# Patient Record
Sex: MALE | Race: WHITE | NOT HISPANIC OR LATINO | Employment: OTHER | URBAN - METROPOLITAN AREA
[De-identification: names, ages, dates, MRNs, and addresses within clinical notes are randomized per-mention and may not be internally consistent; named-entity substitution may affect disease eponyms.]

---

## 2017-03-06 ENCOUNTER — TRANSCRIBE ORDERS (OUTPATIENT)
Dept: LAB | Facility: CLINIC | Age: 67
End: 2017-03-06

## 2017-03-06 ENCOUNTER — APPOINTMENT (OUTPATIENT)
Dept: LAB | Facility: CLINIC | Age: 67
End: 2017-03-06
Payer: COMMERCIAL

## 2017-03-06 DIAGNOSIS — E78.5 HYPERLIPIDEMIA: ICD-10-CM

## 2017-03-06 DIAGNOSIS — M10.9 GOUT: ICD-10-CM

## 2017-03-06 DIAGNOSIS — R73.01 IMPAIRED FASTING GLUCOSE: ICD-10-CM

## 2017-03-06 DIAGNOSIS — I10 ESSENTIAL (PRIMARY) HYPERTENSION: ICD-10-CM

## 2017-03-06 LAB
ALBUMIN SERPL BCP-MCNC: 4 G/DL (ref 3.5–5)
ALP SERPL-CCNC: 90 U/L (ref 46–116)
ALT SERPL W P-5'-P-CCNC: 95 U/L (ref 12–78)
ANION GAP SERPL CALCULATED.3IONS-SCNC: 7 MMOL/L (ref 4–13)
AST SERPL W P-5'-P-CCNC: 49 U/L (ref 5–45)
BASOPHILS # BLD AUTO: 0.06 THOUSANDS/ΜL (ref 0–0.1)
BASOPHILS NFR BLD AUTO: 1 % (ref 0–1)
BILIRUB DIRECT SERPL-MCNC: 0.14 MG/DL (ref 0–0.2)
BILIRUB SERPL-MCNC: 0.56 MG/DL (ref 0.2–1)
BUN SERPL-MCNC: 19 MG/DL (ref 5–25)
CALCIUM SERPL-MCNC: 9.3 MG/DL (ref 8.3–10.1)
CHLORIDE SERPL-SCNC: 103 MMOL/L (ref 100–108)
CHOLEST SERPL-MCNC: 171 MG/DL (ref 50–200)
CO2 SERPL-SCNC: 31 MMOL/L (ref 21–32)
CREAT SERPL-MCNC: 1.11 MG/DL (ref 0.6–1.3)
EOSINOPHIL # BLD AUTO: 0.2 THOUSAND/ΜL (ref 0–0.61)
EOSINOPHIL NFR BLD AUTO: 3 % (ref 0–6)
ERYTHROCYTE [DISTWIDTH] IN BLOOD BY AUTOMATED COUNT: 13 % (ref 11.6–15.1)
EST. AVERAGE GLUCOSE BLD GHB EST-MCNC: 120 MG/DL
GFR SERPL CREATININE-BSD FRML MDRD: >60 ML/MIN/1.73SQ M
GLUCOSE SERPL-MCNC: 89 MG/DL (ref 65–140)
HBA1C MFR BLD: 5.8 % (ref 4.2–6.3)
HCT VFR BLD AUTO: 41 % (ref 36.5–49.3)
HDLC SERPL-MCNC: 40 MG/DL (ref 40–60)
HGB BLD-MCNC: 14.2 G/DL (ref 12–17)
LDLC SERPL CALC-MCNC: 60 MG/DL (ref 0–100)
LYMPHOCYTES # BLD AUTO: 2.29 THOUSANDS/ΜL (ref 0.6–4.47)
LYMPHOCYTES NFR BLD AUTO: 36 % (ref 14–44)
MCH RBC QN AUTO: 32.6 PG (ref 26.8–34.3)
MCHC RBC AUTO-ENTMCNC: 34.6 G/DL (ref 31.4–37.4)
MCV RBC AUTO: 94 FL (ref 82–98)
MONOCYTES # BLD AUTO: 0.49 THOUSAND/ΜL (ref 0.17–1.22)
MONOCYTES NFR BLD AUTO: 8 % (ref 4–12)
NEUTROPHILS # BLD AUTO: 3.27 THOUSANDS/ΜL (ref 1.85–7.62)
NEUTS SEG NFR BLD AUTO: 52 % (ref 43–75)
NRBC BLD AUTO-RTO: 0 /100 WBCS
PLATELET # BLD AUTO: 188 THOUSANDS/UL (ref 149–390)
PMV BLD AUTO: 11.2 FL (ref 8.9–12.7)
POTASSIUM SERPL-SCNC: 4 MMOL/L (ref 3.5–5.3)
PROT SERPL-MCNC: 7.2 G/DL (ref 6.4–8.2)
RBC # BLD AUTO: 4.35 MILLION/UL (ref 3.88–5.62)
SODIUM SERPL-SCNC: 141 MMOL/L (ref 136–145)
TRIGL SERPL-MCNC: 357 MG/DL
URATE SERPL-MCNC: 6.7 MG/DL (ref 4.2–8)
WBC # BLD AUTO: 6.33 THOUSAND/UL (ref 4.31–10.16)

## 2017-03-06 PROCEDURE — 83036 HEMOGLOBIN GLYCOSYLATED A1C: CPT

## 2017-03-06 PROCEDURE — 80061 LIPID PANEL: CPT

## 2017-03-06 PROCEDURE — 80076 HEPATIC FUNCTION PANEL: CPT

## 2017-03-06 PROCEDURE — 36415 COLL VENOUS BLD VENIPUNCTURE: CPT

## 2017-03-06 PROCEDURE — 84550 ASSAY OF BLOOD/URIC ACID: CPT

## 2017-03-06 PROCEDURE — 85025 COMPLETE CBC W/AUTO DIFF WBC: CPT

## 2017-03-06 PROCEDURE — 80048 BASIC METABOLIC PNL TOTAL CA: CPT

## 2017-03-17 ENCOUNTER — ALLSCRIPTS OFFICE VISIT (OUTPATIENT)
Dept: OTHER | Facility: OTHER | Age: 67
End: 2017-03-17

## 2017-09-18 DIAGNOSIS — M25.561 PAIN IN RIGHT KNEE: ICD-10-CM

## 2017-09-18 DIAGNOSIS — M10.9 GOUT: ICD-10-CM

## 2017-09-18 DIAGNOSIS — R73.01 IMPAIRED FASTING GLUCOSE: ICD-10-CM

## 2017-09-18 DIAGNOSIS — Z12.5 ENCOUNTER FOR SCREENING FOR MALIGNANT NEOPLASM OF PROSTATE: ICD-10-CM

## 2017-09-18 DIAGNOSIS — M25.562 PAIN IN LEFT KNEE: ICD-10-CM

## 2017-09-18 DIAGNOSIS — R74.8 ABNORMAL LEVELS OF OTHER SERUM ENZYMES: ICD-10-CM

## 2017-09-18 DIAGNOSIS — E78.5 HYPERLIPIDEMIA: ICD-10-CM

## 2017-09-23 ENCOUNTER — APPOINTMENT (OUTPATIENT)
Dept: LAB | Facility: CLINIC | Age: 67
End: 2017-09-23
Payer: COMMERCIAL

## 2017-09-23 DIAGNOSIS — E78.5 HYPERLIPIDEMIA: ICD-10-CM

## 2017-09-23 DIAGNOSIS — R73.01 IMPAIRED FASTING GLUCOSE: ICD-10-CM

## 2017-09-23 DIAGNOSIS — M10.9 GOUT: ICD-10-CM

## 2017-09-23 DIAGNOSIS — Z12.5 ENCOUNTER FOR SCREENING FOR MALIGNANT NEOPLASM OF PROSTATE: ICD-10-CM

## 2017-09-23 LAB
ALBUMIN SERPL BCP-MCNC: 3.9 G/DL (ref 3.5–5)
ALP SERPL-CCNC: 100 U/L (ref 46–116)
ALT SERPL W P-5'-P-CCNC: 125 U/L (ref 12–78)
ANION GAP SERPL CALCULATED.3IONS-SCNC: 7 MMOL/L (ref 4–13)
AST SERPL W P-5'-P-CCNC: 58 U/L (ref 5–45)
BILIRUB SERPL-MCNC: 0.51 MG/DL (ref 0.2–1)
BUN SERPL-MCNC: 18 MG/DL (ref 5–25)
CALCIUM SERPL-MCNC: 8.9 MG/DL (ref 8.3–10.1)
CHLORIDE SERPL-SCNC: 105 MMOL/L (ref 100–108)
CHOLEST SERPL-MCNC: 170 MG/DL (ref 50–200)
CO2 SERPL-SCNC: 26 MMOL/L (ref 21–32)
CREAT SERPL-MCNC: 1.11 MG/DL (ref 0.6–1.3)
ERYTHROCYTE [DISTWIDTH] IN BLOOD BY AUTOMATED COUNT: 13.1 % (ref 11.6–15.1)
EST. AVERAGE GLUCOSE BLD GHB EST-MCNC: 128 MG/DL
GFR SERPL CREATININE-BSD FRML MDRD: 69 ML/MIN/1.73SQ M
GLUCOSE P FAST SERPL-MCNC: 115 MG/DL (ref 65–99)
HBA1C MFR BLD: 6.1 % (ref 4.2–6.3)
HCT VFR BLD AUTO: 42.6 % (ref 36.5–49.3)
HDLC SERPL-MCNC: 37 MG/DL (ref 40–60)
HGB BLD-MCNC: 14.5 G/DL (ref 12–17)
LDLC SERPL CALC-MCNC: 82 MG/DL (ref 0–100)
MCH RBC QN AUTO: 32 PG (ref 26.8–34.3)
MCHC RBC AUTO-ENTMCNC: 34 G/DL (ref 31.4–37.4)
MCV RBC AUTO: 94 FL (ref 82–98)
PLATELET # BLD AUTO: 194 THOUSANDS/UL (ref 149–390)
PMV BLD AUTO: 11.1 FL (ref 8.9–12.7)
POTASSIUM SERPL-SCNC: 3.9 MMOL/L (ref 3.5–5.3)
PROT SERPL-MCNC: 7.4 G/DL (ref 6.4–8.2)
PSA SERPL-MCNC: 1.2 NG/ML (ref 0–4)
RBC # BLD AUTO: 4.53 MILLION/UL (ref 3.88–5.62)
SODIUM SERPL-SCNC: 138 MMOL/L (ref 136–145)
TRIGL SERPL-MCNC: 257 MG/DL
TSH SERPL DL<=0.05 MIU/L-ACNC: 3.1 UIU/ML (ref 0.36–3.74)
URATE SERPL-MCNC: 7 MG/DL (ref 4.2–8)
WBC # BLD AUTO: 4.61 THOUSAND/UL (ref 4.31–10.16)

## 2017-09-23 PROCEDURE — 85027 COMPLETE CBC AUTOMATED: CPT

## 2017-09-23 PROCEDURE — 80053 COMPREHEN METABOLIC PANEL: CPT

## 2017-09-23 PROCEDURE — 80061 LIPID PANEL: CPT

## 2017-09-23 PROCEDURE — 84443 ASSAY THYROID STIM HORMONE: CPT

## 2017-09-23 PROCEDURE — 83036 HEMOGLOBIN GLYCOSYLATED A1C: CPT

## 2017-09-23 PROCEDURE — 36415 COLL VENOUS BLD VENIPUNCTURE: CPT

## 2017-09-23 PROCEDURE — 84550 ASSAY OF BLOOD/URIC ACID: CPT

## 2017-09-23 PROCEDURE — G0103 PSA SCREENING: HCPCS

## 2017-09-27 ENCOUNTER — GENERIC CONVERSION - ENCOUNTER (OUTPATIENT)
Dept: OTHER | Facility: OTHER | Age: 67
End: 2017-09-27

## 2017-09-29 ENCOUNTER — APPOINTMENT (OUTPATIENT)
Dept: LAB | Facility: CLINIC | Age: 67
End: 2017-09-29
Payer: COMMERCIAL

## 2017-09-29 ENCOUNTER — ALLSCRIPTS OFFICE VISIT (OUTPATIENT)
Dept: OTHER | Facility: OTHER | Age: 67
End: 2017-09-29

## 2017-09-29 ENCOUNTER — APPOINTMENT (OUTPATIENT)
Dept: RADIOLOGY | Facility: CLINIC | Age: 67
End: 2017-09-29
Payer: COMMERCIAL

## 2017-09-29 DIAGNOSIS — R74.8 ABNORMAL LEVELS OF OTHER SERUM ENZYMES: ICD-10-CM

## 2017-09-29 DIAGNOSIS — M25.561 PAIN IN RIGHT KNEE: ICD-10-CM

## 2017-09-29 DIAGNOSIS — M25.562 PAIN IN LEFT KNEE: ICD-10-CM

## 2017-09-29 LAB
FERRITIN SERPL-MCNC: 164 NG/ML (ref 8–388)
GGT SERPL-CCNC: 121 U/L (ref 5–85)

## 2017-09-29 PROCEDURE — 82728 ASSAY OF FERRITIN: CPT

## 2017-09-29 PROCEDURE — 86803 HEPATITIS C AB TEST: CPT

## 2017-09-29 PROCEDURE — 86235 NUCLEAR ANTIGEN ANTIBODY: CPT

## 2017-09-29 PROCEDURE — 86255 FLUORESCENT ANTIBODY SCREEN: CPT

## 2017-09-29 PROCEDURE — 82977 ASSAY OF GGT: CPT

## 2017-09-29 PROCEDURE — 86704 HEP B CORE ANTIBODY TOTAL: CPT

## 2017-09-29 PROCEDURE — 87340 HEPATITIS B SURFACE AG IA: CPT

## 2017-09-29 PROCEDURE — 83516 IMMUNOASSAY NONANTIBODY: CPT

## 2017-09-29 PROCEDURE — 86256 FLUORESCENT ANTIBODY TITER: CPT

## 2017-09-29 PROCEDURE — 36415 COLL VENOUS BLD VENIPUNCTURE: CPT

## 2017-09-29 PROCEDURE — 82784 ASSAY IGA/IGD/IGG/IGM EACH: CPT

## 2017-09-29 PROCEDURE — 73562 X-RAY EXAM OF KNEE 3: CPT

## 2017-09-29 PROCEDURE — 86705 HEP B CORE ANTIBODY IGM: CPT

## 2017-09-30 LAB
ACTIN IGG SERPL-ACNC: 3 UNITS (ref 0–19)
MITOCHONDRIA M2 IGG SER-ACNC: 8.3 UNITS (ref 0–20)

## 2017-10-01 LAB
HBV CORE AB SER QL: NORMAL
HBV CORE IGM SER QL: NORMAL
HBV SURFACE AG SER QL: NORMAL
HCV AB SER QL: NORMAL

## 2017-10-02 LAB
ENDOMYSIUM IGA SER QL: NEGATIVE
GLIADIN PEPTIDE IGA SER-ACNC: 9 UNITS (ref 0–19)
GLIADIN PEPTIDE IGG SER-ACNC: 3 UNITS (ref 0–19)
IGA SERPL-MCNC: 215 MG/DL (ref 61–437)
TTG IGA SER-ACNC: <2 U/ML (ref 0–3)
TTG IGG SER-ACNC: <2 U/ML (ref 0–5)

## 2017-10-03 ENCOUNTER — HOSPITAL ENCOUNTER (OUTPATIENT)
Dept: ULTRASOUND IMAGING | Facility: CLINIC | Age: 67
Discharge: HOME | End: 2017-10-03
Payer: COMMERCIAL

## 2017-10-03 DIAGNOSIS — R74.8 ABNORMAL LEVELS OF OTHER SERUM ENZYMES: ICD-10-CM

## 2017-10-03 PROCEDURE — 76705 ECHO EXAM OF ABDOMEN: CPT

## 2017-10-05 ENCOUNTER — GENERIC CONVERSION - ENCOUNTER (OUTPATIENT)
Dept: OTHER | Facility: OTHER | Age: 67
End: 2017-10-05

## 2017-11-29 ENCOUNTER — GENERIC CONVERSION - ENCOUNTER (OUTPATIENT)
Dept: OTHER | Facility: OTHER | Age: 67
End: 2017-11-29

## 2018-01-12 VITALS
HEART RATE: 80 BPM | WEIGHT: 227.5 LBS | HEIGHT: 68 IN | SYSTOLIC BLOOD PRESSURE: 132 MMHG | BODY MASS INDEX: 34.48 KG/M2 | DIASTOLIC BLOOD PRESSURE: 82 MMHG

## 2018-01-14 VITALS
RESPIRATION RATE: 12 BRPM | HEIGHT: 68 IN | HEART RATE: 80 BPM | DIASTOLIC BLOOD PRESSURE: 100 MMHG | SYSTOLIC BLOOD PRESSURE: 150 MMHG | BODY MASS INDEX: 34.58 KG/M2 | WEIGHT: 228.13 LBS

## 2018-02-02 DIAGNOSIS — I10 ESSENTIAL HYPERTENSION: Primary | ICD-10-CM

## 2018-02-02 RX ORDER — AMLODIPINE BESYLATE AND ATORVASTATIN CALCIUM 5; 10 MG/1; MG/1
TABLET, FILM COATED ORAL
Qty: 30 TABLET | Refills: 2 | Status: SHIPPED | OUTPATIENT
Start: 2018-02-02 | End: 2018-04-28 | Stop reason: SDUPTHER

## 2018-03-01 DIAGNOSIS — M10.9 GOUT: ICD-10-CM

## 2018-03-01 DIAGNOSIS — I10 ESSENTIAL (PRIMARY) HYPERTENSION: ICD-10-CM

## 2018-03-01 DIAGNOSIS — R73.01 IMPAIRED FASTING GLUCOSE: ICD-10-CM

## 2018-03-01 DIAGNOSIS — E78.5 HYPERLIPIDEMIA: ICD-10-CM

## 2018-03-04 DIAGNOSIS — M10.9 GOUT: Primary | ICD-10-CM

## 2018-03-05 RX ORDER — COLCHICINE 0.6 MG/1
TABLET ORAL
Qty: 30 TABLET | Refills: 1 | Status: SHIPPED | OUTPATIENT
Start: 2018-03-05 | End: 2018-05-08 | Stop reason: SDUPTHER

## 2018-03-06 ENCOUNTER — APPOINTMENT (OUTPATIENT)
Dept: LAB | Facility: CLINIC | Age: 68
End: 2018-03-06
Payer: COMMERCIAL

## 2018-03-06 DIAGNOSIS — R73.01 IMPAIRED FASTING GLUCOSE: ICD-10-CM

## 2018-03-06 DIAGNOSIS — M10.9 GOUT: ICD-10-CM

## 2018-03-06 DIAGNOSIS — E78.5 HYPERLIPIDEMIA: ICD-10-CM

## 2018-03-06 DIAGNOSIS — I10 ESSENTIAL (PRIMARY) HYPERTENSION: ICD-10-CM

## 2018-03-06 LAB
ALBUMIN SERPL BCP-MCNC: 4.1 G/DL (ref 3.5–5)
ALP SERPL-CCNC: 78 U/L (ref 46–116)
ALT SERPL W P-5'-P-CCNC: 85 U/L (ref 12–78)
ANION GAP SERPL CALCULATED.3IONS-SCNC: 8 MMOL/L (ref 4–13)
AST SERPL W P-5'-P-CCNC: 41 U/L (ref 5–45)
BASOPHILS # BLD AUTO: 0.04 THOUSANDS/ΜL (ref 0–0.1)
BASOPHILS NFR BLD AUTO: 1 % (ref 0–1)
BILIRUB DIRECT SERPL-MCNC: 0.15 MG/DL (ref 0–0.2)
BILIRUB SERPL-MCNC: 0.69 MG/DL (ref 0.2–1)
BUN SERPL-MCNC: 18 MG/DL (ref 5–25)
CALCIUM SERPL-MCNC: 9.2 MG/DL (ref 8.3–10.1)
CHLORIDE SERPL-SCNC: 107 MMOL/L (ref 100–108)
CHOLEST SERPL-MCNC: 173 MG/DL (ref 50–200)
CO2 SERPL-SCNC: 27 MMOL/L (ref 21–32)
CREAT SERPL-MCNC: 1.19 MG/DL (ref 0.6–1.3)
EOSINOPHIL # BLD AUTO: 0.24 THOUSAND/ΜL (ref 0–0.61)
EOSINOPHIL NFR BLD AUTO: 4 % (ref 0–6)
ERYTHROCYTE [DISTWIDTH] IN BLOOD BY AUTOMATED COUNT: 13.4 % (ref 11.6–15.1)
EST. AVERAGE GLUCOSE BLD GHB EST-MCNC: 120 MG/DL
GFR SERPL CREATININE-BSD FRML MDRD: 64 ML/MIN/1.73SQ M
GLUCOSE P FAST SERPL-MCNC: 111 MG/DL (ref 65–99)
HBA1C MFR BLD: 5.8 % (ref 4.2–6.3)
HCT VFR BLD AUTO: 41.5 % (ref 36.5–49.3)
HDLC SERPL-MCNC: 43 MG/DL (ref 40–60)
HGB BLD-MCNC: 14.4 G/DL (ref 12–17)
LDLC SERPL CALC-MCNC: 89 MG/DL (ref 0–100)
LYMPHOCYTES # BLD AUTO: 1.79 THOUSANDS/ΜL (ref 0.6–4.47)
LYMPHOCYTES NFR BLD AUTO: 33 % (ref 14–44)
MCH RBC QN AUTO: 32.4 PG (ref 26.8–34.3)
MCHC RBC AUTO-ENTMCNC: 34.7 G/DL (ref 31.4–37.4)
MCV RBC AUTO: 94 FL (ref 82–98)
MONOCYTES # BLD AUTO: 0.32 THOUSAND/ΜL (ref 0.17–1.22)
MONOCYTES NFR BLD AUTO: 6 % (ref 4–12)
NEUTROPHILS # BLD AUTO: 3.05 THOUSANDS/ΜL (ref 1.85–7.62)
NEUTS SEG NFR BLD AUTO: 56 % (ref 43–75)
NRBC BLD AUTO-RTO: 0 /100 WBCS
PLATELET # BLD AUTO: 190 THOUSANDS/UL (ref 149–390)
PMV BLD AUTO: 11.8 FL (ref 8.9–12.7)
POTASSIUM SERPL-SCNC: 3.8 MMOL/L (ref 3.5–5.3)
PROT SERPL-MCNC: 7.4 G/DL (ref 6.4–8.2)
RBC # BLD AUTO: 4.44 MILLION/UL (ref 3.88–5.62)
SODIUM SERPL-SCNC: 142 MMOL/L (ref 136–145)
TRIGL SERPL-MCNC: 207 MG/DL
URATE SERPL-MCNC: 7.6 MG/DL (ref 4.2–8)
WBC # BLD AUTO: 5.45 THOUSAND/UL (ref 4.31–10.16)

## 2018-03-06 PROCEDURE — 80076 HEPATIC FUNCTION PANEL: CPT

## 2018-03-06 PROCEDURE — 85025 COMPLETE CBC W/AUTO DIFF WBC: CPT

## 2018-03-06 PROCEDURE — 84550 ASSAY OF BLOOD/URIC ACID: CPT

## 2018-03-06 PROCEDURE — 36415 COLL VENOUS BLD VENIPUNCTURE: CPT

## 2018-03-06 PROCEDURE — 80048 BASIC METABOLIC PNL TOTAL CA: CPT

## 2018-03-06 PROCEDURE — 83036 HEMOGLOBIN GLYCOSYLATED A1C: CPT

## 2018-03-06 PROCEDURE — 80061 LIPID PANEL: CPT

## 2018-03-07 NOTE — PROGRESS NOTES
History of Present Illness    Revaccination   Vaccine Information: Vaccine(s) Given (names): Forbes Severin [id number]  Unable to reach by phone  Spoke with patient regarding vaccine out of temperature range  Action(s): Pt will be revaccinated  Pt called (attempt 1): [phone] sd  Other Information: patient returned call and scheduled appt tomorrow  [phone] sd  Revaccination Completed: [id number]  Active Problems    1  Asthma (493 90) (J45 909)   2  Benign essential hypertension (401 1) (I10)   3  BPH without urinary obstruction (600 00) (N40 0)   4  Depression (311) (F32 9)   5  Fever (780 60) (R50 9)   6  Gout (274 9) (M10 9)   7  Hyperlipidemia (272 4) (E78 5)   8  Impaired fasting glucose (790 21) (R73 01)   9  Need for influenza vaccination (V04 81) (Z23)   10  Need for prophylactic vaccination and inoculation against influenza (V04 81) (Z23)   11  Need for revaccination (V05 9) (Z23)   12  Need for Tdap vaccination (V06 1) (Z23)   13  Need for vaccination (V05 9) (Z23)   14  Need for zoster vaccine (V04 89) (Z23)   15  Prostate cancer screening (V76 44) (Z12 5)   16  Tubular adenoma of colon (211 3) (D12 6)    Immunizations  Influenza --- Verl Nones: [id number] Lynette: 30-Oct-2015   PPSV --- Verl Nones: Never   Tdap --- Verl Nones: 02-Nov-2015; [facility]: Unknown   Zoster --- Series1: Never     Current Meds   1  Amlodipine-Atorvastatin 5-10 MG Oral Tablet; Take 1 tablet daily   2  Aspir-81 81 MG Oral Tablet Delayed Release; Take 1 tablet daily   3  BusPIRone HCl - 15 MG Oral Tablet; Take 1 tablet twice daily   4  ClonazePAM 1 MG Oral Tablet; take 1 tablet daily as needed   5  CloNIDine HCl - 0 1 MG Oral Tablet; TAKE 1 TABLET AT BEDTIME   6  Colchicine 0 6 MG Oral Tablet; TAKE 1 TABLET BY MOUTH 1-3 TIMES DAILY AS NEEDED   7  Lisinopril 20 MG Oral Tablet; take 1 tablet by mouth every day   8  Montelukast Sodium 10 MG Oral Tablet; TAKE 1 TABLET DAILY AS DIRECTED   9  SEROquel 100 MG Oral Tablet;  Take 1qam 2 q hs    Allergies    1  FLU    Future Appointments    Date/Time Provider Specialty Site   03/10/2017 10:15 AM NISSA Sims   Internal Medicine [facility]     Signatures   Electronically signed by : NISSA Reese ; Dec 21 2016 10:57AM EST

## 2018-03-16 DIAGNOSIS — I10 ESSENTIAL HYPERTENSION: Primary | ICD-10-CM

## 2018-03-16 RX ORDER — LISINOPRIL 20 MG/1
TABLET ORAL
Qty: 30 TABLET | Refills: 2 | Status: SHIPPED | OUTPATIENT
Start: 2018-03-16 | End: 2018-06-15 | Stop reason: SDUPTHER

## 2018-03-21 RX ORDER — CLONIDINE HYDROCHLORIDE 0.1 MG/1
1 TABLET ORAL
COMMUNITY
End: 2018-11-27

## 2018-03-21 RX ORDER — BUSPIRONE HYDROCHLORIDE 15 MG/1
1 TABLET ORAL DAILY
COMMUNITY
End: 2020-03-24

## 2018-03-21 RX ORDER — QUETIAPINE FUMARATE 100 MG/1
100 TABLET, FILM COATED ORAL DAILY
COMMUNITY
End: 2020-03-24

## 2018-03-21 RX ORDER — ASPIRIN 81 MG/1
1 TABLET ORAL DAILY
COMMUNITY

## 2018-03-21 RX ORDER — MONTELUKAST SODIUM 10 MG/1
1 TABLET ORAL DAILY
COMMUNITY
Start: 2014-10-09 | End: 2022-05-18

## 2018-03-22 ENCOUNTER — OFFICE VISIT (OUTPATIENT)
Dept: INTERNAL MEDICINE CLINIC | Facility: CLINIC | Age: 68
End: 2018-03-22
Payer: COMMERCIAL

## 2018-03-22 VITALS
DIASTOLIC BLOOD PRESSURE: 96 MMHG | HEART RATE: 104 BPM | BODY MASS INDEX: 34.4 KG/M2 | RESPIRATION RATE: 14 BRPM | SYSTOLIC BLOOD PRESSURE: 144 MMHG | WEIGHT: 227 LBS | HEIGHT: 68 IN

## 2018-03-22 DIAGNOSIS — R73.01 IMPAIRED FASTING GLUCOSE: ICD-10-CM

## 2018-03-22 DIAGNOSIS — M17.0 PRIMARY OSTEOARTHRITIS OF BOTH KNEES: ICD-10-CM

## 2018-03-22 DIAGNOSIS — I10 BENIGN ESSENTIAL HYPERTENSION: ICD-10-CM

## 2018-03-22 DIAGNOSIS — Z12.5 SCREENING FOR PROSTATE CANCER: ICD-10-CM

## 2018-03-22 DIAGNOSIS — R74.8 ELEVATED LIVER ENZYMES: ICD-10-CM

## 2018-03-22 DIAGNOSIS — M1A.9XX0 CHRONIC GOUT INVOLVING TOE OF RIGHT FOOT WITHOUT TOPHUS, UNSPECIFIED CAUSE: ICD-10-CM

## 2018-03-22 DIAGNOSIS — E78.2 MIXED HYPERLIPIDEMIA: ICD-10-CM

## 2018-03-22 DIAGNOSIS — J06.9 VIRAL URI WITH COUGH: Primary | ICD-10-CM

## 2018-03-22 DIAGNOSIS — N40.0 BPH WITHOUT URINARY OBSTRUCTION: ICD-10-CM

## 2018-03-22 PROCEDURE — 99215 OFFICE O/P EST HI 40 MIN: CPT | Performed by: INTERNAL MEDICINE

## 2018-03-22 PROCEDURE — 1101F PT FALLS ASSESS-DOCD LE1/YR: CPT | Performed by: INTERNAL MEDICINE

## 2018-03-22 RX ORDER — HYDROCHLOROTHIAZIDE 12.5 MG/1
12.5 TABLET ORAL DAILY
Qty: 30 TABLET | Refills: 5 | Status: SHIPPED | OUTPATIENT
Start: 2018-03-22 | End: 2018-09-12 | Stop reason: SDUPTHER

## 2018-03-22 NOTE — LETTER
April 4, 2018     Patient: Valerie Rush   YOB: 1950   Date of Visit: 3/22/2018       To Whom it May Concern:    Markel Cantu is under my professional care  He was seen in my office on 3/22/2018  He has upper respiratory illness  He may return to work on 3/26 without restriction  If you have any questions or concerns, please don't hesitate to call           Sincerely,          Ellis Tomas MD        CC: No Recipients

## 2018-03-22 NOTE — PROGRESS NOTES
Assessment/Plan:    No problem-specific Assessment & Plan notes found for this encounter  Diagnoses and all orders for this visit:    Viral URI with cough    Impaired fasting glucose  -     Hemoglobin A1c; Future    Benign essential hypertension  -     CBC; Future  -     Comprehensive metabolic panel; Future  -     hydrochlorothiazide (HYDRODIURIL) 12 5 mg tablet; Take 1 tablet (12 5 mg total) by mouth daily    BPH without urinary obstruction    Elevated liver enzymes    Mixed hyperlipidemia  -     Lipid panel; Future  -     TSH, 3rd generation with T4 reflex; Future    Chronic gout involving toe of right 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 tophus, unspecified cause  -     Uric acid; Future    Primary osteoarthritis of both knees  -     Ambulatory referral to Orthopedic Surgery; Future    Screening for prostate cancer  -     PSA; Future    Other orders  -     aspirin (ASPIR-81) 81 mg EC tablet; Take 1 tablet by mouth daily  -     busPIRone (BUSPAR) 15 mg tablet; Take 1 tablet by mouth 2 (two) times a day    -     cloNIDine (CATAPRES) 0 1 mg tablet; Take 1 tablet by mouth  -     montelukast (SINGULAIR) 10 mg tablet; Take 1 tablet by mouth daily  -     QUEtiapine (SEROQUEL) 100 mg tablet; Take 100 mg by mouth 2 (two) times a day    -     Hm Colonoscopy        Patient Instructions    Lab data reviewed in detail and compared prior     Cough and pharyngitis likely related to viral URI, cannot rule out influenza however outside of the treatment window therefore continue with supportive measures  Can try Mucinex DM over the counter, patient declines stronger prescription cough syrup at this time  Hypertension-suboptimally controlled- start back on hydrochlorothiazide 12 5 mg, continue monitor uric acid levels, keep well-hydrated, exercise as able, monitor home blood pressures, contact me for any recurrent gout flare     Gout has been stable with normal uric acid levels without recent flare    Monitor as above    Hyperlipidemia with increased triglycerides-slight improvement, no indication for medicationChange, continue on atorvastatin get back to exercise when able,     bilateral knee pain -x-rays  Reviewed indicating tricompartmental osteoarthritis on the right and medial compartment on the left -referral to orthopedic surgery  Routine follow-up after labs in 6 months, sooner as needed, PSA ordered, do not do before September 23rd to avoid charge  Health maintenance- due for pneumonia vaccination however will avoid today due to upper respiratory infection  Colonoscopy reviewed from May 5, 2015 with 1 solitary 7 mm polyp removed, recommendation was 5 year follow-up which would be May of 2020  Subjective:      Patient ID: Erinn Melendez is a 74 y o  male  Notes acute onset cough, chills, st, myalgias w/o fever 4-5 d ago  Getting better, using tylenol  HTN-home bp's 140-160/  No regular exercise d/t knee pain, occasional walks  B/l knee pain limits activity, hasn't seen orhto, no help w/ PT  Gout-no flares, he continues w/ colchicine daily  The following portions of the patient's history were reviewed and updated as appropriate: allergies, current medications, past family history, past medical history, past social history, past surgical history and problem list     Review of Systems   Constitutional: Negative for appetite change, chills, diaphoresis, fatigue, fever and unexpected weight change  HENT: Positive for sore throat  Negative for congestion, hearing loss and rhinorrhea  Eyes: Negative for visual disturbance  Respiratory: Positive for cough  Negative for chest tightness, shortness of breath and wheezing  Cardiovascular: Negative for chest pain, palpitations and leg swelling  Gastrointestinal: Negative for abdominal pain and blood in stool  Endocrine: Negative for cold intolerance, heat intolerance, polydipsia and polyuria     Genitourinary: Negative for difficulty urinating, dysuria, frequency and urgency  Musculoskeletal: Positive for arthralgias  Negative for myalgias  Skin: Negative for rash  Neurological: Negative for dizziness, weakness, light-headedness and headaches  Hematological: Does not bruise/bleed easily  Psychiatric/Behavioral: Negative for dysphoric mood and sleep disturbance  Objective:      /90 (BP Location: Left arm, Patient Position: Sitting)   Pulse 104   Resp 14   Ht 5' 8" (1 727 m)   Wt 103 kg (227 lb)   BMI 34 52 kg/m²          Physical Exam   Constitutional: He is oriented to person, place, and time  He appears well-developed and well-nourished  HENT:   Head: Normocephalic and atraumatic  Nose: Nose normal    Mouth/Throat: Oropharynx is clear and moist  No oropharyngeal exudate  Soft palate and uvula are erythematous without exudate   Eyes: Conjunctivae and EOM are normal  Pupils are equal, round, and reactive to light  No scleral icterus  Neck: Normal range of motion  Neck supple  No JVD present  No tracheal deviation present  No thyromegaly present  Cardiovascular: Normal rate, regular rhythm and intact distal pulses  Exam reveals no gallop and no friction rub  No murmur heard  Pulmonary/Chest: Effort normal and breath sounds normal  No respiratory distress  He has no wheezes  He has no rales  Abdominal: Soft  Bowel sounds are normal    Musculoskeletal: He exhibits no edema or tenderness  Lymphadenopathy:     He has no cervical adenopathy  Neurological: He is alert and oriented to person, place, and time  No cranial nerve deficit  Skin: Skin is warm and dry  No rash noted  No erythema  Psychiatric: He has a normal mood and affect   His behavior is normal  Judgment and thought content normal

## 2018-03-22 NOTE — PATIENT INSTRUCTIONS
Lab data reviewed in detail and compared prior     Cough and pharyngitis likely related to viral URI, cannot rule out influenza however outside of the treatment window therefore continue with supportive measures  Can try Mucinex DM over the counter, patient declines stronger prescription cough syrup at this time  Hypertension-suboptimally controlled- start back on hydrochlorothiazide 12 5 mg, continue monitor uric acid levels, keep well-hydrated, exercise as able, monitor home blood pressures, contact me for any recurrent gout flare     Gout has been stable with normal uric acid levels without recent flare  Monitor as above    Hyperlipidemia with increased triglycerides-slight improvement, no indication for medicationChange, continue on atorvastatin get back to exercise when able,     bilateral knee pain -x-rays  Reviewed indicating tricompartmental osteoarthritis on the right and medial compartment on the left -referral to orthopedic surgery  Routine follow-up after labs in 6 months, sooner as needed, PSA ordered, do not do before September 23rd to avoid charge  Health maintenance- due for pneumonia vaccination however will avoid today due to upper respiratory infection  Colonoscopy reviewed from May 5, 2015 with 1 solitary 7 mm polyp removed, recommendation was 5 year follow-up which would be May of 2020

## 2018-03-27 ENCOUNTER — TELEPHONE (OUTPATIENT)
Dept: INTERNAL MEDICINE CLINIC | Facility: CLINIC | Age: 68
End: 2018-03-27

## 2018-03-27 NOTE — TELEPHONE ENCOUNTER
Pt cld asking if he could get a work note from his visit on 3/22? Please call pt and let him know and then mail letter to him  [phone]

## 2018-03-27 NOTE — LETTER
[facility] [address] [place] [address] [address]  [facility] [address]  Dept: [phone]    March 27, 2018     Patient: Afshin Bright   YOB: 1950   Date of Visit: 3/27/2018       To Whom it May Concern:    Markel Campo is under my professional care  He was seen in the office on 03/22/2018  If you have any questions or concerns, please don't hesitate to call           Sincerely,          Poonam Galindo MD

## 2018-04-04 ENCOUNTER — TELEPHONE (OUTPATIENT)
Dept: INTERNAL MEDICINE CLINIC | Facility: CLINIC | Age: 68
End: 2018-04-04

## 2018-04-04 NOTE — TELEPHONE ENCOUNTER
Pt saw Dr & had to be out of work, so now needs Dr's note stating the condition he had   which was upper respiratory infection   Also needs to state expected duration of illness    Which was a wk , & expected return to work date, which was 3/26/18 & because he was out for more then 4 days, it needs to state that he is released, by , to return to work, & needs to be signed by the Dr Marilyn Ugarte fax to PT @ [phone] & also mail copy to his home  Mirna Watson

## 2018-04-05 NOTE — TELEPHONE ENCOUNTER
Called pt and was notified have been trying to fax letter and fax machine is busy and notified has been mailed to him , stated they have awful fax machine as connected to printer- will continue to try to fax

## 2018-04-06 NOTE — TELEPHONE ENCOUNTER
Pt cld asking if we could please fax letter to [phone]  This is the correct fax#, please resend

## 2018-04-20 VITALS
DIASTOLIC BLOOD PRESSURE: 81 MMHG | SYSTOLIC BLOOD PRESSURE: 123 MMHG | WEIGHT: 215 LBS | HEART RATE: 87 BPM | HEIGHT: 70 IN | BODY MASS INDEX: 30.78 KG/M2

## 2018-04-20 DIAGNOSIS — M25.562 CHRONIC PAIN OF LEFT KNEE: ICD-10-CM

## 2018-04-20 DIAGNOSIS — M25.561 CHRONIC PAIN OF RIGHT KNEE: ICD-10-CM

## 2018-04-20 DIAGNOSIS — G89.29 CHRONIC PAIN OF RIGHT KNEE: ICD-10-CM

## 2018-04-20 DIAGNOSIS — G89.29 CHRONIC PAIN OF LEFT KNEE: ICD-10-CM

## 2018-04-20 DIAGNOSIS — M17.0 PRIMARY OSTEOARTHRITIS OF BOTH KNEES: Primary | ICD-10-CM

## 2018-04-20 PROCEDURE — 20610 DRAIN/INJ JOINT/BURSA W/O US: CPT

## 2018-04-20 PROCEDURE — 99203 OFFICE O/P NEW LOW 30 MIN: CPT | Performed by: ORTHOPAEDIC SURGERY

## 2018-04-20 RX ORDER — BUPIVACAINE HYDROCHLORIDE 2.5 MG/ML
2 INJECTION, SOLUTION INFILTRATION; PERINEURAL
Status: COMPLETED | OUTPATIENT
Start: 2018-04-20 | End: 2018-04-20

## 2018-04-20 RX ORDER — BETAMETHASONE SODIUM PHOSPHATE AND BETAMETHASONE ACETATE 3; 3 MG/ML; MG/ML
12 INJECTION, SUSPENSION INTRA-ARTICULAR; INTRALESIONAL; INTRAMUSCULAR; SOFT TISSUE
Status: COMPLETED | OUTPATIENT
Start: 2018-04-20 | End: 2018-04-20

## 2018-04-20 RX ORDER — LIDOCAINE HYDROCHLORIDE 10 MG/ML
2 INJECTION, SOLUTION INFILTRATION; PERINEURAL
Status: COMPLETED | OUTPATIENT
Start: 2018-04-20 | End: 2018-04-20

## 2018-04-20 RX ADMIN — LIDOCAINE HYDROCHLORIDE 2 ML: 10 INJECTION, SOLUTION INFILTRATION; PERINEURAL at 10:35

## 2018-04-20 RX ADMIN — BETAMETHASONE SODIUM PHOSPHATE AND BETAMETHASONE ACETATE 12 MG: 3; 3 INJECTION, SUSPENSION INTRA-ARTICULAR; INTRALESIONAL; INTRAMUSCULAR; SOFT TISSUE at 10:35

## 2018-04-20 RX ADMIN — BUPIVACAINE HYDROCHLORIDE 2 ML: 2.5 INJECTION, SOLUTION INFILTRATION; PERINEURAL at 10:35

## 2018-04-20 NOTE — PROGRESS NOTES
74 y o male presents for initial evaluation of his B/L knees due to pain left more than right  He started with discomfort some 10 yrs ago but increased last year  He had x-rays in Sept '17 and was referred to PT without relief  He has not taken any oral medications or had any injections  He likes to be active and cannot do so due to his knee pain  Review of Systems  Review of systems negative unless otherwise specified in HPI    Past Medical History  Past Medical History:   Diagnosis Date    Chronic kidney disease     Depression     Dyshidrosis     Hypertension     Osteoarthritis        Past Surgical History  Past Surgical History:   Procedure Laterality Date    COLONOSCOPY  02/25/2010    HEMORRHOID SURGERY      TONSILLECTOMY         Current Medications  Current Outpatient Prescriptions on File Prior to Visit   Medication Sig Dispense Refill    amLODIPine-atorvastatin (CADUET) 5-10 MG per tablet TAKE 1 TABLET DAILY 30 tablet 2    aspirin (ASPIR-81) 81 mg EC tablet Take 1 tablet by mouth daily      busPIRone (BUSPAR) 15 mg tablet Take 1 tablet by mouth 2 (two) times a day        cloNIDine (CATAPRES) 0 1 mg tablet Take 1 tablet by mouth      colchicine (COLCRYS) 0 6 mg tablet TAKE 1 TABLET BY MOUTH 1-3 TIMES DAILY AS NEEDED (Patient taking differently: daily) 30 tablet 1    hydrochlorothiazide (HYDRODIURIL) 12 5 mg tablet Take 1 tablet (12 5 mg total) by mouth daily 30 tablet 5    lisinopril (ZESTRIL) 20 mg tablet TAKE 1 TABLET EVERY DAY 30 tablet 2    montelukast (SINGULAIR) 10 mg tablet Take 1 tablet by mouth daily      QUEtiapine (SEROQUEL) 100 mg tablet Take 100 mg by mouth 2 (two) times a day         No current facility-administered medications on file prior to visit          Recent Labs [facility])    0  Lab Value Date/Time   HCT 41 5 03/06/2018 0754   HCT 40 5 09/25/2015 0820   HGB 14 4 03/06/2018 0754   HGB 14 2 09/25/2015 0820   WBC 5 45 03/06/2018 0754   WBC 5 06 09/25/2015 0820   ESR 6 09/09/2016 1010   GLUCOSE 89 03/06/2017 1613   GLUCOSE 103 09/25/2015 0820   HGBA1C 5 8 03/06/2018 0754   HGBA1C 5 5 09/25/2015 0820         Physical exam  · General: Awake, Alert, Oriented  · Eyes: Pupils equal, round and reactive to light  · Heart: regular rate and rhythm  · Lungs: No audible wheezing  · Abdomen: soft  bilateral Knee exam mild varus alignment , good STLT NVID B/L  · Left Knee: no swelling or effusion, no warmth, bony enlargement medial proximal tibia, moderate crepitus with ROM, medial joint line tenderness    · No ligamentous laxity, good quad tone and strength  ·   · Right Knee: no swelling or effusion, no warmth, bony enlargement medial proximal tibia, moderate crepitus with ROM, medial joint line tenderness  No ligamentous laxity, good quad tone and strength  ·     Procedure  Large joint arthrocentesis  Date/Time: 4/20/2018 10:35 AM  Consent given by: patient  Site marked: site marked  Supporting Documentation  Indications: pain   Procedure Details  Location: knee - R knee  Preparation: Patient was prepped and draped in the usual sterile fashion  Needle size: 22 G  Ultrasound guidance: no  Approach: anteromedial  Medications administered: 2 mL bupivacaine 0 25 %; 2 mL lidocaine 1 %; 12 mg betamethasone acetate-betamethasone sodium phosphate 6 (3-3) mg/mL    Patient tolerance: patient tolerated the procedure well with no immediate complications  Dressing:  Sterile dressing applied  Large joint arthrocentesis  Date/Time: 4/20/2018 10:35 AM  Consent given by: patient  Site marked: site marked  Supporting Documentation  Indications: pain   Procedure Details  Location: knee - L knee  Preparation: Patient was prepped and draped in the usual sterile fashion  Needle size: 22 G  Ultrasound guidance: no  Approach: anteromedial  Medications administered: 2 mL bupivacaine 0 25 %; 2 mL lidocaine 1 %; 12 mg betamethasone acetate-betamethasone sodium phosphate 6 (3-3) mg/mL    Patient tolerance: patient tolerated the procedure well with no immediate complications  Dressing:  Sterile dressing applied          Imaging  X-rays of both knees from 9/29/17 reviewed with the patient today in the exam room :   Left Knee: moderate medial joint space narrowing with spurring medially, moderate patellofemoral DJD, mild lateral joint space narrowing  Right Knee: moderate medial joint space narrowing with spurring medially, moderate patellofemoral DJD, mild lateral joint space narrowing    ASSESSMENT  74yo male with B/L knee moderate Oa, offered cortisone injections  PLAN  Both knees injected today  ICE over injection sites    WBAT  Activities as tolerated  Re-check in 6 weeks

## 2018-04-28 DIAGNOSIS — I10 ESSENTIAL HYPERTENSION: ICD-10-CM

## 2018-04-30 RX ORDER — AMLODIPINE BESYLATE AND ATORVASTATIN CALCIUM 5; 10 MG/1; MG/1
TABLET, FILM COATED ORAL
Qty: 30 TABLET | Refills: 2 | Status: SHIPPED | OUTPATIENT
Start: 2018-04-30 | End: 2018-07-26 | Stop reason: SDUPTHER

## 2018-05-08 DIAGNOSIS — M10.9 GOUT: ICD-10-CM

## 2018-05-08 RX ORDER — COLCHICINE 0.6 MG/1
TABLET ORAL
Qty: 30 TABLET | Refills: 1 | Status: SHIPPED | OUTPATIENT
Start: 2018-05-08 | End: 2018-06-15 | Stop reason: SDUPTHER

## 2018-06-08 ENCOUNTER — OFFICE VISIT (OUTPATIENT)
Dept: OBGYN CLINIC | Facility: MEDICAL CENTER | Age: 68
End: 2018-06-08
Payer: COMMERCIAL

## 2018-06-08 VITALS
WEIGHT: 218 LBS | DIASTOLIC BLOOD PRESSURE: 84 MMHG | BODY MASS INDEX: 31.21 KG/M2 | HEART RATE: 88 BPM | HEIGHT: 70 IN | SYSTOLIC BLOOD PRESSURE: 126 MMHG

## 2018-06-08 DIAGNOSIS — G89.29 CHRONIC PAIN OF RIGHT KNEE: ICD-10-CM

## 2018-06-08 DIAGNOSIS — M25.562 CHRONIC PAIN OF LEFT KNEE: ICD-10-CM

## 2018-06-08 DIAGNOSIS — M25.561 CHRONIC PAIN OF RIGHT KNEE: ICD-10-CM

## 2018-06-08 DIAGNOSIS — G89.29 CHRONIC PAIN OF LEFT KNEE: ICD-10-CM

## 2018-06-08 DIAGNOSIS — M17.0 PRIMARY OSTEOARTHRITIS OF BOTH KNEES: Primary | ICD-10-CM

## 2018-06-08 PROCEDURE — 99213 OFFICE O/P EST LOW 20 MIN: CPT | Performed by: ORTHOPAEDIC SURGERY

## 2018-06-08 NOTE — PROGRESS NOTES
79 y o male returns today for evaluation if his B/L knees, known OA and chronic pain  He had both knees injected with cortisone 6 weeks ago with minimal short term relief  He hiked 4 miles last weekend and his knees were painful for a few days after his hike  He is seeking out a 2nd opinion with Dr Bolivar Das next week  Review of Systems  Review of systems negative unless otherwise specified in HPI    Past Medical History  Past Medical History:   Diagnosis Date    Chronic kidney disease     Depression     Dyshidrosis     Hypertension     Osteoarthritis        Past Surgical History  Past Surgical History:   Procedure Laterality Date    COLONOSCOPY  02/25/2010    HEMORRHOID SURGERY      TONSILLECTOMY         Current Medications  Current Outpatient Prescriptions on File Prior to Visit   Medication Sig Dispense Refill    amLODIPine-atorvastatin (CADUET) 5-10 MG per tablet TAKE 1 TABLET DAILY 30 tablet 2    aspirin (ASPIR-81) 81 mg EC tablet Take 1 tablet by mouth daily      busPIRone (BUSPAR) 15 mg tablet Take 1 tablet by mouth 2 (two) times a day        cloNIDine (CATAPRES) 0 1 mg tablet Take 1 tablet by mouth      colchicine (COLCRYS) 0 6 mg tablet TAKE 1 TABLET BY MOUTH 1-3 TIMES DAILY AS NEEDED 30 tablet 1    hydrochlorothiazide (HYDRODIURIL) 12 5 mg tablet Take 1 tablet (12 5 mg total) by mouth daily 30 tablet 5    lisinopril (ZESTRIL) 20 mg tablet TAKE 1 TABLET EVERY DAY 30 tablet 2    montelukast (SINGULAIR) 10 mg tablet Take 1 tablet by mouth daily      QUEtiapine (SEROQUEL) 100 mg tablet Take 100 mg by mouth 2 (two) times a day         No current facility-administered medications on file prior to visit          Recent Labs [facility])    0  Lab Value Date/Time   HCT 41 5 03/06/2018 0754   HCT 40 5 09/25/2015 0820   HGB 14 4 03/06/2018 0754   HGB 14 2 09/25/2015 0820   WBC 5 45 03/06/2018 0754   WBC 5 06 09/25/2015 0820   ESR 6 09/09/2016 1010   GLUCOSE 89 03/06/2017 1613 GLUCOSE 103 09/25/2015 0820   HGBA1C 5 8 03/06/2018 0754   HGBA1C 5 5 09/25/2015 0820         Physical exam  · General: Awake, Alert, Oriented  · Eyes: Pupils equal, round and reactive to light  · Heart: regular rate and rhythm  · Lungs: No audible wheezing  · Abdomen: soft    B/L knees:  Skin intact, good STLT  Mild swelling with trace effusion  No signs of infection  Varus alignment with bony enlargement medially  TTP medially  WNL ROM with moderate patellar crepitus and pain at full flexion  Mild laxity but no instability  NVID      Imaging  Previous x-rays revisited again today    Procedure  None indicated    Assessment/Plan:   68 y o male with B/L knee OA and chronic pain  Treatment options discussed, advised against an arthroscopy and given the stage of his arthritis questionable if he would get any long term benefit from [facility]  Quality of life decision to pursue a total knee surgery, staged    Risks and benefits discussed  WBAT

## 2018-06-15 DIAGNOSIS — I10 ESSENTIAL HYPERTENSION: ICD-10-CM

## 2018-06-15 DIAGNOSIS — M10.9 GOUT: ICD-10-CM

## 2018-06-15 RX ORDER — LISINOPRIL 20 MG/1
TABLET ORAL
Qty: 30 TABLET | Refills: 2 | Status: SHIPPED | OUTPATIENT
Start: 2018-06-15 | End: 2019-01-04 | Stop reason: SDUPTHER

## 2018-06-15 RX ORDER — COLCHICINE 0.6 MG/1
TABLET ORAL
Qty: 30 TABLET | Refills: 1 | Status: SHIPPED | OUTPATIENT
Start: 2018-06-15 | End: 2018-08-09 | Stop reason: SDUPTHER

## 2018-06-20 ENCOUNTER — TELEPHONE (OUTPATIENT)
Dept: INTERNAL MEDICINE CLINIC | Facility: CLINIC | Age: 68
End: 2018-06-20

## 2018-07-26 DIAGNOSIS — I10 ESSENTIAL HYPERTENSION: ICD-10-CM

## 2018-07-26 RX ORDER — AMLODIPINE BESYLATE AND ATORVASTATIN CALCIUM 5; 10 MG/1; MG/1
TABLET, FILM COATED ORAL
Qty: 30 TABLET | Refills: 2 | Status: SHIPPED | OUTPATIENT
Start: 2018-07-26 | End: 2018-11-02 | Stop reason: SDUPTHER

## 2018-08-09 DIAGNOSIS — M10.9 GOUT: ICD-10-CM

## 2018-08-09 RX ORDER — COLCHICINE 0.6 MG/1
TABLET ORAL
Qty: 30 TABLET | Refills: 1 | Status: SHIPPED | OUTPATIENT
Start: 2018-08-09 | End: 2018-10-04 | Stop reason: SDUPTHER

## 2018-08-15 ENCOUNTER — CONSULT (OUTPATIENT)
Dept: INTERNAL MEDICINE CLINIC | Facility: CLINIC | Age: 68
End: 2018-08-15
Payer: COMMERCIAL

## 2018-08-15 VITALS
WEIGHT: 229.6 LBS | DIASTOLIC BLOOD PRESSURE: 84 MMHG | SYSTOLIC BLOOD PRESSURE: 126 MMHG | OXYGEN SATURATION: 97 % | HEIGHT: 68 IN | TEMPERATURE: 97.9 F | HEART RATE: 73 BPM | BODY MASS INDEX: 34.8 KG/M2

## 2018-08-15 DIAGNOSIS — M17.0 PRIMARY OSTEOARTHRITIS OF BOTH KNEES: ICD-10-CM

## 2018-08-15 DIAGNOSIS — Z01.818 PREOPERATIVE CLEARANCE: Primary | ICD-10-CM

## 2018-08-15 PROCEDURE — 99243 OFF/OP CNSLTJ NEW/EST LOW 30: CPT | Performed by: PHYSICIAN ASSISTANT

## 2018-08-15 RX ORDER — CHLORHEXIDINE GLUCONATE 4 G/100ML
SOLUTION TOPICAL
COMMUNITY
End: 2018-10-12 | Stop reason: ALTCHOICE

## 2018-08-15 RX ORDER — LORAZEPAM 1 MG/1
1 TABLET ORAL AS NEEDED
COMMUNITY

## 2018-08-15 NOTE — PROGRESS NOTES
Assessment/Plan:     preoperative clearance for total left knee replacement, date of surgery September 7, 0663, Dr Kolton Fairchild  I see no contraindications for the patient to proceed with the planned surgery  Case d/w Dr Velvet Lutz who agrees with the A/P    No problem-specific Assessment & Plan notes found for this encounter  There are no diagnoses linked to this encounter  Subjective:      Patient ID: Mary Jo Sarabia is a 79 y o  male  Patient is here for a preoperative clearance  He is having a total left knee replacement scheduled for September 7, 2018, [address] specialists in [facility], Dr Kolton Fairchild  The patient had preoperative testing done in  [facility]  Is available for review in Care everywhere  Chest x-ray is clear, EKG is read as normal sinus rhythm normal EKG, blood work is unremarkable except for slightly elevated AST and ALT which appears chronic  Patient feels well and has no acute complaints  He denies chest pain, exertional chest pain, dyspnea  No current fevers, chills or sweats  No symptoms of an of upper respiratory infection        The following portions of the patient's history were reviewed and updated as appropriate: allergies, current medications, past family history, past medical history, past social history, past surgical history and problem list     Review of Systems   Constitutional: Negative for chills, fatigue and fever  HENT: Negative for congestion, ear pain, rhinorrhea, sinus pain, sinus pressure and sore throat  Respiratory: Negative for cough and chest tightness  Cardiovascular: Negative for chest pain and palpitations  Gastrointestinal: Negative for abdominal pain, blood in stool, diarrhea and nausea  Musculoskeletal: Positive for arthralgias           Objective:      /84   Pulse 73   Temp 97 9 °F (36 6 °C) (Tympanic)   Ht 5' 8 25" (1 734 m)   Wt 104 kg (229 lb 9 6 oz)   SpO2 97%   BMI 34 66 kg/m²          Physical Exam   Constitutional: He appears well-developed and well-nourished  HENT:   Head: Normocephalic and atraumatic  Right Ear: External ear normal    Left Ear: External ear normal    Mouth/Throat: Oropharynx is clear and moist  No oropharyngeal exudate  Eyes: Pupils are equal, round, and reactive to light  Neck: Normal range of motion  Cardiovascular: Normal rate, regular rhythm and normal heart sounds  Pulmonary/Chest: Effort normal and breath sounds normal  No respiratory distress  Abdominal: Soft  Bowel sounds are normal  There is no tenderness  Lymphadenopathy:     He has no cervical adenopathy  Neurological: He is alert  No cranial nerve deficit  Psychiatric: He has a normal mood and affect   His behavior is normal  Judgment normal

## 2018-09-10 ENCOUNTER — TELEPHONE (OUTPATIENT)
Dept: INTERNAL MEDICINE CLINIC | Facility: CLINIC | Age: 68
End: 2018-09-10

## 2018-09-10 NOTE — TELEPHONE ENCOUNTER
Ragini-his wife called  Patient lives an hour [place] a half away  He had surgery on Friday  Has temporary pain medication-Tramadol 50mg once every 6 hours  They would be ok with coming in, however to get here, they have to drive an hour and a half  They're asking for oxycodone  Please call them back tonight  Wife wants patient to have meds, as she has to go to work tomorrow       Pharmacy is [facility] in [place]     IX#[phone]

## 2018-09-10 NOTE — TELEPHONE ENCOUNTER
Michael Mitchell from Dr Garth Espinoza office called, pt had a total joint replacement on 9/7/18 and is requesting a refill of oxycodone 5mg  Pt lives an hour and a half away from the main office and they do not have the ability to e-scribe controlled substances  Wants to know if Dr Collin Gomez would be ok prescribing this  Per Dr Norma Mcclain, pt can be prescribed 5mg oxycodone 1 tablet every 6 hours, quantity would be up to Dr Collin Gomez       Send to [facility] in [place]     Any questions call Michael Mitchell   [phone]

## 2018-09-10 NOTE — TELEPHONE ENCOUNTER
Mrs Xiomara Lujan called upset stating pt is in pain r/t his 9/7 total knee replacement and is in need of pain medication  I informed her of the need for pt to come in for appt and she stated that the patient is unable to get in the car for such a long distance  Mrs Xiomara Lujan again is asking for Dr Vladislav Paige to phone in Oxycodone 5mg one tab every 6 hours  See Dr Brenna Rosenberg message/request  below  Call Mrs Xiomara Lujan at [phone] before 5pm

## 2018-09-11 NOTE — TELEPHONE ENCOUNTER
Can you please call Dr Mullen's office and find out how many pills pt got, and why they are unwilling/unable to help him

## 2018-09-12 DIAGNOSIS — I10 BENIGN ESSENTIAL HYPERTENSION: ICD-10-CM

## 2018-09-12 RX ORDER — HYDROCHLOROTHIAZIDE 12.5 MG/1
12.5 TABLET ORAL DAILY
Qty: 30 TABLET | Refills: 5 | Status: SHIPPED | OUTPATIENT
Start: 2018-09-12 | End: 2019-03-06 | Stop reason: SDUPTHER

## 2018-09-28 RX ORDER — ALBUTEROL SULFATE 90 UG/1
1 AEROSOL, METERED RESPIRATORY (INHALATION) AS NEEDED
COMMUNITY
End: 2021-12-07

## 2018-09-28 RX ORDER — TRAMADOL HYDROCHLORIDE 50 MG/1
50 TABLET ORAL EVERY 6 HOURS
COMMUNITY
Start: 2018-09-06 | End: 2018-10-01

## 2018-09-28 RX ORDER — OXYCODONE HYDROCHLORIDE 5 MG/1
5-10 CAPSULE ORAL EVERY 4 HOURS
COMMUNITY
Start: 2018-09-06 | End: 2018-10-01

## 2018-10-01 ENCOUNTER — OFFICE VISIT (OUTPATIENT)
Dept: INTERNAL MEDICINE CLINIC | Facility: CLINIC | Age: 68
End: 2018-10-01
Payer: COMMERCIAL

## 2018-10-01 ENCOUNTER — TELEPHONE (OUTPATIENT)
Dept: INTERNAL MEDICINE CLINIC | Facility: CLINIC | Age: 68
End: 2018-10-01

## 2018-10-01 ENCOUNTER — APPOINTMENT (OUTPATIENT)
Dept: LAB | Facility: CLINIC | Age: 68
End: 2018-10-01
Payer: COMMERCIAL

## 2018-10-01 ENCOUNTER — TRANSCRIBE ORDERS (OUTPATIENT)
Dept: LAB | Facility: CLINIC | Age: 68
End: 2018-10-01

## 2018-10-01 VITALS
DIASTOLIC BLOOD PRESSURE: 80 MMHG | WEIGHT: 216.6 LBS | SYSTOLIC BLOOD PRESSURE: 114 MMHG | RESPIRATION RATE: 14 BRPM | HEART RATE: 80 BPM | HEIGHT: 68 IN | BODY MASS INDEX: 32.83 KG/M2

## 2018-10-01 DIAGNOSIS — R73.01 IMPAIRED FASTING GLUCOSE: Primary | ICD-10-CM

## 2018-10-01 DIAGNOSIS — Z12.5 SCREENING FOR PROSTATE CANCER: ICD-10-CM

## 2018-10-01 DIAGNOSIS — Z23 NEED FOR INFLUENZA VACCINATION: ICD-10-CM

## 2018-10-01 DIAGNOSIS — Z96.652 HISTORY OF TOTAL KNEE ARTHROPLASTY, LEFT: ICD-10-CM

## 2018-10-01 DIAGNOSIS — I10 BENIGN ESSENTIAL HYPERTENSION: ICD-10-CM

## 2018-10-01 DIAGNOSIS — E78.2 MIXED HYPERLIPIDEMIA: ICD-10-CM

## 2018-10-01 DIAGNOSIS — M17.0 PRIMARY OSTEOARTHRITIS OF BOTH KNEES: ICD-10-CM

## 2018-10-01 DIAGNOSIS — N17.9 ACUTE KIDNEY INJURY (HCC): Primary | ICD-10-CM

## 2018-10-01 DIAGNOSIS — M1A.9XX0 CHRONIC GOUT INVOLVING TOE OF RIGHT FOOT WITHOUT TOPHUS, UNSPECIFIED CAUSE: ICD-10-CM

## 2018-10-01 DIAGNOSIS — R73.01 IMPAIRED FASTING GLUCOSE: ICD-10-CM

## 2018-10-01 DIAGNOSIS — Z23 NEED FOR PNEUMOCOCCAL VACCINATION: ICD-10-CM

## 2018-10-01 DIAGNOSIS — R74.8 ELEVATED LIVER ENZYMES: ICD-10-CM

## 2018-10-01 LAB
ALBUMIN SERPL BCP-MCNC: 3.9 G/DL (ref 3.5–5)
ALP SERPL-CCNC: 89 U/L (ref 46–116)
ALT SERPL W P-5'-P-CCNC: 58 U/L (ref 12–78)
ANION GAP SERPL CALCULATED.3IONS-SCNC: 7 MMOL/L (ref 4–13)
AST SERPL W P-5'-P-CCNC: 29 U/L (ref 5–45)
BILIRUB SERPL-MCNC: 0.62 MG/DL (ref 0.2–1)
BUN SERPL-MCNC: 24 MG/DL (ref 5–25)
CALCIUM SERPL-MCNC: 9.1 MG/DL (ref 8.3–10.1)
CHLORIDE SERPL-SCNC: 106 MMOL/L (ref 100–108)
CHOLEST SERPL-MCNC: 112 MG/DL (ref 50–200)
CO2 SERPL-SCNC: 24 MMOL/L (ref 21–32)
CREAT SERPL-MCNC: 1.53 MG/DL (ref 0.6–1.3)
ERYTHROCYTE [DISTWIDTH] IN BLOOD BY AUTOMATED COUNT: 12.6 % (ref 11.6–15.1)
EST. AVERAGE GLUCOSE BLD GHB EST-MCNC: 126 MG/DL
GFR SERPL CREATININE-BSD FRML MDRD: 47 ML/MIN/1.73SQ M
GLUCOSE P FAST SERPL-MCNC: 111 MG/DL (ref 65–99)
HBA1C MFR BLD: 6 % (ref 4.2–6.3)
HCT VFR BLD AUTO: 37.6 % (ref 36.5–49.3)
HDLC SERPL-MCNC: 35 MG/DL (ref 40–60)
HGB BLD-MCNC: 12.9 G/DL (ref 12–17)
LDLC SERPL CALC-MCNC: 49 MG/DL (ref 0–100)
MCH RBC QN AUTO: 32.7 PG (ref 26.8–34.3)
MCHC RBC AUTO-ENTMCNC: 34.3 G/DL (ref 31.4–37.4)
MCV RBC AUTO: 95 FL (ref 82–98)
NONHDLC SERPL-MCNC: 77 MG/DL
PLATELET # BLD AUTO: 255 THOUSANDS/UL (ref 149–390)
PMV BLD AUTO: 11.3 FL (ref 8.9–12.7)
POTASSIUM SERPL-SCNC: 3.5 MMOL/L (ref 3.5–5.3)
PROT SERPL-MCNC: 7.4 G/DL (ref 6.4–8.2)
PSA SERPL-MCNC: 1.7 NG/ML (ref 0–4)
RBC # BLD AUTO: 3.95 MILLION/UL (ref 3.88–5.62)
SODIUM SERPL-SCNC: 137 MMOL/L (ref 136–145)
TRIGL SERPL-MCNC: 141 MG/DL
TSH SERPL DL<=0.05 MIU/L-ACNC: 3.47 UIU/ML (ref 0.36–3.74)
URATE SERPL-MCNC: 8.2 MG/DL (ref 4.2–8)
WBC # BLD AUTO: 5.35 THOUSAND/UL (ref 4.31–10.16)

## 2018-10-01 PROCEDURE — 80061 LIPID PANEL: CPT

## 2018-10-01 PROCEDURE — 90662 IIV NO PRSV INCREASED AG IM: CPT

## 2018-10-01 PROCEDURE — 36415 COLL VENOUS BLD VENIPUNCTURE: CPT

## 2018-10-01 PROCEDURE — 99214 OFFICE O/P EST MOD 30 MIN: CPT | Performed by: INTERNAL MEDICINE

## 2018-10-01 PROCEDURE — 80053 COMPREHEN METABOLIC PANEL: CPT

## 2018-10-01 PROCEDURE — 90471 IMMUNIZATION ADMIN: CPT

## 2018-10-01 PROCEDURE — 84550 ASSAY OF BLOOD/URIC ACID: CPT

## 2018-10-01 PROCEDURE — G0103 PSA SCREENING: HCPCS

## 2018-10-01 PROCEDURE — 83036 HEMOGLOBIN GLYCOSYLATED A1C: CPT

## 2018-10-01 PROCEDURE — 85027 COMPLETE CBC AUTOMATED: CPT

## 2018-10-01 PROCEDURE — 90472 IMMUNIZATION ADMIN EACH ADD: CPT

## 2018-10-01 PROCEDURE — 84443 ASSAY THYROID STIM HORMONE: CPT

## 2018-10-01 PROCEDURE — 90670 PCV13 VACCINE IM: CPT

## 2018-10-01 RX ORDER — ACETAMINOPHEN 325 MG/1
650 TABLET ORAL EVERY 6 HOURS PRN
COMMUNITY

## 2018-10-01 RX ORDER — CELECOXIB 200 MG/1
200 CAPSULE ORAL 2 TIMES DAILY
Refills: 0
Start: 2018-10-01 | End: 2018-10-12 | Stop reason: ALTCHOICE

## 2018-10-01 RX ORDER — CYCLOBENZAPRINE HCL 10 MG
TABLET ORAL 3 TIMES DAILY
COMMUNITY
End: 2018-10-12 | Stop reason: ALTCHOICE

## 2018-10-01 NOTE — PROGRESS NOTES
Assessment/Plan:    Patient Instructions   The lab dated today for review, pending at the time of this evaluation, will follow accordingly, a labs from August preop were reviewed notable for blood sugar 182, creatinine 1 27 and elevated transaminases    Impaired fasting glucose -follow-up A1c    Hypertension stable on present regimen, office blood pressure is significantly lower than home BP ease, consider bring your monitor to follow-up visit so we can do a [id number] comparison    Hyperlipidemia -follow-up lipids, continue atorvastatin    Cardiovascular risk factors -we discussed ASPREE  Data which does not directly apply to max based upon his age less than 81, will continue on daily baby aspirin prophylaxis and monitor further literature as this topic progresses    Osteoarthritis of the knees status post left total knee arthroplasty is progressing nicely with consideration for right in January     Depression stable under care of psychiatry    Gout stable on colchicine     Routine follow-up after labs in 6 months, sooner as needed  Health maintenance -colonoscopy due May 2020  Flu shot today, Prevnar today, Pneumovax in 1 year    Addendum:  Labs reviewed, GFR dropped to 47, likely related to the Celebrex  Patient advised to discontinue Celebrex, keep well-hydrated and repeat basic metabolic panel in 2 weeks  Diagnoses and all orders for this visit:    Impaired fasting glucose  -     Hemoglobin A1C; Future    Benign essential hypertension  -     CBC and differential; Future  -     Basic metabolic panel; Future  -     Hepatic function panel; Future    Primary osteoarthritis of both knees    Chronic gout involving toe of right 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 tophus, unspecified cause    Mixed hyperlipidemia  -     Lipid panel; Future    History of total knee arthroplasty, left  -     celecoxib (CeleBREX) 200 mg capsule;  Take 1 capsule (200 mg total) by mouth 2 (two) times a day    Elevated liver enzymes    Need for influenza vaccination  -     influenza vaccine, 4048-4533, high-dose, PF 0 5 mL, for patients 65 yr+ (FLUZONE HIGH-DOSE)    Need for pneumococcal vaccination  -     PNEUMOCOCCAL CONJUGATE VACCINE 13-VALENT GREATER THAN 6 MONTHS    Other orders  -     Discontinue: oxyCODONE (OXY-IR) 5 MG capsule; Take 5-10 mg by mouth every 4 (four) hours  -     Discontinue: traMADol (ULTRAM) 50 mg tablet; Take 50 mg by mouth every 6 (six) hours  -     albuterol (PROVENTIL HFA,VENTOLIN HFA) 90 mcg/act inhaler; Inhale 1 puff every 6 (six) hours  -     cyclobenzaprine (FLEXERIL) 10 mg tablet; 3 (three) times a day  -     acetaminophen (TYLENOL) 325 mg tablet; Take 650 mg by mouth every 6 (six) hours as needed for mild pain         Subjective:      Patient ID: Archana Krueger is a 79 y o  male    Feeling generally well, 3 wks s/p L TKA, off all pain meds except celebrex and tylenol, driving, outpt PT, considering R in Jan   Just did labs this am    HTN-home bp's ~130/80's  HPL-tolerating atorvastatin  Bipolar-stable, w/ Dr Carolynn Dakins biy  Gout-no flares, he continues w/ colchicine daily            Current Outpatient Prescriptions:     acetaminophen (TYLENOL) 325 mg tablet, Take 650 mg by mouth every 6 (six) hours as needed for mild pain, Disp: , Rfl:     albuterol (PROVENTIL HFA,VENTOLIN HFA) 90 mcg/act inhaler, Inhale 1 puff every 6 (six) hours, Disp: , Rfl:     amLODIPine-atorvastatin (CADUET) 5-10 MG per tablet, TAKE 1 TABLET DAILY, Disp: 30 tablet, Rfl: 2    aspirin (ASPIR-81) 81 mg EC tablet, Take 1 tablet by mouth daily, Disp: , Rfl:     busPIRone (BUSPAR) 15 mg tablet, Take 1 tablet by mouth 2 (two) times a day  , Disp: , Rfl:     chlorhexidine (HIBICLENS) 4 % external liquid, Wash surgical site daily starting five days before surgery, Disp: , Rfl:     cloNIDine (CATAPRES) 0 1 mg tablet, Take 1 tablet by mouth, Disp: , Rfl:     colchicine (COLCRYS) 0 6 mg tablet, TAKE 1 TABLET BY MOUTH 1-3 TIMES DAILY AS NEEDED, Disp: 30 tablet, Rfl: 1    cyclobenzaprine (FLEXERIL) 10 mg tablet, 3 (three) times a day, Disp: , Rfl:     hydrochlorothiazide (HYDRODIURIL) 12 5 mg tablet, TAKE 1 TABLET (12 5 MG TOTAL) BY MOUTH DAILY, Disp: 30 tablet, Rfl: 5    lisinopril (ZESTRIL) 20 mg tablet, TAKE 1 TABLET EVERY DAY, Disp: 30 tablet, Rfl: 2    LORazepam (ATIVAN) 1 mg tablet, Take 1 mg by mouth daily, Disp: , Rfl:     montelukast (SINGULAIR) 10 mg tablet, Take 1 tablet by mouth daily, Disp: , Rfl:     QUEtiapine (SEROQUEL) 100 mg tablet, Take 100 mg by mouth 2 (two) times a day  , Disp: , Rfl:     celecoxib (CeleBREX) 200 mg capsule, Take 1 capsule (200 mg total) by mouth 2 (two) times a day, Disp: , Rfl: 0    Recent Results (from the past 1008 hour(s))   CBC    Collection Time: 10/01/18  8:17 AM   Result Value Ref Range    WBC 5 35 4 31 - 10 16 Thousand/uL    RBC 3 95 3 88 - 5 62 Million/uL    Hemoglobin 12 9 12 0 - 17 0 g/dL    Hematocrit 37 6 36 5 - 49 3 %    MCV 95 82 - 98 fL    MCH 32 7 26 8 - 34 3 pg    MCHC 34 3 31 4 - 37 4 g/dL    RDW 12 6 11 6 - 15 1 %    Platelets 074 726 - 573 Thousands/uL    MPV 11 3 8 9 - 12 7 fL   Comprehensive metabolic panel    Collection Time: 10/01/18  8:17 AM   Result Value Ref Range    Sodium 137 136 - 145 mmol/L    Potassium 3 5 3 5 - 5 3 mmol/L    Chloride 106 100 - 108 mmol/L    CO2 24 21 - 32 mmol/L    ANION GAP 7 4 - 13 mmol/L    BUN 24 5 - 25 mg/dL    Creatinine 1 53 (H) 0 60 - 1 30 mg/dL    Glucose, Fasting 111 (H) 65 - 99 mg/dL    Calcium 9 1 8 3 - 10 1 mg/dL    AST 29 5 - 45 U/L    ALT 58 12 - 78 U/L    Alkaline Phosphatase 89 46 - 116 U/L    Total Protein 7 4 6 4 - 8 2 g/dL    Albumin 3 9 3 5 - 5 0 g/dL    Total Bilirubin 0 62 0 20 - 1 00 mg/dL    eGFR 47 ml/min/1 73sq m   Lipid panel    Collection Time: 10/01/18  8:17 AM   Result Value Ref Range    Cholesterol 112 50 - 200 mg/dL    Triglycerides 141 <=150 mg/dL    HDL, Direct 35 (L) 40 - 60 mg/dL    LDL Calculated 49 0 - 100 mg/dL    Non-HDL-Chol (CHOL-HDL) 77 mg/dl   Uric acid    Collection Time: 10/01/18  8:17 AM   Result Value Ref Range    Uric Acid 8 2 (H) 4 2 - 8 0 mg/dL   TSH, 3rd generation with T4 reflex    Collection Time: 10/01/18  8:17 AM   Result Value Ref Range    TSH 3RD GENERATON 3 470 0 358 - 3 740 uIU/mL   Hemoglobin A1C    Collection Time: 10/01/18  8:17 AM   Result Value Ref Range    Hemoglobin A1C 6 0 4 2 - 6 3 %     mg/dl       The following portions of the patient's history were reviewed and updated as appropriate: allergies, current medications, past family history, past medical history, past social history, past surgical history and problem list      Review of Systems   Constitutional: Negative for appetite change, chills, diaphoresis, fatigue, fever and unexpected weight change  HENT: Negative for congestion, hearing loss and rhinorrhea  Eyes: Negative for visual disturbance  Respiratory: Negative for cough, chest tightness, shortness of breath and wheezing  Cardiovascular: Negative for chest pain, palpitations and leg swelling  Gastrointestinal: Negative for abdominal pain and blood in stool  Endocrine: Negative for cold intolerance, heat intolerance, polydipsia and polyuria  Genitourinary: Negative for difficulty urinating, dysuria, frequency and urgency  Musculoskeletal: Negative for arthralgias and myalgias  Skin: Negative for rash  Neurological: Negative for dizziness, weakness, light-headedness and headaches  Hematological: Does not bruise/bleed easily  Psychiatric/Behavioral: Negative for dysphoric mood and sleep disturbance  Objective:      Vitals:    10/01/18 0943   BP: 114/80   Pulse:    Resp:           Physical Exam   Constitutional: He is oriented to person, place, and time  He appears well-developed and well-nourished  HENT:   Head: Normocephalic and atraumatic     Nose: Nose normal    Mouth/Throat: Oropharynx is clear and moist    Eyes: Pupils are equal, round, and reactive to light  Conjunctivae and EOM are normal  No scleral icterus  Neck: Normal range of motion  Neck supple  No JVD present  No tracheal deviation present  No thyromegaly present  Cardiovascular: Normal rate, regular rhythm and intact distal pulses  Exam reveals no gallop and no friction rub  No murmur heard  Pulmonary/Chest: Effort normal and breath sounds normal  No respiratory distress  He has no wheezes  He has no rales  Musculoskeletal: He exhibits no edema or deformity  Lymphadenopathy:     He has no cervical adenopathy  Neurological: He is alert and oriented to person, place, and time  No cranial nerve deficit  Skin: Skin is warm and dry  No rash noted  No erythema  No pallor  Psychiatric: He has a normal mood and affect   His behavior is normal  Judgment and thought content normal

## 2018-10-01 NOTE — PATIENT INSTRUCTIONS
The lab dated today for review, pending at the time of this evaluation, will follow accordingly, a labs from August preop were reviewed notable for blood sugar 182, creatinine 1 27 and elevated transaminases    Impaired fasting glucose -follow-up A1c    Hypertension stable on present regimen, office blood pressure is significantly lower than home BP ease, consider bring your monitor to follow-up visit so we can do a [id number] comparison    Hyperlipidemia -follow-up lipids, continue atorvastatin    Cardiovascular risk factors -we discussed ASPREE  Data which does not directly apply to max based upon his age less than 81, will continue on daily baby aspirin prophylaxis and monitor further literature as this topic progresses    Osteoarthritis of the knees status post left total knee arthroplasty is progressing nicely with consideration for right in January     Depression stable under care of psychiatry    Gout stable on colchicine     Routine follow-up after labs in 6 months, sooner as needed  Health maintenance -colonoscopy due May 2020  Flu shot today, Prevnar today, Pneumovax in 1 year    Addendum:  Labs reviewed, GFR dropped to 47, likely related to the Celebrex  Patient advised to discontinue Celebrex, keep well-hydrated and repeat basic metabolic panel in 2 weeks

## 2018-10-01 NOTE — TELEPHONE ENCOUNTER
----- Message from Jermaine Nielsen MD sent at 10/1/2018 11:23 AM EDT -----  Notify labs are fairly stable with the exception of the kidney function, filtration rate dropped from 64 to 47 which is likely secondary to the Celebrex  He needs to stop the Celebrex immediately  Okay to continue Tylenol for pain  Okay to continue the aspirin as discussed    Keep very well hydrated and repeat blood test in 2 weeks

## 2018-10-04 DIAGNOSIS — M10.9 GOUT: ICD-10-CM

## 2018-10-04 RX ORDER — COLCHICINE 0.6 MG/1
TABLET ORAL
Qty: 30 TABLET | Refills: 1 | Status: SHIPPED | OUTPATIENT
Start: 2018-10-04 | End: 2018-11-21 | Stop reason: SDUPTHER

## 2018-10-10 ENCOUNTER — TELEPHONE (OUTPATIENT)
Dept: INTERNAL MEDICINE CLINIC | Facility: CLINIC | Age: 68
End: 2018-10-10

## 2018-10-10 NOTE — TELEPHONE ENCOUNTER
----- Message from Jermaine Nielsen MD sent at 10/9/2018  5:59 PM EDT -----  Regarding: FW: Non-Urgent Medical Question  Contact: [phone]  Please try to get him in as soon as possible for EKG and consideration of Holter monitor  If his heart is racing or pulses greater than 110 or he is lightheaded or having low blood pressures, chest pain or shortness of breath he should go to the emergency department     ----- Message -----  From: Deangelo Borrego  Sent: 10/9/2018   4:22 PM  To: Jermaine Nielsen MD  Subject: FW: Non-Urgent Medical Question                      ----- Message -----  From: Rut Richards  Sent: 10/8/2018   7:08 PM  To: [facility]  Subject: Non-Urgent Medical Question                      I have been having intermittent irregular heartbeat since I stopped the celebrex  Is there something I need to do?

## 2018-10-12 ENCOUNTER — HOSPITAL ENCOUNTER (OUTPATIENT)
Dept: NON INVASIVE DIAGNOSTICS | Facility: HOSPITAL | Age: 68
Discharge: HOME | End: 2018-10-12
Payer: COMMERCIAL

## 2018-10-12 ENCOUNTER — LAB (OUTPATIENT)
Dept: LAB | Facility: CLINIC | Age: 68
End: 2018-10-12
Payer: COMMERCIAL

## 2018-10-12 ENCOUNTER — OFFICE VISIT (OUTPATIENT)
Dept: INTERNAL MEDICINE CLINIC | Facility: CLINIC | Age: 68
End: 2018-10-12
Payer: COMMERCIAL

## 2018-10-12 VITALS
DIASTOLIC BLOOD PRESSURE: 64 MMHG | SYSTOLIC BLOOD PRESSURE: 116 MMHG | OXYGEN SATURATION: 96 % | BODY MASS INDEX: 32.6 KG/M2 | WEIGHT: 216 LBS | HEART RATE: 108 BPM

## 2018-10-12 DIAGNOSIS — I10 BENIGN ESSENTIAL HYPERTENSION: ICD-10-CM

## 2018-10-12 DIAGNOSIS — R00.2 PALPITATION: Primary | ICD-10-CM

## 2018-10-12 DIAGNOSIS — R00.2 PALPITATION: ICD-10-CM

## 2018-10-12 LAB
ANION GAP SERPL CALCULATED.3IONS-SCNC: 7 MMOL/L (ref 4–13)
BUN SERPL-MCNC: 15 MG/DL (ref 5–25)
CALCIUM SERPL-MCNC: 9.8 MG/DL (ref 8.3–10.1)
CHLORIDE SERPL-SCNC: 104 MMOL/L (ref 100–108)
CO2 SERPL-SCNC: 28 MMOL/L (ref 21–32)
CREAT SERPL-MCNC: 1.29 MG/DL (ref 0.6–1.3)
GFR SERPL CREATININE-BSD FRML MDRD: 57 ML/MIN/1.73SQ M
GLUCOSE P FAST SERPL-MCNC: 115 MG/DL (ref 65–99)
POTASSIUM SERPL-SCNC: 3.9 MMOL/L (ref 3.5–5.3)
SODIUM SERPL-SCNC: 139 MMOL/L (ref 136–145)

## 2018-10-12 PROCEDURE — 93226 XTRNL ECG REC<48 HR SCAN A/R: CPT

## 2018-10-12 PROCEDURE — 93000 ELECTROCARDIOGRAM COMPLETE: CPT | Performed by: NURSE PRACTITIONER

## 2018-10-12 PROCEDURE — 93225 XTRNL ECG REC<48 HRS REC: CPT

## 2018-10-12 PROCEDURE — 99213 OFFICE O/P EST LOW 20 MIN: CPT | Performed by: NURSE PRACTITIONER

## 2018-10-12 PROCEDURE — 36415 COLL VENOUS BLD VENIPUNCTURE: CPT

## 2018-10-12 PROCEDURE — 80048 BASIC METABOLIC PNL TOTAL CA: CPT

## 2018-10-12 NOTE — PATIENT INSTRUCTIONS
Palpitations, his EKG shows normal sinus rhythm we will proceed with Holter monitor he is due to recheck kidney function today    We will follow up after Holter monitor is completed, if he shows evidence of PVC or he continues with tachycardia consider adding a beta-blocker additionally he would like to come off of his clonidine could be feels it is ineffective and cause significant dry mouth

## 2018-10-12 NOTE — PROGRESS NOTES
Assessment/Plan:    Patient Instructions   Palpitations, his EKG shows normal sinus rhythm we will proceed with Holter monitor he is due to recheck kidney function today  We will follow up after Holter monitor is completed, if he shows evidence of PVC or he continues with tachycardia consider adding a beta-blocker additionally he would like to come off of his clonidine could be feels it is ineffective and cause significant dry mouth         Diagnoses and all orders for this visit:    Palpitation  -     POCT ECG  -     Holter monitor - 24 hour; Future    Benign essential hypertension  -     Basic metabolic panel; Future         Subjective:      Patient ID: Leyda Reed is a 79 y o  male    Several weeks ago patient was taken off of Celebrex secondary to increasing kidney function  After that he started to notice some palpitations  He notices it at least every day and sometimes it will last several minutes to an hour then he will go hours without it  There are no other associated symptoms  He states he has had this twice before in his life time    He is not taking decongestants          Current Outpatient Prescriptions:     acetaminophen (TYLENOL) 325 mg tablet, Take 650 mg by mouth every 6 (six) hours as needed for mild pain, Disp: , Rfl:     albuterol (PROVENTIL HFA,VENTOLIN HFA) 90 mcg/act inhaler, Inhale 1 puff every 6 (six) hours, Disp: , Rfl:     amLODIPine-atorvastatin (CADUET) 5-10 MG per tablet, TAKE 1 TABLET DAILY, Disp: 30 tablet, Rfl: 2    aspirin (ASPIR-81) 81 mg EC tablet, Take 1 tablet by mouth 2 (two) times a day  , Disp: , Rfl:     busPIRone (BUSPAR) 15 mg tablet, Take 1 tablet by mouth 2 (two) times a day  , Disp: , Rfl:     cloNIDine (CATAPRES) 0 1 mg tablet, Take 1 tablet by mouth, Disp: , Rfl:     colchicine (COLCRYS) 0 6 mg tablet, TAKE 1 TABLET BY MOUTH 1-3 TIMES DAILY AS NEEDED, Disp: 30 tablet, Rfl: 1    hydrochlorothiazide (HYDRODIURIL) 12 5 mg tablet, TAKE 1 TABLET (12 5 MG TOTAL) BY MOUTH DAILY, Disp: 30 tablet, Rfl: 5    lisinopril (ZESTRIL) 20 mg tablet, TAKE 1 TABLET EVERY DAY, Disp: 30 tablet, Rfl: 2    LORazepam (ATIVAN) 1 mg tablet, Take 1 mg by mouth daily, Disp: , Rfl:     montelukast (SINGULAIR) 10 mg tablet, Take 1 tablet by mouth daily, Disp: , Rfl:     QUEtiapine (SEROQUEL) 100 mg tablet, Take 100 mg by mouth 2 (two) times a day  , Disp: , Rfl:     Recent Results (from the past 1008 hour(s))   CBC    Collection Time: 10/01/18  8:17 AM   Result Value Ref Range    WBC 5 35 4 31 - 10 16 Thousand/uL    RBC 3 95 3 88 - 5 62 Million/uL    Hemoglobin 12 9 12 0 - 17 0 g/dL    Hematocrit 37 6 36 5 - 49 3 %    MCV 95 82 - 98 fL    MCH 32 7 26 8 - 34 3 pg    MCHC 34 3 31 4 - 37 4 g/dL    RDW 12 6 11 6 - 15 1 %    Platelets 990 447 - 188 Thousands/uL    MPV 11 3 8 9 - 12 7 fL   Comprehensive metabolic panel    Collection Time: 10/01/18  8:17 AM   Result Value Ref Range    Sodium 137 136 - 145 mmol/L    Potassium 3 5 3 5 - 5 3 mmol/L    Chloride 106 100 - 108 mmol/L    CO2 24 21 - 32 mmol/L    ANION GAP 7 4 - 13 mmol/L    BUN 24 5 - 25 mg/dL    Creatinine 1 53 (H) 0 60 - 1 30 mg/dL    Glucose, Fasting 111 (H) 65 - 99 mg/dL    Calcium 9 1 8 3 - 10 1 mg/dL    AST 29 5 - 45 U/L    ALT 58 12 - 78 U/L    Alkaline Phosphatase 89 46 - 116 U/L    Total Protein 7 4 6 4 - 8 2 g/dL    Albumin 3 9 3 5 - 5 0 g/dL    Total Bilirubin 0 62 0 20 - 1 00 mg/dL    eGFR 47 ml/min/1 73sq m   Lipid panel    Collection Time: 10/01/18  8:17 AM   Result Value Ref Range    Cholesterol 112 50 - 200 mg/dL    Triglycerides 141 <=150 mg/dL    HDL, Direct 35 (L) 40 - 60 mg/dL    LDL Calculated 49 0 - 100 mg/dL    Non-HDL-Chol (CHOL-HDL) 77 mg/dl   Uric acid    Collection Time: 10/01/18  8:17 AM   Result Value Ref Range    Uric Acid 8 2 (H) 4 2 - 8 0 mg/dL   TSH, 3rd generation with T4 reflex    Collection Time: 10/01/18  8:17 AM   Result Value Ref Range    TSH 3RD GENERATON 3 470 0 358 - 3 740 uIU/mL   PSA Collection Time: 10/01/18  8:17 AM   Result Value Ref Range    PSA 1 7 0 0 - 4 0 ng/mL   Hemoglobin A1C    Collection Time: 10/01/18  8:17 AM   Result Value Ref Range    Hemoglobin A1C 6 0 4 2 - 6 3 %     mg/dl       The following portions of the patient's history were reviewed and updated as appropriate: allergies, current medications, past family history, past medical history, past social history, past surgical history and problem list      Review of Systems   Constitutional: Negative for appetite change, chills, diaphoresis, fatigue, fever and unexpected weight change  HENT: Negative for postnasal drip and sneezing  Eyes: Negative for visual disturbance  Respiratory: Negative for chest tightness and shortness of breath  Cardiovascular: Negative for chest pain, palpitations and leg swelling  Gastrointestinal: Negative for abdominal pain and blood in stool  Endocrine: Negative for cold intolerance, heat intolerance, polydipsia, polyphagia and polyuria  Genitourinary: Negative for difficulty urinating, dysuria, frequency and urgency  Musculoskeletal: Negative for arthralgias and myalgias  Skin: Negative for rash and wound  Neurological: Negative for dizziness, weakness, light-headedness and headaches  Hematological: Negative for adenopathy  Psychiatric/Behavioral: Negative for confusion, dysphoric mood and sleep disturbance  The patient is not nervous/anxious  Objective:      /64 (BP Location: Left arm, Patient Position: Sitting, Cuff Size: Standard)   Pulse (!) 108   Wt 98 kg (216 lb)   SpO2 96%   BMI 32 60 kg/m²        Physical Exam   Constitutional: He is oriented to person, place, and time  He appears well-developed  No distress  HENT:   Head: Normocephalic and atraumatic  Nose: Nose normal    Mouth/Throat: Oropharynx is clear and moist    Eyes: Pupils are equal, round, and reactive to light  Conjunctivae and EOM are normal    Neck: Normal range of motion  Neck supple  No JVD present  No tracheal deviation present  No thyromegaly present  Cardiovascular: Normal rate, regular rhythm, normal heart sounds and intact distal pulses  Exam reveals no gallop and no friction rub  No murmur heard  Pulmonary/Chest: Effort normal and breath sounds normal  No respiratory distress  He has no wheezes  He has no rales  Abdominal: Soft  Bowel sounds are normal  He exhibits no distension  There is no tenderness  Musculoskeletal: Normal range of motion  He exhibits no edema  Lymphadenopathy:     He has no cervical adenopathy  Neurological: He is alert and oriented to person, place, and time  No cranial nerve deficit  Skin: Skin is warm and dry  No rash noted  He is not diaphoretic  Psychiatric: He has a normal mood and affect   His behavior is normal  Judgment and thought content normal

## 2018-10-19 DIAGNOSIS — I49.9 VENTRICULAR ARRHYTHMIA: Primary | ICD-10-CM

## 2018-10-19 PROCEDURE — 93227 XTRNL ECG REC<48 HR R&I: CPT

## 2018-10-30 ENCOUNTER — HOSPITAL ENCOUNTER (OUTPATIENT)
Dept: NON INVASIVE DIAGNOSTICS | Facility: CLINIC | Age: 68
Discharge: HOME | End: 2018-10-30
Payer: COMMERCIAL

## 2018-10-30 DIAGNOSIS — I49.9 VENTRICULAR ARRHYTHMIA: ICD-10-CM

## 2018-10-30 PROCEDURE — 93016 CV STRESS TEST SUPVJ ONLY: CPT | Performed by: INTERNAL MEDICINE

## 2018-10-30 PROCEDURE — 93017 CV STRESS TEST TRACING ONLY: CPT

## 2018-10-30 PROCEDURE — A9502 TC99M TETROFOSMIN: HCPCS

## 2018-10-30 PROCEDURE — 93306 TTE W/DOPPLER COMPLETE: CPT

## 2018-10-30 PROCEDURE — 93018 CV STRESS TEST I&R ONLY: CPT | Performed by: INTERNAL MEDICINE

## 2018-10-30 PROCEDURE — 78452 HT MUSCLE IMAGE SPECT MULT: CPT

## 2018-10-30 RX ADMIN — REGADENOSON 0.4 MG: 0.08 INJECTION, SOLUTION INTRAVENOUS at 12:49

## 2018-10-31 DIAGNOSIS — R94.39 ABNORMAL STRESS TEST: Primary | ICD-10-CM

## 2018-10-31 DIAGNOSIS — I49.3 PVC'S (PREMATURE VENTRICULAR CONTRACTIONS): ICD-10-CM

## 2018-10-31 PROCEDURE — 93306 TTE W/DOPPLER COMPLETE: CPT | Performed by: INTERNAL MEDICINE

## 2018-10-31 PROCEDURE — 78452 HT MUSCLE IMAGE SPECT MULT: CPT | Performed by: INTERNAL MEDICINE

## 2018-11-02 DIAGNOSIS — I10 ESSENTIAL HYPERTENSION: ICD-10-CM

## 2018-11-02 RX ORDER — AMLODIPINE BESYLATE AND ATORVASTATIN CALCIUM 5; 10 MG/1; MG/1
TABLET, FILM COATED ORAL
Qty: 30 TABLET | Refills: 2 | Status: SHIPPED | OUTPATIENT
Start: 2018-11-02 | End: 2019-02-07 | Stop reason: SDUPTHER

## 2018-11-21 DIAGNOSIS — M10.9 GOUT: ICD-10-CM

## 2018-11-21 RX ORDER — COLCHICINE 0.6 MG/1
TABLET ORAL
Qty: 30 TABLET | Refills: 1 | Status: SHIPPED | OUTPATIENT
Start: 2018-11-21 | End: 2019-03-06 | Stop reason: SDUPTHER

## 2018-11-27 ENCOUNTER — OFFICE VISIT (OUTPATIENT)
Dept: CARDIOLOGY CLINIC | Facility: CLINIC | Age: 68
End: 2018-11-27
Payer: COMMERCIAL

## 2018-11-27 VITALS
BODY MASS INDEX: 33.34 KG/M2 | DIASTOLIC BLOOD PRESSURE: 90 MMHG | SYSTOLIC BLOOD PRESSURE: 128 MMHG | OXYGEN SATURATION: 98 % | HEIGHT: 68 IN | HEART RATE: 87 BPM | WEIGHT: 220 LBS

## 2018-11-27 DIAGNOSIS — I49.3 PVC'S (PREMATURE VENTRICULAR CONTRACTIONS): ICD-10-CM

## 2018-11-27 DIAGNOSIS — R94.39 ABNORMAL STRESS TEST: ICD-10-CM

## 2018-11-27 PROCEDURE — 99244 OFF/OP CNSLTJ NEW/EST MOD 40: CPT | Performed by: INTERNAL MEDICINE

## 2018-11-27 NOTE — PROGRESS NOTES
Cardiology Consultation     Osorio Chinchilla  [id number]  1950  [address] [facility]    Dear Dr Taj Murray is a 61-year-old gentleman who has been referred to the [address] [place] [address] Cardiology in [place] for the following issues:    1  Palpitations  2  Abnormal stress test  3  Hypertension  4  Hyperlipidemia    Mr Roby Dhaliwal recently underwent left total knee arthroplasty on September 7, 2018  After this arthroplasty, he began to experience occasional palpitations  The palpitations were present, though not extremely bothersome  They board no relationship to exercise, in fact they seem to resolve with increased physical activity  He did not experience any chest pain or shortness of breath  He underwent Holter monitor which showed ventricular ectopy in the form of PVCs and couplets  This prompted an echocardiogram and pharmacologic SPECT  Echocardiogram showed normal cardiac structure and function  Stress test was deemed abnormal due to diaphragmatic artifact  At an outpatient visit, his renal function had been noted to deteriorate, so celecoxib was discontinued  His knee is feeling better, and he is able to walk around on it without restriction  He is able to achieve well over 4 METS of activity without chest pain or shortness of breath        Patient Active Problem List   Diagnosis    Asthma    Benign essential hypertension    BPH without urinary obstruction    Elevated liver enzymes    Hyperlipidemia    Gout    Impaired fasting glucose    Tubular adenoma of colon    Primary osteoarthritis of both knees    Chronic pain of left knee    Chronic pain of right knee     Past Medical History:   Diagnosis Date    Chronic kidney disease     Depression     Dyshidrosis     Hypertension     Osteoarthritis      Social History     Social History    Marital status: /Civil Union     Spouse name: N/A    Number of children: N/A    Years of education: N/A     Occupational History    IT      Social History Main Topics    Smoking status: Light Tobacco Smoker    Smokeless tobacco: Never Used      Comment: cigar sometimes     Alcohol use 3 6 oz/week     3 Glasses of wine, 2 Cans of beer, 1 Shots of liquor per week      Comment: occasional    Drug use: No    Sexual activity: Yes     Other Topics Concern    Not on file     Social History Narrative    Living independently with spouse    No advance directives          Family History   Problem Relation Age of Onset    Coronary artery disease Mother     Hyperlipidemia Mother     Hypertension Mother     Stroke Father     Hypertension Father      Past Surgical History:   Procedure Laterality Date    COLONOSCOPY  02/25/2010    HEMORRHOID SURGERY      REPLACEMENT TOTAL KNEE Left     TONSILLECTOMY         Current Outpatient Prescriptions:     acetaminophen (TYLENOL) 325 mg tablet, Take 650 mg by mouth every 6 (six) hours as needed for mild pain, Disp: , Rfl:     albuterol (PROVENTIL HFA,VENTOLIN HFA) 90 mcg/act inhaler, Inhale 1 puff every 6 (six) hours, Disp: , Rfl:     amLODIPine-atorvastatin (CADUET) 5-10 MG per tablet, TAKE 1 TABLET DAILY, Disp: 30 tablet, Rfl: 2    aspirin (ASPIR-81) 81 mg EC tablet, Take 1 tablet by mouth 2 (two) times a day  , Disp: , Rfl:     busPIRone (BUSPAR) 15 mg tablet, Take 1 tablet by mouth 2 (two) times a day  , Disp: , Rfl:     colchicine (COLCRYS) 0 6 mg tablet, TAKE 1 TABLET BY MOUTH 1-3 TIMES DAILY AS NEEDED, Disp: 30 tablet, Rfl: 1    hydrochlorothiazide (HYDRODIURIL) 12 5 mg tablet, TAKE 1 TABLET (12 5 MG TOTAL) BY MOUTH DAILY, Disp: 30 tablet, Rfl: 5    lisinopril (ZESTRIL) 20 mg tablet, TAKE 1 TABLET EVERY DAY, Disp: 30 tablet, Rfl: 2    LORazepam (ATIVAN) 1 mg tablet, Take 1 mg by mouth daily, Disp: , Rfl:     montelukast (SINGULAIR) 10 mg tablet, Take 1 tablet by mouth daily, Disp: , Rfl:     QUEtiapine (SEROQUEL) 100 mg tablet, Take 100 mg by mouth 2 (two) times a day  , Disp: , Rfl:   No Known Allergies  Vitals:    11/27/18 1308   BP: 128/90   Pulse: 87   SpO2: 98%   Weight: 99 8 kg (220 lb)   Height: 5' 8 25" (1 734 m)       Labs:  Results for Mathew Bateman (MRN [id number]) as of 11/27/2018 13:55   10/12/2018 09:07   Sodium 139   Potassium 3 9   Chloride 104   CO2 28   Anion Gap 7   BUN 15   Creatinine 1 29   GLUCOSE FASTING 115 (H)   Calcium 9 8     Results for Mathew Bateman (MRN [id number]) as of 11/27/2018 13:55   10/1/2018 08:17   Cholesterol 112   Triglycerides 141   HDL 35 (L)   Non-HDL Cholesterol 77   LDL Direct 49     Results for Mathew Bateman (MRN [id number]) as of 11/27/2018 13:55   10/1/2018 08:17   Hemoglobin A1C 6 0      TSH 3RD GENERATON 3 470   PSA, Total 1 7   URIC ACID 8 2 (H)       Holter monitor 10/12/2018  IMPRESSION:  1  This Holter monitoring and analysis was done for a period of 23 hours and 59 minutes  2  The rhythm was sinus  Average heart rate was 77 bpm  Minimum heart rate was 55 bpm  Maximum heart rate was 129 bpm   3  Ventricular ectopic activity consisted of 1588 beats, of which 1470 were single PVCs, 7 were in interpolated PVCs, 31 were single VEs, 80 were in bigeminy  4  Supraventricular ectopic activity consisted of 39 beats, of which 22 were single PACs, 4 were in atrial couplets, 7 were in 2 runs, 6 were late beats  5  No irregular rhythm episodes were detected  6  Patient's rhythm included 58 minutes 12 seconds of bradycardia  The slowest single episode of bradycardia lasted 15 seconds with a minimum heart rate of 55 bpm The longest R-R interval 1 5 seconds  7  Patient's rhythm included 1 hours 51 minutes 37 seconds of tachycardia   The fastest single episode of tachycardia lasted 9 minutes 38 seconds with a maximum heart rate of 129 bpm   8  Patient had multiple episodes of palpitations with heart rate ranging from  beats per minute      CONCLUSIONS:  Sinus rhythm with ectopic activity as detailed above  Patient had multiple episodes of palpitations with heart rate ranging from  beats per minute  Echocardiogram 10/30/2018  SUMMARY  LEFT VENTRICLE:  Ejection fraction was estimated to be 65 %  There were no regional wall motion abnormalities  There was mild concentric hypertrophy  There was an increased relative contribution of atrial contraction to ventricular filling      MITRAL VALVE:  There was trace regurgitation      AORTIC VALVE:  There was trace regurgitation  Pharmacologic SPECT 10/30/2018  IMPRESSIONS: Abnormal study  There was image artifact, without diagnostic evidence for perfusion abnormality  Left ventricular systolic function was reduced, without distinct regional wall motion abnormalities  EKG 11/27/2018:  Normal sinus rhythm with appropriate axis, normal intervals, no evidence of hypertrophy, ischemia or infarct  Review of Systems:  Review of Systems   Constitutional: Negative  Respiratory: Negative  Cardiovascular: Negative  Gastrointestinal: Negative  Endocrine: Negative  Musculoskeletal: Negative  Skin: Negative  Neurological: Negative  Hematological: Negative  Vitals:    11/27/18 1308   BP: 128/90   Pulse: 87   SpO2: 98%       Physical Exam:  Physical Exam   Constitutional: He is oriented to person, place, and time  He appears well-developed and well-nourished  HENT:   Head: Normocephalic and atraumatic  Eyes: Conjunctivae and EOM are normal    Neck: No hepatojugular reflux and no JVD present  Carotid bruit is not present  No tracheal deviation present  No thyromegaly present  Cardiovascular: Normal rate, regular rhythm, S1 normal and S2 normal   PMI is not displaced  Exam reveals no gallop  No murmur heard  Pulses:       Carotid pulses are 2+ on the right side, and 2+ on the left side         Radial pulses are 2+ on the right side, and 2+ on the left side  Femoral pulses are 2+ on the right side, and 2+ on the left side  Pulmonary/Chest: Breath sounds normal  No accessory muscle usage  No tachypnea  No respiratory distress  Abdominal: Soft  Bowel sounds are normal  He exhibits no distension  There is no tenderness  Musculoskeletal: He exhibits no edema  Lymphadenopathy:        Head (right side): No submental, no submandibular, no tonsillar, no preauricular, no posterior auricular and no occipital adenopathy present  Head (left side): No submental, no submandibular, no tonsillar, no preauricular, no posterior auricular and no occipital adenopathy present  He has no cervical adenopathy  Neurological: He is alert and oriented to person, place, and time  Skin: Skin is warm and dry  Psychiatric: He has a normal mood and affect  His behavior is normal  Judgment and thought content normal        Discussion/Summary:  Palpitations  Abnormal stress test  Hypertension  Hyperlipidemia    Records and testing reviewed  Palpitations have resolved without intervention beyond discontinuation of celecoxib  Is possible that the celecoxib or the resulting renal dysfunction was responsible for his palpitations  Stress test is nonischemic in nature  He does have other risk factors for coronary artery disease, however these are being addressed  He has no evidence of angina  Hypertension is well controlled with a blood pressure 128/90  We are discontinuing his clonidine at this time as the single dose he is taking at night will do very little for his blood pressure control given its short half life  Lipids reviewed, and are acceptable  Continue his combination amlodipine atorvastatin at this current dose  Return to care in 6 months, sooner if any acute issues arise  Thank you for the opportunity to consult on this patient  If you have any questions, please feel free to call my office

## 2018-11-27 NOTE — PATIENT INSTRUCTIONS
Heart Palpitations   AMBULATORY CARE:   Heart palpitations  are feelings that your heart races, jumps, throbs, or flutters  You may feel extra beats, no beats for a short time, or skipped beats  You may have these feelings in your chest, throat, or neck  They may happen when you are sitting, standing, or lying  Heart palpitations may be frightening, but are usually not caused by a serious problem  Call 911 or have someone else call for any of the following:   · You have any of the following signs of a heart attack:      ¨ Squeezing, pressure, or pain in your chest that lasts longer than 5 minutes or returns    ¨ Discomfort or pain in your back, neck, jaw, stomach, or arm     ¨ Trouble breathing    ¨ Nausea or vomiting    ¨ Lightheadedness or a sudden cold sweat, especially with chest pain or trouble breathing    · You have any of the following signs of a stroke:      ¨ Numbness or drooping on one side of your face     ¨ Weakness in an arm or leg    ¨ Confusion or difficulty speaking    ¨ Dizziness, a severe headache, or vision loss    · You faint or lose consciousness  Seek care immediately if:   · Your palpitations happen more often or last longer than usual      · You have palpitations and shortness of breath, nausea, sweating, or dizziness  Contact your healthcare provider if:   · You have questions or concerns about your condition or care  Follow up with your healthcare provider as directed: You may need to follow up with a cardiologist  Olga Orona may need tests to check for heart problems that cause palpitations  Write down your questions so you remember to ask them during your visits  Treatment for heart palpitations  is usually not needed  Your healthcare provider may stop or change your medicines if they are causing your palpitations  Conditions that cause palpitations, such as an abnormal heartbeat, will be treated     Keep a record:  Write down when your palpitations start and stop, what you were doing when they started, and your symptoms  Keep track of what you ate or drank within a few hours of your palpitations  Include anything that seemed to help your symptoms, such as lying down or holding your breath  This record will help you and your healthcare provider learn what triggers your palpitations  Bring this record with you to your follow up visits  Help prevent heart palpitations:   · Manage stress and anxiety  Find ways to relax such as listening to music, meditating, or doing yoga  Exercise can also help decrease stress and anxiety  Talk to someone you trust about your stress or anxiety  You can also talk to a therapist      · Get plenty of sleep every night  Ask your healthcare provider how much sleep you need each night  · Do not drink caffeine or alcohol  Caffeine and alcohol can make your palpitations worse  Caffeine is found in soda, coffee, tea, chocolate, and drinks that increase your energy  · Do not smoke  Nicotine and other chemicals in cigarettes and cigars may damage your heart and blood vessels  Ask your healthcare provider for information if you currently smoke and need help to quit  E-cigarettes or smokeless tobacco still contain nicotine  Talk to your healthcare provider before you use these products  · Do not use illegal drugs  Talk to your healthcare provider if you use illegal drugs and want help to quit  © 2017 [facility] Information is for End User's use only and may not be sold, redistributed or otherwise used for commercial purposes  All illustrations and images included in CareNotes® are the copyrighted property of [employer] , [employer]  or [employer]  The above information is an  only  It is not intended as medical advice for individual conditions or treatments  Talk to your doctor, nurse or pharmacist before following any medical regimen to see if it is safe and effective for you

## 2018-11-27 NOTE — LETTER
November 27, 2018     Tonie Muñoz MD  [address],  [place] [address]    Patient: Todd Mora   YOB: 1950   Date of Visit: 11/27/2018       Dear Dr Kelvin Hudson: Thank you for referring Elizabeth Lugo to me for evaluation  Below are my notes for this consultation  If you have questions, please do not hesitate to call me  I look forward to following your patient along with you  Sincerely,        Zoie Koehler DO        CC: No Recipients  Zoie Koehler DO  11/27/2018  2:10 PM  Sign at close encounter                                             Cardiology Consultation     Markel Carmichael  [id number]  1950  [address] [facility]  [address] [facility] [place]    Dear Dr Mike Ny is a 53-year-old gentleman who has been referred to the [address] [place] [address] of Cardiology in [facility] for the following issues:    1  Palpitations  2  Abnormal stress test  3  Hypertension  4  Hyperlipidemia    Mr Azar Burleson recently underwent left total knee arthroplasty on September 7, 2018  After this arthroplasty, he began to experience occasional palpitations  The palpitations were present, though not extremely bothersome  They board no relationship to exercise, in fact they seem to resolve with increased physical activity  He did not experience any chest pain or shortness of breath  He underwent Holter monitor which showed ventricular ectopy in the form of PVCs and couplets  This prompted an echocardiogram and pharmacologic SPECT  Echocardiogram showed normal cardiac structure and function  Stress test was deemed abnormal due to diaphragmatic artifact  At an outpatient visit, his renal function had been noted to deteriorate, so celecoxib was discontinued  His knee is feeling better, and he is able to walk around on it without restriction    He is able to achieve well over 4 METS of activity without chest pain or shortness of breath        Patient Active Problem List   Diagnosis    Asthma    Benign essential hypertension    BPH without urinary obstruction    Elevated liver enzymes    Hyperlipidemia    Gout    Impaired fasting glucose    Tubular adenoma of colon    Primary osteoarthritis of both knees    Chronic pain of left knee    Chronic pain of right knee     Past Medical History:   Diagnosis Date    Chronic kidney disease     Depression     Dyshidrosis     Hypertension     Osteoarthritis      Social History     Social History    Marital status: /Civil Union     Spouse name: N/A    Number of children: N/A    Years of education: N/A     Occupational History    IT      Social History Main Topics    Smoking status: Light Tobacco Smoker    Smokeless tobacco: Never Used      Comment: cigar sometimes     Alcohol use 3 6 oz/week     3 Glasses of wine, 2 Cans of beer, 1 Shots of liquor per week      Comment: occasional    Drug use: No    Sexual activity: Yes     Other Topics Concern    Not on file     Social History Narrative    Living independently with spouse    No advance directives          Family History   Problem Relation Age of Onset    Coronary artery disease Mother     Hyperlipidemia Mother     Hypertension Mother     Stroke Father     Hypertension Father      Past Surgical History:   Procedure Laterality Date    COLONOSCOPY  02/25/2010    HEMORRHOID SURGERY      REPLACEMENT TOTAL KNEE Left     TONSILLECTOMY         Current Outpatient Prescriptions:     acetaminophen (TYLENOL) 325 mg tablet, Take 650 mg by mouth every 6 (six) hours as needed for mild pain, Disp: , Rfl:     albuterol (PROVENTIL HFA,VENTOLIN HFA) 90 mcg/act inhaler, Inhale 1 puff every 6 (six) hours, Disp: , Rfl:     amLODIPine-atorvastatin (CADUET) 5-10 MG per tablet, TAKE 1 TABLET DAILY, Disp: 30 tablet, Rfl: 2    aspirin (ASPIR-81) 81 mg EC tablet, Take 1 tablet by mouth 2 (two) times a day  , Disp: , Rfl:     busPIRone (BUSPAR) 15 mg tablet, Take 1 tablet by mouth 2 (two) times a day  , Disp: , Rfl:     colchicine (COLCRYS) 0 6 mg tablet, TAKE 1 TABLET BY MOUTH 1-3 TIMES DAILY AS NEEDED, Disp: 30 tablet, Rfl: 1    hydrochlorothiazide (HYDRODIURIL) 12 5 mg tablet, TAKE 1 TABLET (12 5 MG TOTAL) BY MOUTH DAILY, Disp: 30 tablet, Rfl: 5    lisinopril (ZESTRIL) 20 mg tablet, TAKE 1 TABLET EVERY DAY, Disp: 30 tablet, Rfl: 2    LORazepam (ATIVAN) 1 mg tablet, Take 1 mg by mouth daily, Disp: , Rfl:     montelukast (SINGULAIR) 10 mg tablet, Take 1 tablet by mouth daily, Disp: , Rfl:     QUEtiapine (SEROQUEL) 100 mg tablet, Take 100 mg by mouth 2 (two) times a day  , Disp: , Rfl:   No Known Allergies  Vitals:    11/27/18 1308   BP: 128/90   Pulse: 87   SpO2: 98%   Weight: 99 8 kg (220 lb)   Height: 5' 8 25" (1 734 m)       Labs:  Results for Dwaine Panchal (MRN [id number]) as of 11/27/2018 13:55   10/12/2018 09:07   Sodium 139   Potassium 3 9   Chloride 104   CO2 28   Anion Gap 7   BUN 15   Creatinine 1 29   GLUCOSE FASTING 115 (H)   Calcium 9 8     Results for Dwaine Panchal (MRN [id number]) as of 11/27/2018 13:55   10/1/2018 08:17   Cholesterol 112   Triglycerides 141   HDL 35 (L)   Non-HDL Cholesterol 77   LDL Direct 49     Results for Dwaine Panchal (MRN [id number]) as of 11/27/2018 13:55   10/1/2018 08:17   Hemoglobin A1C 6 0      TSH 3RD GENERATON 3 470   PSA, Total 1 7   URIC ACID 8 2 (H)       Holter monitor 10/12/2018  IMPRESSION:  1  This Holter monitoring and analysis was done for a period of 23 hours and 59 minutes  2  The rhythm was sinus  Average heart rate was 77 bpm  Minimum heart rate was 55 bpm  Maximum heart rate was 129 bpm   3  Ventricular ectopic activity consisted of 1588 beats, of which 1470 were single PVCs, 7 were in interpolated PVCs, 31 were single VEs, 80 were in bigeminy    4  Supraventricular ectopic activity consisted of 39 beats, of which 22 were single PACs, 4 were in atrial couplets, 7 were in 2 runs, 6 were late beats  5  No irregular rhythm episodes were detected  6  Patient's rhythm included 58 minutes 12 seconds of bradycardia  The slowest single episode of bradycardia lasted 15 seconds with a minimum heart rate of 55 bpm The longest R-R interval 1 5 seconds  7  Patient's rhythm included 1 hours 51 minutes 37 seconds of tachycardia  The fastest single episode of tachycardia lasted 9 minutes 38 seconds with a maximum heart rate of 129 bpm   8  Patient had multiple episodes of palpitations with heart rate ranging from  beats per minute      CONCLUSIONS:  Sinus rhythm with ectopic activity as detailed above  Patient had multiple episodes of palpitations with heart rate ranging from  beats per minute  Echocardiogram 10/30/2018  SUMMARY  LEFT VENTRICLE:  Ejection fraction was estimated to be 65 %  There were no regional wall motion abnormalities  There was mild concentric hypertrophy  There was an increased relative contribution of atrial contraction to ventricular filling      MITRAL VALVE:  There was trace regurgitation      AORTIC VALVE:  There was trace regurgitation  Pharmacologic SPECT 10/30/2018  IMPRESSIONS: Abnormal study  There was image artifact, without diagnostic evidence for perfusion abnormality  Left ventricular systolic function was reduced, without distinct regional wall motion abnormalities  EKG 11/27/2018:  Normal sinus rhythm with appropriate axis, normal intervals, no evidence of hypertrophy, ischemia or infarct  Review of Systems:  Review of Systems   Constitutional: Negative  Respiratory: Negative  Cardiovascular: Negative  Gastrointestinal: Negative  Endocrine: Negative  Musculoskeletal: Negative  Skin: Negative  Neurological: Negative  Hematological: Negative          Vitals:    11/27/18 1308   BP: 128/90   Pulse: 87   SpO2: 98%       Physical Exam:  Physical Exam Constitutional: He is oriented to person, place, and time  He appears well-developed and well-nourished  HENT:   Head: Normocephalic and atraumatic  Eyes: Conjunctivae and EOM are normal    Neck: No hepatojugular reflux and no JVD present  Carotid bruit is not present  No tracheal deviation present  No thyromegaly present  Cardiovascular: Normal rate, regular rhythm, S1 normal and S2 normal   PMI is not displaced  Exam reveals no gallop  No murmur heard  Pulses:       Carotid pulses are 2+ on the right side, and 2+ on the left side  Radial pulses are 2+ on the right side, and 2+ on the left side  Femoral pulses are 2+ on the right side, and 2+ on the left side  Pulmonary/Chest: Breath sounds normal  No accessory muscle usage  No tachypnea  No respiratory distress  Abdominal: Soft  Bowel sounds are normal  He exhibits no distension  There is no tenderness  Musculoskeletal: He exhibits no edema  Lymphadenopathy:        Head (right side): No submental, no submandibular, no tonsillar, no preauricular, no posterior auricular and no occipital adenopathy present  Head (left side): No submental, no submandibular, no tonsillar, no preauricular, no posterior auricular and no occipital adenopathy present  He has no cervical adenopathy  Neurological: He is alert and oriented to person, place, and time  Skin: Skin is warm and dry  Psychiatric: He has a normal mood and affect  His behavior is normal  Judgment and thought content normal        Discussion/Summary:  Palpitations  Abnormal stress test  Hypertension  Hyperlipidemia    Records and testing reviewed  Palpitations have resolved without intervention beyond discontinuation of celecoxib  Is possible that the celecoxib or the resulting renal dysfunction was responsible for his palpitations  Stress test is nonischemic in nature    He does have other risk factors for coronary artery disease, however these are being addressed  He has no evidence of angina  Hypertension is well controlled with a blood pressure 128/90  We are discontinuing his clonidine at this time as the single dose he is taking at night will do very little for his blood pressure control given its short half life  Lipids reviewed, and are acceptable  Continue his combination amlodipine atorvastatin at this current dose  Return to care in 6 months, sooner if any acute issues arise  Thank you for the opportunity to consult on this patient  If you have any questions, please feel free to call my office

## 2019-01-04 DIAGNOSIS — I10 ESSENTIAL HYPERTENSION: ICD-10-CM

## 2019-01-04 RX ORDER — LISINOPRIL 20 MG/1
TABLET ORAL
Qty: 30 TABLET | Refills: 3 | Status: SHIPPED | OUTPATIENT
Start: 2019-01-04 | End: 2019-04-10 | Stop reason: SDUPTHER

## 2019-01-18 ENCOUNTER — TRANSCRIBE ORDERS (OUTPATIENT)
Dept: LAB | Facility: CLINIC | Age: 69
End: 2019-01-18

## 2019-01-18 ENCOUNTER — APPOINTMENT (OUTPATIENT)
Dept: LAB | Facility: CLINIC | Age: 69
End: 2019-01-18
Payer: COMMERCIAL

## 2019-01-18 DIAGNOSIS — Z01.818 PREOP EXAMINATION: ICD-10-CM

## 2019-01-18 DIAGNOSIS — Z01.818 PREOP EXAMINATION: Primary | ICD-10-CM

## 2019-01-18 DIAGNOSIS — I10 BENIGN ESSENTIAL HYPERTENSION: ICD-10-CM

## 2019-01-18 LAB
ALBUMIN SERPL BCP-MCNC: 4.2 G/DL (ref 3.5–5)
ALP SERPL-CCNC: 78 U/L (ref 46–116)
ALT SERPL W P-5'-P-CCNC: 61 U/L (ref 12–78)
ANION GAP SERPL CALCULATED.3IONS-SCNC: 6 MMOL/L (ref 4–13)
AST SERPL W P-5'-P-CCNC: 37 U/L (ref 5–45)
BASOPHILS # BLD AUTO: 0.07 THOUSANDS/ΜL (ref 0–0.1)
BASOPHILS NFR BLD AUTO: 1 % (ref 0–1)
BILIRUB SERPL-MCNC: 0.6 MG/DL (ref 0.2–1)
BILIRUB UR QL STRIP: NEGATIVE
BUN SERPL-MCNC: 19 MG/DL (ref 5–25)
CALCIUM SERPL-MCNC: 8.9 MG/DL (ref 8.3–10.1)
CHLORIDE SERPL-SCNC: 101 MMOL/L (ref 100–108)
CLARITY UR: CLEAR
CO2 SERPL-SCNC: 29 MMOL/L (ref 21–32)
COLOR UR: YELLOW
CREAT SERPL-MCNC: 1.18 MG/DL (ref 0.6–1.3)
EOSINOPHIL # BLD AUTO: 0.19 THOUSAND/ΜL (ref 0–0.61)
EOSINOPHIL NFR BLD AUTO: 3 % (ref 0–6)
ERYTHROCYTE [DISTWIDTH] IN BLOOD BY AUTOMATED COUNT: 12.8 % (ref 11.6–15.1)
GFR SERPL CREATININE-BSD FRML MDRD: 64 ML/MIN/1.73SQ M
GLUCOSE P FAST SERPL-MCNC: 96 MG/DL (ref 65–99)
GLUCOSE UR STRIP-MCNC: NEGATIVE MG/DL
HCT VFR BLD AUTO: 43.1 % (ref 36.5–49.3)
HGB BLD-MCNC: 14.7 G/DL (ref 12–17)
HGB UR QL STRIP.AUTO: NEGATIVE
IMM GRANULOCYTES # BLD AUTO: 0.01 THOUSAND/UL (ref 0–0.2)
IMM GRANULOCYTES NFR BLD AUTO: 0 % (ref 0–2)
KETONES UR STRIP-MCNC: NEGATIVE MG/DL
LEUKOCYTE ESTERASE UR QL STRIP: NEGATIVE
LYMPHOCYTES # BLD AUTO: 2.3 THOUSANDS/ΜL (ref 0.6–4.47)
LYMPHOCYTES NFR BLD AUTO: 34 % (ref 14–44)
MCH RBC QN AUTO: 32.1 PG (ref 26.8–34.3)
MCHC RBC AUTO-ENTMCNC: 34.1 G/DL (ref 31.4–37.4)
MCV RBC AUTO: 94 FL (ref 82–98)
MONOCYTES # BLD AUTO: 0.64 THOUSAND/ΜL (ref 0.17–1.22)
MONOCYTES NFR BLD AUTO: 10 % (ref 4–12)
NEUTROPHILS # BLD AUTO: 3.5 THOUSANDS/ΜL (ref 1.85–7.62)
NEUTS SEG NFR BLD AUTO: 52 % (ref 43–75)
NITRITE UR QL STRIP: NEGATIVE
NRBC BLD AUTO-RTO: 0 /100 WBCS
PH UR STRIP.AUTO: 6.5 [PH] (ref 4.5–8)
PLATELET # BLD AUTO: 206 THOUSANDS/UL (ref 149–390)
PMV BLD AUTO: 10.9 FL (ref 8.9–12.7)
POTASSIUM SERPL-SCNC: 3.8 MMOL/L (ref 3.5–5.3)
PROT SERPL-MCNC: 7.6 G/DL (ref 6.4–8.2)
PROT UR STRIP-MCNC: NEGATIVE MG/DL
RBC # BLD AUTO: 4.58 MILLION/UL (ref 3.88–5.62)
SODIUM SERPL-SCNC: 136 MMOL/L (ref 136–145)
SP GR UR STRIP.AUTO: 1.01 (ref 1–1.03)
UROBILINOGEN UR QL STRIP.AUTO: 0.2 E.U./DL
WBC # BLD AUTO: 6.71 THOUSAND/UL (ref 4.31–10.16)

## 2019-01-18 PROCEDURE — 36415 COLL VENOUS BLD VENIPUNCTURE: CPT

## 2019-01-18 PROCEDURE — 85025 COMPLETE CBC W/AUTO DIFF WBC: CPT

## 2019-01-18 PROCEDURE — 81003 URINALYSIS AUTO W/O SCOPE: CPT | Performed by: ORTHOPAEDIC SURGERY

## 2019-01-18 PROCEDURE — 80053 COMPREHEN METABOLIC PANEL: CPT

## 2019-01-22 ENCOUNTER — CONSULT (OUTPATIENT)
Dept: INTERNAL MEDICINE CLINIC | Facility: CLINIC | Age: 69
End: 2019-01-22
Payer: COMMERCIAL

## 2019-01-22 VITALS
RESPIRATION RATE: 16 BRPM | WEIGHT: 218.2 LBS | HEART RATE: 82 BPM | BODY MASS INDEX: 33.07 KG/M2 | DIASTOLIC BLOOD PRESSURE: 78 MMHG | HEIGHT: 68 IN | SYSTOLIC BLOOD PRESSURE: 118 MMHG

## 2019-01-22 DIAGNOSIS — Z01.818 PREOP EXAMINATION: Primary | ICD-10-CM

## 2019-01-22 PROCEDURE — 99243 OFF/OP CNSLTJ NEW/EST LOW 30: CPT | Performed by: NURSE PRACTITIONER

## 2019-01-22 NOTE — PROGRESS NOTES
Assessment/Plan:    Patient Instructions   24-year-old male who presents today for preoperative clearance for right total knee arthroplasty to be completed on February 1st with Dr Stanley Gaviria  He had his left knee completed in September  Labs reviewed, chest x-ray from August stable without any new symptoms , there is a deeper Q-wave in 3 only, EKG was reviewed by Cardiology and deemed stable  Additionally he did have full cardiac workup this past November and was evaluation by Cardiology  He has no evidence of angina or decompensated heart failure  At this time there is no medical contraindication to proceed with surgery  We do recommend stopping baby aspirin 5 days before surgery  Additionally recommend holding lisinopril 24 hr prior to surgery to avoid hypotension intraoperatively or postoperatively  Diagnoses and all orders for this visit:    Preop examination  -     POCT ECG         Subjective:      Patient ID: Sana Powell is a 79 y o  male    Patient is anticipating a right knee replacement early February he has history of having the same on the left in September  He walks on a routine basis no chest pain shortness of breath or palpitations  He was seen by Cardiology in late November secondary to abnormal stress test deemed to be nonischemic in nature            Current Outpatient Prescriptions:     acetaminophen (TYLENOL) 325 mg tablet, Take 650 mg by mouth every 6 (six) hours as needed for mild pain, Disp: , Rfl:     albuterol (PROVENTIL HFA,VENTOLIN HFA) 90 mcg/act inhaler, Inhale 1 puff as needed  , Disp: , Rfl:     amLODIPine-atorvastatin (CADUET) 5-10 MG per tablet, TAKE 1 TABLET DAILY, Disp: 30 tablet, Rfl: 2    aspirin (ASPIR-81) 81 mg EC tablet, Take 1 tablet by mouth daily  , Disp: , Rfl:     busPIRone (BUSPAR) 15 mg tablet, Take 1 tablet by mouth 2 (two) times a day  , Disp: , Rfl:     colchicine (COLCRYS) 0 6 mg tablet, TAKE 1 TABLET BY MOUTH 1-3 TIMES DAILY AS NEEDED, Disp: 30 tablet, Rfl: 1    hydrochlorothiazide (HYDRODIURIL) 12 5 mg tablet, TAKE 1 TABLET (12 5 MG TOTAL) BY MOUTH DAILY, Disp: 30 tablet, Rfl: 5    lisinopril (ZESTRIL) 20 mg tablet, TAKE 1 TABLET BY MOUTH EVERY DAY, Disp: 30 tablet, Rfl: 3    LORazepam (ATIVAN) 1 mg tablet, Take 1 mg by mouth as needed  , Disp: , Rfl:     montelukast (SINGULAIR) 10 mg tablet, Take 1 tablet by mouth daily, Disp: , Rfl:     QUEtiapine (SEROQUEL) 100 mg tablet, Take 100 mg by mouth 2 (two) times a day  , Disp: , Rfl:     Recent Results (from the past 1008 hour(s))   UA w Reflex to Microscopic w Reflex to Culture    Collection Time: 01/18/19  1:12 PM   Result Value Ref Range    Color, UA Yellow     Clarity, UA Clear     Specific Garibaldi, UA 1 015 1 003 - 1 030    pH, UA 6 5 4 5 - 8 0    Leukocytes, UA Negative Negative    Nitrite, UA Negative Negative    Protein, UA Negative Negative mg/dl    Glucose, UA Negative Negative mg/dl    Ketones, UA Negative Negative mg/dl    Urobilinogen, UA 0 2 0 2, 1 0 E U /dl E U /dl    Bilirubin, UA Negative Negative    Blood, UA Negative Negative   Comprehensive metabolic panel    Collection Time: 01/18/19  1:12 PM   Result Value Ref Range    Sodium 136 136 - 145 mmol/L    Potassium 3 8 3 5 - 5 3 mmol/L    Chloride 101 100 - 108 mmol/L    CO2 29 21 - 32 mmol/L    ANION GAP 6 4 - 13 mmol/L    BUN 19 5 - 25 mg/dL    Creatinine 1 18 0 60 - 1 30 mg/dL    Glucose, Fasting 96 65 - 99 mg/dL    Calcium 8 9 8 3 - 10 1 mg/dL    AST 37 5 - 45 U/L    ALT 61 12 - 78 U/L    Alkaline Phosphatase 78 46 - 116 U/L    Total Protein 7 6 6 4 - 8 2 g/dL    Albumin 4 2 3 5 - 5 0 g/dL    Total Bilirubin 0 60 0 20 - 1 00 mg/dL    eGFR 64 ml/min/1 73sq m   CBC and differential    Collection Time: 01/18/19  1:12 PM   Result Value Ref Range    WBC 6 71 4 31 - 10 16 Thousand/uL    RBC 4 58 3 88 - 5 62 Million/uL    Hemoglobin 14 7 12 0 - 17 0 g/dL    Hematocrit 43 1 36 5 - 49 3 %    MCV 94 82 - 98 fL    MCH 32 1 26 8 - 34 3 pg    MCHC 34 1 31 4 - 37 4 g/dL    RDW 12 8 11 6 - 15 1 %    MPV 10 9 8 9 - 12 7 fL    Platelets 270 249 - 438 Thousands/uL    nRBC 0 /100 WBCs    Neutrophils Relative 52 43 - 75 %    Immat GRANS % 0 0 - 2 %    Lymphocytes Relative 34 14 - 44 %    Monocytes Relative 10 4 - 12 %    Eosinophils Relative 3 0 - 6 %    Basophils Relative 1 0 - 1 %    Neutrophils Absolute 3 50 1 85 - 7 62 Thousands/µL    Immature Grans Absolute 0 01 0 00 - 0 20 Thousand/uL    Lymphocytes Absolute 2 30 0 60 - 4 47 Thousands/µL    Monocytes Absolute 0 64 0 17 - 1 22 Thousand/µL    Eosinophils Absolute 0 19 0 00 - 0 61 Thousand/µL    Basophils Absolute 0 07 0 00 - 0 10 Thousands/µL       The following portions of the patient's history were reviewed and updated as appropriate: allergies, current medications, past family history, past medical history, past social history, past surgical history and problem list      Review of Systems   Constitutional: Negative for appetite change, chills, diaphoresis, fatigue, fever and unexpected weight change  HENT: Negative for postnasal drip and sneezing  Eyes: Negative for visual disturbance  Respiratory: Negative for chest tightness and shortness of breath  Cardiovascular: Negative for chest pain, palpitations and leg swelling  Gastrointestinal: Negative for abdominal pain and blood in stool  Endocrine: Negative for cold intolerance, heat intolerance, polydipsia, polyphagia and polyuria  Genitourinary: Negative for difficulty urinating, dysuria, frequency and urgency  Musculoskeletal: Negative for arthralgias and myalgias  Skin: Negative for rash and wound  Neurological: Negative for dizziness, weakness, light-headedness and headaches  Hematological: Negative for adenopathy  Psychiatric/Behavioral: Negative for confusion, dysphoric mood and sleep disturbance  The patient is not nervous/anxious            Objective:      /78 (BP Location: Left arm, Patient Position: Sitting) Pulse 82   Resp 16    5' 8 25" (1 734 m)   Wt 99 kg (218 lb 3 2 oz) Comment: shoes on  BMI 32 93 kg/m²        Physical Exam   Constitutional: He is oriented to person, place, and time  He appears well-developed  No distress  HENT:   Head: Normocephalic and atraumatic  Nose: Nose normal    Mouth/Throat: Oropharynx is clear and moist    Eyes: Pupils are equal, round, and reactive to light  Conjunctivae and EOM are normal    Neck: Normal range of motion  Neck supple  No JVD present  No tracheal deviation present  No thyromegaly present  Cardiovascular: Normal rate, regular rhythm, normal heart sounds and intact distal pulses  Exam reveals no gallop and no friction rub  No murmur heard  Pulmonary/Chest: Effort normal and breath sounds normal  No respiratory distress  He has no wheezes  He has no rales  Abdominal: Soft  Bowel sounds are normal  He exhibits no distension  There is no tenderness  Musculoskeletal: Normal range of motion  He exhibits no edema  Lymphadenopathy:     He has no cervical adenopathy  Neurological: He is alert and oriented to person, place, and time  No cranial nerve deficit  Skin: Skin is warm and dry  No rash noted  He is not diaphoretic  Psychiatric: He has a normal mood and affect   His behavior is normal  Judgment and thought content normal

## 2019-01-22 NOTE — PATIENT INSTRUCTIONS
65-year-old male who presents today for preoperative clearance for right total knee arthroplasty to be completed on February 1st with Dr Jesus Bolanos  He had his left knee completed in September  Labs reviewed, chest x-ray from August stable without any new symptoms , there is a deeper Q-wave in 3 only, EKG was reviewed by Cardiology and deemed stable  Additionally he did have full cardiac workup this past November and was evaluation by Cardiology  He has no evidence of angina or decompensated heart failure  At this time there is no medical contraindication to proceed with surgery  We do recommend stopping baby aspirin 5 days before surgery  Additionally recommend holding lisinopril 24 hr prior to surgery to avoid hypotension intraoperatively or postoperatively

## 2019-01-24 ENCOUNTER — TELEPHONE (OUTPATIENT)
Dept: INTERNAL MEDICINE CLINIC | Facility: CLINIC | Age: 69
End: 2019-01-24

## 2019-01-24 PROCEDURE — 93000 ELECTROCARDIOGRAM COMPLETE: CPT | Performed by: NURSE PRACTITIONER

## 2019-01-30 ENCOUNTER — TELEPHONE (OUTPATIENT)
Dept: CARDIOLOGY CLINIC | Facility: CLINIC | Age: 69
End: 2019-01-30

## 2019-01-30 NOTE — TELEPHONE ENCOUNTER
----- Message from [address] [place] [address]  Jackie Mendoza sent at 1/30/2019 10:40 AM EST -----  Regarding: Non-Urgent Medical Question  Contact: [phone]  Dr Keira Sales could my irregular heartbeats been caused by the Flexeril I was taking after my knee replacement surgery? If so, should I avoid taking this with my second knee surgery?

## 2019-01-30 NOTE — TELEPHONE ENCOUNTER
It is unlikely that the flexeril caused the palpitations  However, if you would like to abstain from flexeril in the perioperative period to see if that resolves the palpitations, that is acceptable

## 2019-01-31 NOTE — TELEPHONE ENCOUNTER
Spoke with patient let him know per Dr Екатерина Boyle the flexeril is not likely the cause of his palpitations, he states he is no longer having them anyway and he will try the flexeril again after sx

## 2019-02-05 ENCOUNTER — TRANSITIONAL CARE MANAGEMENT (OUTPATIENT)
Dept: INTERNAL MEDICINE CLINIC | Facility: CLINIC | Age: 69
End: 2019-02-05

## 2019-02-07 DIAGNOSIS — I10 ESSENTIAL HYPERTENSION: ICD-10-CM

## 2019-02-07 RX ORDER — AMLODIPINE BESYLATE AND ATORVASTATIN CALCIUM 5; 10 MG/1; MG/1
TABLET, FILM COATED ORAL
Qty: 30 TABLET | Refills: 2 | Status: SHIPPED | OUTPATIENT
Start: 2019-02-07 | End: 2019-04-10 | Stop reason: SDUPTHER

## 2019-03-06 DIAGNOSIS — M10.9 GOUT: ICD-10-CM

## 2019-03-06 DIAGNOSIS — I10 BENIGN ESSENTIAL HYPERTENSION: ICD-10-CM

## 2019-03-07 RX ORDER — HYDROCHLOROTHIAZIDE 12.5 MG/1
12.5 TABLET ORAL DAILY
Qty: 30 TABLET | Refills: 5 | Status: SHIPPED | OUTPATIENT
Start: 2019-03-07 | End: 2019-09-01 | Stop reason: SDUPTHER

## 2019-03-07 RX ORDER — COLCHICINE 0.6 MG/1
TABLET, FILM COATED ORAL
Qty: 30 TABLET | Refills: 1 | Status: SHIPPED | OUTPATIENT
Start: 2019-03-07 | End: 2019-05-14 | Stop reason: SDUPTHER

## 2019-03-08 ENCOUNTER — APPOINTMENT (OUTPATIENT)
Dept: LAB | Facility: CLINIC | Age: 69
End: 2019-03-08
Payer: COMMERCIAL

## 2019-03-08 DIAGNOSIS — I10 BENIGN ESSENTIAL HYPERTENSION: ICD-10-CM

## 2019-03-08 DIAGNOSIS — E78.2 MIXED HYPERLIPIDEMIA: ICD-10-CM

## 2019-03-08 DIAGNOSIS — R73.01 IMPAIRED FASTING GLUCOSE: ICD-10-CM

## 2019-03-08 LAB
ALBUMIN SERPL BCP-MCNC: 4 G/DL (ref 3.5–5)
ALP SERPL-CCNC: 108 U/L (ref 46–116)
ALT SERPL W P-5'-P-CCNC: 48 U/L (ref 12–78)
ANION GAP SERPL CALCULATED.3IONS-SCNC: 5 MMOL/L (ref 4–13)
AST SERPL W P-5'-P-CCNC: 30 U/L (ref 5–45)
BASOPHILS # BLD AUTO: 0.07 THOUSANDS/ΜL (ref 0–0.1)
BASOPHILS NFR BLD AUTO: 1 % (ref 0–1)
BILIRUB DIRECT SERPL-MCNC: 0.13 MG/DL (ref 0–0.2)
BILIRUB SERPL-MCNC: 0.54 MG/DL (ref 0.2–1)
BUN SERPL-MCNC: 13 MG/DL (ref 5–25)
CALCIUM SERPL-MCNC: 9.1 MG/DL (ref 8.3–10.1)
CHLORIDE SERPL-SCNC: 104 MMOL/L (ref 100–108)
CHOLEST SERPL-MCNC: 147 MG/DL (ref 50–200)
CO2 SERPL-SCNC: 29 MMOL/L (ref 21–32)
CREAT SERPL-MCNC: 1.22 MG/DL (ref 0.6–1.3)
EOSINOPHIL # BLD AUTO: 0.23 THOUSAND/ΜL (ref 0–0.61)
EOSINOPHIL NFR BLD AUTO: 4 % (ref 0–6)
ERYTHROCYTE [DISTWIDTH] IN BLOOD BY AUTOMATED COUNT: 13.2 % (ref 11.6–15.1)
EST. AVERAGE GLUCOSE BLD GHB EST-MCNC: 114 MG/DL
GFR SERPL CREATININE-BSD FRML MDRD: 61 ML/MIN/1.73SQ M
GLUCOSE P FAST SERPL-MCNC: 91 MG/DL (ref 65–99)
HBA1C MFR BLD: 5.6 % (ref 4.2–6.3)
HCT VFR BLD AUTO: 38.4 % (ref 36.5–49.3)
HDLC SERPL-MCNC: 36 MG/DL (ref 40–60)
HGB BLD-MCNC: 12.7 G/DL (ref 12–17)
IMM GRANULOCYTES # BLD AUTO: 0.02 THOUSAND/UL (ref 0–0.2)
IMM GRANULOCYTES NFR BLD AUTO: 0 % (ref 0–2)
LDLC SERPL CALC-MCNC: 61 MG/DL (ref 0–100)
LYMPHOCYTES # BLD AUTO: 1.54 THOUSANDS/ΜL (ref 0.6–4.47)
LYMPHOCYTES NFR BLD AUTO: 28 % (ref 14–44)
MCH RBC QN AUTO: 31.4 PG (ref 26.8–34.3)
MCHC RBC AUTO-ENTMCNC: 33.1 G/DL (ref 31.4–37.4)
MCV RBC AUTO: 95 FL (ref 82–98)
MONOCYTES # BLD AUTO: 0.46 THOUSAND/ΜL (ref 0.17–1.22)
MONOCYTES NFR BLD AUTO: 8 % (ref 4–12)
NEUTROPHILS # BLD AUTO: 3.16 THOUSANDS/ΜL (ref 1.85–7.62)
NEUTS SEG NFR BLD AUTO: 59 % (ref 43–75)
NONHDLC SERPL-MCNC: 111 MG/DL
NRBC BLD AUTO-RTO: 0 /100 WBCS
PLATELET # BLD AUTO: 219 THOUSANDS/UL (ref 149–390)
PMV BLD AUTO: 11.1 FL (ref 8.9–12.7)
POTASSIUM SERPL-SCNC: 3.5 MMOL/L (ref 3.5–5.3)
PROT SERPL-MCNC: 7.5 G/DL (ref 6.4–8.2)
RBC # BLD AUTO: 4.04 MILLION/UL (ref 3.88–5.62)
SODIUM SERPL-SCNC: 138 MMOL/L (ref 136–145)
TRIGL SERPL-MCNC: 251 MG/DL
WBC # BLD AUTO: 5.48 THOUSAND/UL (ref 4.31–10.16)

## 2019-03-08 PROCEDURE — 85025 COMPLETE CBC W/AUTO DIFF WBC: CPT

## 2019-03-08 PROCEDURE — 80061 LIPID PANEL: CPT

## 2019-03-08 PROCEDURE — 36415 COLL VENOUS BLD VENIPUNCTURE: CPT

## 2019-03-08 PROCEDURE — 80076 HEPATIC FUNCTION PANEL: CPT

## 2019-03-08 PROCEDURE — 80048 BASIC METABOLIC PNL TOTAL CA: CPT

## 2019-03-08 PROCEDURE — 83036 HEMOGLOBIN GLYCOSYLATED A1C: CPT

## 2019-03-12 ENCOUNTER — OFFICE VISIT (OUTPATIENT)
Dept: INTERNAL MEDICINE CLINIC | Facility: CLINIC | Age: 69
End: 2019-03-12
Payer: COMMERCIAL

## 2019-03-12 VITALS
HEART RATE: 80 BPM | WEIGHT: 213.8 LBS | RESPIRATION RATE: 12 BRPM | BODY MASS INDEX: 32.4 KG/M2 | SYSTOLIC BLOOD PRESSURE: 116 MMHG | HEIGHT: 68 IN | DIASTOLIC BLOOD PRESSURE: 80 MMHG

## 2019-03-12 DIAGNOSIS — E78.2 MIXED HYPERLIPIDEMIA: ICD-10-CM

## 2019-03-12 DIAGNOSIS — R73.01 IMPAIRED FASTING GLUCOSE: Primary | ICD-10-CM

## 2019-03-12 DIAGNOSIS — Z96.653 HISTORY OF BILATERAL KNEE ARTHROPLASTY: ICD-10-CM

## 2019-03-12 DIAGNOSIS — M1A.9XX0 CHRONIC GOUT INVOLVING TOE OF RIGHT FOOT WITHOUT TOPHUS, UNSPECIFIED CAUSE: ICD-10-CM

## 2019-03-12 DIAGNOSIS — J45.20 MILD INTERMITTENT ASTHMA, UNSPECIFIED WHETHER COMPLICATED: ICD-10-CM

## 2019-03-12 DIAGNOSIS — I10 BENIGN ESSENTIAL HYPERTENSION: ICD-10-CM

## 2019-03-12 PROBLEM — G89.29 CHRONIC PAIN OF RIGHT KNEE: Status: RESOLVED | Noted: 2018-04-20 | Resolved: 2019-03-12

## 2019-03-12 PROBLEM — M25.562 CHRONIC PAIN OF LEFT KNEE: Status: RESOLVED | Noted: 2018-04-20 | Resolved: 2019-03-12

## 2019-03-12 PROBLEM — G89.29 CHRONIC PAIN OF LEFT KNEE: Status: RESOLVED | Noted: 2018-04-20 | Resolved: 2019-03-12

## 2019-03-12 PROBLEM — M25.561 CHRONIC PAIN OF RIGHT KNEE: Status: RESOLVED | Noted: 2018-04-20 | Resolved: 2019-03-12

## 2019-03-12 PROBLEM — M17.0 PRIMARY OSTEOARTHRITIS OF BOTH KNEES: Status: RESOLVED | Noted: 2018-03-22 | Resolved: 2019-03-12

## 2019-03-12 PROCEDURE — 99214 OFFICE O/P EST MOD 30 MIN: CPT | Performed by: INTERNAL MEDICINE

## 2019-03-12 PROCEDURE — 1101F PT FALLS ASSESS-DOCD LE1/YR: CPT | Performed by: INTERNAL MEDICINE

## 2019-03-12 PROCEDURE — 3008F BODY MASS INDEX DOCD: CPT | Performed by: INTERNAL MEDICINE

## 2019-03-12 PROCEDURE — 3074F SYST BP LT 130 MM HG: CPT | Performed by: INTERNAL MEDICINE

## 2019-03-12 PROCEDURE — 3079F DIAST BP 80-89 MM HG: CPT | Performed by: INTERNAL MEDICINE

## 2019-03-12 PROCEDURE — 1160F RVW MEDS BY RX/DR IN RCRD: CPT | Performed by: INTERNAL MEDICINE

## 2019-03-12 RX ORDER — TRAMADOL HYDROCHLORIDE 50 MG/1
TABLET ORAL
COMMUNITY
Start: 2019-02-27 | End: 2019-09-24

## 2019-03-12 NOTE — PATIENT INSTRUCTIONS
Lab data reviewed in detail and compared prior    Hypertension stable on present regimen    Hyperlipidemia-triglycerides elevated, HDL is low, likely related to decreased activity status post total knee arthroplasty, no change in medication, recheck in 6 months    Chronic kidney disease remains stable, continue to avoid NSAIDs    Gout stable without flare, check uric acid level prior to follow-up    Impaired fasting glucose markedly improved with A1c down to 5 6    Anxiety stable on present regimen despite discontinuation of clonidine    History of tubular adenoma-due for colonoscopy within the next few months, I would recommend holding off at least 6 months status post total knee arthroplasty    Routine follow-up after labs in 6 months, sooner as needed

## 2019-03-12 NOTE — PROGRESS NOTES
Assessment/Plan:    Patient Instructions   Lab data reviewed in detail and compared prior    Hypertension stable on present regimen    Hyperlipidemia-triglycerides elevated, HDL is low, likely related to decreased activity status post total knee arthroplasty, no change in medication, recheck in 6 months    Chronic kidney disease remains stable, continue to avoid NSAIDs    Gout stable without flare, check uric acid level prior to follow-up    Impaired fasting glucose markedly improved with A1c down to 5 6    Anxiety stable on present regimen despite discontinuation of clonidine    History of tubular adenoma-due for colonoscopy within the next few months, I would recommend holding off at least 6 months status post total knee arthroplasty    Routine follow-up after labs in 6 months, sooner as needed  Diagnoses and all orders for this visit:    Impaired fasting glucose  -     Hemoglobin A1C; Future    Benign essential hypertension  -     CBC and differential; Future  -     Comprehensive metabolic panel; Future    Mild intermittent asthma, unspecified whether complicated    Mixed hyperlipidemia  -     Lipid panel; Future    Chronic gout involving toe of right 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 tophus, unspecified cause  -     Uric acid; Future    History of bilateral knee arthroplasty    Other orders  -     traMADol (ULTRAM) 50 mg tablet         Subjective:      Patient ID: Markel Solis is a 79 y o  male    Feeling generally well, 5 wks s/p R TKA, using Tramadol and tylenol for pain, no celebrex d/t prior kidney injuiry  Using 81 asa for dvt proph  HTN-home bp's ~130/80's, stopped clonidine per cardiology recs  HPL-tolerating atorvastatin  Bipolar-stable, w/ Dr Janie tellez  Missed last visit d/t tka  No issues off clonidine  Gout-no flares, he continues w/ colchicine daily  Doing PT for exercise, plans to get back into hiking when able           Current Outpatient Medications:     acetaminophen (TYLENOL) 325 mg tablet, Take 650 mg by mouth every 6 (six) hours as needed for mild pain, Disp: , Rfl:     albuterol (PROVENTIL HFA,VENTOLIN HFA) 90 mcg/act inhaler, Inhale 1 puff as needed  , Disp: , Rfl:     amLODIPine-atorvastatin (CADUET) 5-10 MG per tablet, TAKE 1 TABLET DAILY, Disp: 30 tablet, Rfl: 2    aspirin (ASPIR-81) 81 mg EC tablet, Take 1 tablet by mouth daily  , Disp: , Rfl:     busPIRone (BUSPAR) 15 mg tablet, Take 1 tablet by mouth 2 (two) times a day  , Disp: , Rfl:     COLCRYS 0 6 MG tablet, TAKE 1 TABLET BY MOUTH 1-3 TIMES DAILY AS NEEDED, Disp: 30 tablet, Rfl: 1    hydrochlorothiazide (HYDRODIURIL) 12 5 mg tablet, TAKE 1 TABLET (12 5 MG TOTAL) BY MOUTH DAILY, Disp: 30 tablet, Rfl: 5    lisinopril (ZESTRIL) 20 mg tablet, TAKE 1 TABLET BY MOUTH EVERY DAY, Disp: 30 tablet, Rfl: 3    LORazepam (ATIVAN) 1 mg tablet, Take 1 mg by mouth as needed  , Disp: , Rfl:     montelukast (SINGULAIR) 10 mg tablet, Take 1 tablet by mouth daily, Disp: , Rfl:     QUEtiapine (SEROQUEL) 100 mg tablet, Take 100 mg by mouth 2 (two) times a day  , Disp: , Rfl:     traMADol (ULTRAM) 50 mg tablet, , Disp: , Rfl:     Recent Results (from the past 1008 hour(s))   CBC and differential    Collection Time: 03/08/19  9:52 AM   Result Value Ref Range    WBC 5 48 4 31 - 10 16 Thousand/uL    RBC 4 04 3 88 - 5 62 Million/uL    Hemoglobin 12 7 12 0 - 17 0 g/dL    Hematocrit 38 4 36 5 - 49 3 %    MCV 95 82 - 98 fL    MCH 31 4 26 8 - 34 3 pg    MCHC 33 1 31 4 - 37 4 g/dL    RDW 13 2 11 6 - 15 1 %    MPV 11 1 8 9 - 12 7 fL    Platelets 194 408 - 980 Thousands/uL    nRBC 0 /100 WBCs    Neutrophils Relative 59 43 - 75 %    Immat GRANS % 0 0 - 2 %    Lymphocytes Relative 28 14 - 44 %    Monocytes Relative 8 4 - 12 %    Eosinophils Relative 4 0 - 6 %    Basophils Relative 1 0 - 1 %    Neutrophils Absolute 3 16 1 85 - 7 62 Thousands/µL    Immature Grans Absolute 0 02 0 00 - 0 20 Thousand/uL    Lymphocytes Absolute 1 54 0 60 - 4 47 Thousands/µL    Monocytes Absolute 0 46 0 17 - 1 22 Thousand/µL    Eosinophils Absolute 0 23 0 00 - 0 61 Thousand/µL    Basophils Absolute 0 07 0 00 - 0 10 Thousands/µL   Lipid panel    Collection Time: 03/08/19  9:52 AM   Result Value Ref Range    Cholesterol 147 50 - 200 mg/dL    Triglycerides 251 (H) <=150 mg/dL    HDL, Direct 36 (L) 40 - 60 mg/dL    LDL Calculated 61 0 - 100 mg/dL    Non-HDL-Chol (CHOL-HDL) 111 mg/dl   Hemoglobin A1C    Collection Time: 03/08/19  9:52 AM   Result Value Ref Range    Hemoglobin A1C 5 6 4 2 - 6 3 %     mg/dl   Basic metabolic panel    Collection Time: 03/08/19  9:52 AM   Result Value Ref Range    Sodium 138 136 - 145 mmol/L    Potassium 3 5 3 5 - 5 3 mmol/L    Chloride 104 100 - 108 mmol/L    CO2 29 21 - 32 mmol/L    ANION GAP 5 4 - 13 mmol/L    BUN 13 5 - 25 mg/dL    Creatinine 1 22 0 60 - 1 30 mg/dL    Glucose, Fasting 91 65 - 99 mg/dL    Calcium 9 1 8 3 - 10 1 mg/dL    eGFR 61 ml/min/1 73sq m   Hepatic function panel    Collection Time: 03/08/19  9:52 AM   Result Value Ref Range    Total Bilirubin 0 54 0 20 - 1 00 mg/dL    Bilirubin, Direct 0 13 0 00 - 0 20 mg/dL    Alkaline Phosphatase 108 46 - 116 U/L    AST 30 5 - 45 U/L    ALT 48 12 - 78 U/L    Total Protein 7 5 6 4 - 8 2 g/dL    Albumin 4 0 3 5 - 5 0 g/dL       The following portions of the patient's history were reviewed and updated as appropriate: allergies, current medications, past family history, past medical history, past social history, past surgical history and problem list      Review of Systems   Constitutional: Negative for appetite change, chills, diaphoresis, fatigue, fever and unexpected weight change  HENT: Negative for congestion, hearing loss and rhinorrhea  Eyes: Negative for visual disturbance  Respiratory: Negative for cough, chest tightness, shortness of breath and wheezing  Cardiovascular: Negative for chest pain, palpitations and leg swelling  Gastrointestinal: Negative for abdominal pain and blood in stool  Endocrine: Negative for cold intolerance, heat intolerance, polydipsia and polyuria  Genitourinary: Negative for difficulty urinating, dysuria, frequency and urgency  Musculoskeletal: Negative for arthralgias and myalgias  Skin: Negative for rash  Neurological: Negative for dizziness, weakness, light-headedness and headaches  Hematological: Does not bruise/bleed easily  Psychiatric/Behavioral: Negative for dysphoric mood and sleep disturbance  Objective:      Vitals:    03/12/19 1244   BP: 116/80   Pulse: 80   Resp: 12          Physical Exam   Constitutional: He is oriented to person, place, and time  He appears well-developed and well-nourished  HENT:   Head: Normocephalic and atraumatic  Nose: Nose normal    Mouth/Throat: Oropharynx is clear and moist    Eyes: Pupils are equal, round, and reactive to light  Conjunctivae and EOM are normal  No scleral icterus  Neck: Normal range of motion  Neck supple  No JVD present  No tracheal deviation present  No thyromegaly present  Cardiovascular: Normal rate, regular rhythm and intact distal pulses  Exam reveals no gallop and no friction rub  No murmur heard  Pulmonary/Chest: Effort normal and breath sounds normal  No respiratory distress  He has no wheezes  He has no rales  Musculoskeletal: He exhibits no edema or deformity  Lymphadenopathy:     He has no cervical adenopathy  Neurological: He is alert and oriented to person, place, and time  No cranial nerve deficit  Skin: Skin is warm and dry  No rash noted  No erythema  No pallor  Psychiatric: He has a normal mood and affect   His behavior is normal  Judgment and thought content normal

## 2019-04-09 ENCOUNTER — TRANSITIONAL CARE MANAGEMENT (OUTPATIENT)
Dept: INTERNAL MEDICINE CLINIC | Facility: CLINIC | Age: 69
End: 2019-04-09

## 2019-04-10 DIAGNOSIS — I10 ESSENTIAL HYPERTENSION: ICD-10-CM

## 2019-04-10 RX ORDER — LISINOPRIL 20 MG/1
TABLET ORAL
Qty: 30 TABLET | Refills: 3 | Status: SHIPPED | OUTPATIENT
Start: 2019-04-10 | End: 2019-05-29 | Stop reason: SDUPTHER

## 2019-04-11 RX ORDER — AMLODIPINE BESYLATE AND ATORVASTATIN CALCIUM 5; 10 MG/1; MG/1
TABLET, FILM COATED ORAL
Qty: 30 TABLET | Refills: 2 | Status: SHIPPED | OUTPATIENT
Start: 2019-04-11 | End: 2019-05-29 | Stop reason: SDUPTHER

## 2019-05-14 DIAGNOSIS — M10.9 GOUT: ICD-10-CM

## 2019-05-14 RX ORDER — COLCHICINE 0.6 MG/1
TABLET, FILM COATED ORAL
Qty: 30 TABLET | Refills: 1 | Status: SHIPPED | OUTPATIENT
Start: 2019-05-14 | End: 2019-07-07 | Stop reason: SDUPTHER

## 2019-05-29 DIAGNOSIS — I10 ESSENTIAL HYPERTENSION: ICD-10-CM

## 2019-05-29 RX ORDER — LISINOPRIL 20 MG/1
TABLET ORAL
Qty: 30 TABLET | Refills: 0 | Status: SHIPPED | OUTPATIENT
Start: 2019-05-29 | End: 2019-07-03 | Stop reason: SDUPTHER

## 2019-05-30 RX ORDER — AMLODIPINE BESYLATE AND ATORVASTATIN CALCIUM 5; 10 MG/1; MG/1
TABLET, FILM COATED ORAL
Qty: 30 TABLET | Refills: 0 | Status: SHIPPED | OUTPATIENT
Start: 2019-05-30 | End: 2019-07-03 | Stop reason: SDUPTHER

## 2019-07-03 DIAGNOSIS — I10 ESSENTIAL HYPERTENSION: ICD-10-CM

## 2019-07-03 RX ORDER — LISINOPRIL 20 MG/1
TABLET ORAL
Qty: 30 TABLET | Refills: 0 | Status: SHIPPED | OUTPATIENT
Start: 2019-07-03 | End: 2019-07-31 | Stop reason: SDUPTHER

## 2019-07-03 RX ORDER — AMLODIPINE BESYLATE AND ATORVASTATIN CALCIUM 5; 10 MG/1; MG/1
TABLET, FILM COATED ORAL
Qty: 30 TABLET | Refills: 0 | Status: SHIPPED | OUTPATIENT
Start: 2019-07-03 | End: 2019-07-31 | Stop reason: SDUPTHER

## 2019-07-07 DIAGNOSIS — M10.9 GOUT: ICD-10-CM

## 2019-07-08 RX ORDER — COLCHICINE 0.6 MG/1
TABLET, FILM COATED ORAL
Qty: 30 TABLET | Refills: 1 | Status: SHIPPED | OUTPATIENT
Start: 2019-07-08 | End: 2019-09-08 | Stop reason: SDUPTHER

## 2019-07-31 DIAGNOSIS — I10 ESSENTIAL HYPERTENSION: ICD-10-CM

## 2019-07-31 RX ORDER — LISINOPRIL 20 MG/1
TABLET ORAL
Qty: 30 TABLET | Refills: 0 | Status: SHIPPED | OUTPATIENT
Start: 2019-07-31 | End: 2019-08-28 | Stop reason: SDUPTHER

## 2019-07-31 RX ORDER — AMLODIPINE BESYLATE AND ATORVASTATIN CALCIUM 5; 10 MG/1; MG/1
TABLET, FILM COATED ORAL
Qty: 30 TABLET | Refills: 0 | Status: SHIPPED | OUTPATIENT
Start: 2019-07-31 | End: 2019-08-28 | Stop reason: SDUPTHER

## 2019-08-28 DIAGNOSIS — I10 ESSENTIAL HYPERTENSION: ICD-10-CM

## 2019-08-29 RX ORDER — LISINOPRIL 20 MG/1
TABLET ORAL
Qty: 30 TABLET | Refills: 0 | Status: SHIPPED | OUTPATIENT
Start: 2019-08-29 | End: 2019-09-27 | Stop reason: SDUPTHER

## 2019-08-29 RX ORDER — AMLODIPINE BESYLATE AND ATORVASTATIN CALCIUM 5; 10 MG/1; MG/1
TABLET, FILM COATED ORAL
Qty: 30 TABLET | Refills: 0 | Status: SHIPPED | OUTPATIENT
Start: 2019-08-29 | End: 2019-09-27 | Stop reason: SDUPTHER

## 2019-09-01 DIAGNOSIS — I10 BENIGN ESSENTIAL HYPERTENSION: ICD-10-CM

## 2019-09-01 RX ORDER — HYDROCHLOROTHIAZIDE 12.5 MG/1
12.5 TABLET ORAL DAILY
Qty: 30 TABLET | Refills: 5 | Status: SHIPPED | OUTPATIENT
Start: 2019-09-01 | End: 2020-02-26

## 2019-09-08 DIAGNOSIS — M10.9 GOUT: ICD-10-CM

## 2019-09-09 RX ORDER — COLCHICINE 0.6 MG/1
TABLET, FILM COATED ORAL
Qty: 30 TABLET | Refills: 1 | Status: SHIPPED | OUTPATIENT
Start: 2019-09-09 | End: 2019-11-09 | Stop reason: SDUPTHER

## 2019-09-19 ENCOUNTER — APPOINTMENT (OUTPATIENT)
Dept: LAB | Facility: CLINIC | Age: 69
End: 2019-09-19
Payer: COMMERCIAL

## 2019-09-19 DIAGNOSIS — M1A.9XX0 CHRONIC GOUT INVOLVING TOE OF RIGHT FOOT WITHOUT TOPHUS, UNSPECIFIED CAUSE: ICD-10-CM

## 2019-09-19 DIAGNOSIS — I10 BENIGN ESSENTIAL HYPERTENSION: ICD-10-CM

## 2019-09-19 DIAGNOSIS — E78.2 MIXED HYPERLIPIDEMIA: ICD-10-CM

## 2019-09-19 DIAGNOSIS — R73.01 IMPAIRED FASTING GLUCOSE: ICD-10-CM

## 2019-09-19 LAB
ALBUMIN SERPL BCP-MCNC: 4.9 G/DL (ref 3.5–5)
ALP SERPL-CCNC: 74 U/L (ref 46–116)
ALT SERPL W P-5'-P-CCNC: 63 U/L (ref 12–78)
ANION GAP SERPL CALCULATED.3IONS-SCNC: 6 MMOL/L (ref 4–13)
AST SERPL W P-5'-P-CCNC: 43 U/L (ref 5–45)
BASOPHILS # BLD AUTO: 0.06 THOUSANDS/ΜL (ref 0–0.1)
BASOPHILS NFR BLD AUTO: 1 % (ref 0–1)
BILIRUB SERPL-MCNC: 0.76 MG/DL (ref 0.2–1)
BUN SERPL-MCNC: 23 MG/DL (ref 5–25)
CALCIUM SERPL-MCNC: 9.8 MG/DL (ref 8.3–10.1)
CHLORIDE SERPL-SCNC: 103 MMOL/L (ref 100–108)
CHOLEST SERPL-MCNC: 152 MG/DL (ref 50–200)
CO2 SERPL-SCNC: 29 MMOL/L (ref 21–32)
CREAT SERPL-MCNC: 1.32 MG/DL (ref 0.6–1.3)
EOSINOPHIL # BLD AUTO: 0.2 THOUSAND/ΜL (ref 0–0.61)
EOSINOPHIL NFR BLD AUTO: 4 % (ref 0–6)
ERYTHROCYTE [DISTWIDTH] IN BLOOD BY AUTOMATED COUNT: 12.7 % (ref 11.6–15.1)
EST. AVERAGE GLUCOSE BLD GHB EST-MCNC: 111 MG/DL
GFR SERPL CREATININE-BSD FRML MDRD: 55 ML/MIN/1.73SQ M
GLUCOSE P FAST SERPL-MCNC: 94 MG/DL (ref 65–99)
HBA1C MFR BLD: 5.5 % (ref 4.2–6.3)
HCT VFR BLD AUTO: 44.6 % (ref 36.5–49.3)
HDLC SERPL-MCNC: 38 MG/DL (ref 40–60)
HGB BLD-MCNC: 15.1 G/DL (ref 12–17)
IMM GRANULOCYTES # BLD AUTO: 0.01 THOUSAND/UL (ref 0–0.2)
IMM GRANULOCYTES NFR BLD AUTO: 0 % (ref 0–2)
LDLC SERPL CALC-MCNC: 79 MG/DL (ref 0–100)
LYMPHOCYTES # BLD AUTO: 1.57 THOUSANDS/ΜL (ref 0.6–4.47)
LYMPHOCYTES NFR BLD AUTO: 31 % (ref 14–44)
MCH RBC QN AUTO: 32.6 PG (ref 26.8–34.3)
MCHC RBC AUTO-ENTMCNC: 33.9 G/DL (ref 31.4–37.4)
MCV RBC AUTO: 96 FL (ref 82–98)
MONOCYTES # BLD AUTO: 0.47 THOUSAND/ΜL (ref 0.17–1.22)
MONOCYTES NFR BLD AUTO: 9 % (ref 4–12)
NEUTROPHILS # BLD AUTO: 2.75 THOUSANDS/ΜL (ref 1.85–7.62)
NEUTS SEG NFR BLD AUTO: 55 % (ref 43–75)
NONHDLC SERPL-MCNC: 114 MG/DL
NRBC BLD AUTO-RTO: 0 /100 WBCS
PLATELET # BLD AUTO: 190 THOUSANDS/UL (ref 149–390)
PMV BLD AUTO: 11.6 FL (ref 8.9–12.7)
POTASSIUM SERPL-SCNC: 4.2 MMOL/L (ref 3.5–5.3)
PROT SERPL-MCNC: 7.8 G/DL (ref 6.4–8.2)
RBC # BLD AUTO: 4.63 MILLION/UL (ref 3.88–5.62)
SODIUM SERPL-SCNC: 138 MMOL/L (ref 136–145)
TRIGL SERPL-MCNC: 175 MG/DL
URATE SERPL-MCNC: 9.2 MG/DL (ref 4.2–8)
WBC # BLD AUTO: 5.06 THOUSAND/UL (ref 4.31–10.16)

## 2019-09-19 PROCEDURE — 80061 LIPID PANEL: CPT

## 2019-09-19 PROCEDURE — 80053 COMPREHEN METABOLIC PANEL: CPT

## 2019-09-19 PROCEDURE — 83036 HEMOGLOBIN GLYCOSYLATED A1C: CPT

## 2019-09-19 PROCEDURE — 85025 COMPLETE CBC W/AUTO DIFF WBC: CPT

## 2019-09-19 PROCEDURE — 36415 COLL VENOUS BLD VENIPUNCTURE: CPT

## 2019-09-19 PROCEDURE — 84550 ASSAY OF BLOOD/URIC ACID: CPT

## 2019-09-24 ENCOUNTER — OFFICE VISIT (OUTPATIENT)
Dept: INTERNAL MEDICINE CLINIC | Facility: CLINIC | Age: 69
End: 2019-09-24
Payer: COMMERCIAL

## 2019-09-24 VITALS
WEIGHT: 222.8 LBS | HEIGHT: 68 IN | SYSTOLIC BLOOD PRESSURE: 116 MMHG | DIASTOLIC BLOOD PRESSURE: 70 MMHG | HEART RATE: 92 BPM | BODY MASS INDEX: 33.77 KG/M2 | RESPIRATION RATE: 12 BRPM

## 2019-09-24 DIAGNOSIS — Z23 NEED FOR INFLUENZA VACCINATION: ICD-10-CM

## 2019-09-24 DIAGNOSIS — E78.2 MIXED HYPERLIPIDEMIA: ICD-10-CM

## 2019-09-24 DIAGNOSIS — R73.01 IMPAIRED FASTING GLUCOSE: ICD-10-CM

## 2019-09-24 DIAGNOSIS — N18.30 CKD (CHRONIC KIDNEY DISEASE) STAGE 3, GFR 30-59 ML/MIN (HCC): ICD-10-CM

## 2019-09-24 DIAGNOSIS — I49.3 PVC'S (PREMATURE VENTRICULAR CONTRACTIONS): ICD-10-CM

## 2019-09-24 DIAGNOSIS — Z12.5 SCREENING FOR PROSTATE CANCER: ICD-10-CM

## 2019-09-24 DIAGNOSIS — I10 BENIGN ESSENTIAL HYPERTENSION: Primary | ICD-10-CM

## 2019-09-24 DIAGNOSIS — D12.6 TUBULAR ADENOMA OF COLON: ICD-10-CM

## 2019-09-24 DIAGNOSIS — M1A.9XX0 CHRONIC GOUT INVOLVING TOE OF RIGHT FOOT WITHOUT TOPHUS, UNSPECIFIED CAUSE: ICD-10-CM

## 2019-09-24 PROCEDURE — 3008F BODY MASS INDEX DOCD: CPT | Performed by: INTERNAL MEDICINE

## 2019-09-24 PROCEDURE — 99214 OFFICE O/P EST MOD 30 MIN: CPT | Performed by: INTERNAL MEDICINE

## 2019-09-24 PROCEDURE — 90471 IMMUNIZATION ADMIN: CPT | Performed by: INTERNAL MEDICINE

## 2019-09-24 PROCEDURE — 3078F DIAST BP <80 MM HG: CPT | Performed by: INTERNAL MEDICINE

## 2019-09-24 PROCEDURE — 3074F SYST BP LT 130 MM HG: CPT | Performed by: INTERNAL MEDICINE

## 2019-09-24 PROCEDURE — 90662 IIV NO PRSV INCREASED AG IM: CPT | Performed by: INTERNAL MEDICINE

## 2019-09-24 NOTE — PATIENT INSTRUCTIONS
Lab data reviewed in detail and compared prior    Hypertension stable on present regimen, repeat blood pressure 100/64  Patient on thiazide diuretic with history of gout, can consider discontinuation if uric acid level continues to rise as below  Hyperlipidemia-LDL at goal on atorvastatin, triglycerides improved, continue with exercise and weight loss efforts    Impaired fasting glucose improved with A1c 5 5, continue with low carb diet and weight loss efforts    Gout-no recent flares but taking colchicine daily  Uric acid level continues to increase which can be multifactorial related to diet, weight gain, thiazide diuretic and alcohol intake  We discussed several options including discontinuation of thiazide diuretic, cutting back or cutting out the alcohol, weight loss and exercise as well as low purine diet  Patient would like to keep medication the same and concentrate on his diet and exercise efforts  If uric acid level is still elevated at follow-up we will likely discontinue thiazide diuretic  Palpitations consistent with symptomatic PVCs-Holter monitor from last October reviewed, if 1588 ventricular ectopies  We discussed consideration of adding a beta-blocker though his heart is structurally and functionally normal by echo and nuclear stress test   Will continue to monitor symptoms without change in medication at this time  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

## 2019-09-24 NOTE — PROGRESS NOTES
Assessment/Plan:    There are no diagnoses linked to this encounter  There are no Patient Instructions on file for this visit  Subjective:      Patient ID: Mini Madrid is a 81 y o  male    Feeling generally well  Working at [employer] loss, but gained about 15 in past 3 mos, now down 5  Taking classes at [employer]  Still getting palpitations a few x per day, lasting just a few seconds  HTN-home bp's ~130/80's   HPL-tolerating atorvastatin  CKD-keeping well hydrated, no nsaids  IFG-watching carbs  Bipolar-stable, w/ Dr Leyla tellez  No issues off clonidine  Gout-no flares, he continues w/ colchicine daily  Having 3-4 beers per week + wine about 8-10 drinks per week  Exercising tiw w/ [employer], plans to start swimming at [employer]          Current Outpatient Medications:     acetaminophen (TYLENOL) 325 mg tablet, Take 650 mg by mouth every 6 (six) hours as needed for mild pain, Disp: , Rfl:     albuterol (PROVENTIL HFA,VENTOLIN HFA) 90 mcg/act inhaler, Inhale 1 puff as needed  , Disp: , Rfl:     amLODIPine-atorvastatin (CADUET) 5-10 MG per tablet, TAKE 1 TABLET DAILY, Disp: 30 tablet, Rfl: 0    aspirin (ASPIR-81) 81 mg EC tablet, Take 1 tablet by mouth daily  , Disp: , Rfl:     busPIRone (BUSPAR) 15 mg tablet, Take 1 tablet by mouth 2 (two) times a day  , Disp: , Rfl:     COLCRYS 0 6 MG tablet, TAKE 1 TABLET BY MOUTH 1-3 TIMES DAILY AS NEEDED, Disp: 30 tablet, Rfl: 1    hydrochlorothiazide (HYDRODIURIL) 12 5 mg tablet, TAKE 1 TABLET (12 5 MG TOTAL) BY MOUTH DAILY, Disp: 30 tablet, Rfl: 5    lisinopril (ZESTRIL) 20 mg tablet, TAKE 1 TABLET BY MOUTH EVERY DAY, Disp: 30 tablet, Rfl: 0    LORazepam (ATIVAN) 1 mg tablet, Take 1 mg by mouth as needed  , Disp: , Rfl:     montelukast (SINGULAIR) 10 mg tablet, Take 1 tablet by mouth daily, Disp: , Rfl:     QUEtiapine (SEROQUEL) 100 mg tablet, Take 100 mg by mouth 2 (two) times a day  , Disp: , Rfl:     traMADol (ULTRAM) 50 mg tablet, , Disp: , Rfl: Recent Results (from the past 1008 hour(s))   CBC and differential    Collection Time: 09/19/19  9:02 AM   Result Value Ref Range    WBC 5 06 4 31 - 10 16 Thousand/uL    RBC 4 63 3 88 - 5 62 Million/uL    Hemoglobin 15 1 12 0 - 17 0 g/dL    Hematocrit 44 6 36 5 - 49 3 %    MCV 96 82 - 98 fL    MCH 32 6 26 8 - 34 3 pg    MCHC 33 9 31 4 - 37 4 g/dL    RDW 12 7 11 6 - 15 1 %    MPV 11 6 8 9 - 12 7 fL    Platelets 882 273 - 517 Thousands/uL    nRBC 0 /100 WBCs    Neutrophils Relative 55 43 - 75 %    Immat GRANS % 0 0 - 2 %    Lymphocytes Relative 31 14 - 44 %    Monocytes Relative 9 4 - 12 %    Eosinophils Relative 4 0 - 6 %    Basophils Relative 1 0 - 1 %    Neutrophils Absolute 2 75 1 85 - 7 62 Thousands/µL    Immature Grans Absolute 0 01 0 00 - 0 20 Thousand/uL    Lymphocytes Absolute 1 57 0 60 - 4 47 Thousands/µL    Monocytes Absolute 0 47 0 17 - 1 22 Thousand/µL    Eosinophils Absolute 0 20 0 00 - 0 61 Thousand/µL    Basophils Absolute 0 06 0 00 - 0 10 Thousands/µL   Comprehensive metabolic panel    Collection Time: 09/19/19  9:02 AM   Result Value Ref Range    Sodium 138 136 - 145 mmol/L    Potassium 4 2 3 5 - 5 3 mmol/L    Chloride 103 100 - 108 mmol/L    CO2 29 21 - 32 mmol/L    ANION GAP 6 4 - 13 mmol/L    BUN 23 5 - 25 mg/dL    Creatinine 1 32 (H) 0 60 - 1 30 mg/dL    Glucose, Fasting 94 65 - 99 mg/dL    Calcium 9 8 8 3 - 10 1 mg/dL    AST 43 5 - 45 U/L    ALT 63 12 - 78 U/L    Alkaline Phosphatase 74 46 - 116 U/L    Total Protein 7 8 6 4 - 8 2 g/dL    Albumin 4 9 3 5 - 5 0 g/dL    Total Bilirubin 0 76 0 20 - 1 00 mg/dL    eGFR 55 ml/min/1 73sq m   Lipid panel    Collection Time: 09/19/19  9:02 AM   Result Value Ref Range    Cholesterol 152 50 - 200 mg/dL    Triglycerides 175 (H) <=150 mg/dL    HDL, Direct 38 (L) 40 - 60 mg/dL    LDL Calculated 79 0 - 100 mg/dL    Non-HDL-Chol (CHOL-HDL) 114 mg/dl   Hemoglobin A1C    Collection Time: 09/19/19  9:02 AM   Result Value Ref Range    Hemoglobin A1C 5 5 4 2 - 6 3 %     mg/dl   Uric acid    Collection Time: 09/19/19  9:02 AM   Result Value Ref Range    Uric Acid 9 2 (H) 4 2 - 8 0 mg/dL       The following portions of the patient's history were reviewed and updated as appropriate: allergies, current medications, past family history, past medical history, past social history, past surgical history and problem list      Review of Systems   Constitutional: Negative for appetite change, chills, diaphoresis, fatigue, fever and unexpected weight change  HENT: Negative for congestion, hearing loss and rhinorrhea  Eyes: Negative for visual disturbance  Respiratory: Negative for cough, chest tightness, shortness of breath and wheezing  Cardiovascular: Negative for chest pain, palpitations and leg swelling  Gastrointestinal: Negative for abdominal pain and blood in stool  Endocrine: Negative for cold intolerance, heat intolerance, polydipsia and polyuria  Genitourinary: Negative for difficulty urinating, dysuria, frequency and urgency  Musculoskeletal: Positive for arthralgias  Negative for myalgias  Skin: Negative for rash  Neurological: Negative for dizziness, weakness, light-headedness and headaches  Hematological: Does not bruise/bleed easily  Psychiatric/Behavioral: Negative for dysphoric mood and sleep disturbance  Objective:      Vitals:    09/24/19 0937   BP: 116/70   Pulse: 92   Resp: 12          Physical Exam   Constitutional: He is oriented to person, place, and time  He appears well-developed and well-nourished  HENT:   Head: Normocephalic and atraumatic  Nose: Nose normal    Mouth/Throat: Oropharynx is clear and moist    Eyes: Pupils are equal, round, and reactive to light  Conjunctivae and EOM are normal  No scleral icterus  Neck: Normal range of motion  Neck supple  No JVD present  No tracheal deviation present  No thyromegaly present  Cardiovascular: Normal rate, regular rhythm and intact distal pulses   Exam reveals no gallop and no friction rub  No murmur heard  Pulmonary/Chest: Effort normal and breath sounds normal  No respiratory distress  He has no wheezes  He has no rales  Musculoskeletal: He exhibits no edema or deformity  Lymphadenopathy:     He has no cervical adenopathy  Neurological: He is alert and oriented to person, place, and time  No cranial nerve deficit  Skin: Skin is warm and dry  No rash noted  No erythema  No pallor  Psychiatric: He has a normal mood and affect   His behavior is normal  Judgment and thought content normal

## 2019-09-27 DIAGNOSIS — I10 ESSENTIAL HYPERTENSION: ICD-10-CM

## 2019-09-28 RX ORDER — LISINOPRIL 20 MG/1
TABLET ORAL
Qty: 30 TABLET | Refills: 0 | Status: SHIPPED | OUTPATIENT
Start: 2019-09-28 | End: 2019-10-29 | Stop reason: SDUPTHER

## 2019-09-28 RX ORDER — AMLODIPINE BESYLATE AND ATORVASTATIN CALCIUM 5; 10 MG/1; MG/1
TABLET, FILM COATED ORAL
Qty: 30 TABLET | Refills: 0 | Status: SHIPPED | OUTPATIENT
Start: 2019-09-28 | End: 2019-10-29 | Stop reason: SDUPTHER

## 2019-10-15 ENCOUNTER — TELEPHONE (OUTPATIENT)
Dept: INTERNAL MEDICINE CLINIC | Facility: CLINIC | Age: 69
End: 2019-10-15

## 2019-10-15 NOTE — TELEPHONE ENCOUNTER
----- Message from [address] [place] [address]  Alanis Coffey sent at 10/15/2019 11:50 AM EDT -----  Regarding: Non-Urgent Medical Question  Contact: [phone]  Cherri Zambrano,  Nobody has contacted me about an appointment for my colonoscopy  Is there something I need to do?

## 2019-10-29 DIAGNOSIS — I10 ESSENTIAL HYPERTENSION: ICD-10-CM

## 2019-10-29 RX ORDER — AMLODIPINE BESYLATE AND ATORVASTATIN CALCIUM 5; 10 MG/1; MG/1
TABLET, FILM COATED ORAL
Qty: 30 TABLET | Refills: 0 | Status: SHIPPED | OUTPATIENT
Start: 2019-10-29 | End: 2019-11-23 | Stop reason: SDUPTHER

## 2019-10-29 RX ORDER — LISINOPRIL 20 MG/1
TABLET ORAL
Qty: 30 TABLET | Refills: 0 | Status: SHIPPED | OUTPATIENT
Start: 2019-10-29 | End: 2019-11-23 | Stop reason: SDUPTHER

## 2019-11-05 ENCOUNTER — TELEPHONE (OUTPATIENT)
Dept: GASTROENTEROLOGY | Facility: CLINIC | Age: 69
End: 2019-11-05

## 2019-11-05 NOTE — TELEPHONE ENCOUNTER
11/05/19  Screened by: Rafat Mills    Referring Provider     Pre- Screening: There is no height or weight on file to calculate BMI  Has patient been referred for a routine screening Colonoscopy? yes  Is the patient between 39-70 years old? yes    SCHEDULING STAFF   If the patient is between 45yrs-49yrs, please advise patient to confirm benefits/coverage with their insurance company for a routine screening colonoscopy, some insurance carriers will only cover at [id number] or older   If the patient is over 66years old, please schedule an office visit  Do you have any of the following symptoms? NONE    Have you had a coronary or vascular stent within the last year? no    Have you had a heart attack or stroke in the last 6 months? no    Have you had intestinal surgery in the last 3 months? no    Do you have problems with: NONE    Do you use: NONE    Have you been hospitalized in the last Month? no    Have you been diagnosed with a bleeding disorder or anemia? no    Have you had chest pain (angina) or breathing problems  (COPD) in the last 3 months? no    Do you have any difficulty walking up a flight of stairs? no    Have you had Kidney failure or insufficiency? no    Have you had heart valve surgery? no    Are you confined to a wheelchair? no    Do you take Other blood thinning medications yes ASA    Have you been diagnosed with Diabetes or are you taking any   Diabetic medications? no    : If patient answers NO to medical questions, then schedule procedure  If patient answers YES to medical questions, then schedule office appointment  Previous Colonoscopy yes  Date and Facility of last colonoscopy?  2016    Comments:

## 2019-11-09 DIAGNOSIS — M10.9 GOUT: ICD-10-CM

## 2019-11-10 RX ORDER — COLCHICINE 0.6 MG/1
TABLET, FILM COATED ORAL
Qty: 30 TABLET | Refills: 1 | Status: SHIPPED | OUTPATIENT
Start: 2019-11-10 | End: 2020-01-04

## 2019-11-23 DIAGNOSIS — I10 ESSENTIAL HYPERTENSION: ICD-10-CM

## 2019-11-24 RX ORDER — LISINOPRIL 20 MG/1
TABLET ORAL
Qty: 30 TABLET | Refills: 0 | Status: SHIPPED | OUTPATIENT
Start: 2019-11-24 | End: 2019-12-10

## 2019-11-24 RX ORDER — AMLODIPINE BESYLATE AND ATORVASTATIN CALCIUM 5; 10 MG/1; MG/1
TABLET, FILM COATED ORAL
Qty: 30 TABLET | Refills: 0 | Status: SHIPPED | OUTPATIENT
Start: 2019-11-24 | End: 2019-12-18 | Stop reason: SDUPTHER

## 2019-11-26 ENCOUNTER — TELEPHONE (OUTPATIENT)
Dept: CARDIOLOGY CLINIC | Facility: CLINIC | Age: 69
End: 2019-11-26

## 2019-11-26 NOTE — TELEPHONE ENCOUNTER
Per last office note : Return in about 6 months (around 5/27/2019) for Recheck  Please call pt to schedule f/u with Dr Chaparro Rosario

## 2019-11-26 NOTE — TELEPHONE ENCOUNTER
----- Message from [id number]  Ann Mccann sent at 11/26/2019 11:34 AM EST -----  Regarding: Non-Urgent Medical Question  Contact: [phone]  It's been a year since I saw you last   Do I need to make a follow up appointment?

## 2019-12-04 ENCOUNTER — TELEPHONE (OUTPATIENT)
Dept: INTERNAL MEDICINE CLINIC | Facility: CLINIC | Age: 69
End: 2019-12-04

## 2019-12-04 NOTE — TELEPHONE ENCOUNTER
----- Message from Coleman Lozano MD sent at 12/4/2019  1:22 PM EST -----  Regarding: FW: FW: FW: Non-Urgent Medical Question  Contact: [phone]  Please schedule visit, with home monitor    ----- Message -----  From: Mercedes Branch MA  Sent: 12/4/2019   7:54 AM EST  To: Coleman Lozano MD  Subject: FW: FW: FW: Non-Urgent Medical Question              ----- Message -----  From: Charissa Morrison  Sent: 12/3/2019   7:49 PM EST  To: [facility]  Subject: RE: FW: FW: Non-Urgent Medical Question          Today four hours after taking my meds my BP was 138/85  Tonight just before taking my evening meds my BP was 143/97  I increased the Lisinopril when you told me last week   ----- Message -----  From: Coleman Lozano MD  Sent: 11/26/2019 11:49 AM EST  To: Charissa Morrison  Subject: RE: FW: FW: Non-Urgent Medical Question  Start taking 2 of the lisinopril, 40 mg at a time and get back to me next week       ----- Message -----  From: Charissa Morrison  Sent: 11/26/2019  11:32 AM EST  To: [facility]  Subject: RE: FW: Non-Urgent Medical Question              That's exactly when I'm taking them  I take them about 12 hours apart from each other  About 30 to 45 minutes after taking them my BP is very low but it creeps up as the day goes on   ----- Message -----  From: Coleman Lozano MD  Sent: 11/26/2019  9:48 AM EST  To: Charissa Morrison  Subject: RE: FW: Non-Urgent Medical Question  When are you taking the hydrochlorothiazide, lisinopril and Caduet? They are all long-acting medications  I would recommend taking hydrochlorothiazide and lisinopril in the morning and CT await at night  Continue with twice daily blood pressure checks    We may need to bump the dose of lisinopril     ----- Message -----  From: Charissa Morrison  Sent: 11/25/2019   8:15 PM EST  To: [facility]  Subject: Non-Urgent Medical Question                      Hardy Meng,  When I take my BP medication in the morning  my blood pressure is fine - actually low -  for the first few hours but then  creeps up to 161/95 by the time I take my evening BP medication 12 hours later  Is there something with a more time released action or should I take another medication half way through to keep my BP from jumping up?   Best regards,  Markel

## 2019-12-10 ENCOUNTER — OFFICE VISIT (OUTPATIENT)
Dept: INTERNAL MEDICINE CLINIC | Facility: CLINIC | Age: 69
End: 2019-12-10
Payer: COMMERCIAL

## 2019-12-10 VITALS
WEIGHT: 223 LBS | OXYGEN SATURATION: 97 % | HEART RATE: 88 BPM | SYSTOLIC BLOOD PRESSURE: 132 MMHG | DIASTOLIC BLOOD PRESSURE: 84 MMHG | BODY MASS INDEX: 33.8 KG/M2 | HEIGHT: 68 IN

## 2019-12-10 DIAGNOSIS — I10 BENIGN ESSENTIAL HYPERTENSION: Primary | ICD-10-CM

## 2019-12-10 DIAGNOSIS — I10 ESSENTIAL HYPERTENSION: ICD-10-CM

## 2019-12-10 PROCEDURE — 3079F DIAST BP 80-89 MM HG: CPT | Performed by: NURSE PRACTITIONER

## 2019-12-10 PROCEDURE — 3075F SYST BP GE 130 - 139MM HG: CPT | Performed by: NURSE PRACTITIONER

## 2019-12-10 PROCEDURE — 3008F BODY MASS INDEX DOCD: CPT | Performed by: NURSE PRACTITIONER

## 2019-12-10 PROCEDURE — 1160F RVW MEDS BY RX/DR IN RCRD: CPT | Performed by: NURSE PRACTITIONER

## 2019-12-10 PROCEDURE — 99213 OFFICE O/P EST LOW 20 MIN: CPT | Performed by: NURSE PRACTITIONER

## 2019-12-10 RX ORDER — LISINOPRIL 40 MG/1
40 TABLET ORAL DAILY
Qty: 90 TABLET | Refills: 3 | Status: SHIPPED | OUTPATIENT
Start: 2019-12-10 | End: 2020-01-15 | Stop reason: SDUPTHER

## 2019-12-10 NOTE — PATIENT INSTRUCTIONS
Hypertension, his blood pressure cuff seems reliable in relationship to in office blood pressure  At this time I recommend that he continue to monitor blood pressures over the new next 2 weeks  He should take blood pressures morning and nighttime take 3 consecutive and then average and then email those results to us

## 2019-12-10 NOTE — PROGRESS NOTES
Assessment/Plan:    Patient Instructions     Hypertension, his blood pressure cuff seems reliable in relationship to in office blood pressure  At this time I recommend that he continue to monitor blood pressures over the new next 2 weeks  He should take blood pressures morning and nighttime take 3 consecutive and then average and then email those results to us  Diagnoses and all orders for this visit:    Benign essential hypertension    Essential hypertension  -     lisinopril (ZESTRIL) 40 mg tablet; Take 1 tablet (40 mg total) by mouth daily         Subjective:      Patient ID: Kevin Schwarz is a 81 y o  male      Patient recently had his lisinopril increased, he was monitoring blood pressures at home noted his morning blood pressure would be normal if not a little bit low before to 5 hours later it would be high 160s over 100  In the nighttime was always very variant  He takes 2 blood pressures in the morning and 1 at nighttime  No change in diet no other change in medications no significant weight loss or weight gain          Current Outpatient Medications:     acetaminophen (TYLENOL) 325 mg tablet, Take 650 mg by mouth every 6 (six) hours as needed for mild pain, Disp: , Rfl:     albuterol (PROVENTIL HFA,VENTOLIN HFA) 90 mcg/act inhaler, Inhale 1 puff as needed  , Disp: , Rfl:     amLODIPine-atorvastatin (CADUET) 5-10 MG per tablet, TAKE 1 TABLET BY MOUTH EVERY DAY, Disp: 30 tablet, Rfl: 0    aspirin (ASPIR-81) 81 mg EC tablet, Take 1 tablet by mouth daily  , Disp: , Rfl:     busPIRone (BUSPAR) 15 mg tablet, Take 1 tablet by mouth 2 (two) times a day  , Disp: , Rfl:     COLCRYS 0 6 MG tablet, TAKE 1 TABLET BY MOUTH 1-3 TIMES DAILY AS NEEDED, Disp: 30 tablet, Rfl: 1    hydrochlorothiazide (HYDRODIURIL) 12 5 mg tablet, TAKE 1 TABLET (12 5 MG TOTAL) BY MOUTH DAILY, Disp: 30 tablet, Rfl: 5    lisinopril (ZESTRIL) 40 mg tablet, Take 1 tablet (40 mg total) by mouth daily, Disp: 90 tablet, Rfl: 3   LORazepam (ATIVAN) 1 mg tablet, Take 1 mg by mouth as needed  , Disp: , Rfl:     montelukast (SINGULAIR) 10 mg tablet, Take 1 tablet by mouth daily, Disp: , Rfl:     QUEtiapine (SEROQUEL) 100 mg tablet, Take 100 mg by mouth 2 (two) times a day  , Disp: , Rfl:     No results found for this or any previous visit (from the past 1008 hour(s))  The following portions of the patient's history were reviewed and updated as appropriate: allergies, current medications, past family history, past medical history, past social history, past surgical history and problem list      Review of Systems   Constitutional: Negative for appetite change, chills, diaphoresis, fatigue, fever and unexpected weight change  HENT: Negative for postnasal drip and sneezing  Eyes: Negative for visual disturbance  Respiratory: Negative for chest tightness and shortness of breath  Cardiovascular: Negative for chest pain, palpitations and leg swelling  Gastrointestinal: Negative for abdominal pain and blood in stool  Endocrine: Negative for cold intolerance, heat intolerance, polydipsia, polyphagia and polyuria  Genitourinary: Negative for difficulty urinating, dysuria, frequency and urgency  Musculoskeletal: Negative for arthralgias and myalgias  Skin: Negative for rash and wound  Neurological: Negative for dizziness, weakness, light-headedness and headaches  Hematological: Negative for adenopathy  Psychiatric/Behavioral: Negative for confusion, dysphoric mood and sleep disturbance  The patient is not nervous/anxious  Objective:      /84   Pulse 88   Ht 5' 8 25" (1 734 m)   Wt 101 kg (223 lb)   SpO2 97%   BMI 33 66 kg/m²        Physical Exam   Constitutional: He is oriented to person, place, and time  He appears well-developed  No distress  HENT:   Head: Normocephalic and atraumatic     Nose: Nose normal    Mouth/Throat: Oropharynx is clear and moist    Eyes: Pupils are equal, round, and reactive to light  Conjunctivae and EOM are normal    Neck: Normal range of motion  Neck supple  No JVD present  No tracheal deviation present  No thyromegaly present  Cardiovascular: Normal rate, regular rhythm, normal heart sounds and intact distal pulses  Exam reveals no gallop and no friction rub  No murmur heard  Pulmonary/Chest: Effort normal and breath sounds normal  No respiratory distress  He has no wheezes  He has no rales  Abdominal: Soft  Bowel sounds are normal  He exhibits no distension  There is no tenderness  Musculoskeletal: Normal range of motion  He exhibits no edema  Lymphadenopathy:     He has no cervical adenopathy  Neurological: He is alert and oriented to person, place, and time  No cranial nerve deficit  Skin: Skin is warm and dry  No rash noted  He is not diaphoretic  Psychiatric: He has a normal mood and affect  His behavior is normal  Judgment and thought content normal      BMI Counseling: Body mass index is 33 66 kg/m²  The BMI is above normal  Nutrition recommendations include decreasing portion sizes  Exercise recommendations include moderate physical activity 150 minutes/week  No pharmacotherapy was ordered  Tobacco Cessation Counseling: Tobacco cessation counseling was provided   The patient is sincerely urged to quit consumption of tobacco  He is not ready to quit tobacco

## 2019-12-18 DIAGNOSIS — I10 ESSENTIAL HYPERTENSION: ICD-10-CM

## 2019-12-18 RX ORDER — LISINOPRIL 40 MG/1
TABLET ORAL
Qty: 90 TABLET | Refills: 3 | Status: SHIPPED | OUTPATIENT
Start: 2019-12-18 | End: 2020-01-03

## 2019-12-18 RX ORDER — AMLODIPINE BESYLATE AND ATORVASTATIN CALCIUM 5; 10 MG/1; MG/1
TABLET, FILM COATED ORAL
Qty: 30 TABLET | Refills: 0 | Status: SHIPPED | OUTPATIENT
Start: 2019-12-18 | End: 2020-01-03

## 2020-01-03 ENCOUNTER — OFFICE VISIT (OUTPATIENT)
Dept: CARDIOLOGY CLINIC | Facility: CLINIC | Age: 70
End: 2020-01-03
Payer: COMMERCIAL

## 2020-01-03 VITALS
OXYGEN SATURATION: 97 % | HEART RATE: 81 BPM | HEIGHT: 68 IN | WEIGHT: 222 LBS | DIASTOLIC BLOOD PRESSURE: 80 MMHG | BODY MASS INDEX: 33.65 KG/M2 | SYSTOLIC BLOOD PRESSURE: 134 MMHG

## 2020-01-03 DIAGNOSIS — I49.3 PVC'S (PREMATURE VENTRICULAR CONTRACTIONS): ICD-10-CM

## 2020-01-03 DIAGNOSIS — E78.2 MIXED HYPERLIPIDEMIA: Primary | ICD-10-CM

## 2020-01-03 DIAGNOSIS — I10 ESSENTIAL HYPERTENSION: ICD-10-CM

## 2020-01-03 PROCEDURE — 99213 OFFICE O/P EST LOW 20 MIN: CPT | Performed by: INTERNAL MEDICINE

## 2020-01-03 RX ORDER — AMLODIPINE BESYLATE AND ATORVASTATIN CALCIUM 5; 40 MG/1; MG/1
1 TABLET, FILM COATED ORAL DAILY
Qty: 90 TABLET | Refills: 3 | Status: SHIPPED | OUTPATIENT
Start: 2020-01-03 | End: 2020-12-17

## 2020-01-03 NOTE — PATIENT INSTRUCTIONS
Please stop taking your Caduet 5-10 mg  Please start taking Caduet 5-40 mg  A new prescription has been sent to the pharmacy  Please have your blood drawn in the next 3 months to monitor your cholesterol levels on this new drug  This is a fasting blood test     Please return to our office in 1 year, sooner if any acute issues arise  We will be in contact with you regarding your blood test before then

## 2020-01-03 NOTE — PROGRESS NOTES
Cardiology Follow Up    Tita Norton  1950  [id number]  [address] [place] [address] [facility]    Dear Dr Annabelle Guerra is a 61-year-old gentleman who has been referred to the [address] [place] [address] Place of Cardiology in [facility] for the following issues:     1  Palpitations  2  Abnormal stress test  3  Hypertension  4  Hyperlipidemia    Interval History:  Since our last visit, Mr John Poole has continued to do very well  He is very active and an avid hiker  He reports no chest pain, shortness of breath, syncope or presyncope  He does occasionally have a palpitation, but these are self-limited and do not bother him nearly as much as they used to  He did note that his palpitations seemed exacerbated while taking a probiotic, and he has stopped taking it        Patient Active Problem List   Diagnosis    Asthma    Benign essential hypertension    BPH without urinary obstruction    Elevated liver enzymes    Hyperlipidemia    Gout    Impaired fasting glucose    Tubular adenoma of colon    History of bilateral knee arthroplasty    CKD (chronic kidney disease) stage 3, GFR 30-59 ml/min ([facility])    PVC's (premature ventricular contractions)     Past Medical History:   Diagnosis Date    Chronic kidney disease     Depression     Dyshidrosis     Hypertension     Osteoarthritis      Social History     Socioeconomic History    Marital status: /Civil Union     Spouse name: Not on file    Number of children: Not on file    Years of education: Not on file    Highest education level: Not on file   Occupational History    Occupation: IT   Social Needs    Financial resource strain: Not on file    Food insecurity:     Worry: Not on file     Inability: Not on file    Transportation needs:     Medical: Not on file     Non-medical: Not on file   Tobacco Use    Smoking status: Light Tobacco Smoker     Packs/day: 0 00     Years: 40 00     Pack years: 0 00     Types: Cigars    Smokeless tobacco: Never Used    Tobacco comment: cigar sometimes    Substance and Sexual Activity    Alcohol use:  Yes     Alcohol/week: 6 0 standard drinks     Types: 3 Glasses of wine, 2 Cans of beer, 1 Shots of liquor per week     Frequency: 2-3 times a week     Drinks per session: 1 or 2     Comment: occasional    Drug use: No    Sexual activity: Yes   Lifestyle    Physical activity:     Days per week: 3 days     Minutes per session: 30 min    Stress: Not at all   Relationships    Social connections:     Talks on phone: Not on file     Gets together: Not on file     Attends Denominational service: Not on file     Active member of club or organization: Not on file     Attends meetings of clubs or organizations: Not on file     Relationship status: Not on file    Intimate partner violence:     Fear of current or ex partner: Not on file     Emotionally abused: Not on file     Physically abused: Not on file     Forced sexual activity: Not on file   Other Topics Concern    Not on file   Social History Narrative    Living independently with spouse    No advance directives      Family History   Problem Relation Age of Onset    Coronary artery disease Mother     Hyperlipidemia Mother     Hypertension Mother     Stroke Father     Hypertension Father      Past Surgical History:   Procedure Laterality Date    COLONOSCOPY  02/25/2010    HEMORRHOID SURGERY      REPLACEMENT TOTAL KNEE Left     TONSILLECTOMY      WISDOM TOOTH EXTRACTION         Current Outpatient Medications:     acetaminophen (TYLENOL) 325 mg tablet, Take 650 mg by mouth every 6 (six) hours as needed for mild pain, Disp: , Rfl:     albuterol (PROVENTIL HFA,VENTOLIN HFA) 90 mcg/act inhaler, Inhale 1 puff as needed  , Disp: , Rfl:     amLODIPine-atorvastatin (CADUET) 5-10 MG per tablet, TAKE 1 TABLET BY MOUTH EVERY DAY, Disp: 30 tablet, Rfl: 0    aspirin (ASPIR-81) 81 mg EC tablet, Take 1 tablet by mouth daily  , Disp: , Rfl:     busPIRone (BUSPAR) 15 mg tablet, Take 1 tablet by mouth daily , Disp: , Rfl:     COLCRYS 0 6 MG tablet, TAKE 1 TABLET BY MOUTH 1-3 TIMES DAILY AS NEEDED, Disp: 30 tablet, Rfl: 1    hydrochlorothiazide (HYDRODIURIL) 12 5 mg tablet, TAKE 1 TABLET (12 5 MG TOTAL) BY MOUTH DAILY, Disp: 30 tablet, Rfl: 5    lisinopril (ZESTRIL) 40 mg tablet, Take 1 tablet (40 mg total) by mouth daily, Disp: 90 tablet, Rfl: 3    LORazepam (ATIVAN) 1 mg tablet, Take 1 mg by mouth as needed  , Disp: , Rfl:     montelukast (SINGULAIR) 10 mg tablet, Take 1 tablet by mouth daily, Disp: , Rfl:     QUEtiapine (SEROQUEL) 100 mg tablet, Take 100 mg by mouth daily , Disp: , Rfl:        Allergies   Allergen Reactions    Cat Hair Extract Anaphylaxis, Hives, Itching, Shortness Of Breath and Swelling    Other Cough    Pollen Extract Other (See Comments) and Shortness Of Breath       Labs:  Results for Berlin Edmondson (MRN [id number]) as of 1/3/2020 10:42   9/19/2019 09:02   Sodium 138   Potassium 4 2   Chloride 103   CO2 29   Anion Gap 6   BUN 23   Creatinine 1 32 (H)   GLUCOSE FASTING 94   Calcium 9 8   AST 43   ALT 63   Alkaline Phosphatase 74   Total Protein 7 8   Albumin 4 9   TOTAL BILIRUBIN 0 76   eGFR 55     Results for Berlin Edmondson (MRN [id number]) as of 1/3/2020 10:42   9/19/2019 09:02   WBC 5 06   Red Blood Cell Count 4 63   Hemoglobin 15 1   HCT 44 6   MCV 96   MCH 32 6   MCHC 33 9   RDW 12 7   Platelet Count 312     Results for Berlin Edmondson (MRN [id number]) as of 1/3/2020 10:42   9/19/2019 09:02   Cholesterol 152   Triglycerides 175 (H)   HDL 38 (L)   Non-HDL Cholesterol 114   LDL Direct 79       The 10-year ASCVD risk score (Harriet Elliott et al , 2013) is: 23 2%    Values used to calculate the score:      Age: 81 years      Sex: Male      Is Non- : No      Diabetic: No      Tobacco smoker: Yes      Systolic Blood Pressure: 642 mmHg      Is BP treated: Yes      HDL Cholesterol: 38 mg/dL      Total Cholesterol: 152 mg/dL      Imaging:   No recent relevant imaging  Review of Systems:  Review of Systems   Constitutional: Negative  Respiratory: Negative  Cardiovascular: Negative  Gastrointestinal: Negative  Endocrine: Negative  Musculoskeletal: Negative  Skin: Negative  Neurological: Negative  Hematological: Negative  /80   Pulse 81   Ht 5' 8 25" (1 734 m)   Wt 101 kg (222 lb)   SpO2 97%   BMI 33 51 kg/m²     Physical Exam:  Physical Exam   Constitutional: He is oriented to person, place, and time  He appears well-developed and well-nourished  HENT:   Head: Normocephalic and atraumatic  Eyes: Conjunctivae and EOM are normal    Neck: No hepatojugular reflux and no JVD present  Carotid bruit is not present  No tracheal deviation present  No thyromegaly present  Cardiovascular: Normal rate, regular rhythm, S1 normal and S2 normal  PMI is not displaced  Exam reveals no gallop  No murmur heard  Pulses:       Carotid pulses are 2+ on the right side, and 2+ on the left side  Radial pulses are 2+ on the right side, and 2+ on the left side  Femoral pulses are 2+ on the right side, and 2+ on the left side  Dorsalis pedis pulses are 2+ on the right side, and 2+ on the left side  Posterior tibial pulses are 2+ on the right side, and 2+ on the left side  Pulmonary/Chest: Breath sounds normal  No accessory muscle usage  No tachypnea  No respiratory distress  Abdominal: Soft  Bowel sounds are normal  He exhibits no distension  There is no tenderness  Musculoskeletal: He exhibits no edema  Lymphadenopathy:        Head (right side): No submental, no submandibular, no tonsillar, no preauricular, no posterior auricular and no occipital adenopathy present  Head (left side): No submental, no submandibular, no tonsillar, no preauricular, no posterior auricular and no occipital adenopathy present       He has no cervical adenopathy  Neurological: He is alert and oriented to person, place, and time  Skin: Skin is warm and dry  Psychiatric: He has a normal mood and affect  His behavior is normal  Judgment and thought content normal        Discussion/Summary:  Palpitations  Hypertension  Hyperlipidemia    Palpitations seen controlled on his current regimen  No medication changes will be made today in that regard  Blood pressure is likewise reasonably controlled  We will continue his amlodipine, hydrochlorothiazide and lisinopril  ASCVD risk is 23 2 percent  This elevated risk would qualify him for high-dose high potency statin  Given his persistently elevated lipids, particularly as triglycerides, I am going to increase his atorvastatin to 40 mg daily and repeat his lipid panel in 6 weeks  Persistently elevated triglycerides would prompt initiation of eicosapenataenoic acid (EPA)  I would like him to follow up in our office in 1 year, sooner if any acute issues arise  We will be in contact with him regarding his laboratory results before then  Thank you for the opportunity to consult on this patient  If you have any questions, please feel free to call my office

## 2020-01-03 NOTE — LETTER
January 3, 2020     Deisy Bullock MD  [address],  [place] [address]    Patient: Kevin Schwarz   YOB: 1950   Date of Visit: 1/3/2020       Dear Dr Cori Louise: Thank you for referring Amanda Barrett to me for evaluation  Below are my notes for this consultation  If you have questions, please do not hesitate to call me  I look forward to following your patient along with you  Sincerely,        Charlotte Fair DO        CC: No Recipients  Charlotte Fair DO  1/3/2020 10:52 AM  Sign at close encounter                                             Cardiology Follow Up    Kevin Schwarz  1950  [id number]  [address] [place] [address]    Dear Dr Jim Donaldson is a 72-year-old gentleman who has been referred to the [address] [place] [address] of Cardiology in [facility] for the following issues:     1  Palpitations  2  Abnormal stress test  3  Hypertension  4  Hyperlipidemia    Interval History:  Since our last visit, Mr Christia Mcburney has continued to do very well  He is very active and an avid hiker  He reports no chest pain, shortness of breath, syncope or presyncope  He does occasionally have a palpitation, but these are self-limited and do not bother him nearly as much as they used to  He did note that his palpitations seemed exacerbated while taking a probiotic, and he has stopped taking it        Patient Active Problem List   Diagnosis    Asthma    Benign essential hypertension    BPH without urinary obstruction    Elevated liver enzymes    Hyperlipidemia    Gout    Impaired fasting glucose    Tubular adenoma of colon    History of bilateral knee arthroplasty    CKD (chronic kidney disease) stage 3, GFR 30-59 ml/min ([facility])    PVC's (premature ventricular contractions)     Past Medical History:   Diagnosis Date    Chronic kidney disease     Depression     Dyshidrosis     Hypertension     Osteoarthritis Social History     Socioeconomic History    Marital status: /Civil Union     Spouse name: Not on file    Number of children: Not on file    Years of education: Not on file    Highest education level: Not on file   Occupational History    Occupation: IT   Social Needs    Financial resource strain: Not on file    Food insecurity:     Worry: Not on file     Inability: Not on file   [employer] needs:     Medical: Not on file     Non-medical: Not on file   Tobacco Use    Smoking status: Light Tobacco Smoker     Packs/day: 0 00     Years: 40 00     Pack years: 0 00     Types: Cigars    Smokeless tobacco: Never Used    Tobacco comment: cigar sometimes    Substance and Sexual Activity    Alcohol use:  Yes     Alcohol/week: 6 0 standard drinks     Types: 3 Glasses of wine, 2 Cans of beer, 1 Shots of liquor per week     Frequency: 2-3 times a week     Drinks per session: 1 or 2     Comment: occasional    Drug use: No    Sexual activity: Yes   Lifestyle    Physical activity:     Days per week: 3 days     Minutes per session: 30 min    Stress: Not at all   Relationships    Social connections:     Talks on phone: Not on file     Gets together: Not on file     Attends Christian service: Not on file     Active member of club or organization: Not on file     Attends meetings of clubs or organizations: Not on file     Relationship status: Not on file    Intimate partner violence:     Fear of current or ex partner: Not on file     Emotionally abused: Not on file     Physically abused: Not on file     Forced sexual activity: Not on file   Other Topics Concern    Not on file   Social History Narrative    Living independently with spouse    No advance directives      Family History   Problem Relation Age of Onset    Coronary artery disease Mother     Hyperlipidemia Mother     Hypertension Mother     Stroke Father     Hypertension Father      Past Surgical History:   Procedure Laterality Date    COLONOSCOPY  02/25/2010    HEMORRHOID SURGERY      REPLACEMENT TOTAL KNEE Left     TONSILLECTOMY      WISDOM TOOTH EXTRACTION         Current Outpatient Medications:     acetaminophen (TYLENOL) 325 mg tablet, Take 650 mg by mouth every 6 (six) hours as needed for mild pain, Disp: , Rfl:     albuterol (PROVENTIL HFA,VENTOLIN HFA) 90 mcg/act inhaler, Inhale 1 puff as needed  , Disp: , Rfl:     amLODIPine-atorvastatin (CADUET) 5-10 MG per tablet, TAKE 1 TABLET BY MOUTH EVERY DAY, Disp: 30 tablet, Rfl: 0    aspirin (ASPIR-81) 81 mg EC tablet, Take 1 tablet by mouth daily  , Disp: , Rfl:     busPIRone (BUSPAR) 15 mg tablet, Take 1 tablet by mouth daily , Disp: , Rfl:     COLCRYS 0 6 MG tablet, TAKE 1 TABLET BY MOUTH 1-3 TIMES DAILY AS NEEDED, Disp: 30 tablet, Rfl: 1    hydrochlorothiazide (HYDRODIURIL) 12 5 mg tablet, TAKE 1 TABLET (12 5 MG TOTAL) BY MOUTH DAILY, Disp: 30 tablet, Rfl: 5    lisinopril (ZESTRIL) 40 mg tablet, Take 1 tablet (40 mg total) by mouth daily, Disp: 90 tablet, Rfl: 3    LORazepam (ATIVAN) 1 mg tablet, Take 1 mg by mouth as needed  , Disp: , Rfl:     montelukast (SINGULAIR) 10 mg tablet, Take 1 tablet by mouth daily, Disp: , Rfl:     QUEtiapine (SEROQUEL) 100 mg tablet, Take 100 mg by mouth daily , Disp: , Rfl:        Allergies   Allergen Reactions    Cat Hair Extract Anaphylaxis, Hives, Itching, Shortness Of Breath and Swelling    Other Cough    Pollen Extract Other (See Comments) and Shortness Of Breath       Labs:  Results for Daryle Catching (MRN [id number]) as of 1/3/2020 10:42   9/19/2019 09:02   Sodium 138   Potassium 4 2   Chloride 103   CO2 29   Anion Gap 6   BUN 23   Creatinine 1 32 (H)   GLUCOSE FASTING 94   Calcium 9 8   AST 43   ALT 63   Alkaline Phosphatase 74   Total Protein 7 8   Albumin 4 9   TOTAL BILIRUBIN 0 76   eGFR 55     Results for Daryle Catching (MRN [id number]) as of 1/3/2020 10:42   9/19/2019 09:02   WBC 5 06   Red Blood Cell Count 4 63   Hemoglobin 15 1 HCT 44 6   MCV 96   MCH 32 6   MCHC 33 9   RDW 12 7   Platelet Count 284     Results for Adriane Bowels (MRN [id number]) as of 1/3/2020 10:42   9/19/2019 09:02   Cholesterol 152   Triglycerides 175 (H)   HDL 38 (L)   Non-HDL Cholesterol 114   LDL Direct 79       The 10-year ASCVD risk score (Lexie Tapia et al , 2013) is: 23 2%    Values used to calculate the score:      Age: 81 years      Sex: Male      Is Non- : No      Diabetic: No      Tobacco smoker: Yes      Systolic Blood Pressure: 328 mmHg      Is BP treated: Yes      HDL Cholesterol: 38 mg/dL      Total Cholesterol: 152 mg/dL      Imaging:   No recent relevant imaging  Review of Systems:  Review of Systems   Constitutional: Negative  Respiratory: Negative  Cardiovascular: Negative  Gastrointestinal: Negative  Endocrine: Negative  Musculoskeletal: Negative  Skin: Negative  Neurological: Negative  Hematological: Negative  /80   Pulse 81   Ht 5' 8 25" (1 734 m)   Wt 101 kg (222 lb)   SpO2 97%   BMI 33 51 kg/m²      Physical Exam:  Physical Exam   Constitutional: He is oriented to person, place, and time  He appears well-developed and well-nourished  HENT:   Head: Normocephalic and atraumatic  Eyes: Conjunctivae and EOM are normal    Neck: No hepatojugular reflux and no JVD present  Carotid bruit is not present  No tracheal deviation present  No thyromegaly present  Cardiovascular: Normal rate, regular rhythm, S1 normal and S2 normal  PMI is not displaced  Exam reveals no gallop  No murmur heard  Pulses:       Carotid pulses are 2+ on the right side, and 2+ on the left side  Radial pulses are 2+ on the right side, and 2+ on the left side  Femoral pulses are 2+ on the right side, and 2+ on the left side  Dorsalis pedis pulses are 2+ on the right side, and 2+ on the left side  Posterior tibial pulses are 2+ on the right side, and 2+ on the left side  Pulmonary/Chest: Breath sounds normal  No accessory muscle usage  No tachypnea  No respiratory distress  Abdominal: Soft  Bowel sounds are normal  He exhibits no distension  There is no tenderness  Musculoskeletal: He exhibits no edema  Lymphadenopathy:        Head (right side): No submental, no submandibular, no tonsillar, no preauricular, no posterior auricular and no occipital adenopathy present  Head (left side): No submental, no submandibular, no tonsillar, no preauricular, no posterior auricular and no occipital adenopathy present  He has no cervical adenopathy  Neurological: He is alert and oriented to person, place, and time  Skin: Skin is warm and dry  Psychiatric: He has a normal mood and affect  His behavior is normal  Judgment and thought content normal        Discussion/Summary:  Palpitations  Hypertension  Hyperlipidemia    Palpitations seen controlled on his current regimen  No medication changes will be made today in that regard  Blood pressure is likewise reasonably controlled  We will continue his amlodipine, hydrochlorothiazide and lisinopril  ASCVD risk is 23 2 percent  This elevated risk would qualify him for high-dose high potency statin  Given his persistently elevated lipids, particularly as triglycerides, I am going to increase his atorvastatin to 40 mg daily and repeat his lipid panel in 6 weeks  Persistently elevated triglycerides would prompt initiation of eicosadoic acid  I would like him to follow up in our office in 1 year, sooner if any acute issues arise  We will be in contact with him regarding his laboratory results before then  Thank you for the opportunity to consult on this patient  If you have any questions, please feel free to call my office

## 2020-01-04 DIAGNOSIS — M10.9 GOUT: ICD-10-CM

## 2020-01-04 RX ORDER — COLCHICINE 0.6 MG/1
TABLET, FILM COATED ORAL
Qty: 30 TABLET | Refills: 0 | Status: SHIPPED | OUTPATIENT
Start: 2020-01-04 | End: 2020-01-27

## 2020-01-10 DIAGNOSIS — I10 ESSENTIAL HYPERTENSION: ICD-10-CM

## 2020-01-10 RX ORDER — AMLODIPINE BESYLATE AND ATORVASTATIN CALCIUM 5; 10 MG/1; MG/1
TABLET, FILM COATED ORAL
Qty: 30 TABLET | Refills: 0 | Status: SHIPPED | OUTPATIENT
Start: 2020-01-10 | End: 2020-01-20

## 2020-01-15 DIAGNOSIS — I10 ESSENTIAL HYPERTENSION: ICD-10-CM

## 2020-01-16 RX ORDER — LISINOPRIL 40 MG/1
40 TABLET ORAL DAILY
Qty: 90 TABLET | Refills: 0 | Status: SHIPPED | OUTPATIENT
Start: 2020-01-16 | End: 2020-03-20

## 2020-01-18 DIAGNOSIS — I10 ESSENTIAL HYPERTENSION: ICD-10-CM

## 2020-01-20 RX ORDER — AMLODIPINE BESYLATE AND ATORVASTATIN CALCIUM 5; 10 MG/1; MG/1
TABLET, FILM COATED ORAL
Qty: 90 TABLET | Refills: 0 | Status: SHIPPED | OUTPATIENT
Start: 2020-01-20 | End: 2020-03-24

## 2020-01-27 DIAGNOSIS — M10.9 GOUT: ICD-10-CM

## 2020-01-27 RX ORDER — COLCHICINE 0.6 MG/1
TABLET, FILM COATED ORAL
Qty: 30 TABLET | Refills: 0 | Status: SHIPPED | OUTPATIENT
Start: 2020-01-27 | End: 2020-02-28

## 2020-02-26 DIAGNOSIS — I10 BENIGN ESSENTIAL HYPERTENSION: ICD-10-CM

## 2020-02-26 RX ORDER — HYDROCHLOROTHIAZIDE 12.5 MG/1
12.5 TABLET ORAL DAILY
Qty: 30 TABLET | Refills: 5 | Status: SHIPPED | OUTPATIENT
Start: 2020-02-26 | End: 2020-06-21

## 2020-02-27 DIAGNOSIS — M10.9 GOUT: ICD-10-CM

## 2020-02-28 RX ORDER — COLCHICINE 0.6 MG/1
TABLET, FILM COATED ORAL
Qty: 30 TABLET | Refills: 0 | Status: SHIPPED | OUTPATIENT
Start: 2020-02-28 | End: 2020-03-20

## 2020-03-09 ENCOUNTER — APPOINTMENT (OUTPATIENT)
Dept: LAB | Facility: CLINIC | Age: 70
End: 2020-03-09
Payer: COMMERCIAL

## 2020-03-09 DIAGNOSIS — E78.2 MIXED HYPERLIPIDEMIA: ICD-10-CM

## 2020-03-09 DIAGNOSIS — Z12.5 SCREENING FOR PROSTATE CANCER: ICD-10-CM

## 2020-03-09 DIAGNOSIS — R73.01 IMPAIRED FASTING GLUCOSE: ICD-10-CM

## 2020-03-09 DIAGNOSIS — M1A.9XX0 CHRONIC GOUT INVOLVING TOE OF RIGHT FOOT WITHOUT TOPHUS, UNSPECIFIED CAUSE: ICD-10-CM

## 2020-03-09 DIAGNOSIS — I10 BENIGN ESSENTIAL HYPERTENSION: ICD-10-CM

## 2020-03-09 LAB
ALBUMIN SERPL BCP-MCNC: 4.6 G/DL (ref 3.5–5)
ALP SERPL-CCNC: 80 U/L (ref 46–116)
ALT SERPL W P-5'-P-CCNC: 66 U/L (ref 12–78)
ANION GAP SERPL CALCULATED.3IONS-SCNC: 4 MMOL/L (ref 4–13)
AST SERPL W P-5'-P-CCNC: 37 U/L (ref 5–45)
BASOPHILS # BLD AUTO: 0.08 THOUSANDS/ΜL (ref 0–0.1)
BASOPHILS NFR BLD AUTO: 1 % (ref 0–1)
BILIRUB SERPL-MCNC: 0.87 MG/DL (ref 0.2–1)
BUN SERPL-MCNC: 20 MG/DL (ref 5–25)
CALCIUM SERPL-MCNC: 9.4 MG/DL (ref 8.3–10.1)
CHLORIDE SERPL-SCNC: 106 MMOL/L (ref 100–108)
CHOLEST SERPL-MCNC: 113 MG/DL (ref 50–200)
CO2 SERPL-SCNC: 28 MMOL/L (ref 21–32)
CREAT SERPL-MCNC: 1.19 MG/DL (ref 0.6–1.3)
CREAT UR-MCNC: 155 MG/DL
EOSINOPHIL # BLD AUTO: 0.15 THOUSAND/ΜL (ref 0–0.61)
EOSINOPHIL NFR BLD AUTO: 2 % (ref 0–6)
ERYTHROCYTE [DISTWIDTH] IN BLOOD BY AUTOMATED COUNT: 12.8 % (ref 11.6–15.1)
EST. AVERAGE GLUCOSE BLD GHB EST-MCNC: 114 MG/DL
GFR SERPL CREATININE-BSD FRML MDRD: 63 ML/MIN/1.73SQ M
GLUCOSE P FAST SERPL-MCNC: 109 MG/DL (ref 65–99)
HBA1C MFR BLD: 5.6 %
HCT VFR BLD AUTO: 44.6 % (ref 36.5–49.3)
HDLC SERPL-MCNC: 42 MG/DL
HGB BLD-MCNC: 15.2 G/DL (ref 12–17)
IMM GRANULOCYTES # BLD AUTO: 0.01 THOUSAND/UL (ref 0–0.2)
IMM GRANULOCYTES NFR BLD AUTO: 0 % (ref 0–2)
LDLC SERPL CALC-MCNC: 50 MG/DL (ref 0–100)
LYMPHOCYTES # BLD AUTO: 1.95 THOUSANDS/ΜL (ref 0.6–4.47)
LYMPHOCYTES NFR BLD AUTO: 30 % (ref 14–44)
MCH RBC QN AUTO: 32.7 PG (ref 26.8–34.3)
MCHC RBC AUTO-ENTMCNC: 34.1 G/DL (ref 31.4–37.4)
MCV RBC AUTO: 96 FL (ref 82–98)
MICROALBUMIN UR-MCNC: 10.7 MG/L (ref 0–20)
MICROALBUMIN/CREAT 24H UR: 7 MG/G CREATININE (ref 0–30)
MONOCYTES # BLD AUTO: 0.55 THOUSAND/ΜL (ref 0.17–1.22)
MONOCYTES NFR BLD AUTO: 8 % (ref 4–12)
NEUTROPHILS # BLD AUTO: 3.84 THOUSANDS/ΜL (ref 1.85–7.62)
NEUTS SEG NFR BLD AUTO: 59 % (ref 43–75)
NONHDLC SERPL-MCNC: 71 MG/DL
NRBC BLD AUTO-RTO: 0 /100 WBCS
PLATELET # BLD AUTO: 190 THOUSANDS/UL (ref 149–390)
PMV BLD AUTO: 11.6 FL (ref 8.9–12.7)
POTASSIUM SERPL-SCNC: 4 MMOL/L (ref 3.5–5.3)
PROT SERPL-MCNC: 7.5 G/DL (ref 6.4–8.2)
PSA SERPL-MCNC: 1.4 NG/ML (ref 0–4)
RBC # BLD AUTO: 4.65 MILLION/UL (ref 3.88–5.62)
SODIUM SERPL-SCNC: 138 MMOL/L (ref 136–145)
TRIGL SERPL-MCNC: 107 MG/DL
URATE SERPL-MCNC: 8.8 MG/DL (ref 4.2–8)
WBC # BLD AUTO: 6.58 THOUSAND/UL (ref 4.31–10.16)

## 2020-03-09 PROCEDURE — 82570 ASSAY OF URINE CREATININE: CPT

## 2020-03-09 PROCEDURE — 80061 LIPID PANEL: CPT

## 2020-03-09 PROCEDURE — 85025 COMPLETE CBC W/AUTO DIFF WBC: CPT

## 2020-03-09 PROCEDURE — 36415 COLL VENOUS BLD VENIPUNCTURE: CPT

## 2020-03-09 PROCEDURE — 80053 COMPREHEN METABOLIC PANEL: CPT

## 2020-03-09 PROCEDURE — 83036 HEMOGLOBIN GLYCOSYLATED A1C: CPT

## 2020-03-09 PROCEDURE — 82043 UR ALBUMIN QUANTITATIVE: CPT

## 2020-03-09 PROCEDURE — 84550 ASSAY OF BLOOD/URIC ACID: CPT

## 2020-03-09 PROCEDURE — G0103 PSA SCREENING: HCPCS

## 2020-03-10 ENCOUNTER — TELEPHONE (OUTPATIENT)
Dept: CARDIOLOGY CLINIC | Facility: CLINIC | Age: 70
End: 2020-03-10

## 2020-03-10 NOTE — TELEPHONE ENCOUNTER
----- Message from Judah Nelson DO sent at 3/9/2020  5:45 PM EDT -----  Please call the patient and inform him that his cholesterol levels are much improved from last time  Continue all current medications      [address]    ----- Message -----  From: Lab, Background User  Sent: 3/9/2020   2:15 PM EDT  To: Judah Nelson DO

## 2020-03-17 DIAGNOSIS — M10.9 GOUT: ICD-10-CM

## 2020-03-17 DIAGNOSIS — I10 ESSENTIAL HYPERTENSION: ICD-10-CM

## 2020-03-20 DIAGNOSIS — M10.9 GOUT: ICD-10-CM

## 2020-03-20 RX ORDER — COLCHICINE 0.6 MG/1
TABLET, FILM COATED ORAL
Qty: 30 TABLET | Refills: 0 | Status: SHIPPED | OUTPATIENT
Start: 2020-03-20 | End: 2020-03-20 | Stop reason: SDUPTHER

## 2020-03-20 RX ORDER — COLCHICINE 0.6 MG/1
0.6 TABLET ORAL 3 TIMES DAILY PRN
Qty: 90 TABLET | Refills: 3 | Status: SHIPPED | OUTPATIENT
Start: 2020-03-20 | End: 2020-12-07

## 2020-03-20 RX ORDER — LISINOPRIL 40 MG/1
TABLET ORAL
Qty: 30 TABLET | Refills: 2 | Status: SHIPPED | OUTPATIENT
Start: 2020-03-20 | End: 2020-07-09 | Stop reason: SDUPTHER

## 2020-03-20 NOTE — TELEPHONE ENCOUNTER
P# for [facility] is: [phone]  F#" [phone]  [place], [place]    Refill for: COLCRYS 0 6 tablet  Dr Irina Mansfield pt

## 2020-03-24 ENCOUNTER — TELEMEDICINE (OUTPATIENT)
Dept: INTERNAL MEDICINE CLINIC | Facility: CLINIC | Age: 70
End: 2020-03-24
Payer: COMMERCIAL

## 2020-03-24 DIAGNOSIS — N40.0 BPH WITHOUT URINARY OBSTRUCTION: ICD-10-CM

## 2020-03-24 DIAGNOSIS — R05.9 COUGH: ICD-10-CM

## 2020-03-24 DIAGNOSIS — J45.20 MILD INTERMITTENT ASTHMA, UNSPECIFIED WHETHER COMPLICATED: ICD-10-CM

## 2020-03-24 DIAGNOSIS — M1A.9XX0 CHRONIC GOUT INVOLVING TOE OF RIGHT FOOT WITHOUT TOPHUS, UNSPECIFIED CAUSE: ICD-10-CM

## 2020-03-24 DIAGNOSIS — N18.30 CKD (CHRONIC KIDNEY DISEASE) STAGE 3, GFR 30-59 ML/MIN (HCC): ICD-10-CM

## 2020-03-24 DIAGNOSIS — R73.01 IMPAIRED FASTING GLUCOSE: Primary | ICD-10-CM

## 2020-03-24 DIAGNOSIS — E78.2 MIXED HYPERLIPIDEMIA: ICD-10-CM

## 2020-03-24 DIAGNOSIS — I10 BENIGN ESSENTIAL HYPERTENSION: ICD-10-CM

## 2020-03-24 PROCEDURE — 99443 PR PHYS/QHP TELEPHONE EVALUATION 21-30 MIN: CPT | Performed by: INTERNAL MEDICINE

## 2020-03-24 NOTE — PROGRESS NOTES
Virtual Regular Visit    Problem List Items Addressed This Visit        Endocrine    Impaired fasting glucose - Primary    Relevant Orders    Hemoglobin A1C       Respiratory    Asthma       Cardiovascular and Mediastinum    Benign essential hypertension    Relevant Orders    CBC and differential    Comprehensive metabolic panel       Genitourinary    BPH without urinary obstruction    CKD (chronic kidney disease) stage 3, GFR 30-59 ml/min ([facility])       Other    Hyperlipidemia    Relevant Orders    Lipid panel    Gout    Relevant Orders    Uric acid      Other Visit Diagnoses     Cough              BMI Counseling: There is no height or weight on file to calculate BMI  The BMI is above normal  Nutrition recommendations include decreasing portion sizes  Exercise recommendations include moderate physical activity 150 minutes/week  No pharmacotherapy was ordered  Reason for visit is follow-up multiple medical problems and review labs    Encounter provider Coleman Lozano MD    Provider located at [address] [place] [address]      Recent Visits  No visits were found meeting these conditions  Showing recent visits within past 7 days and meeting all other requirements     Future Appointments  No visits were found meeting these conditions  Showing future appointments within next 150 days and meeting all other requirements        After connecting through [employer], the patient was identified by name and date of birth  Markel Ricci was informed that this is a telemedicine visit and that the visit is being conducted through telephone which may not be secure and therefore, might not be HIPAA-compliant  My office door was closed  No one else was in the room  He acknowledged consent and understanding of privacy and security of the video platform   The patient has agreed to participate and understands they can discontinue the visit at any time     Subjective  Max Lakesha Mars is a 81 y o  male for follow-up multiple medical problems  He is feeling physically well  He is keeping himself isolated to avoid briseyda and spread corona virus  He is hiking to keep fit  Previously he had been swimming but they close the pool  He was successfully able to wean himself off of clonazepam and Seroquel and now uses lorazepam very rarely for anxiety  He has been following with his psychiatrist   He believes the exercise did help him control his anxiety  Hypertension-she is taking medication as directed  Home blood pressures have been stable around 120/80 with heart rate in the 80s    He has had no gout flares since taking colchicine 1 tablet daily  Hyperlipidemia-he is tolerating the increased dose of atorvastatin and watching his diet  Impaired fasting glucose-he is watching his carbs  He notes neck Ng cough over the last 3 months that he attributes to allergies  He has no chest congestion  He was seen by his allergist and felt to be stable on Singulair    Past Medical History:   Diagnosis Date    Chronic kidney disease     Depression     Dyshidrosis     Hypertension     Osteoarthritis        Past Surgical History:   Procedure Laterality Date    COLONOSCOPY  02/25/2010    HEMORRHOID SURGERY      REPLACEMENT TOTAL KNEE Left     TONSILLECTOMY      WISDOM TOOTH EXTRACTION         Current Outpatient Medications   Medication Sig Dispense Refill    acetaminophen (TYLENOL) 325 mg tablet Take 650 mg by mouth every 6 (six) hours as needed for mild pain      albuterol (PROVENTIL HFA,VENTOLIN HFA) 90 mcg/act inhaler Inhale 1 puff as needed        amLODIPine-atorvastatin (CADUET) 5-40 MG per tablet Take 1 tablet by mouth daily 90 tablet 3    aspirin (ASPIR-81) 81 mg EC tablet Take 1 tablet by mouth daily        colchicine (Colcrys) 0 6 mg tablet Take 1 tablet (0 6 mg total) by mouth 3 (three) times a day as needed for muscle/joint pain 90 tablet 3    hydrochlorothiazide (HYDRODIURIL) 12 5 mg tablet TAKE 1 TABLET (12 5 MG TOTAL) BY MOUTH DAILY 30 tablet 5    lisinopril (ZESTRIL) 40 mg tablet TAKE 1 TABLET BY MOUTH EVERY DAY 30 tablet 2    LORazepam (ATIVAN) 1 mg tablet Take 1 mg by mouth as needed        montelukast (SINGULAIR) 10 mg tablet Take 1 tablet by mouth daily       No current facility-administered medications for this visit  Allergies   Allergen Reactions    Cat Hair Extract Anaphylaxis, Hives, Itching, Shortness Of Breath and Swelling    Other Cough    Pollen Extract Other (See Comments) and Shortness Of Breath       Review of Systems      I spent 22 minutes with the patient during this visit  Multiple medical problems are stable  Patient advised his cough may be related to allergies verses ACE-inhibitor induced cough  He is reluctant to change medications at this time based upon his excellent blood pressure control  We did discuss transition to ARB ease with the potential pit fall with recent threat of carcinogens  We also discussed adding nasal steroid verses over-the-counter antihistamine if needed    He will follow-up after labs in 6 months, sooner as needed

## 2020-06-20 DIAGNOSIS — I10 BENIGN ESSENTIAL HYPERTENSION: ICD-10-CM

## 2020-06-21 RX ORDER — HYDROCHLOROTHIAZIDE 12.5 MG/1
TABLET ORAL
Qty: 30 TABLET | Refills: 5 | Status: SHIPPED | OUTPATIENT
Start: 2020-06-21 | End: 2021-03-15

## 2020-07-09 DIAGNOSIS — I10 ESSENTIAL HYPERTENSION: ICD-10-CM

## 2020-07-09 RX ORDER — LISINOPRIL 40 MG/1
40 TABLET ORAL DAILY
Qty: 90 TABLET | Refills: 3 | Status: SHIPPED | OUTPATIENT
Start: 2020-07-09 | End: 2021-07-09

## 2020-08-21 DIAGNOSIS — D36.9 TUBULAR ADENOMA: Primary | ICD-10-CM

## 2020-09-01 ENCOUNTER — APPOINTMENT (OUTPATIENT)
Dept: LAB | Facility: CLINIC | Age: 70
End: 2020-09-01
Payer: COMMERCIAL

## 2020-09-01 ENCOUNTER — OFFICE VISIT (OUTPATIENT)
Dept: GASTROENTEROLOGY | Facility: CLINIC | Age: 70
End: 2020-09-01
Payer: COMMERCIAL

## 2020-09-01 VITALS
HEIGHT: 68 IN | HEART RATE: 66 BPM | BODY MASS INDEX: 33.95 KG/M2 | TEMPERATURE: 97.1 F | SYSTOLIC BLOOD PRESSURE: 110 MMHG | WEIGHT: 224 LBS | DIASTOLIC BLOOD PRESSURE: 70 MMHG

## 2020-09-01 DIAGNOSIS — R73.01 IMPAIRED FASTING GLUCOSE: ICD-10-CM

## 2020-09-01 DIAGNOSIS — M1A.9XX0 CHRONIC GOUT INVOLVING TOE OF RIGHT FOOT WITHOUT TOPHUS, UNSPECIFIED CAUSE: ICD-10-CM

## 2020-09-01 DIAGNOSIS — D36.9 TUBULAR ADENOMA: ICD-10-CM

## 2020-09-01 DIAGNOSIS — E78.2 MIXED HYPERLIPIDEMIA: ICD-10-CM

## 2020-09-01 DIAGNOSIS — Z86.010 PERSONAL HISTORY OF COLONIC POLYPS: Primary | ICD-10-CM

## 2020-09-01 DIAGNOSIS — I10 BENIGN ESSENTIAL HYPERTENSION: ICD-10-CM

## 2020-09-01 LAB
ALBUMIN SERPL BCP-MCNC: 4.2 G/DL (ref 3.5–5)
ALP SERPL-CCNC: 78 U/L (ref 46–116)
ALT SERPL W P-5'-P-CCNC: 49 U/L (ref 12–78)
ANION GAP SERPL CALCULATED.3IONS-SCNC: 4 MMOL/L (ref 4–13)
AST SERPL W P-5'-P-CCNC: 37 U/L (ref 5–45)
BASOPHILS # BLD AUTO: 0.08 THOUSANDS/ΜL (ref 0–0.1)
BASOPHILS NFR BLD AUTO: 1 % (ref 0–1)
BILIRUB SERPL-MCNC: 0.84 MG/DL (ref 0.2–1)
BUN SERPL-MCNC: 23 MG/DL (ref 5–25)
CALCIUM SERPL-MCNC: 9.4 MG/DL (ref 8.3–10.1)
CHLORIDE SERPL-SCNC: 107 MMOL/L (ref 100–108)
CHOLEST SERPL-MCNC: 134 MG/DL (ref 50–200)
CO2 SERPL-SCNC: 29 MMOL/L (ref 21–32)
CREAT SERPL-MCNC: 1.17 MG/DL (ref 0.6–1.3)
EOSINOPHIL # BLD AUTO: 0.18 THOUSAND/ΜL (ref 0–0.61)
EOSINOPHIL NFR BLD AUTO: 3 % (ref 0–6)
ERYTHROCYTE [DISTWIDTH] IN BLOOD BY AUTOMATED COUNT: 12.8 % (ref 11.6–15.1)
EST. AVERAGE GLUCOSE BLD GHB EST-MCNC: 114 MG/DL
GFR SERPL CREATININE-BSD FRML MDRD: 64 ML/MIN/1.73SQ M
GLUCOSE P FAST SERPL-MCNC: 107 MG/DL (ref 65–99)
HBA1C MFR BLD: 5.6 %
HCT VFR BLD AUTO: 47.2 % (ref 36.5–49.3)
HDLC SERPL-MCNC: 42 MG/DL
HGB BLD-MCNC: 15.7 G/DL (ref 12–17)
IMM GRANULOCYTES # BLD AUTO: 0.01 THOUSAND/UL (ref 0–0.2)
IMM GRANULOCYTES NFR BLD AUTO: 0 % (ref 0–2)
LDLC SERPL CALC-MCNC: 68 MG/DL (ref 0–100)
LYMPHOCYTES # BLD AUTO: 1.79 THOUSANDS/ΜL (ref 0.6–4.47)
LYMPHOCYTES NFR BLD AUTO: 31 % (ref 14–44)
MCH RBC QN AUTO: 32.5 PG (ref 26.8–34.3)
MCHC RBC AUTO-ENTMCNC: 33.3 G/DL (ref 31.4–37.4)
MCV RBC AUTO: 98 FL (ref 82–98)
MONOCYTES # BLD AUTO: 0.54 THOUSAND/ΜL (ref 0.17–1.22)
MONOCYTES NFR BLD AUTO: 9 % (ref 4–12)
NEUTROPHILS # BLD AUTO: 3.18 THOUSANDS/ΜL (ref 1.85–7.62)
NEUTS SEG NFR BLD AUTO: 56 % (ref 43–75)
NONHDLC SERPL-MCNC: 92 MG/DL
NRBC BLD AUTO-RTO: 0 /100 WBCS
PLATELET # BLD AUTO: 192 THOUSANDS/UL (ref 149–390)
PMV BLD AUTO: 11.6 FL (ref 8.9–12.7)
POTASSIUM SERPL-SCNC: 3.9 MMOL/L (ref 3.5–5.3)
PROT SERPL-MCNC: 7.6 G/DL (ref 6.4–8.2)
RBC # BLD AUTO: 4.83 MILLION/UL (ref 3.88–5.62)
SODIUM SERPL-SCNC: 140 MMOL/L (ref 136–145)
TRIGL SERPL-MCNC: 122 MG/DL
URATE SERPL-MCNC: 9.4 MG/DL (ref 4.2–8)
WBC # BLD AUTO: 5.78 THOUSAND/UL (ref 4.31–10.16)

## 2020-09-01 PROCEDURE — 85025 COMPLETE CBC W/AUTO DIFF WBC: CPT

## 2020-09-01 PROCEDURE — 83036 HEMOGLOBIN GLYCOSYLATED A1C: CPT

## 2020-09-01 PROCEDURE — 84550 ASSAY OF BLOOD/URIC ACID: CPT

## 2020-09-01 PROCEDURE — 36415 COLL VENOUS BLD VENIPUNCTURE: CPT

## 2020-09-01 PROCEDURE — 80053 COMPREHEN METABOLIC PANEL: CPT

## 2020-09-01 PROCEDURE — 80061 LIPID PANEL: CPT

## 2020-09-01 PROCEDURE — 99203 OFFICE O/P NEW LOW 30 MIN: CPT | Performed by: PHYSICIAN ASSISTANT

## 2020-09-01 NOTE — H&P (VIEW-ONLY)
[facility] - Outpatient Consultation  Markel Walker 78 y o  male MRN: [id number]  Encounter: [id number]          ASSESSMENT AND PLAN:      1  Personal history of colonic polyps    Patient presents to schedule a screening colonoscopy  Last colonoscopy was in May of 2015 and a polyp was removed  Will plan for colonoscopy to investigate   ______________________________________________________________________    HPI:  Patient is a 61-year-old male who presents to the office to schedule a colonoscopy  Patient's last colonoscopy was in May of 2015 and a polyp was removed  He denies any family history of colon cancer  He denies any rectal bleeding or melena  He denies any difficulties with his bowel movements  He denies any abdominal pain  He denies any vomiting  He denies any shortness of breath or chest pain  He does have a history of CKD stage 3  He denies any problems with the procedure 5 years ago  REVIEW OF SYSTEMS:    CONSTITUTIONAL: Denies any fever, chills, rigors, and weight loss  HEENT: No earache or tinnitus  Denies hearing loss or visual disturbances  CARDIOVASCULAR: No chest pain or palpitations  RESPIRATORY: Denies any cough, hemoptysis, shortness of breath or dyspnea on exertion  GASTROINTESTINAL: As noted in the History of Present Illness  GENITOURINARY: No problems with urination  Denies any hematuria or dysuria  NEUROLOGIC: No dizziness or vertigo, denies headaches  MUSCULOSKELETAL: Denies any muscle or joint pain  SKIN: Denies skin rashes or itching  ENDOCRINE: Denies excessive thirst  Denies intolerance to heat or cold  PSYCHOSOCIAL: Denies depression or anxiety  Denies any recent memory loss         Historical Information   Past Medical History:   Diagnosis Date    Chronic kidney disease     Depression     Dyshidrosis     Hypertension     Osteoarthritis      Past Surgical History:   Procedure Laterality Date    COLONOSCOPY  02/25/2010  HEMORRHOID SURGERY      REPLACEMENT TOTAL KNEE Left     TONSILLECTOMY      WISDOM TOOTH EXTRACTION       Social History   Social History     Substance and Sexual Activity   Alcohol Use Yes    Alcohol/week: 6 0 standard drinks    Types: 3 Glasses of wine, 2 Cans of beer, 1 Shots of liquor per week    Frequency: 2-3 times a week    Drinks per session: 1 or 2    Comment: occasional     Social History     Substance and Sexual Activity   Drug Use No     Social History     Tobacco Use   Smoking Status Light Tobacco Smoker    Packs/day: 0 00    Years: 40 00    Pack years: 0 00    Types: Cigars   Smokeless Tobacco Never Used   Tobacco Comment    cigar sometimes      Family History   Problem Relation Age of Onset    Coronary artery disease Mother     Hyperlipidemia Mother     Hypertension Mother     Stroke Father     Hypertension Father        Meds/Allergies       Current Outpatient Medications:     acetaminophen (TYLENOL) 325 mg tablet    albuterol (PROVENTIL HFA,VENTOLIN HFA) 90 mcg/act inhaler    amLODIPine-atorvastatin (CADUET) 5-40 MG per tablet    aspirin (ASPIR-81) 81 mg EC tablet    colchicine (Colcrys) 0 6 mg tablet    hydrochlorothiazide (HYDRODIURIL) 12 5 mg tablet    lisinopril (ZESTRIL) 40 mg tablet    LORazepam (ATIVAN) 1 mg tablet    montelukast (SINGULAIR) 10 mg tablet    Allergies   Allergen Reactions    Cat Hair Extract Anaphylaxis, Hives, Itching, Shortness Of Breath and Swelling    Other Cough    Pollen Extract Other (See Comments) and Shortness Of Breath           Objective     Blood pressure 110/70, pulse 66, temperature (!) 97 1 °F (36 2 °C), height 5' 8" (1 727 m), weight 102 kg (224 lb)  Body mass index is 34 06 kg/m²          PHYSICAL EXAM:      General Appearance:   Alert, cooperative, no distress   HEENT:   Normocephalic, atraumatic, anicteric     Neck:  Supple, symmetrical, trachea midline   Lungs:   Clear to auscultation bilaterally; no rales, rhonchi or wheezing; respirations unlabored    Heart[de-identified]   Regular rate and rhythm; no murmur, rub, or gallop  Abdomen:   Soft, non-tender, non-distended; normal bowel sounds; no masses, no organomegaly    Genitalia:   Deferred    Rectal:   Deferred    Extremities:  No cyanosis, clubbing or edema    Pulses:  2+ and symmetric    Skin:  No jaundice, rashes, or lesions    Lymph nodes:  No palpable cervical lymphadenopathy        Lab Results:   No visits with results within 1 Day(s) from this visit     Latest known visit with results is:   Appointment on 03/09/2020   Component Date Value    WBC 03/09/2020 6 58     RBC 03/09/2020 4 65     Hemoglobin 03/09/2020 15 2     Hematocrit 03/09/2020 44 6     MCV 03/09/2020 96     MCH 03/09/2020 32 7     MCHC 03/09/2020 34 1     RDW 03/09/2020 12 8     MPV 03/09/2020 11 6     Platelets 14/68/1303 190     nRBC 03/09/2020 0     Neutrophils Relative 03/09/2020 59     Immat GRANS % 03/09/2020 0     Lymphocytes Relative 03/09/2020 30     Monocytes Relative 03/09/2020 8     Eosinophils Relative 03/09/2020 2     Basophils Relative 03/09/2020 1     Neutrophils Absolute 03/09/2020 3 84     Immature Grans Absolute 03/09/2020 0 01     Lymphocytes Absolute 03/09/2020 1 95     Monocytes Absolute 03/09/2020 0 55     Eosinophils Absolute 03/09/2020 0 15     Basophils Absolute 03/09/2020 0 08     Sodium 03/09/2020 138     Potassium 03/09/2020 4 0     Chloride 03/09/2020 106     CO2 03/09/2020 28     ANION GAP 03/09/2020 4     BUN 03/09/2020 20     Creatinine 03/09/2020 1 19     Glucose, Fasting 03/09/2020 109*    Calcium 03/09/2020 9 4     AST 03/09/2020 37     ALT 03/09/2020 66     Alkaline Phosphatase 03/09/2020 80     Total Protein 03/09/2020 7 5     Albumin 03/09/2020 4 6     Total Bilirubin 03/09/2020 0 87     eGFR 03/09/2020 63     Cholesterol 03/09/2020 113     Triglycerides 03/09/2020 107     HDL, Direct 03/09/2020 42     LDL Calculated 03/09/2020 50     Non-HDL-Chol (CHOL-HDL) 03/09/2020 71     Hemoglobin A1C 03/09/2020 5 6     EAG 03/09/2020 114     Uric Acid 03/09/2020 8 8*    Creatinine, Ur 03/09/2020 155 0     Microalbum  ,U,Random 03/09/2020 10 7     Microalb Creat Ratio 03/09/2020 7     PSA 03/09/2020 1 4          Radiology Results:   No results found

## 2020-09-01 NOTE — PROGRESS NOTES
[facility] - Outpatient Consultation  Markel Morrison 78 y o  male MRN: [id number]  Encounter: [id number]          ASSESSMENT AND PLAN:      1  Personal history of colonic polyps    Patient presents to schedule a screening colonoscopy  Last colonoscopy was in May of 2015 and a polyp was removed  Will plan for colonoscopy to investigate   ______________________________________________________________________    HPI:  Patient is a 22-year-old male who presents to the office to schedule a colonoscopy  Patient's last colonoscopy was in May of 2015 and a polyp was removed  He denies any family history of colon cancer  He denies any rectal bleeding or melena  He denies any difficulties with his bowel movements  He denies any abdominal pain  He denies any vomiting  He denies any shortness of breath or chest pain  He does have a history of CKD stage 3  He denies any problems with the procedure 5 years ago  REVIEW OF SYSTEMS:    CONSTITUTIONAL: Denies any fever, chills, rigors, and weight loss  HEENT: No earache or tinnitus  Denies hearing loss or visual disturbances  CARDIOVASCULAR: No chest pain or palpitations  RESPIRATORY: Denies any cough, hemoptysis, shortness of breath or dyspnea on exertion  GASTROINTESTINAL: As noted in the History of Present Illness  GENITOURINARY: No problems with urination  Denies any hematuria or dysuria  NEUROLOGIC: No dizziness or vertigo, denies headaches  MUSCULOSKELETAL: Denies any muscle or joint pain  SKIN: Denies skin rashes or itching  ENDOCRINE: Denies excessive thirst  Denies intolerance to heat or cold  PSYCHOSOCIAL: Denies depression or anxiety  Denies any recent memory loss         Historical Information   Past Medical History:   Diagnosis Date    Chronic kidney disease     Depression     Dyshidrosis     Hypertension     Osteoarthritis      Past Surgical History:   Procedure Laterality Date    COLONOSCOPY  02/25/2010  HEMORRHOID SURGERY      REPLACEMENT TOTAL KNEE Left     TONSILLECTOMY      WISDOM TOOTH EXTRACTION       Social History   Social History     Substance and Sexual Activity   Alcohol Use Yes    Alcohol/week: 6 0 standard drinks    Types: 3 Glasses of wine, 2 Cans of beer, 1 Shots of liquor per week    Frequency: 2-3 times a week    Drinks per session: 1 or 2    Comment: occasional     Social History     Substance and Sexual Activity   Drug Use No     Social History     Tobacco Use   Smoking Status Light Tobacco Smoker    Packs/day: 0 00    Years: 40 00    Pack years: 0 00    Types: Cigars   Smokeless Tobacco Never Used   Tobacco Comment    cigar sometimes      Family History   Problem Relation Age of Onset    Coronary artery disease Mother     Hyperlipidemia Mother     Hypertension Mother     Stroke Father     Hypertension Father        Meds/Allergies       Current Outpatient Medications:     acetaminophen (TYLENOL) 325 mg tablet    albuterol (PROVENTIL HFA,VENTOLIN HFA) 90 mcg/act inhaler    amLODIPine-atorvastatin (CADUET) 5-40 MG per tablet    aspirin (ASPIR-81) 81 mg EC tablet    colchicine (Colcrys) 0 6 mg tablet    hydrochlorothiazide (HYDRODIURIL) 12 5 mg tablet    lisinopril (ZESTRIL) 40 mg tablet    LORazepam (ATIVAN) 1 mg tablet    montelukast (SINGULAIR) 10 mg tablet    Allergies   Allergen Reactions    Cat Hair Extract Anaphylaxis, Hives, Itching, Shortness Of Breath and Swelling    Other Cough    Pollen Extract Other (See Comments) and Shortness Of Breath           Objective     Blood pressure 110/70, pulse 66, temperature (!) 97 1 °F (36 2 °C), height 5' 8" (1 727 m), weight 102 kg (224 lb)  Body mass index is 34 06 kg/m²          PHYSICAL EXAM:      General Appearance:   Alert, cooperative, no distress   HEENT:   Normocephalic, atraumatic, anicteric     Neck:  Supple, symmetrical, trachea midline   Lungs:   Clear to auscultation bilaterally; no rales, rhonchi or wheezing; respirations unlabored    Heart[de-identified]   Regular rate and rhythm; no murmur, rub, or gallop  Abdomen:   Soft, non-tender, non-distended; normal bowel sounds; no masses, no organomegaly    Genitalia:   Deferred    Rectal:   Deferred    Extremities:  No cyanosis, clubbing or edema    Pulses:  2+ and symmetric    Skin:  No jaundice, rashes, or lesions    Lymph nodes:  No palpable cervical lymphadenopathy        Lab Results:   No visits with results within 1 Day(s) from this visit     Latest known visit with results is:   Appointment on 03/09/2020   Component Date Value    WBC 03/09/2020 6 58     RBC 03/09/2020 4 65     Hemoglobin 03/09/2020 15 2     Hematocrit 03/09/2020 44 6     MCV 03/09/2020 96     MCH 03/09/2020 32 7     MCHC 03/09/2020 34 1     RDW 03/09/2020 12 8     MPV 03/09/2020 11 6     Platelets 98/70/3837 190     nRBC 03/09/2020 0     Neutrophils Relative 03/09/2020 59     Immat GRANS % 03/09/2020 0     Lymphocytes Relative 03/09/2020 30     Monocytes Relative 03/09/2020 8     Eosinophils Relative 03/09/2020 2     Basophils Relative 03/09/2020 1     Neutrophils Absolute 03/09/2020 3 84     Immature Grans Absolute 03/09/2020 0 01     Lymphocytes Absolute 03/09/2020 1 95     Monocytes Absolute 03/09/2020 0 55     Eosinophils Absolute 03/09/2020 0 15     Basophils Absolute 03/09/2020 0 08     Sodium 03/09/2020 138     Potassium 03/09/2020 4 0     Chloride 03/09/2020 106     CO2 03/09/2020 28     ANION GAP 03/09/2020 4     BUN 03/09/2020 20     Creatinine 03/09/2020 1 19     Glucose, Fasting 03/09/2020 109*    Calcium 03/09/2020 9 4     AST 03/09/2020 37     ALT 03/09/2020 66     Alkaline Phosphatase 03/09/2020 80     Total Protein 03/09/2020 7 5     Albumin 03/09/2020 4 6     Total Bilirubin 03/09/2020 0 87     eGFR 03/09/2020 63     Cholesterol 03/09/2020 113     Triglycerides 03/09/2020 107     HDL, Direct 03/09/2020 42     LDL Calculated 03/09/2020 50     Non-HDL-Chol (CHOL-HDL) 03/09/2020 71     Hemoglobin A1C 03/09/2020 5 6     EAG 03/09/2020 114     Uric Acid 03/09/2020 8 8*    Creatinine, Ur 03/09/2020 155 0     Microalbum  ,U,Random 03/09/2020 10 7     Microalb Creat Ratio 03/09/2020 7     PSA 03/09/2020 1 4          Radiology Results:   No results found

## 2020-09-18 ENCOUNTER — TELEPHONE (OUTPATIENT)
Dept: GASTROENTEROLOGY | Facility: CLINIC | Age: 70
End: 2020-09-18

## 2020-09-23 ENCOUNTER — TELEPHONE (OUTPATIENT)
Dept: INTERNAL MEDICINE CLINIC | Facility: CLINIC | Age: 70
End: 2020-09-23

## 2020-09-23 NOTE — TELEPHONE ENCOUNTER
----- Message from [facility]  Foster Bird sent at 9/23/2020  4:29 PM EDT -----  Regarding: Test Results Question  Contact: [phone]  Please send all my test results from September 1, 2020 to [facility]  Fax: [phone]  Thank you

## 2020-09-25 ENCOUNTER — ANESTHESIA EVENT (OUTPATIENT)
Dept: GASTROENTEROLOGY | Facility: HOSPITAL | Age: 70
End: 2020-09-25

## 2020-09-25 RX ORDER — SODIUM CHLORIDE, SODIUM LACTATE, POTASSIUM CHLORIDE, CALCIUM CHLORIDE 600; 310; 30; 20 MG/100ML; MG/100ML; MG/100ML; MG/100ML
100 INJECTION, SOLUTION INTRAVENOUS CONTINUOUS
Status: CANCELLED | OUTPATIENT
Start: 2020-09-25

## 2020-09-26 ENCOUNTER — HOSPITAL ENCOUNTER (OUTPATIENT)
Dept: GASTROENTEROLOGY | Facility: HOSPITAL | Age: 70
Setting detail: OUTPATIENT SURGERY
Discharge: HOME | End: 2020-09-26
Attending: INTERNAL MEDICINE
Payer: COMMERCIAL

## 2020-09-26 ENCOUNTER — ANESTHESIA (OUTPATIENT)
Dept: GASTROENTEROLOGY | Facility: HOSPITAL | Age: 70
End: 2020-09-26

## 2020-09-26 VITALS
WEIGHT: 204.81 LBS | OXYGEN SATURATION: 100 % | BODY MASS INDEX: 30.33 KG/M2 | DIASTOLIC BLOOD PRESSURE: 95 MMHG | TEMPERATURE: 97.8 F | HEIGHT: 69 IN | HEART RATE: 63 BPM | RESPIRATION RATE: 18 BRPM | SYSTOLIC BLOOD PRESSURE: 153 MMHG

## 2020-09-26 VITALS — HEART RATE: 66 BPM

## 2020-09-26 DIAGNOSIS — D36.9 TUBULAR ADENOMA: ICD-10-CM

## 2020-09-26 DIAGNOSIS — Z86.010 PERSONAL HISTORY OF COLONIC POLYPS: ICD-10-CM

## 2020-09-26 PROBLEM — F17.200 SMOKING: Status: ACTIVE | Noted: 2020-09-26

## 2020-09-26 PROBLEM — IMO0001 SMOKING: Status: ACTIVE | Noted: 2020-09-26

## 2020-09-26 PROBLEM — N18.2 CKD (CHRONIC KIDNEY DISEASE) STAGE 2, GFR 60-89 ML/MIN: Status: ACTIVE | Noted: 2019-09-24

## 2020-09-26 PROBLEM — N18.30 CKD (CHRONIC KIDNEY DISEASE) STAGE 3, GFR 30-59 ML/MIN (HCC): Status: RESOLVED | Noted: 2019-09-24 | Resolved: 2020-09-26

## 2020-09-26 PROCEDURE — 45385 COLONOSCOPY W/LESION REMOVAL: CPT | Performed by: INTERNAL MEDICINE

## 2020-09-26 PROCEDURE — 88305 TISSUE EXAM BY PATHOLOGIST: CPT | Performed by: PATHOLOGY

## 2020-09-26 RX ORDER — LIDOCAINE HYDROCHLORIDE 10 MG/ML
INJECTION, SOLUTION EPIDURAL; INFILTRATION; INTRACAUDAL; PERINEURAL AS NEEDED
Status: DISCONTINUED | OUTPATIENT
Start: 2020-09-26 | End: 2020-09-26

## 2020-09-26 RX ORDER — PROPOFOL 10 MG/ML
INJECTION, EMULSION INTRAVENOUS AS NEEDED
Status: DISCONTINUED | OUTPATIENT
Start: 2020-09-26 | End: 2020-09-26

## 2020-09-26 RX ORDER — SODIUM CHLORIDE, SODIUM LACTATE, POTASSIUM CHLORIDE, CALCIUM CHLORIDE 600; 310; 30; 20 MG/100ML; MG/100ML; MG/100ML; MG/100ML
INJECTION, SOLUTION INTRAVENOUS CONTINUOUS PRN
Status: DISCONTINUED | OUTPATIENT
Start: 2020-09-26 | End: 2020-09-26

## 2020-09-26 RX ADMIN — PROPOFOL 40 MG: 10 INJECTION, EMULSION INTRAVENOUS at 11:30

## 2020-09-26 RX ADMIN — PROPOFOL 100 MG: 10 INJECTION, EMULSION INTRAVENOUS at 11:17

## 2020-09-26 RX ADMIN — SODIUM CHLORIDE, SODIUM LACTATE, POTASSIUM CHLORIDE, AND CALCIUM CHLORIDE: .6; .31; .03; .02 INJECTION, SOLUTION INTRAVENOUS at 11:16

## 2020-09-26 RX ADMIN — PROPOFOL 50 MG: 10 INJECTION, EMULSION INTRAVENOUS at 11:24

## 2020-09-26 RX ADMIN — PROPOFOL 40 MG: 10 INJECTION, EMULSION INTRAVENOUS at 11:27

## 2020-09-26 RX ADMIN — LIDOCAINE HYDROCHLORIDE 50 MG: 10 INJECTION, SOLUTION EPIDURAL; INFILTRATION; INTRACAUDAL; PERINEURAL at 11:17

## 2020-09-26 RX ADMIN — PROPOFOL 20 MG: 10 INJECTION, EMULSION INTRAVENOUS at 11:32

## 2020-09-26 RX ADMIN — PROPOFOL 50 MG: 10 INJECTION, EMULSION INTRAVENOUS at 11:21

## 2020-09-26 RX ADMIN — PROPOFOL 30 MG: 10 INJECTION, EMULSION INTRAVENOUS at 11:20

## 2020-09-26 RX ADMIN — PROPOFOL 50 MG: 10 INJECTION, EMULSION INTRAVENOUS at 11:18

## 2020-09-26 RX ADMIN — PROPOFOL 10 MG: 10 INJECTION, EMULSION INTRAVENOUS at 11:35

## 2020-09-26 NOTE — ANESTHESIA POSTPROCEDURE EVALUATION
Post-Op Assessment Note    CV Status:  Stable  Pain Score: 0    Pain management: adequate     Mental Status:  Sleepy   Hydration Status:  Euvolemic   PONV Controlled:  Controlled   Airway Patency:  Patent      Post Op Vitals Reviewed: Yes      Staff: CRNA         No complications documented      /79 (09/26/20 1144)    Temp 97 8 °F (36 6 °C) (09/26/20 1144)    Pulse 58 (09/26/20 1144)   Resp 14 (09/26/20 1144)    SpO2 100 % (09/26/20 1144)

## 2020-09-26 NOTE — ANESTHESIA PREPROCEDURE EVALUATION
Procedure:  COLONOSCOPY    Relevant Problems   ANESTHESIA (within normal limits)      CARDIO   (+) Benign essential hypertension   (+) Hyperlipidemia   (+) PVC's (premature ventricular contractions)      /RENAL   (+) BPH without urinary obstruction   (+) CKD (chronic kidney disease) stage 2, GFR 60-89 ml/min      MUSCULOSKELETAL   (+) Gout      PULMONARY   (+) Asthma (reports uses inhaler 1-2 x per year)   (+) Smoking   (-) Sleep apnea   (-) URI (upper respiratory infection)      Other   (+) BMI 30 0-30 9,adult      Physical Exam    Airway    Mallampati score: III  TM Distance: >3 FB  Neck ROM: full     Dental   No notable dental hx     Cardiovascular      Pulmonary      Other Findings       Lab Results   Component Value Date    WBC 5 78 09/01/2020    HGB 15 7 09/01/2020     09/01/2020     Lab Results   Component Value Date    SODIUM 140 09/01/2020    K 3 9 09/01/2020    BUN 23 09/01/2020    CREATININE 1 17 09/01/2020    EGFR 64 09/01/2020    GLUCOSE 103 09/25/2015     Lab Results   Component Value Date    HGBA1C 5 6 09/01/2020       Anesthesia Plan  ASA Score- 2     Anesthesia Type- IV sedation with anesthesia with ASA Monitors  Additional Monitors:   Airway Plan:           Plan Factors-Exercise tolerance (METS): >4 METS  Chart reviewed  Existing labs reviewed  Patient summary reviewed  Patient is a current smoker  Induction- intravenous  Postoperative Plan-     Informed Consent- Anesthetic plan and risks discussed with patient  I personally reviewed this patient with the CRNA  Discussed and agreed on the Anesthesia Plan with the CRNA  Florence Sosa

## 2020-09-26 NOTE — DISCHARGE INSTRUCTIONS
Colonoscopy   WHAT YOU NEED TO KNOW:   A colonoscopy is a procedure to examine the inside of your colon (intestine) with a scope  Polyps or tissue growths may have been removed during your colonoscopy  It is normal to feel bloated and to have some abdominal discomfort  You should be passing gas  If you have hemorrhoids or you had polyps removed, you may have a small amount of bleeding  DISCHARGE INSTRUCTIONS:   Seek care immediately if:   · You have a large amount of bright red blood in your bowel movements  · Your abdomen is hard and firm and you have severe pain  · You have sudden trouble breathing  Contact your healthcare provider if:   · You develop a rash or hives  · You have a fever within 24 hours of your procedure  · You have not had a bowel movement for 3 days after your procedure  · You have questions or concerns about your condition or care  Activity:   · Do not lift, strain, or run  for 3 days after your procedure  · Rest after your procedure  You have been given medicine to relax you  Do not  drive or make important decisions until the day after your procedure  Return to your normal activity as directed  · Relieve gas and discomfort from bloating  by lying on your right side with a heating pad on your abdomen  You may need to take short walks to help the gas move out  Eat small meals until bloating is relieved  If you had polyps removed: For 7 days after your procedure:  · Do not  take aspirin  · Do not  go on long car rides  Help prevent constipation:   · Eat a variety of healthy foods  Healthy foods include fruit, vegetables, whole-grain breads, low-fat dairy products, beans, lean meat, and fish  Ask if you need to be on a special diet  Your healthcare provider may recommend that you eat high-fiber foods such as cooked beans  Fiber helps you have regular bowel movements  · Drink liquids as directed    Adults should drink between 9 and 13 eight-ounce cups of liquid every day  Ask what amount is best for you  For most people, good liquids to drink are water, juice, and milk  · Exercise as directed  Talk to your healthcare provider about the best exercise plan for you  Exercise can help prevent constipation, decrease your blood pressure and improve your health  Follow up with your healthcare provider as directed:  Write down your questions so you remember to ask them during your visits  © 2017 [employer] Information is for End User's use only and may not be sold, redistributed or otherwise used for commercial purposes  All illustrations and images included in CareNotes® are the copyrighted property of [employer]  or [employer]  The above information is an  only  It is not intended as medical advice for individual conditions or treatments  Talk to your doctor, nurse or pharmacist before following any medical regimen to see if it is safe and effective for you

## 2020-09-28 ENCOUNTER — NURSE TRIAGE (OUTPATIENT)
Dept: OTHER | Facility: OTHER | Age: 70
End: 2020-09-28

## 2020-09-28 DIAGNOSIS — Z11.59 SPECIAL SCREENING EXAMINATION FOR VIRAL DISEASE: Primary | ICD-10-CM

## 2020-09-28 NOTE — TELEPHONE ENCOUNTER
Regarding: COVID TEST REQUEST ASYMPTOMATIC PRE SURGERY  ----- Message from Amanda Perez sent at 9/28/2020 12:46 PM EDT -----  " I am having surgery this week and need to be tested"

## 2020-09-28 NOTE — TELEPHONE ENCOUNTER
Reason for Disposition   COVID-19 Testing, questions about    Protocols used: CORONAVIRUS (COVID-19) - EXPOSURE-ADULT-AH

## 2020-09-28 NOTE — TELEPHONE ENCOUNTER
Patient is having surgery in [facility] for trigger finger, needs to be tested  No symptoms and no known exposure

## 2020-09-29 ENCOUNTER — OFFICE VISIT (OUTPATIENT)
Dept: INTERNAL MEDICINE CLINIC | Facility: CLINIC | Age: 70
End: 2020-09-29
Payer: COMMERCIAL

## 2020-09-29 VITALS
TEMPERATURE: 97.2 F | RESPIRATION RATE: 12 BRPM | WEIGHT: 210.4 LBS | DIASTOLIC BLOOD PRESSURE: 80 MMHG | BODY MASS INDEX: 31.16 KG/M2 | HEART RATE: 68 BPM | SYSTOLIC BLOOD PRESSURE: 124 MMHG | HEIGHT: 69 IN

## 2020-09-29 DIAGNOSIS — Z23 ENCOUNTER FOR IMMUNIZATION: Primary | ICD-10-CM

## 2020-09-29 DIAGNOSIS — M1A.9XX0 CHRONIC GOUT INVOLVING TOE OF RIGHT FOOT WITHOUT TOPHUS, UNSPECIFIED CAUSE: ICD-10-CM

## 2020-09-29 DIAGNOSIS — Z11.59 SPECIAL SCREENING EXAMINATION FOR VIRAL DISEASE: ICD-10-CM

## 2020-09-29 DIAGNOSIS — E78.2 MIXED HYPERLIPIDEMIA: ICD-10-CM

## 2020-09-29 DIAGNOSIS — I10 BENIGN ESSENTIAL HYPERTENSION: ICD-10-CM

## 2020-09-29 DIAGNOSIS — R73.01 IMPAIRED FASTING GLUCOSE: ICD-10-CM

## 2020-09-29 DIAGNOSIS — N40.0 BPH WITHOUT URINARY OBSTRUCTION: ICD-10-CM

## 2020-09-29 PROCEDURE — U0003 INFECTIOUS AGENT DETECTION BY NUCLEIC ACID (DNA OR RNA); SEVERE ACUTE RESPIRATORY SYNDROME CORONAVIRUS 2 (SARS-COV-2) (CORONAVIRUS DISEASE [COVID-19]), AMPLIFIED PROBE TECHNIQUE, MAKING USE OF HIGH THROUGHPUT TECHNOLOGIES AS DESCRIBED BY CMS-2020-01-R: HCPCS | Performed by: FAMILY MEDICINE

## 2020-09-29 PROCEDURE — 99214 OFFICE O/P EST MOD 30 MIN: CPT | Performed by: INTERNAL MEDICINE

## 2020-09-29 PROCEDURE — 90471 IMMUNIZATION ADMIN: CPT | Performed by: INTERNAL MEDICINE

## 2020-09-29 PROCEDURE — 90662 IIV NO PRSV INCREASED AG IM: CPT | Performed by: INTERNAL MEDICINE

## 2020-09-30 ENCOUNTER — TELEPHONE (OUTPATIENT)
Dept: INTERNAL MEDICINE CLINIC | Facility: CLINIC | Age: 70
End: 2020-09-30

## 2020-09-30 LAB — SARS-COV-2 RNA SPEC QL NAA+PROBE: NOT DETECTED

## 2020-09-30 NOTE — TELEPHONE ENCOUNTER
----- Message from Tobi Al MD sent at 9/30/2020  5:40 PM EDT -----  Regarding: FW: Non-Urgent Medical Question  Contact: [phone]    Please send EKG, covid, and my note to Dr Aura Salomon at [facility]    ----- Message -----  From: Janel Wheatley MA  Sent: 9/30/2020   3:44 PM EDT  To: Tobi Al MD  Subject: FW: Non-Urgent Medical Question                    ----- Message -----  From: Sana Powell  Sent: 9/30/2020   2:58 PM EDT  To: [facility]  Subject: Non-Urgent Medical Question                      Any news on my Covid test?  If so, has it and the EKG been sent to [facility]? Thank you

## 2020-09-30 NOTE — TELEPHONE ENCOUNTER
[facility] called and needs a the EKG results and tracing to be faxed to them          [phone] fax  [phone] phone number

## 2020-10-08 ENCOUNTER — TELEPHONE (OUTPATIENT)
Dept: GASTROENTEROLOGY | Facility: CLINIC | Age: 70
End: 2020-10-08

## 2020-10-21 ENCOUNTER — TELEPHONE (OUTPATIENT)
Dept: INTERNAL MEDICINE CLINIC | Facility: CLINIC | Age: 70
End: 2020-10-21

## 2020-11-06 DIAGNOSIS — M10.9 GOUT, UNSPECIFIED CAUSE, UNSPECIFIED CHRONICITY, UNSPECIFIED SITE: Primary | ICD-10-CM

## 2020-11-06 RX ORDER — PREDNISONE 10 MG/1
TABLET ORAL
Qty: 18 TABLET | Refills: 0 | Status: SHIPPED | OUTPATIENT
Start: 2020-11-06

## 2020-12-04 DIAGNOSIS — Z86.018 HISTORY OF CHANGING SKIN MOLE: Primary | ICD-10-CM

## 2020-12-06 DIAGNOSIS — M10.9 GOUT: ICD-10-CM

## 2020-12-07 RX ORDER — COLCHICINE 0.6 MG/1
TABLET, FILM COATED ORAL
Qty: 270 TABLET | Refills: 1 | Status: SHIPPED | OUTPATIENT
Start: 2020-12-07 | End: 2022-03-04

## 2020-12-17 DIAGNOSIS — E78.2 MIXED HYPERLIPIDEMIA: ICD-10-CM

## 2020-12-17 DIAGNOSIS — I10 ESSENTIAL HYPERTENSION: ICD-10-CM

## 2020-12-17 RX ORDER — AMLODIPINE BESYLATE AND ATORVASTATIN CALCIUM 5; 40 MG/1; MG/1
TABLET, FILM COATED ORAL
Qty: 30 TABLET | Refills: 8 | Status: SHIPPED | OUTPATIENT
Start: 2020-12-17 | End: 2021-07-28

## 2021-01-05 ENCOUNTER — TELEPHONE (OUTPATIENT)
Dept: CARDIOLOGY CLINIC | Facility: CLINIC | Age: 71
End: 2021-01-05

## 2021-03-08 ENCOUNTER — LAB (OUTPATIENT)
Dept: LAB | Facility: CLINIC | Age: 71
End: 2021-03-08
Payer: COMMERCIAL

## 2021-03-08 DIAGNOSIS — R73.01 IMPAIRED FASTING GLUCOSE: ICD-10-CM

## 2021-03-08 DIAGNOSIS — M1A.9XX0 CHRONIC GOUT INVOLVING TOE OF RIGHT FOOT WITHOUT TOPHUS, UNSPECIFIED CAUSE: ICD-10-CM

## 2021-03-08 DIAGNOSIS — N40.0 BPH WITHOUT URINARY OBSTRUCTION: ICD-10-CM

## 2021-03-08 DIAGNOSIS — E78.2 MIXED HYPERLIPIDEMIA: ICD-10-CM

## 2021-03-08 DIAGNOSIS — I10 BENIGN ESSENTIAL HYPERTENSION: ICD-10-CM

## 2021-03-08 LAB
ALBUMIN SERPL BCP-MCNC: 4 G/DL (ref 3.5–5)
ALP SERPL-CCNC: 96 U/L (ref 46–116)
ALT SERPL W P-5'-P-CCNC: 45 U/L (ref 12–78)
ANION GAP SERPL CALCULATED.3IONS-SCNC: 6 MMOL/L (ref 4–13)
AST SERPL W P-5'-P-CCNC: 25 U/L (ref 5–45)
BASOPHILS # BLD AUTO: 0.05 THOUSANDS/ΜL (ref 0–0.1)
BASOPHILS NFR BLD AUTO: 1 % (ref 0–1)
BILIRUB SERPL-MCNC: 0.9 MG/DL (ref 0.2–1)
BUN SERPL-MCNC: 16 MG/DL (ref 5–25)
CALCIUM SERPL-MCNC: 9.2 MG/DL (ref 8.3–10.1)
CHLORIDE SERPL-SCNC: 109 MMOL/L (ref 100–108)
CHOLEST SERPL-MCNC: 114 MG/DL (ref 50–200)
CO2 SERPL-SCNC: 27 MMOL/L (ref 21–32)
CREAT SERPL-MCNC: 1.09 MG/DL (ref 0.6–1.3)
EOSINOPHIL # BLD AUTO: 0.17 THOUSAND/ΜL (ref 0–0.61)
EOSINOPHIL NFR BLD AUTO: 3 % (ref 0–6)
ERYTHROCYTE [DISTWIDTH] IN BLOOD BY AUTOMATED COUNT: 12.2 % (ref 11.6–15.1)
EST. AVERAGE GLUCOSE BLD GHB EST-MCNC: 111 MG/DL
GFR SERPL CREATININE-BSD FRML MDRD: 69 ML/MIN/1.73SQ M
GLUCOSE P FAST SERPL-MCNC: 110 MG/DL (ref 65–99)
HBA1C MFR BLD: 5.5 %
HCT VFR BLD AUTO: 44 % (ref 36.5–49.3)
HDLC SERPL-MCNC: 41 MG/DL
HGB BLD-MCNC: 14.9 G/DL (ref 12–17)
IMM GRANULOCYTES # BLD AUTO: 0 THOUSAND/UL (ref 0–0.2)
IMM GRANULOCYTES NFR BLD AUTO: 0 % (ref 0–2)
LDLC SERPL CALC-MCNC: 49 MG/DL (ref 0–100)
LYMPHOCYTES # BLD AUTO: 1.67 THOUSANDS/ΜL (ref 0.6–4.47)
LYMPHOCYTES NFR BLD AUTO: 31 % (ref 14–44)
MCH RBC QN AUTO: 32.2 PG (ref 26.8–34.3)
MCHC RBC AUTO-ENTMCNC: 33.9 G/DL (ref 31.4–37.4)
MCV RBC AUTO: 95 FL (ref 82–98)
MONOCYTES # BLD AUTO: 0.42 THOUSAND/ΜL (ref 0.17–1.22)
MONOCYTES NFR BLD AUTO: 8 % (ref 4–12)
NEUTROPHILS # BLD AUTO: 3.03 THOUSANDS/ΜL (ref 1.85–7.62)
NEUTS SEG NFR BLD AUTO: 57 % (ref 43–75)
NONHDLC SERPL-MCNC: 73 MG/DL
NRBC BLD AUTO-RTO: 0 /100 WBCS
PLATELET # BLD AUTO: 184 THOUSANDS/UL (ref 149–390)
PMV BLD AUTO: 11.5 FL (ref 8.9–12.7)
POTASSIUM SERPL-SCNC: 4.1 MMOL/L (ref 3.5–5.3)
PROT SERPL-MCNC: 7.1 G/DL (ref 6.4–8.2)
RBC # BLD AUTO: 4.63 MILLION/UL (ref 3.88–5.62)
SODIUM SERPL-SCNC: 142 MMOL/L (ref 136–145)
TRIGL SERPL-MCNC: 119 MG/DL
URATE SERPL-MCNC: 7.3 MG/DL (ref 4.2–8)
WBC # BLD AUTO: 5.34 THOUSAND/UL (ref 4.31–10.16)

## 2021-03-08 PROCEDURE — 84153 ASSAY OF PSA TOTAL: CPT

## 2021-03-08 PROCEDURE — 85025 COMPLETE CBC W/AUTO DIFF WBC: CPT

## 2021-03-08 PROCEDURE — 36415 COLL VENOUS BLD VENIPUNCTURE: CPT

## 2021-03-08 PROCEDURE — 80053 COMPREHEN METABOLIC PANEL: CPT

## 2021-03-08 PROCEDURE — 84550 ASSAY OF BLOOD/URIC ACID: CPT

## 2021-03-08 PROCEDURE — 80061 LIPID PANEL: CPT

## 2021-03-08 PROCEDURE — 84154 ASSAY OF PSA FREE: CPT

## 2021-03-08 PROCEDURE — 83036 HEMOGLOBIN GLYCOSYLATED A1C: CPT

## 2021-03-09 LAB
PSA FREE MFR SERPL: 31 %
PSA FREE SERPL-MCNC: 0.65 NG/ML
PSA SERPL-MCNC: 2.1 NG/ML (ref 0–4)

## 2021-03-15 ENCOUNTER — OFFICE VISIT (OUTPATIENT)
Dept: DERMATOLOGY | Facility: CLINIC | Age: 71
End: 2021-03-15
Payer: COMMERCIAL

## 2021-03-15 ENCOUNTER — OFFICE VISIT (OUTPATIENT)
Dept: INTERNAL MEDICINE CLINIC | Facility: CLINIC | Age: 71
End: 2021-03-15
Payer: COMMERCIAL

## 2021-03-15 VITALS
BODY MASS INDEX: 31.25 KG/M2 | WEIGHT: 211 LBS | HEIGHT: 69 IN | SYSTOLIC BLOOD PRESSURE: 130 MMHG | DIASTOLIC BLOOD PRESSURE: 82 MMHG | RESPIRATION RATE: 12 BRPM | TEMPERATURE: 97.7 F | HEART RATE: 72 BPM

## 2021-03-15 VITALS — TEMPERATURE: 96.4 F

## 2021-03-15 DIAGNOSIS — L57.0 ACTINIC KERATOSIS: Primary | ICD-10-CM

## 2021-03-15 DIAGNOSIS — L82.0 INFLAMED SEBORRHEIC KERATOSIS: ICD-10-CM

## 2021-03-15 DIAGNOSIS — M1A.9XX0 CHRONIC GOUT INVOLVING TOE OF RIGHT FOOT WITHOUT TOPHUS, UNSPECIFIED CAUSE: ICD-10-CM

## 2021-03-15 DIAGNOSIS — E78.2 MIXED HYPERLIPIDEMIA: ICD-10-CM

## 2021-03-15 DIAGNOSIS — R73.01 IMPAIRED FASTING GLUCOSE: Primary | ICD-10-CM

## 2021-03-15 DIAGNOSIS — N18.2 CKD (CHRONIC KIDNEY DISEASE) STAGE 2, GFR 60-89 ML/MIN: ICD-10-CM

## 2021-03-15 DIAGNOSIS — Z86.018 HISTORY OF CHANGING SKIN MOLE: ICD-10-CM

## 2021-03-15 DIAGNOSIS — I10 BENIGN ESSENTIAL HYPERTENSION: ICD-10-CM

## 2021-03-15 DIAGNOSIS — L82.1 SEBORRHEIC KERATOSIS: ICD-10-CM

## 2021-03-15 DIAGNOSIS — Z13.89 SCREENING FOR SKIN CONDITION: ICD-10-CM

## 2021-03-15 PROCEDURE — 17003 DESTRUCT PREMALG LES 2-14: CPT | Performed by: DERMATOLOGY

## 2021-03-15 PROCEDURE — 17000 DESTRUCT PREMALG LESION: CPT | Performed by: DERMATOLOGY

## 2021-03-15 PROCEDURE — 99214 OFFICE O/P EST MOD 30 MIN: CPT | Performed by: INTERNAL MEDICINE

## 2021-03-15 PROCEDURE — 17110 DESTRUCTION B9 LES UP TO 14: CPT | Performed by: DERMATOLOGY

## 2021-03-15 PROCEDURE — 99203 OFFICE O/P NEW LOW 30 MIN: CPT | Performed by: DERMATOLOGY

## 2021-03-15 NOTE — PATIENT INSTRUCTIONS
Actinic Keratosis:  Patient advised lesions are precancers  These should resolve with cryosurgery if not to let us know sun block recommended  Inflamed keratosis lesion treated because the patient concern  Seborrheic Keratosis  Patient reasurred these are normal growths we acquire with age no treatment needed  Screening for Dermatologic Disorders: Nothing else of concern noted on complete exam follow up in 1 year   Treatment with Cryotherapy    The doctor has treated your skin with nitrogen, which is 320 degrees Fahrenheit below zero  He has given the treated area "frostbite "    Stinging should subside within a few hours  You can take Tylenol for pain, if needed  Over the next few days, it is normal if the area becomes reddened, a blood blister, or swollen with fluid  If the lesion treated was near the eye - you could get a swollen eye over the next few days  Do not panic! This is all temporary, and will resolve with time  There is no special treatment - just keep the area clean  Makeup and BandAids can be used, if preferred  When the area starts to dry up and peel off, using Vaseline can help healing  It usually takes up to a month for it to heal   Some lesions are recurrent and may require repeat treatments  If a lesion has not healed in one month, please don't hesitate to contact us  If you have any further questions that are not answered here, please call us  [phone]    Thank you for allowing us to care for you

## 2021-03-15 NOTE — PROGRESS NOTES
Assessment/Plan:    Diagnoses and all orders for this visit:    Impaired fasting glucose  -     Hemoglobin A1C; Future    Benign essential hypertension  -     CBC and differential; Future  -     Comprehensive metabolic panel; Future  -     TSH, 3rd generation with Free T4 reflex; Future    CKD (chronic kidney disease) stage 2, GFR 60-89 ml/min    Mixed hyperlipidemia  -     Lipid panel; Future    Chronic gout involving toe of right 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 tophus, unspecified cause  -     Uric acid; Future         BMI Counseling: Body mass index is 31 16 kg/m²  The BMI is above normal  Nutrition recommendations include decreasing portion sizes, decreasing fast food intake, consuming healthier snacks, limiting drinks that contain sugar, moderation in carbohydrate intake and reducing intake of saturated and trans fat  Exercise recommendations include moderate physical activity 150 minutes/week  No pharmacotherapy was ordered  Patient Instructions   Data reviewed in detail and compared prior    Hypertension stable on present regimen    Hyperlipidemia-LDL remains at goal on atorvastatin    CKD remains stable    Impaired fasting glucose improved with A1c down to 5 5    Depression/anxiety remains stable with very rare use of lorazepam     Gout -1 recent flare, uric acid markedly decreased since stopping hydrochlorothiazide and keeping up with exercise and dietary modification  He continues on colchicine daily  Health maintenance is up-to-date    Routine follow-up after labs in 6 months, sooner as needed      Subjective:      Patient ID: Markel Yuen is a 78 y o  male    Feeling generally well  Waiting on vaccine  Taking classes at [employer], hiking most days  No recent palpitations  Dr Lawyer Duverney moved, so he went back to Dr Renetta [place] in [place]      HTN-home bp's ~130/80's   HPL-tolerating atorvastatin  CKD-keeping well hydrated, no nsaids  IFG-watching carbs  Depression/anxiety-stable, no recent rx    Seen by   Phil 4 mos ago  Rare lorazepam for sleep  Gout-one flare recently, stopped hctz, he continues w/ colchicine daily  Cut back some etoh w/ recent flare             Current Outpatient Medications:     acetaminophen (TYLENOL) 325 mg tablet, Take 650 mg by mouth every 6 (six) hours as needed for mild pain, Disp: , Rfl:     albuterol (PROVENTIL HFA,VENTOLIN HFA) 90 mcg/act inhaler, Inhale 1 puff as needed  , Disp: , Rfl:     amLODIPine-atorvastatin (CADUET) 5-40 MG per tablet, TAKE 1 TABLET BY MOUTH EVERY DAY, Disp: 30 tablet, Rfl: 8    aspirin (ASPIR-81) 81 mg EC tablet, Take 1 tablet by mouth daily  , Disp: , Rfl:     Colcrys 0 6 MG tablet, TAKE 1 TABLET (0 6 MG TOTAL) BY MOUTH 3 (THREE) TIMES A DAY AS NEEDED FOR MUSCLE/JOINT PAIN, Disp: 270 tablet, Rfl: 1    lisinopril (ZESTRIL) 40 mg tablet, Take 1 tablet (40 mg total) by mouth daily, Disp: 90 tablet, Rfl: 3    LORazepam (ATIVAN) 1 mg tablet, Take 1 mg by mouth as needed  , Disp: , Rfl:     montelukast (SINGULAIR) 10 mg tablet, Take 1 tablet by mouth daily, Disp: , Rfl:     polyethylene glycol (GOLYTELY) 4000 mL solution, Take 4,000 mL by mouth once for 1 dose (Patient not taking: Reported on 9/29/2020), Disp: 4000 mL, Rfl: 0    predniSONE 10 mg tablet, Take 3 tablets for 3 days then 2 tablets for 3 days then 1 tablet for 3 days (Patient not taking: Reported on 3/15/2021), Disp: 18 tablet, Rfl: 0    Recent Results (from the past 1008 hour(s))   CBC and differential    Collection Time: 03/08/21  9:16 AM   Result Value Ref Range    WBC 5 34 4 31 - 10 16 Thousand/uL    RBC 4 63 3 88 - 5 62 Million/uL    Hemoglobin 14 9 12 0 - 17 0 g/dL    Hematocrit 44 0 36 5 - 49 3 %    MCV 95 82 - 98 fL    MCH 32 2 26 8 - 34 3 pg    MCHC 33 9 31 4 - 37 4 g/dL    RDW 12 2 11 6 - 15 1 %    MPV 11 5 8 9 - 12 7 fL    Platelets 490 738 - 965 Thousands/uL    nRBC 0 /100 WBCs    Neutrophils Relative 57 43 - 75 %    Immat GRANS % 0 0 - 2 %    Lymphocytes Relative 31 14 - 44 % Monocytes Relative 8 4 - 12 %    Eosinophils Relative 3 0 - 6 %    Basophils Relative 1 0 - 1 %    Neutrophils Absolute 3 03 1 85 - 7 62 Thousands/µL    Immature Grans Absolute 0 00 0 00 - 0 20 Thousand/uL    Lymphocytes Absolute 1 67 0 60 - 4 47 Thousands/µL    Monocytes Absolute 0 42 0 17 - 1 22 Thousand/µL    Eosinophils Absolute 0 17 0 00 - 0 61 Thousand/µL    Basophils Absolute 0 05 0 00 - 0 10 Thousands/µL   Comprehensive metabolic panel    Collection Time: 03/08/21  9:16 AM   Result Value Ref Range    Sodium 142 136 - 145 mmol/L    Potassium 4 1 3 5 - 5 3 mmol/L    Chloride 109 (H) 100 - 108 mmol/L    CO2 27 21 - 32 mmol/L    ANION GAP 6 4 - 13 mmol/L    BUN 16 5 - 25 mg/dL    Creatinine 1 09 0 60 - 1 30 mg/dL    Glucose, Fasting 110 (H) 65 - 99 mg/dL    Calcium 9 2 8 3 - 10 1 mg/dL    AST 25 5 - 45 U/L    ALT 45 12 - 78 U/L    Alkaline Phosphatase 96 46 - 116 U/L    Total Protein 7 1 6 4 - 8 2 g/dL    Albumin 4 0 3 5 - 5 0 g/dL    Total Bilirubin 0 90 0 20 - 1 00 mg/dL    eGFR 69 ml/min/1 73sq m   Hemoglobin A1C    Collection Time: 03/08/21  9:16 AM   Result Value Ref Range    Hemoglobin A1C 5 5 Normal 3 8-5 6%; PreDiabetic 5 7-6 4%;  Diabetic >=6 5%; Glycemic control for adults with diabetes <7 0% %     mg/dl   Lipid panel    Collection Time: 03/08/21  9:16 AM   Result Value Ref Range    Cholesterol 114 50 - 200 mg/dL    Triglycerides 119 <=150 mg/dL    HDL, Direct 41 >=40 mg/dL    LDL Calculated 49 0 - 100 mg/dL    Non-HDL-Chol (CHOL-HDL) 73 mg/dl   Uric acid    Collection Time: 03/08/21  9:16 AM   Result Value Ref Range    Uric Acid 7 3 4 2 - 8 0 mg/dL   PSA, total and free    Collection Time: 03/08/21  9:16 AM   Result Value Ref Range    Prostate Specific Antigen Total 2 1 0 0 - 4 0 ng/mL    PSA, Free 0 65 N/A ng/mL    PSA, Free Pct 31 0 %       The following portions of the patient's history were reviewed and updated as appropriate: allergies, current medications, past family history, past medical history, past social history, past surgical history and problem list      Review of Systems      Objective:      Vitals:    03/15/21 1607   BP: 130/82   Pulse: 72   Resp: 12   Temp: 97 7 °F (36 5 °C)          Physical Exam

## 2021-03-15 NOTE — PATIENT INSTRUCTIONS
Data reviewed in detail and compared prior    Hypertension stable on present regimen    Hyperlipidemia-LDL remains at goal on atorvastatin    CKD remains stable    Impaired fasting glucose improved with A1c down to 5 5    Depression/anxiety remains stable with very rare use of lorazepam     Gout -1 recent flare, uric acid markedly decreased since stopping hydrochlorothiazide and keeping up with exercise and dietary modification  He continues on colchicine daily      Health maintenance is up-to-date    Routine follow-up after labs in 6 months, sooner as needed

## 2021-03-15 NOTE — PROGRESS NOTES
[facility] DERMATOLOGY  [address] [place] [id number]  [phone]  [phone]     MRN: [id number] : 1950  Encounter: [id number]  Patient Information: Frank Arambula  Chief complaint:  Skin Lesions    History of present illness:  82-year-old male presents for overall checkup no previous history of any skin issues has some new growths that he is concerned about some scaling areas on his left side of his face  Past Medical History:   Diagnosis Date    Chronic kidney disease     CKD (chronic kidney disease) stage 3, GFR 30-59 ml/min ([facility] [address] ) 2019    Depression     Dyshidrosis     Hypertension     Osteoarthritis     Trigger finger      Past Surgical History:   Procedure Laterality Date    COLONOSCOPY  2010    COLONOSCOPY  2020    HEMORRHOID SURGERY      REPLACEMENT TOTAL KNEE Left     TONSILLECTOMY      WISDOM TOOTH EXTRACTION       Social History   Social History     Substance and Sexual Activity   Alcohol Use Yes    Alcohol/week: 6 0 standard drinks    Types: 3 Glasses of wine, 2 Cans of beer, 1 Shots of liquor per week    Frequency: 2-3 times a week    Drinks per session: 1 or 2    Comment: occasional     Social History     Substance and Sexual Activity   Drug Use No     Social History     Tobacco Use   Smoking Status Light Tobacco Smoker    Packs/day: 0 00    Years: 40 00    Pack years: 0 00    Types: Cigars   Smokeless Tobacco Never Used   Tobacco Comment    cigar sometimes      Family History   Problem Relation Age of Onset    Coronary artery disease Mother     Hyperlipidemia Mother     Hypertension Mother     Stroke Father     Hypertension Father      Meds/Allergies   Allergies   Allergen Reactions    Cat Hair Extract Anaphylaxis, Hives, Itching, Shortness Of Breath and Swelling    Other Cough     mold    Pollen Extract Other (See Comments) and Shortness Of Breath    Nsaids        Meds:  Prior to Admission medications    Medication Sig Start Date End Date Taking?  Authorizing Provider   acetaminophen (TYLENOL) 325 mg tablet Take 650 mg by mouth every 6 (six) hours as needed for mild pain   Yes Historical Provider, MD   albuterol (PROVENTIL HFA,VENTOLIN HFA) 90 mcg/act inhaler Inhale 1 puff as needed     Yes Historical Provider, MD   amLODIPine-atorvastatin (CADUET) 5-40 MG per tablet TAKE 1 TABLET BY MOUTH EVERY DAY 12/17/20  Yes KAYLEY Wallace   aspirin (ASPIR-81) 81 mg EC tablet Take 1 tablet by mouth daily     Yes Historical Provider, MD   Colcrys 0 6 MG tablet TAKE 1 TABLET (0 6 MG TOTAL) BY MOUTH 3 (THREE) TIMES A DAY AS NEEDED FOR MUSCLE/JOINT PAIN 12/7/20  Yes Ben Hinton MD   lisinopril (ZESTRIL) 40 mg tablet Take 1 tablet (40 mg total) by mouth daily 7/9/20  Yes Ben Hinton MD   LORazepam (ATIVAN) 1 mg tablet Take 1 mg by mouth as needed     Yes Historical Provider, MD   montelukast (SINGULAIR) 10 mg tablet Take 1 tablet by mouth daily 10/9/14  Yes Historical Provider, MD   predniSONE 10 mg tablet Take 3 tablets for 3 days then 2 tablets for 3 days then 1 tablet for 3 days 11/6/20  Yes KAYLEY Foreman   hydrochlorothiazide (HYDRODIURIL) 12 5 mg tablet TAKE 1 TABLET BY MOUTH EVERY DAY 6/21/20   Ben Hinton MD   polyethylene glycol (GOLYTELY) 4000 mL solution Take 4,000 mL by mouth once for 1 dose  Patient not taking: Reported on 9/29/2020 9/1/20 9/1/20  Thalia Jimenez PA-C       Subjective:     Review of Systems:    General: negative for - chills, fatigue, fever,  weight gain or weight loss  Psychological: negative for - anxiety, behavioral disorder, concentration difficulties, decreased libido, depression, irritability, memory difficulties, mood swings, sleep disturbances or suicidal ideation  ENT: negative for - hearing difficulties , nasal congestion, nasal discharge, oral lesions, sinus pain, sneezing, sore throat  Allergy and Immunology: negative for - hives, insect bite sensitivity,  Hematological and Lymphatic: negative for - bleeding problems, blood clots,bruising, swollen lymph nodes  Endocrine: negative for - hair pattern changes, hot flashes, malaise/lethargy, mood swings, palpitations, polydipsia/polyuria, skin changes, temperature intolerance or unexpected weight change  Respiratory: negative for - cough, hemoptysis, orthopnea, shortness of breath, or wheezing  Cardiovascular: negative for - chest pain, dyspnea on exertion, edema,  Gastrointestinal: negative for - abdominal pain, nausea/vomiting  Genito-Urinary: negative for - dysuria, incontinence, irregular/heavy menses or urinary frequency/urgency  Musculoskeletal: negative for - gait disturbance, joint pain, joint stiffness, joint swelling, muscle pain, muscular weakness  Dermatological:  As in HPI  Neurological: negative for confusion, dizziness, headaches, impaired coordination/balance, memory loss, numbness/tingling, seizures, speech problems, tremors or weakness       Objective:   Temp (!) 96 4 °F (35 8 °C)     Physical Exam:    General Appearance:    Alert, cooperative, no distress   Head:    Normocephalic, without obvious abnormality, atraumatic           Skin:   A full skin exam was performed including scalp, head scalp, eyes, ears, nose, lips, neck, chest, axilla, abdomen, back, buttocks, bilateral upper extremities, bilateral lower extremities, hands, feet, fingers, toes, fingernails, and toenails scaling areas noted on the left side of the face normal keratotic papules greasy stuck appearance nothing else remarkable noted on exam  Physical Exam  HENT:      Head:          Cryotherapy Procedure Note    Pre-operative Diagnosis: actinic keratosis    Plan:  1  Instructed to keep the area dry and clean thereafter  Apply petrolatum if area gets crusty  2  Recommended that the patient use acetaminophen  as needed for pain       Locations:  Left Side of face    Indications: Destruction of actinic keratoses x4    Patient informed of risks (permanent scarring, infection, light or dark discoloration, bleeding, infection, weakness, numbness and recurrence of the lesion) and benefits of the procedure and verbal informed consent obtained  The areas are treated with liquid nitrogen therapy, frozen until ice ball extended 2 mm beyond lesion, allowed to thaw, and treated again  The patient tolerated procedure well  The patient was instructed on post-op care, warned that there may be blister formation, redness and pain  Recommend OTC analgesia as needed for pain  Condition:  Stable    Complications:  None  Cryotherapy Procedure Note    Pre-operative Diagnosis: inflamed seborrheic keratosis    Plan:  1  Instructed to keep the area dry and clean thereafter  Apply petrolatum if area gets crusty  2  Recommended that the patient use acetaminophen  as needed for pain  Locations:  Left groin    Indications: Destruction of inflamed keratosis times 1    Patient informed of risks (permanent scarring, infection, light or dark discoloration, bleeding, infection, weakness, numbness and recurrence of the lesion) and benefits of the procedure and verbal informed consent obtained  The areas are treated with liquid nitrogen therapy, frozen until ice ball extended 2 mm beyond lesion, allowed to thaw, and treated again  The patient tolerated procedure well  The patient was instructed on post-op care, warned that there may be blister formation, redness and pain  Recommend OTC analgesia as needed for pain  Condition:  Stable    Complications:  none  Assessment:     1  Actinic keratosis     2  History of changing skin mole  Ambulatory referral to Dermatology   3  Seborrheic keratosis     4  Screening for skin condition     5  Inflamed seborrheic keratosis           Plan:   Actinic Keratosis:  Patient advised lesions are precancers   These should resolve with cryosurgery if not to let us know sun block recommended  Inflamed keratosis lesion treated because the patient concern  Seborrheic Keratosis  Patient reasurred these are normal growths we acquire with age no treatment needed  Screening for Dermatologic Disorders: Nothing else of concern noted on complete exam follow up in 1 year       Paula Mclaughlin MD  1/15/0825,1:87 PM    Portions of the record may have been created with voice recognition software   Occasional wrong word or "sound a like" substitutions may have occurred due to the inherent limitations of voice recognition software   Read the chart carefully and recognize, using context, where substitutions have occurred

## 2021-03-30 DIAGNOSIS — Z23 ENCOUNTER FOR IMMUNIZATION: ICD-10-CM

## 2021-07-09 DIAGNOSIS — I10 ESSENTIAL HYPERTENSION: ICD-10-CM

## 2021-07-09 RX ORDER — LISINOPRIL 40 MG/1
TABLET ORAL
Qty: 30 TABLET | Refills: 11 | Status: SHIPPED | OUTPATIENT
Start: 2021-07-09 | End: 2022-07-13

## 2021-07-24 DIAGNOSIS — E78.2 MIXED HYPERLIPIDEMIA: ICD-10-CM

## 2021-07-24 DIAGNOSIS — I10 ESSENTIAL HYPERTENSION: ICD-10-CM

## 2021-07-28 RX ORDER — AMLODIPINE BESYLATE AND ATORVASTATIN CALCIUM 5; 40 MG/1; MG/1
TABLET, FILM COATED ORAL
Qty: 30 TABLET | Refills: 8 | Status: SHIPPED | OUTPATIENT
Start: 2021-07-28 | End: 2022-05-25 | Stop reason: SDUPTHER

## 2021-09-13 ENCOUNTER — APPOINTMENT (OUTPATIENT)
Dept: LAB | Facility: CLINIC | Age: 71
End: 2021-09-13
Payer: COMMERCIAL

## 2021-09-13 DIAGNOSIS — R73.01 IMPAIRED FASTING GLUCOSE: ICD-10-CM

## 2021-09-13 DIAGNOSIS — E78.2 MIXED HYPERLIPIDEMIA: ICD-10-CM

## 2021-09-13 DIAGNOSIS — I10 BENIGN ESSENTIAL HYPERTENSION: ICD-10-CM

## 2021-09-13 DIAGNOSIS — M1A.9XX0 CHRONIC GOUT INVOLVING TOE OF RIGHT FOOT WITHOUT TOPHUS, UNSPECIFIED CAUSE: ICD-10-CM

## 2021-09-13 LAB
ALBUMIN SERPL BCP-MCNC: 3.8 G/DL (ref 3.5–5)
ALP SERPL-CCNC: 98 U/L (ref 46–116)
ALT SERPL W P-5'-P-CCNC: 53 U/L (ref 12–78)
ANION GAP SERPL CALCULATED.3IONS-SCNC: -1 MMOL/L (ref 4–13)
AST SERPL W P-5'-P-CCNC: 36 U/L (ref 5–45)
BASOPHILS # BLD AUTO: 0.06 THOUSANDS/ΜL (ref 0–0.1)
BASOPHILS NFR BLD AUTO: 1 % (ref 0–1)
BILIRUB SERPL-MCNC: 0.79 MG/DL (ref 0.2–1)
BUN SERPL-MCNC: 16 MG/DL (ref 5–25)
CALCIUM SERPL-MCNC: 8.9 MG/DL (ref 8.3–10.1)
CHLORIDE SERPL-SCNC: 109 MMOL/L (ref 100–108)
CHOLEST SERPL-MCNC: 107 MG/DL (ref 50–200)
CO2 SERPL-SCNC: 29 MMOL/L (ref 21–32)
CREAT SERPL-MCNC: 1.04 MG/DL (ref 0.6–1.3)
EOSINOPHIL # BLD AUTO: 0.16 THOUSAND/ΜL (ref 0–0.61)
EOSINOPHIL NFR BLD AUTO: 3 % (ref 0–6)
ERYTHROCYTE [DISTWIDTH] IN BLOOD BY AUTOMATED COUNT: 12.7 % (ref 11.6–15.1)
EST. AVERAGE GLUCOSE BLD GHB EST-MCNC: 111 MG/DL
GFR SERPL CREATININE-BSD FRML MDRD: 73 ML/MIN/1.73SQ M
GLUCOSE P FAST SERPL-MCNC: 109 MG/DL (ref 65–99)
HBA1C MFR BLD: 5.5 %
HCT VFR BLD AUTO: 43.2 % (ref 36.5–49.3)
HDLC SERPL-MCNC: 42 MG/DL
HGB BLD-MCNC: 14.5 G/DL (ref 12–17)
IMM GRANULOCYTES # BLD AUTO: 0.01 THOUSAND/UL (ref 0–0.2)
IMM GRANULOCYTES NFR BLD AUTO: 0 % (ref 0–2)
LDLC SERPL CALC-MCNC: 52 MG/DL (ref 0–100)
LYMPHOCYTES # BLD AUTO: 1.54 THOUSANDS/ΜL (ref 0.6–4.47)
LYMPHOCYTES NFR BLD AUTO: 33 % (ref 14–44)
MCH RBC QN AUTO: 32.5 PG (ref 26.8–34.3)
MCHC RBC AUTO-ENTMCNC: 33.6 G/DL (ref 31.4–37.4)
MCV RBC AUTO: 97 FL (ref 82–98)
MONOCYTES # BLD AUTO: 0.47 THOUSAND/ΜL (ref 0.17–1.22)
MONOCYTES NFR BLD AUTO: 10 % (ref 4–12)
NEUTROPHILS # BLD AUTO: 2.5 THOUSANDS/ΜL (ref 1.85–7.62)
NEUTS SEG NFR BLD AUTO: 53 % (ref 43–75)
NONHDLC SERPL-MCNC: 65 MG/DL
NRBC BLD AUTO-RTO: 0 /100 WBCS
PLATELET # BLD AUTO: 169 THOUSANDS/UL (ref 149–390)
PMV BLD AUTO: 11.2 FL (ref 8.9–12.7)
POTASSIUM SERPL-SCNC: 3.8 MMOL/L (ref 3.5–5.3)
PROT SERPL-MCNC: 7 G/DL (ref 6.4–8.2)
RBC # BLD AUTO: 4.46 MILLION/UL (ref 3.88–5.62)
SODIUM SERPL-SCNC: 137 MMOL/L (ref 136–145)
TRIGL SERPL-MCNC: 67 MG/DL
TSH SERPL DL<=0.05 MIU/L-ACNC: 2.63 UIU/ML (ref 0.36–3.74)
URATE SERPL-MCNC: 6.8 MG/DL (ref 4.2–8)
WBC # BLD AUTO: 4.74 THOUSAND/UL (ref 4.31–10.16)

## 2021-09-13 PROCEDURE — 84550 ASSAY OF BLOOD/URIC ACID: CPT

## 2021-09-13 PROCEDURE — 84443 ASSAY THYROID STIM HORMONE: CPT

## 2021-09-13 PROCEDURE — 80061 LIPID PANEL: CPT

## 2021-09-13 PROCEDURE — 80053 COMPREHEN METABOLIC PANEL: CPT

## 2021-09-13 PROCEDURE — 85025 COMPLETE CBC W/AUTO DIFF WBC: CPT

## 2021-09-13 PROCEDURE — 36415 COLL VENOUS BLD VENIPUNCTURE: CPT

## 2021-09-13 PROCEDURE — 83036 HEMOGLOBIN GLYCOSYLATED A1C: CPT

## 2021-09-21 ENCOUNTER — OFFICE VISIT (OUTPATIENT)
Dept: INTERNAL MEDICINE CLINIC | Facility: CLINIC | Age: 71
End: 2021-09-21
Payer: COMMERCIAL

## 2021-09-21 VITALS
SYSTOLIC BLOOD PRESSURE: 116 MMHG | OXYGEN SATURATION: 97 % | DIASTOLIC BLOOD PRESSURE: 78 MMHG | BODY MASS INDEX: 30.9 KG/M2 | TEMPERATURE: 97.9 F | HEART RATE: 81 BPM | RESPIRATION RATE: 12 BRPM | WEIGHT: 208.6 LBS | HEIGHT: 69 IN

## 2021-09-21 DIAGNOSIS — I10 BENIGN ESSENTIAL HYPERTENSION: ICD-10-CM

## 2021-09-21 DIAGNOSIS — E78.2 MIXED HYPERLIPIDEMIA: ICD-10-CM

## 2021-09-21 DIAGNOSIS — Z96.653 HISTORY OF BILATERAL KNEE ARTHROPLASTY: ICD-10-CM

## 2021-09-21 DIAGNOSIS — R73.01 IMPAIRED FASTING GLUCOSE: Primary | ICD-10-CM

## 2021-09-21 DIAGNOSIS — M1A.9XX0 CHRONIC GOUT INVOLVING TOE OF RIGHT FOOT WITHOUT TOPHUS, UNSPECIFIED CAUSE: ICD-10-CM

## 2021-09-21 DIAGNOSIS — I49.3 PVC'S (PREMATURE VENTRICULAR CONTRACTIONS): ICD-10-CM

## 2021-09-21 DIAGNOSIS — N18.2 CKD (CHRONIC KIDNEY DISEASE) STAGE 2, GFR 60-89 ML/MIN: ICD-10-CM

## 2021-09-21 PROCEDURE — 99214 OFFICE O/P EST MOD 30 MIN: CPT | Performed by: INTERNAL MEDICINE

## 2021-09-21 NOTE — PROGRESS NOTES
Assessment/Plan:    Diagnoses and all orders for this visit:    Impaired fasting glucose  -     Hemoglobin A1C; Future    Benign essential hypertension  -     CBC and differential; Future  -     Comprehensive metabolic panel; Future    PVC's (premature ventricular contractions)  -     Ambulatory referral to Cardiology; Future    CKD (chronic kidney disease) stage 2, GFR 60-89 ml/min    Mixed hyperlipidemia  -     Lipid panel; Future    Chronic gout involving toe of right 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 tophus, unspecified cause  -     Uric acid; Future    History of bilateral knee arthroplasty              Patient Instructions   Lab data reviewed in detail and compared prior    Hypertension stable on present regimen    Hyperlipidemia at goal on atorvastatin    Chronic kidney disease remains stable stage II    Gout stable without recent flare since off thiazide diuretic    Impaired fasting glucose stable    Depression/anxiety stable with rare use of lorazepam    Health maintenance is up-to-date, consider Shingrix at local pharmacy, COVID booster sometime after October 12th    Routine follow-up after labs in 6 months, sooner as needed  Subjective:      Patient ID: Rebeka Joseph is a 73 y o  male    F/u mmp and review labs  Feeling generally well  Taking classes at [employer], hiking most days w/ dog  Minimal L knee pain s/p b/l TKA's  No recent palpitations  Stopped seeing Dr Ryanne Loyola in [place]      HTN-taking rx as directed, rare home bp's have been stable  HPL-tolerating atorvastatin  CKD-keeping well hydrated, no nsaids  IFG-watching carbs  Depression/anxiety-stable, no recent rx  Rare lorazepam for sleep  Gout-no flares recently, continues w/ colchicine daily  Occasional etoh             Current Outpatient Medications:     acetaminophen (TYLENOL) 325 mg tablet, Take 650 mg by mouth every 6 (six) hours as needed for mild pain, Disp: , Rfl:     albuterol (PROVENTIL HFA,VENTOLIN HFA) 90 mcg/act inhaler, Inhale 1 puff as needed  , Disp: , Rfl:     amLODIPine-atorvastatin (CADUET) 5-40 MG per tablet, TAKE 1 TABLET BY MOUTH EVERY DAY, Disp: 30 tablet, Rfl: 8    aspirin (ASPIR-81) 81 mg EC tablet, Take 1 tablet by mouth daily  , Disp: , Rfl:     Colcrys 0 6 MG tablet, TAKE 1 TABLET (0 6 MG TOTAL) BY MOUTH 3 (THREE) TIMES A DAY AS NEEDED FOR MUSCLE/JOINT PAIN, Disp: 270 tablet, Rfl: 1    lisinopril (ZESTRIL) 40 mg tablet, TAKE 1 TABLET BY MOUTH EVERY DAY, Disp: 30 tablet, Rfl: 11    LORazepam (ATIVAN) 1 mg tablet, Take 1 mg by mouth as needed  , Disp: , Rfl:     montelukast (SINGULAIR) 10 mg tablet, Take 1 tablet by mouth daily, Disp: , Rfl:     polyethylene glycol (GOLYTELY) 4000 mL solution, Take 4,000 mL by mouth once for 1 dose (Patient not taking: Reported on 9/29/2020), Disp: 4000 mL, Rfl: 0    predniSONE 10 mg tablet, Take 3 tablets for 3 days then 2 tablets for 3 days then 1 tablet for 3 days (Patient not taking: Reported on 3/15/2021), Disp: 18 tablet, Rfl: 0    Recent Results (from the past 1008 hour(s))   CBC and differential    Collection Time: 09/13/21  7:59 AM   Result Value Ref Range    WBC 4 74 4 31 - 10 16 Thousand/uL    RBC 4 46 3 88 - 5 62 Million/uL    Hemoglobin 14 5 12 0 - 17 0 g/dL    Hematocrit 43 2 36 5 - 49 3 %    MCV 97 82 - 98 fL    MCH 32 5 26 8 - 34 3 pg    MCHC 33 6 31 4 - 37 4 g/dL    RDW 12 7 11 6 - 15 1 %    MPV 11 2 8 9 - 12 7 fL    Platelets 139 403 - 597 Thousands/uL    nRBC 0 /100 WBCs    Neutrophils Relative 53 43 - 75 %    Immat GRANS % 0 0 - 2 %    Lymphocytes Relative 33 14 - 44 %    Monocytes Relative 10 4 - 12 %    Eosinophils Relative 3 0 - 6 %    Basophils Relative 1 0 - 1 %    Neutrophils Absolute 2 50 1 85 - 7 62 Thousands/µL    Immature Grans Absolute 0 01 0 00 - 0 20 Thousand/uL    Lymphocytes Absolute 1 54 0 60 - 4 47 Thousands/µL    Monocytes Absolute 0 47 0 17 - 1 22 Thousand/µL    Eosinophils Absolute 0 16 0 00 - 0 61 Thousand/µL    Basophils Absolute 0 06 0 00 - 0 10 Thousands/µL   Comprehensive metabolic panel    Collection Time: 09/13/21  7:59 AM   Result Value Ref Range    Sodium 137 136 - 145 mmol/L    Potassium 3 8 3 5 - 5 3 mmol/L    Chloride 109 (H) 100 - 108 mmol/L    CO2 29 21 - 32 mmol/L    ANION GAP -1 (L) 4 - 13 mmol/L    BUN 16 5 - 25 mg/dL    Creatinine 1 04 0 60 - 1 30 mg/dL    Glucose, Fasting 109 (H) 65 - 99 mg/dL    Calcium 8 9 8 3 - 10 1 mg/dL    AST 36 5 - 45 U/L    ALT 53 12 - 78 U/L    Alkaline Phosphatase 98 46 - 116 U/L    Total Protein 7 0 6 4 - 8 2 g/dL    Albumin 3 8 3 5 - 5 0 g/dL    Total Bilirubin 0 79 0 20 - 1 00 mg/dL    eGFR 73 ml/min/1 73sq m   Lipid panel    Collection Time: 09/13/21  7:59 AM   Result Value Ref Range    Cholesterol 107 50 - 200 mg/dL    Triglycerides 67 <=150 mg/dL    HDL, Direct 42 >=40 mg/dL    LDL Calculated 52 0 - 100 mg/dL    Non-HDL-Chol (CHOL-HDL) 65 mg/dl   Hemoglobin A1C    Collection Time: 09/13/21  7:59 AM   Result Value Ref Range    Hemoglobin A1C 5 5 Normal 3 8-5 6%; PreDiabetic 5 7-6 4%; Diabetic >=6 5%; Glycemic control for adults with diabetes <7 0% %     mg/dl   TSH, 3rd generation with Free T4 reflex    Collection Time: 09/13/21  7:59 AM   Result Value Ref Range    TSH 3RD GENERATON 2 630 0 358 - 3 740 uIU/mL   Uric acid    Collection Time: 09/13/21  7:59 AM   Result Value Ref Range    Uric Acid 6 8 4 2 - 8 0 mg/dL       The following portions of the patient's history were reviewed and updated as appropriate: allergies, current medications, past family history, past medical history, past social history, past surgical history and problem list      Review of Systems   Constitutional: Negative for appetite change, chills, diaphoresis, fatigue, fever and unexpected weight change  HENT: Negative for congestion, hearing loss and rhinorrhea  Eyes: Negative for visual disturbance  Respiratory: Negative for cough, chest tightness, shortness of breath and wheezing      Cardiovascular: Negative for chest pain, palpitations and leg swelling  Gastrointestinal: Negative for abdominal pain and blood in stool  Endocrine: Negative for cold intolerance, heat intolerance, polydipsia and polyuria  Genitourinary: Negative for difficulty urinating, dysuria, frequency and urgency  Musculoskeletal: Negative for arthralgias and myalgias  Skin: Negative for rash  Neurological: Negative for dizziness, weakness, light-headedness and headaches  Hematological: Does not bruise/bleed easily  Psychiatric/Behavioral: Negative for dysphoric mood and sleep disturbance  Objective:      Vitals:    09/21/21 1026   BP: 116/78   Pulse:    Resp:    Temp:    SpO2:           Physical Exam  Constitutional:       Appearance: He is well-developed  HENT:      Head: Normocephalic and atraumatic  Nose: Nose normal    Eyes:      General: No scleral icterus  Conjunctiva/sclera: Conjunctivae normal       Pupils: Pupils are equal, round, and reactive to light  Neck:      Thyroid: No thyromegaly  Vascular: No JVD  Trachea: No tracheal deviation  Cardiovascular:      Rate and Rhythm: Normal rate and regular rhythm  Heart sounds: No murmur heard  No friction rub  No gallop  Pulmonary:      Effort: Pulmonary effort is normal  No respiratory distress  Breath sounds: Normal breath sounds  No wheezing or rales  Musculoskeletal:         General: No deformity  Cervical back: Normal range of motion and neck supple  Lymphadenopathy:      Cervical: No cervical adenopathy  Skin:     General: Skin is warm and dry  Coloration: Skin is not pale  Findings: No erythema or rash  Neurological:      Mental Status: He is alert and oriented to person, place, and time  Cranial Nerves: No cranial nerve deficit  Psychiatric:         Behavior: Behavior normal          Thought Content:  Thought content normal          Judgment: Judgment normal

## 2021-09-21 NOTE — PATIENT INSTRUCTIONS
Lab data reviewed in detail and compared prior    Hypertension stable on present regimen    Hyperlipidemia at goal on atorvastatin    Chronic kidney disease remains stable stage II    Gout stable without recent flare since off thiazide diuretic    Impaired fasting glucose stable    Depression/anxiety stable with rare use of lorazepam    Health maintenance is up-to-date, consider Shingrix at local pharmacy, COVID booster sometime after October 12th    Routine follow-up after labs in 6 months, sooner as needed

## 2021-09-29 ENCOUNTER — CONSULT (OUTPATIENT)
Dept: CARDIOLOGY CLINIC | Facility: CLINIC | Age: 71
End: 2021-09-29
Payer: COMMERCIAL

## 2021-09-29 VITALS
HEART RATE: 80 BPM | RESPIRATION RATE: 16 BRPM | HEIGHT: 69 IN | DIASTOLIC BLOOD PRESSURE: 74 MMHG | BODY MASS INDEX: 30.66 KG/M2 | OXYGEN SATURATION: 98 % | SYSTOLIC BLOOD PRESSURE: 126 MMHG | WEIGHT: 207 LBS

## 2021-09-29 DIAGNOSIS — I49.3 PVC'S (PREMATURE VENTRICULAR CONTRACTIONS): ICD-10-CM

## 2021-09-29 DIAGNOSIS — Z76.89 ENCOUNTER TO ESTABLISH CARE: Primary | ICD-10-CM

## 2021-09-29 PROCEDURE — 93000 ELECTROCARDIOGRAM COMPLETE: CPT | Performed by: INTERNAL MEDICINE

## 2021-09-29 PROCEDURE — 99214 OFFICE O/P EST MOD 30 MIN: CPT | Performed by: INTERNAL MEDICINE

## 2021-09-29 NOTE — PROGRESS NOTES
Cardiology Office Note    Markel Adames 73 y o  male MRN: [id number]    09/29/21          Assessment:  1  Hypertension   2  Hyperlipidemia  3  PVCs      Plan:  · Cont aspirin   · Cont norvasc-atorvastatin and lisinopril   · Ambulatory blood pressure monitoring and maintaining a low sodium diet was advised  · He was advised to notify us with the onset of cardiac symptoms  Follow up: 1 year or sooner as needed    1  Encounter to establish care  POCT ECG   2  PVC's (premature ventricular contractions)  Ambulatory referral to Cardiology    POCT ECG       HPI: Markel Adames is a 73y o  year old male with history of hypertension and hyperlipidemia presents for routine follow-up  Past cardiac evaluation:  · Holter monitor 10/2018:  Normal sinus rhythm with occasional PVCs (1 4% VE burden)  · TTE 2018: EF 65%  · Pharmacologic MPI 8018:  Negative for ischemia    He has been doing very well from a cardiac standpoint since his last evaluation  His blood pressure has been well controlled on his antihypertensive regimen  He is an avid hiker and has been very active without limitation  He denies chest pain, palpitations, dyspnea on exertion or any other cardiac concerns at this time  He has been tolerating his medications without side effect  ECG personally reviewed:  Normal sinus rhythm, normal ECG    Family History: mother with history of CAD s/p CABG at 76; materal uncles with cardiac diseaes; paternal family with history of CVA    Social history: occasional cigar use;        Allergies   Allergen Reactions    Cat Hair Extract Anaphylaxis, Hives, Itching, Shortness Of Breath and Swelling    Other Cough     mold    Pollen Extract Other (See Comments) and Shortness Of Breath    Nsaids          Current Outpatient Medications:     acetaminophen (TYLENOL) 325 mg tablet, Take 650 mg by mouth every 6 (six) hours as needed for mild pain, Disp: , Rfl:     albuterol (PROVENTIL HFA,VENTOLIN HFA) 90 mcg/act inhaler, Inhale 1 puff as needed  , Disp: , Rfl:     amLODIPine-atorvastatin (CADUET) 5-40 MG per tablet, TAKE 1 TABLET BY MOUTH EVERY DAY, Disp: 30 tablet, Rfl: 8    aspirin (ASPIR-81) 81 mg EC tablet, Take 1 tablet by mouth daily  , Disp: , Rfl:     Colcrys 0 6 MG tablet, TAKE 1 TABLET (0 6 MG TOTAL) BY MOUTH 3 (THREE) TIMES A DAY AS NEEDED FOR MUSCLE/JOINT PAIN, Disp: 270 tablet, Rfl: 1    lisinopril (ZESTRIL) 40 mg tablet, TAKE 1 TABLET BY MOUTH EVERY DAY, Disp: 30 tablet, Rfl: 11    LORazepam (ATIVAN) 1 mg tablet, Take 1 mg by mouth as needed  , Disp: , Rfl:     montelukast (SINGULAIR) 10 mg tablet, Take 1 tablet by mouth daily, Disp: , Rfl:     polyethylene glycol (GOLYTELY) 4000 mL solution, Take 4,000 mL by mouth once for 1 dose (Patient not taking: Reported on 9/29/2020), Disp: 4000 mL, Rfl: 0    predniSONE 10 mg tablet, Take 3 tablets for 3 days then 2 tablets for 3 days then 1 tablet for 3 days (Patient not taking: Reported on 3/15/2021), Disp: 18 tablet, Rfl: 0    Past Medical History:   Diagnosis Date    Chronic kidney disease     CKD (chronic kidney disease) stage 3, GFR 30-59 ml/min ([facility]) 9/24/2019    Depression     Dyshidrosis     Hypertension     Osteoarthritis     Trigger finger        Family History   Problem Relation Age of Onset    Coronary artery disease Mother     Hyperlipidemia Mother     Hypertension Mother     Stroke Father     Hypertension Father        Past Surgical History:   Procedure Laterality Date    COLONOSCOPY  02/25/2010    COLONOSCOPY  09/2020    HEMORRHOID SURGERY      REPLACEMENT TOTAL KNEE Left     TONSILLECTOMY      TRIGGER FINGER RELEASE      WISDOM TOOTH EXTRACTION         Social History     Socioeconomic History    Marital status: /Civil Union     Spouse name: Not on file    Number of children: Not on file    Years of education: Not on file    Highest education level: Not on file   Occupational History    Occupation: IT   Tobacco Use    Smoking status: Light Tobacco Smoker     Packs/day: 0 00     Years: 40 00     Pack years: 0 00     Types: Cigars    Smokeless tobacco: Never Used    Tobacco comment: cigar sometimes    Vaping Use    Vaping Use: Never used   Substance and Sexual Activity    Alcohol use: Yes     Alcohol/week: 6 0 standard drinks     Types: 3 Glasses of wine, 2 Cans of beer, 1 Shots of liquor per week     Comment: occasional    Drug use: No    Sexual activity: Yes   Other Topics Concern    Not on file   Social History Narrative    Living independently with spouse    No advance directives     Social Determinants of Health     Financial Resource Strain:     Difficulty of Paying Living Expenses:    Food Insecurity:     Worried About Running Out of Food in the Last Year:     Ran Out of Food in the Last Year:    Transportation Needs:     Lack of Transportation (Medical):  Lack of Transportation (Non-Medical):    Physical Activity: Insufficiently Active    Days of Exercise per Week: 3 days    Minutes of Exercise per Session: 30 min   Stress: No Stress Concern Present    Feeling of Stress : Not at all   Social Connections:     Frequency of Communication with Friends and Family:     Frequency of Social Gatherings with Friends and Family:     Attends Jainism Services:     Active Member of Clubs or Organizations:     Attends Club or Organization Meetings:     Marital Status:    Intimate Partner Violence:     Fear of Current or Ex-Partner:     Emotionally Abused:     Physically Abused:     Sexually Abused:        Review of Systems   Constitutional: Negative for diaphoresis, weight gain and weight loss  HENT: Negative for congestion  Cardiovascular: Negative for chest pain, dyspnea on exertion, irregular heartbeat, leg swelling, near-syncope, orthopnea, palpitations, paroxysmal nocturnal dyspnea and syncope  Respiratory: Negative for shortness of breath, sleep disturbances due to breathing and snoring  Hematologic/Lymphatic: Does not bruise/bleed easily  Skin: Negative for rash  Musculoskeletal: Negative for myalgias  Gastrointestinal: Negative for nausea and vomiting  Neurological: Negative for excessive daytime sleepiness and light-headedness  Psychiatric/Behavioral: The patient is not nervous/anxious  Vitals: /74   Pulse 80   Resp 16   Ht 5' 9" (1 753 m)   Wt 93 9 kg (207 lb)   SpO2 98%   BMI 30 57 kg/m²       Physical Exam:     GEN: Alert and oriented x 3, in no acute distress  Well appearing and well nourished  HEENT: Sclera anicteric, conjunctivae pink, mucous membranes moist  Oropharynx clear  NECK: Supple, no carotid bruits, no significant JVD  Trachea midline, no thyromegaly  HEART: Regular rhythm, normal S1 and S2, no murmurs, clicks, gallops or rubs  PMI nondisplaced, no thrills  LUNGS: Clear to auscultation bilaterally; no wheezes, rales, or rhonchi  No increased work of breathing or signs of respiratory distress  ABDOMEN: Soft, nontender, nondistended, normoactive bowel sounds  EXTREMITIES: Skin warm and well perfused, no clubbing, cyanosis, or edema  NEURO: No focal findings  Normal speech  Mood and affect normal    SKIN: Normal without suspicious lesions on exposed skin

## 2021-10-20 ENCOUNTER — TELEPHONE (OUTPATIENT)
Dept: INTERNAL MEDICINE CLINIC | Facility: CLINIC | Age: 71
End: 2021-10-20

## 2022-03-04 DIAGNOSIS — M10.9 GOUT: ICD-10-CM

## 2022-03-04 RX ORDER — COLCHICINE 0.6 MG/1
TABLET ORAL
Qty: 90 TABLET | Refills: 5 | Status: SHIPPED | OUTPATIENT
Start: 2022-03-04

## 2022-03-14 ENCOUNTER — APPOINTMENT (OUTPATIENT)
Dept: LAB | Facility: CLINIC | Age: 72
End: 2022-03-14
Payer: COMMERCIAL

## 2022-03-14 DIAGNOSIS — I10 BENIGN ESSENTIAL HYPERTENSION: ICD-10-CM

## 2022-03-14 DIAGNOSIS — E78.2 MIXED HYPERLIPIDEMIA: ICD-10-CM

## 2022-03-14 DIAGNOSIS — M1A.9XX0 CHRONIC GOUT INVOLVING TOE OF RIGHT FOOT WITHOUT TOPHUS, UNSPECIFIED CAUSE: ICD-10-CM

## 2022-03-14 DIAGNOSIS — R73.01 IMPAIRED FASTING GLUCOSE: ICD-10-CM

## 2022-03-14 LAB
ALBUMIN SERPL BCP-MCNC: 4 G/DL (ref 3.5–5)
ALP SERPL-CCNC: 90 U/L (ref 46–116)
ALT SERPL W P-5'-P-CCNC: 38 U/L (ref 12–78)
ANION GAP SERPL CALCULATED.3IONS-SCNC: 4 MMOL/L (ref 4–13)
AST SERPL W P-5'-P-CCNC: 29 U/L (ref 5–45)
BASOPHILS # BLD AUTO: 0.07 THOUSANDS/ΜL (ref 0–0.1)
BASOPHILS NFR BLD AUTO: 1 % (ref 0–1)
BILIRUB SERPL-MCNC: 1.02 MG/DL (ref 0.2–1)
BUN SERPL-MCNC: 17 MG/DL (ref 5–25)
CALCIUM SERPL-MCNC: 9.3 MG/DL (ref 8.3–10.1)
CHLORIDE SERPL-SCNC: 109 MMOL/L (ref 100–108)
CHOLEST SERPL-MCNC: 99 MG/DL
CO2 SERPL-SCNC: 29 MMOL/L (ref 21–32)
CREAT SERPL-MCNC: 1.1 MG/DL (ref 0.6–1.3)
EOSINOPHIL # BLD AUTO: 0.22 THOUSAND/ΜL (ref 0–0.61)
EOSINOPHIL NFR BLD AUTO: 4 % (ref 0–6)
ERYTHROCYTE [DISTWIDTH] IN BLOOD BY AUTOMATED COUNT: 12.6 % (ref 11.6–15.1)
EST. AVERAGE GLUCOSE BLD GHB EST-MCNC: 123 MG/DL
GFR SERPL CREATININE-BSD FRML MDRD: 68 ML/MIN/1.73SQ M
GLUCOSE P FAST SERPL-MCNC: 110 MG/DL (ref 65–99)
HBA1C MFR BLD: 5.9 %
HCT VFR BLD AUTO: 45.7 % (ref 36.5–49.3)
HDLC SERPL-MCNC: 47 MG/DL
HGB BLD-MCNC: 15.3 G/DL (ref 12–17)
IMM GRANULOCYTES # BLD AUTO: 0.01 THOUSAND/UL (ref 0–0.2)
IMM GRANULOCYTES NFR BLD AUTO: 0 % (ref 0–2)
LDLC SERPL CALC-MCNC: 39 MG/DL (ref 0–100)
LYMPHOCYTES # BLD AUTO: 1.81 THOUSANDS/ΜL (ref 0.6–4.47)
LYMPHOCYTES NFR BLD AUTO: 33 % (ref 14–44)
MCH RBC QN AUTO: 32.2 PG (ref 26.8–34.3)
MCHC RBC AUTO-ENTMCNC: 33.5 G/DL (ref 31.4–37.4)
MCV RBC AUTO: 96 FL (ref 82–98)
MONOCYTES # BLD AUTO: 0.47 THOUSAND/ΜL (ref 0.17–1.22)
MONOCYTES NFR BLD AUTO: 9 % (ref 4–12)
NEUTROPHILS # BLD AUTO: 2.91 THOUSANDS/ΜL (ref 1.85–7.62)
NEUTS SEG NFR BLD AUTO: 53 % (ref 43–75)
NONHDLC SERPL-MCNC: 52 MG/DL
NRBC BLD AUTO-RTO: 0 /100 WBCS
PLATELET # BLD AUTO: 207 THOUSANDS/UL (ref 149–390)
PMV BLD AUTO: 11.5 FL (ref 8.9–12.7)
POTASSIUM SERPL-SCNC: 4.1 MMOL/L (ref 3.5–5.3)
PROT SERPL-MCNC: 6.9 G/DL (ref 6.4–8.2)
RBC # BLD AUTO: 4.75 MILLION/UL (ref 3.88–5.62)
SODIUM SERPL-SCNC: 142 MMOL/L (ref 136–145)
TRIGL SERPL-MCNC: 65 MG/DL
URATE SERPL-MCNC: 7 MG/DL (ref 4.2–8)
WBC # BLD AUTO: 5.49 THOUSAND/UL (ref 4.31–10.16)

## 2022-03-14 PROCEDURE — 83036 HEMOGLOBIN GLYCOSYLATED A1C: CPT

## 2022-03-14 PROCEDURE — 80053 COMPREHEN METABOLIC PANEL: CPT

## 2022-03-14 PROCEDURE — 85025 COMPLETE CBC W/AUTO DIFF WBC: CPT

## 2022-03-14 PROCEDURE — 80061 LIPID PANEL: CPT

## 2022-03-14 PROCEDURE — 84550 ASSAY OF BLOOD/URIC ACID: CPT

## 2022-03-14 PROCEDURE — 36415 COLL VENOUS BLD VENIPUNCTURE: CPT

## 2022-03-23 ENCOUNTER — TELEMEDICINE (OUTPATIENT)
Dept: INTERNAL MEDICINE CLINIC | Facility: CLINIC | Age: 72
End: 2022-03-23
Payer: COMMERCIAL

## 2022-03-23 ENCOUNTER — TELEPHONE (OUTPATIENT)
Dept: INTERNAL MEDICINE CLINIC | Facility: CLINIC | Age: 72
End: 2022-03-23

## 2022-03-23 DIAGNOSIS — U07.1 COVID-19: Primary | ICD-10-CM

## 2022-03-23 PROCEDURE — 99213 OFFICE O/P EST LOW 20 MIN: CPT | Performed by: INTERNAL MEDICINE

## 2022-03-23 NOTE — TELEPHONE ENCOUNTER
----- Message from Cirilo Alston MD sent at 3/23/2022 10:26 AM EDT -----  Regarding: FW: Covid  Please set up VV    ----- Message -----  From: Mark William  Sent: 3/23/2022   8:24 AM EDT  To: Cirilo Alston MD  Subject: FW: Covid                                          ----- Message -----  From: Lee Brown  Sent: 3/23/2022   8:21 AM EDT  To: [facility]  Subject: Covid                                            My breathing was very labored last week but has been improving  The cough is strong at times and light at others but it's still there  The constant fatigue and the chills are obviously keeping me from my normal routines  When can you do a virtual visit?

## 2022-03-23 NOTE — PROGRESS NOTES
COVID-19 Outpatient Progress Note    Assessment/Plan:    Problem List Items Addressed This Visit     None      Visit Diagnoses     COVID-19    -  Primary    Relevant Orders    CBC and differential    Comprehensive metabolic panel    Procalcitonin    XR chest pa & lateral         Disposition:     I recommended COVID-19 PCR testing on or after day 5 since exposure and if negative can end quarantine after 7 days  Patient was instructed to watch for symptoms until 14 days after exposure  If patient were to develop symptoms, they should immediately self isolate and call our office for further guidance  Come to hospital for labs and xray  I have spent 15 minutes directly with the patient  Greater than 50% of this time was spent in counseling/coordination of care regarding: risks and benefits of treatment options, instructions for management and impressions  Encounter provider Sekou Santizo MD    Provider located at [address] [place] [address]    Recent Visits  No visits were found meeting these conditions  Showing recent visits within past 7 days and meeting all other requirements  Today's Visits  Date Type Provider Dept   03/23/22 Telephone Deangelo Truong, [address]   03/23/22 [address] [place] [address], [address] [place] [address] today's visits and meeting all other requirements  Future Appointments  No visits were found meeting these conditions  Showing future appointments within next 150 days and meeting all other requirements     This virtual check-in was done via [employer] and patient was informed that this is a secure, HIPAA-compliant platform  He agrees to proceed  Patient agrees to participate in a virtual check in via telephone or video visit instead of presenting to the office to address urgent/immediate medical needs  Patient is aware this is a billable service      After connecting through [facility], the patient was identified by name and date of birth  Markel Crump was informed that this was a telemedicine visit and that the exam was being conducted confidentially over secure lines  Markel Crump acknowledged consent and understanding of privacy and security of the telemedicine visit  I informed the patient that I have reviewed his record in Epic and presented the opportunity for him to ask any questions regarding the visit today  The patient agreed to participate  Verification of patient location:  Patient is located in the following state in which I hold an active license: [place]    Subjective:   Markel Crump is a 73 y o  male who is concerned about COVID-19  Patient's symptoms include fever, chills, fatigue, malaise, nasal congestion, rhinorrhea, cough, shortness of breath, diarrhea and myalgias  Patient denies sore throat, anosmia, loss of taste, chest tightness, abdominal pain, nausea, vomiting and headaches  - Date of symptom onset: 3/7/2022  - Date of exposure: 3/19/2022      COVID-19 vaccination status: Fully vaccinated with booster    Exposure:   Contact with a person who is under investigation (PUI) for or who is positive for COVID-19 within the last 14 days?: No    Hospitalized recently for fever and/or lower respiratory symptoms?: No      Currently a healthcare worker that is involved in direct patient care?: No      Works in a special setting where the risk of COVID-19 transmission may be high? (this may include long-term care, correctional and half-way facilities; homeless shelters; assisted-living facilities and group homes ): No      Resident in a special setting where the risk of COVID-19 transmission may be high? (this may include long-term care, correctional and half-way facilities; homeless shelters; assisted-living facilities and group homes ): No      He started feeling sick March 7th, took 2 home COVID tests that week that were negative    Symptoms have waxed and waned since that time an on Saturday the 19th he took a tested [facility] and the rapid was positive and 3 days later the PCR came back negative    Jermaine Baptiste has also been sick but she tested negative    Lab Results   Component Value Date    SARSCOV2 Not Detected 09/29/2020     Past Medical History:   Diagnosis Date    Chronic kidney disease     CKD (chronic kidney disease) stage 3, GFR 30-59 ml/min ([facility] [address] ) 9/24/2019    Depression     Dyshidrosis     Hypertension     Osteoarthritis     Trigger finger      Past Surgical History:   Procedure Laterality Date    COLONOSCOPY  02/25/2010    COLONOSCOPY  09/2020    HEMORRHOID SURGERY      REPLACEMENT TOTAL KNEE Left     TONSILLECTOMY      TRIGGER FINGER RELEASE      WISDOM TOOTH EXTRACTION       Current Outpatient Medications   Medication Sig Dispense Refill    acetaminophen (TYLENOL) 325 mg tablet Take 650 mg by mouth every 6 (six) hours as needed for mild pain      albuterol (PROVENTIL HFA,VENTOLIN HFA) 90 mcg/act inhaler INHALE 2 PUFFS (180 MCG) BY INHALATION ROUTE EVERY 8 HOURS AS NEEDED 6 7 g 0    amLODIPine-atorvastatin (CADUET) 5-40 MG per tablet TAKE 1 TABLET BY MOUTH EVERY DAY 30 tablet 8    aspirin (ASPIR-81) 81 mg EC tablet Take 1 tablet by mouth daily        colchicine (COLCRYS) 0 6 mg tablet TAKE 1 TABLET (0 6 MG TOTAL) BY MOUTH 3 (THREE) TIMES A DAY AS NEEDED FOR MUSCLE/JOINT PAIN 90 tablet 5    lisinopril (ZESTRIL) 40 mg tablet TAKE 1 TABLET BY MOUTH EVERY DAY 30 tablet 11    LORazepam (ATIVAN) 1 mg tablet Take 1 mg by mouth as needed        montelukast (SINGULAIR) 10 mg tablet Take 1 tablet by mouth daily      polyethylene glycol (GOLYTELY) 4000 mL solution Take 4,000 mL by mouth once for 1 dose (Patient not taking: Reported on 9/29/2020) 4000 mL 0    predniSONE 10 mg tablet Take 3 tablets for 3 days then 2 tablets for 3 days then 1 tablet for 3 days (Patient not taking: Reported on 3/15/2021) 18 tablet 0     No current facility-administered medications for this visit  Allergies   Allergen Reactions    Cat Hair Extract Anaphylaxis, Hives, Itching, Shortness Of Breath and Swelling    Other Cough     mold    Pollen Extract Other (See Comments) and Shortness Of Breath    Nsaids        Review of Systems   Constitutional: Positive for chills, fatigue and fever  HENT: Positive for congestion and rhinorrhea  Negative for sore throat  Respiratory: Positive for cough and shortness of breath  Negative for chest tightness  Gastrointestinal: Positive for diarrhea  Negative for abdominal pain, nausea and vomiting  Musculoskeletal: Positive for myalgias  Neurological: Negative for headaches  Objective: There were no vitals filed for this visit  Physical Exam  Constitutional:       Appearance: He is well-developed  HENT:      Head: Normocephalic and atraumatic  Pulmonary:      Effort: Pulmonary effort is normal    Neurological:      Mental Status: He is alert and oriented to person, place, and time  Psychiatric:         Behavior: Behavior normal  Behavior is cooperative  Thought Content: Thought content normal          Judgment: Judgment normal          VIRTUAL VISIT DISCLAIMER    Markel Cooper verbally agrees to participate in [employer]  Pt is aware that [employer] could be limited without vital signs or the ability to perform a full hands-on physical exam  Markel Cooper understands he or the provider may request at any time to terminate the video visit and request the patient to seek care or treatment in person

## 2022-03-24 ENCOUNTER — HOSPITAL ENCOUNTER (OUTPATIENT)
Dept: RADIOLOGY | Facility: HOSPITAL | Age: 72
Discharge: HOME | End: 2022-03-24
Payer: COMMERCIAL

## 2022-03-24 ENCOUNTER — APPOINTMENT (OUTPATIENT)
Dept: LAB | Facility: HOSPITAL | Age: 72
End: 2022-03-24
Payer: COMMERCIAL

## 2022-03-24 DIAGNOSIS — U07.1 COVID-19: ICD-10-CM

## 2022-03-24 LAB
ALBUMIN SERPL BCP-MCNC: 4.2 G/DL (ref 3.5–5)
ALP SERPL-CCNC: 101 U/L (ref 46–116)
ALT SERPL W P-5'-P-CCNC: 59 U/L (ref 12–78)
ANION GAP SERPL CALCULATED.3IONS-SCNC: 6 MMOL/L (ref 4–13)
AST SERPL W P-5'-P-CCNC: 39 U/L (ref 5–45)
BASOPHILS # BLD AUTO: 0.05 THOUSANDS/ΜL (ref 0–0.1)
BASOPHILS NFR BLD AUTO: 1 % (ref 0–1)
BILIRUB SERPL-MCNC: 0.81 MG/DL (ref 0.2–1)
BUN SERPL-MCNC: 19 MG/DL (ref 5–25)
CALCIUM SERPL-MCNC: 9.3 MG/DL (ref 8.3–10.1)
CHLORIDE SERPL-SCNC: 104 MMOL/L (ref 100–108)
CO2 SERPL-SCNC: 30 MMOL/L (ref 21–32)
CREAT SERPL-MCNC: 1.11 MG/DL (ref 0.6–1.3)
EOSINOPHIL # BLD AUTO: 0.21 THOUSAND/ΜL (ref 0–0.61)
EOSINOPHIL NFR BLD AUTO: 4 % (ref 0–6)
ERYTHROCYTE [DISTWIDTH] IN BLOOD BY AUTOMATED COUNT: 12.3 % (ref 11.6–15.1)
GFR SERPL CREATININE-BSD FRML MDRD: 67 ML/MIN/1.73SQ M
GLUCOSE P FAST SERPL-MCNC: 106 MG/DL (ref 65–99)
HCT VFR BLD AUTO: 44.6 % (ref 36.5–49.3)
HGB BLD-MCNC: 15.6 G/DL (ref 12–17)
IMM GRANULOCYTES # BLD AUTO: 0.01 THOUSAND/UL (ref 0–0.2)
IMM GRANULOCYTES NFR BLD AUTO: 0 % (ref 0–2)
LYMPHOCYTES # BLD AUTO: 1.58 THOUSANDS/ΜL (ref 0.6–4.47)
LYMPHOCYTES NFR BLD AUTO: 29 % (ref 14–44)
MCH RBC QN AUTO: 33.3 PG (ref 26.8–34.3)
MCHC RBC AUTO-ENTMCNC: 35 G/DL (ref 31.4–37.4)
MCV RBC AUTO: 95 FL (ref 82–98)
MONOCYTES # BLD AUTO: 0.44 THOUSAND/ΜL (ref 0.17–1.22)
MONOCYTES NFR BLD AUTO: 8 % (ref 4–12)
NEUTROPHILS # BLD AUTO: 3.25 THOUSANDS/ΜL (ref 1.85–7.62)
NEUTS SEG NFR BLD AUTO: 58 % (ref 43–75)
NRBC BLD AUTO-RTO: 0 /100 WBCS
PLATELET # BLD AUTO: 190 THOUSANDS/UL (ref 149–390)
PMV BLD AUTO: 10.9 FL (ref 8.9–12.7)
POTASSIUM SERPL-SCNC: 4.2 MMOL/L (ref 3.5–5.3)
PROCALCITONIN SERPL-MCNC: <0.05 NG/ML
PROT SERPL-MCNC: 7.6 G/DL (ref 6.4–8.2)
RBC # BLD AUTO: 4.69 MILLION/UL (ref 3.88–5.62)
SODIUM SERPL-SCNC: 140 MMOL/L (ref 136–145)
WBC # BLD AUTO: 5.54 THOUSAND/UL (ref 4.31–10.16)

## 2022-03-24 PROCEDURE — 85025 COMPLETE CBC W/AUTO DIFF WBC: CPT

## 2022-03-24 PROCEDURE — 80053 COMPREHEN METABOLIC PANEL: CPT

## 2022-03-24 PROCEDURE — 71046 X-RAY EXAM CHEST 2 VIEWS: CPT

## 2022-03-24 PROCEDURE — 84145 PROCALCITONIN (PCT): CPT

## 2022-03-24 PROCEDURE — 36415 COLL VENOUS BLD VENIPUNCTURE: CPT

## 2022-04-11 ENCOUNTER — OFFICE VISIT (OUTPATIENT)
Dept: DERMATOLOGY | Facility: CLINIC | Age: 72
End: 2022-04-11
Payer: COMMERCIAL

## 2022-04-11 VITALS — HEIGHT: 69 IN | BODY MASS INDEX: 30.66 KG/M2 | WEIGHT: 207 LBS | TEMPERATURE: 97.6 F

## 2022-04-11 DIAGNOSIS — Z13.89 SCREENING FOR SKIN CONDITION: ICD-10-CM

## 2022-04-11 DIAGNOSIS — L57.0 ACTINIC KERATOSIS: Primary | ICD-10-CM

## 2022-04-11 DIAGNOSIS — L82.1 SEBORRHEIC KERATOSIS: ICD-10-CM

## 2022-04-11 PROCEDURE — 99213 OFFICE O/P EST LOW 20 MIN: CPT | Performed by: DERMATOLOGY

## 2022-04-11 PROCEDURE — 17000 DESTRUCT PREMALG LESION: CPT | Performed by: DERMATOLOGY

## 2022-04-11 PROCEDURE — 17003 DESTRUCT PREMALG LES 2-14: CPT | Performed by: DERMATOLOGY

## 2022-04-11 NOTE — PROGRESS NOTES
[facility] DERMATOLOGY  [address] [place] [id number]  [phone]  [phone]     MRN: [id number] : 1950  Encounter: [id number]  Patient Information: Jonatan Hui  Chief complaint:  Yearly checkup    History of present illness:  82-year-old male with previous history of actinic keratosis presents for overall checkup no specific concerns except lesions on his left temple and eyebrow again have recurred concerned regarding the potential 1 that we had previously treated  Past Medical History:   Diagnosis Date    Chronic kidney disease     CKD (chronic kidney disease) stage 3, GFR 30-59 ml/min ([facility] [address] ) 2019    Depression     Dyshidrosis     Hypertension     Osteoarthritis     Trigger finger      Past Surgical History:   Procedure Laterality Date    COLONOSCOPY  2010    COLONOSCOPY  2020    HEMORRHOID SURGERY      REPLACEMENT TOTAL KNEE Left     TONSILLECTOMY      TRIGGER FINGER RELEASE      WISDOM TOOTH EXTRACTION       Social History   Social History     Substance and Sexual Activity   Alcohol Use Yes    Alcohol/week: 6 0 standard drinks    Types: 3 Glasses of wine, 2 Cans of beer, 1 Shots of liquor per week    Comment: occasional     Social History     Substance and Sexual Activity   Drug Use No     Social History     Tobacco Use   Smoking Status Light Tobacco Smoker    Packs/day: 0 00    Years: 40 00    Pack years: 0 00    Types: Cigars   Smokeless Tobacco Never Used   Tobacco Comment    cigar sometimes      Family History   Problem Relation Age of Onset    Coronary artery disease Mother     Hyperlipidemia Mother     Hypertension Mother     Stroke Father     Hypertension Father      Meds/Allergies   Allergies   Allergen Reactions    Cat Hair Extract Anaphylaxis, Hives, Itching, Shortness Of Breath and Swelling    Other Cough     mold    Pollen Extract Other (See Comments) and Shortness Of Breath    Nsaids Meds:  Prior to Admission medications    Medication Sig Start Date End Date Taking?  Authorizing Provider   acetaminophen (TYLENOL) 325 mg tablet Take 650 mg by mouth every 6 (six) hours as needed for mild pain    Historical Provider, MD   albuterol (PROVENTIL HFA,VENTOLIN HFA) 90 mcg/act inhaler INHALE 2 PUFFS (180 MCG) BY INHALATION ROUTE EVERY 8 HOURS AS NEEDED 1/5/22   Nini Vann MD   amLODIPine-atorvastatin (CADUET) 5-40 MG per tablet TAKE 1 TABLET BY MOUTH EVERY DAY 7/28/21   KAYLEY Wallace   aspirin (ASPIR-81) 81 mg EC tablet Take 1 tablet by mouth daily      Historical Provider, MD   colchicine (COLCRYS) 0 6 mg tablet TAKE 1 TABLET (0 6 MG TOTAL) BY MOUTH 3 (THREE) TIMES A DAY AS NEEDED FOR MUSCLE/JOINT PAIN 3/4/22   Joni Bose MD   lisinopril (ZESTRIL) 40 mg tablet TAKE 1 TABLET BY MOUTH EVERY DAY 7/9/21   Joni Bose MD   LORazepam (ATIVAN) 1 mg tablet Take 1 mg by mouth as needed      Historical Provider, MD   montelukast (SINGULAIR) 10 mg tablet Take 1 tablet by mouth daily 10/9/14   Historical Provider, MD   polyethylene glycol (GOLYTELY) 4000 mL solution Take 4,000 mL by mouth once for 1 dose  Patient not taking: Reported on 9/29/2020 9/1/20 9/1/20  Joshua Chapman PA-C   predniSONE 10 mg tablet Take 3 tablets for 3 days then 2 tablets for 3 days then 1 tablet for 3 days  Patient not taking: Reported on 3/15/2021 11/6/20   KAYLEY Zayas       Subjective:     Review of Systems:    General: negative for - chills, fatigue, fever,  weight gain or weight loss  Psychological: negative for - anxiety, behavioral disorder, concentration difficulties, decreased libido, depression, irritability, memory difficulties, mood swings, sleep disturbances or suicidal ideation  ENT: negative for - hearing difficulties , nasal congestion, nasal discharge, oral lesions, sinus pain, sneezing, sore throat  Allergy and Immunology: negative for - hives, insect bite sensitivity,  Hematological and Lymphatic: negative for - bleeding problems, blood clots,bruising, swollen lymph nodes  Endocrine: negative for - hair pattern changes, hot flashes, malaise/lethargy, mood swings, palpitations, polydipsia/polyuria, skin changes, temperature intolerance or unexpected weight change  Respiratory: negative for - cough, hemoptysis, orthopnea, shortness of breath, or wheezing  Cardiovascular: negative for - chest pain, dyspnea on exertion, edema,  Gastrointestinal: negative for - abdominal pain, nausea/vomiting  Genito-Urinary: negative for - dysuria, incontinence, irregular/heavy menses or urinary frequency/urgency  Musculoskeletal: negative for - gait disturbance, joint pain, joint stiffness, joint swelling, muscle pain, muscular weakness  Dermatological:  As in HPI  Neurological: negative for confusion, dizziness, headaches, impaired coordination/balance, memory loss, numbness/tingling, seizures, speech problems, tremors or weakness       Objective:   Temp 97 6 °F (36 4 °C) (Temporal)   Ht 5' 9" (1 753 m)   Wt 93 9 kg (207 lb)   BMI 30 57 kg/m²     Physical Exam:    General Appearance:    Alert, cooperative, no distress   Head:    Normocephalic, without obvious abnormality, atraumatic           Skin:   A full skin exam was performed including scalp, head scalp, eyes, ears, nose, lips, neck, chest, axilla, abdomen, back, buttocks, bilateral upper extremities, bilateral lower extremities, hands, feet, fingers, toes, fingernails, and toenails scaling erythematous areas noted on the left forehead and left temple area normal keratotic papules greasy stuck on appearance nothing else atypical noted on exam lesion on the forehead areas slightly more indurated  Physical Exam  HENT:      Head:          Cryotherapy Procedure Note    Pre-operative Diagnosis: actinic keratosis    Plan:  1  Instructed to keep the area dry and clean thereafter  Apply petrolatum if area gets crusty        2  Recommended that the patient use acetaminophen  as needed for pain  Locations:  Left forehead and left temple    Indications: Destruction of actinic keratoses x 4    Patient informed of risks (permanent scarring, infection, light or dark discoloration, bleeding, infection, weakness, numbness and recurrence of the lesion) and benefits of the procedure and verbal informed consent obtained  The areas are treated with liquid nitrogen therapy, frozen until ice ball extended 2 mm beyond lesion, allowed to thaw, and treated again  The patient tolerated procedure well  The patient was instructed on post-op care, warned that there may be blister formation, redness and pain  Recommend OTC analgesia as needed for pain  Condition:  Stable    Complications:  none  Assessment:     1  Actinic keratosis     2  Seborrheic keratosis     3  Screening for skin condition           Plan:   Actinic Keratosis:  Patient advised lesions are precancers  These should resolve with cryosurgery if not to let us know sun block recommended  Seborrheic Keratosis  Patient reasurred these are normal growths we acquire with age no treatment needed  Screening for Dermatologic Disorders: Nothing else of concern noted on complete exam follow up in 1 year       Stephen Rosario MD  4/11/2022,12:42 PM    Portions of the record may have been created with voice recognition software   Occasional wrong word or "sound a like" substitutions may have occurred due to the inherent limitations of voice recognition software   Read the chart carefully and recognize, using context, where substitutions have occurred

## 2022-04-11 NOTE — PATIENT INSTRUCTIONS
Actinic Keratosis:  Patient advised lesions are precancers  These should resolve with cryosurgery if not to let us know sun block recommended  Seborrheic Keratosis  Patient reasurred these are normal growths we acquire with age no treatment needed  Screening for Dermatologic Disorders: Nothing else of concern noted on complete exam follow up in 1 year   Treatment with Cryotherapy    The doctor has treated your skin with nitrogen, which is 320 degrees Fahrenheit below zero  He has given the treated area "frostbite "    Stinging should subside within a few hours  You can take Tylenol for pain, if needed  Over the next few days, it is normal if the area becomes reddened, a blood blister, or swollen with fluid  If the lesion treated was near the eye - you could get a swollen eye over the next few days  Do not panic! This is all temporary, and will resolve with time  There is no special treatment - just keep the area clean  Makeup and BandAids can be used, if preferred  When the area starts to dry up and peel off, using Vaseline can help healing  It usually takes up to a month for it to heal   Some lesions are recurrent and may require repeat treatments  If a lesion has not healed in one month, please don't hesitate to contact us  If you have any further questions that are not answered here, please call us  [phone]    Thank you for allowing us to care for you

## 2022-04-18 ENCOUNTER — AMB VIDEO VISIT (OUTPATIENT)
Dept: OTHER | Facility: HOSPITAL | Age: 72
End: 2022-04-18

## 2022-04-18 DIAGNOSIS — J20.9 ACUTE BRONCHITIS, UNSPECIFIED ORGANISM: Primary | ICD-10-CM

## 2022-04-18 PROCEDURE — ECARE PR SL URGENT CARE VIRTUAL VISIT: Performed by: NURSE PRACTITIONER

## 2022-04-18 RX ORDER — BENZONATATE 100 MG/1
100 CAPSULE ORAL 3 TIMES DAILY PRN
Qty: 20 CAPSULE | Refills: 0 | Status: SHIPPED | OUTPATIENT
Start: 2022-04-18

## 2022-04-18 RX ORDER — AMOXICILLIN AND CLAVULANATE POTASSIUM 875; 125 MG/1; MG/1
1 TABLET, FILM COATED ORAL EVERY 12 HOURS SCHEDULED
Qty: 14 TABLET | Refills: 0 | Status: SHIPPED | OUTPATIENT
Start: 2022-04-18 | End: 2022-04-25

## 2022-04-18 NOTE — PROGRESS NOTES
Video Visit - Markel Chino 73 y o  male MRN: [id number]    REQUIRED DOCUMENTATION:         1  This service was provided via [employer]  2  Provider located at [address] [place] [id number]  3  [facility] provider: KAYLEY Mackay  4  [facility]ify [facility]  myself  5  After connecting through [employer], patient was identified by name and date of birth  Patient was then informed that this was a Telemedicine visit and that the exam was being conducted confidentially over secure lines  My office door was closed  No one else was in the room  and no one else was in the room  Patient acknowledged consent and understanding of privacy and security of the Telemedicine visit  I informed the patient that I have reviewed their record in Epic and presented the opportunity for them to ask any questions regarding the visit today  The patient agreed to participate  Patient states that symptoms have been intermittent x 6 weeks  Did have negative PCR and home covid test  Patient saw pcp 1 month ago and had a negative chest xray  URI   This is a recurrent problem  Episode onset: 6 weeks ago  The problem has been waxing and waning  There has been no fever  Associated symptoms include congestion, coughing, rhinorrhea and a sore throat  Pertinent negatives include no abdominal pain, chest pain, diarrhea, ear pain, headaches, nausea, neck pain, rash, sinus pain, sneezing, swollen glands, vomiting or wheezing  He has tried antihistamine and inhaler use for the symptoms  The treatment provided mild relief  Review of Systems   Constitutional: Negative for chills, diaphoresis, fatigue and fever  HENT: Positive for congestion, rhinorrhea and sore throat  Negative for ear pain, facial swelling, mouth sores, postnasal drip, sinus pressure, sinus pain, sneezing and trouble swallowing  Eyes: Negative for photophobia and visual disturbance  Respiratory: Positive for cough  Negative for chest tightness, shortness of breath and wheezing  Cardiovascular: Negative for chest pain and palpitations  Gastrointestinal: Negative for abdominal pain, diarrhea, nausea and vomiting  Genitourinary: Negative  Musculoskeletal: Negative for arthralgias, back pain, joint swelling, myalgias, neck pain and neck stiffness  Skin: Negative for rash  Neurological: Negative for dizziness, facial asymmetry, weakness, light-headedness, numbness and headaches  Physical Exam  Constitutional:       General: He is not in acute distress  Appearance: He is well-developed  He is not diaphoretic  HENT:      Nose: Congestion and rhinorrhea present  Right Sinus: No maxillary sinus tenderness or frontal sinus tenderness  Left Sinus: No maxillary sinus tenderness or frontal sinus tenderness  Mouth/Throat: Tonsils: 0 on the right  0 on the left  Cardiovascular:      Comments: Unable to assess via video visit    Pulmonary:      Effort: Pulmonary effort is normal       Comments: Patient able to converse in full sentences, easy non-labored respirations noted  No dyspnea observed  Lymphadenopathy:      Cervical: No cervical adenopathy  Skin:     Coloration: Skin is not pale  Neurological:      Mental Status: He is alert and oriented to person, place, and time  Diagnoses and all orders for this visit:    Acute bronchitis, unspecified organism  -     amoxicillin-clavulanate (AUGMENTIN) 875-125 mg per tablet; Take 1 tablet by mouth every 12 (twelve) hours for 7 days  -     benzonatate (TESSALON PERLES) 100 mg capsule; Take 1 capsule (100 mg total) by mouth 3 (three) times a day as needed for cough      Patient Instructions     Take medication as directed  Rest and drink plenty of fluids  A cool mist humidifier can be helpful    If you develop a worsening cough, chest pain, shortness of breath, palpitations, coughing up blood, prolonged high fever, any new or concerning symptoms please return or proceed ER  Recommend following up with PCP in 3-5 days      Acute Bronchitis   WHAT YOU NEED TO KNOW:   Acute bronchitis is swelling and irritation in your lungs  It is usually caused by a virus and most often happens in the winter  Bronchitis may also be caused by bacteria or by a chemical irritant, such as smoke  DISCHARGE INSTRUCTIONS:   Return to the emergency department if:   · You cough up blood  · Your lips or fingernails turn blue  · You feel like you are not getting enough air when you breathe  Call your doctor if:   · Your symptoms do not go away or get worse, even after treatment  · Your cough does not get better within 4 weeks  · You have questions or concerns about your condition or care  Medicines: You may  need any of the following:  · Cough suppressants  decrease your urge to cough  · Decongestants  help loosen mucus in your lungs and make it easier to cough up  This can help you breathe easier  · Inhalers  may be given  Your healthcare provider may give you one or more inhalers to help you breathe easier and cough less  An inhaler gives your medicine to open your airways  Ask your healthcare provider to show you how to use your inhaler correctly  · Antibiotics  may be given for up to 5 days if your bronchitis is caused by bacteria  · Acetaminophen  decreases pain and fever  It is available without a doctor's order  Ask how much to take and how often to take it  Follow directions  Read the labels of all other medicines you are using to see if they also contain acetaminophen, or ask your doctor or pharmacist  Acetaminophen can cause liver damage if not taken correctly  Do not use more than 4 grams (4,000 milligrams) total of acetaminophen in one day  · NSAIDs  help decrease swelling and pain or fever  This medicine is available with or without a doctor's order   NSAIDs can cause stomach bleeding or kidney problems in certain people  If you take blood thinner medicine, always ask your healthcare provider if NSAIDs are safe for you  Always read the medicine label and follow directions  · Take your medicine as directed  Contact your healthcare provider if you think your medicine is not helping or if you have side effects  Tell him of her if you are allergic to any medicine  Keep a list of the medicines, vitamins, and herbs you take  Include the amounts, and when and why you take them  Bring the list or the pill bottles to follow-up visits  Carry your medicine list with you in case of an emergency  Self-care:   · Drink liquids as directed  You may need to drink more liquids than usual to stay hydrated  Ask how much liquid to drink each day and which liquids are best for you  · Use a cool mist humidifier  to increase air moisture in your home  This may make it easier for you to breathe and help decrease your cough  · Get more rest   Rest helps your body to heal  Slowly start to do more each day  Rest when you feel it is needed  · Avoid irritants in the air  Avoid chemicals, fumes, and dust  Wear a face mask if you must work around dust or fumes  Stay inside on days when air pollution levels are high  If you have allergies, stay inside when pollen counts are high  Do not use aerosol products, such as spray-on deodorant, bug spray, and hair spray  · Do not smoke or be around others who are smoking  Nicotine and other chemicals in cigarettes and cigars can cause lung damage  Ask your healthcare provider for information if you currently smoke and need help to quit  E-cigarettes or smokeless tobacco still contain nicotine  Talk to your healthcare provider before you use these products  Prevent acute bronchitis:       · Ask about vaccines you may need  Get a flu vaccine each year as soon as recommended, usually in September or October   Ask your healthcare provider if you should also get a pneumonia or COVID-19 vaccine  Your healthcare provider can tell you if you should also get other vaccines, and when to get them  · Prevent the spread of germs  You can decrease your risk for acute bronchitis and other illnesses by doing the following:     ? Wash your hands often with soap and water  Carry germ-killing hand lotion or gel with you  You can use the lotion or gel to clean your hands when soap and water are not available  ? Do not touch your eyes, nose, or mouth unless you have washed your hands first     ? Always cover your mouth when you cough to prevent the spread of germs  It is best to cough into a tissue or your shirt sleeve instead of into your hand  Ask those around you to cover their mouths when they cough  ? Try to avoid people who have a cold or the flu  If you are sick, stay away from others as much as possible  Follow up with your doctor as directed:  Write down questions you have so you will remember to ask them during your follow-up visits  © Copyright [employer] 2022 Information is for End User's use only and may not be sold, redistributed or otherwise used for commercial purposes  All illustrations and images included in CareNotes® are the copyrighted property of [employer]  or Alvaro Hernandez   The above information is an  only  It is not intended as medical advice for individual conditions or treatments  Talk to your doctor, nurse or pharmacist before following any medical regimen to see if it is safe and effective for you  Follow up with PCP if not improved, if symptoms are worse, go to the ER

## 2022-04-18 NOTE — PATIENT INSTRUCTIONS
Take medication as directed  Rest and drink plenty of fluids  A cool mist humidifier can be helpful  If you develop a worsening cough, chest pain, shortness of breath, palpitations, coughing up blood, prolonged high fever, any new or concerning symptoms please return or proceed ER  Recommend following up with PCP in 3-5 days      Acute Bronchitis   WHAT YOU NEED TO KNOW:   Acute bronchitis is swelling and irritation in your lungs  It is usually caused by a virus and most often happens in the winter  Bronchitis may also be caused by bacteria or by a chemical irritant, such as smoke  DISCHARGE INSTRUCTIONS:   Return to the emergency department if:   · You cough up blood  · Your lips or fingernails turn blue  · You feel like you are not getting enough air when you breathe  Call your doctor if:   · Your symptoms do not go away or get worse, even after treatment  · Your cough does not get better within 4 weeks  · You have questions or concerns about your condition or care  Medicines: You may  need any of the following:  · Cough suppressants  decrease your urge to cough  · Decongestants  help loosen mucus in your lungs and make it easier to cough up  This can help you breathe easier  · Inhalers  may be given  Your healthcare provider may give you one or more inhalers to help you breathe easier and cough less  An inhaler gives your medicine to open your airways  Ask your healthcare provider to show you how to use your inhaler correctly  · Antibiotics  may be given for up to 5 days if your bronchitis is caused by bacteria  · Acetaminophen  decreases pain and fever  It is available without a doctor's order  Ask how much to take and how often to take it  Follow directions  Read the labels of all other medicines you are using to see if they also contain acetaminophen, or ask your doctor or pharmacist  Acetaminophen can cause liver damage if not taken correctly   Do not use more than 4 grams (4,000 milligrams) total of acetaminophen in one day  · NSAIDs  help decrease swelling and pain or fever  This medicine is available with or without a doctor's order  NSAIDs can cause stomach bleeding or kidney problems in certain people  If you take blood thinner medicine, always ask your healthcare provider if NSAIDs are safe for you  Always read the medicine label and follow directions  · Take your medicine as directed  Contact your healthcare provider if you think your medicine is not helping or if you have side effects  Tell him of her if you are allergic to any medicine  Keep a list of the medicines, vitamins, and herbs you take  Include the amounts, and when and why you take them  Bring the list or the pill bottles to follow-up visits  Carry your medicine list with you in case of an emergency  Self-care:   · Drink liquids as directed  You may need to drink more liquids than usual to stay hydrated  Ask how much liquid to drink each day and which liquids are best for you  · Use a cool mist humidifier  to increase air moisture in your home  This may make it easier for you to breathe and help decrease your cough  · Get more rest   Rest helps your body to heal  Slowly start to do more each day  Rest when you feel it is needed  · Avoid irritants in the air  Avoid chemicals, fumes, and dust  Wear a face mask if you must work around dust or fumes  Stay inside on days when air pollution levels are high  If you have allergies, stay inside when pollen counts are high  Do not use aerosol products, such as spray-on deodorant, bug spray, and hair spray  · Do not smoke or be around others who are smoking  Nicotine and other chemicals in cigarettes and cigars can cause lung damage  Ask your healthcare provider for information if you currently smoke and need help to quit  E-cigarettes or smokeless tobacco still contain nicotine   Talk to your healthcare provider before you use these products  Prevent acute bronchitis:       · Ask about vaccines you may need  Get a flu vaccine each year as soon as recommended, usually in September or October  Ask your healthcare provider if you should also get a pneumonia or COVID-19 vaccine  Your healthcare provider can tell you if you should also get other vaccines, and when to get them  · Prevent the spread of germs  You can decrease your risk for acute bronchitis and other illnesses by doing the following:     ? Wash your hands often with soap and water  Carry germ-killing hand lotion or gel with you  You can use the lotion or gel to clean your hands when soap and water are not available  ? Do not touch your eyes, nose, or mouth unless you have washed your hands first     ? Always cover your mouth when you cough to prevent the spread of germs  It is best to cough into a tissue or your shirt sleeve instead of into your hand  Ask those around you to cover their mouths when they cough  ? Try to avoid people who have a cold or the flu  If you are sick, stay away from others as much as possible  Follow up with your doctor as directed:  Write down questions you have so you will remember to ask them during your follow-up visits  © Copyright [employer] 2022 Information is for End User's use only and may not be sold, redistributed or otherwise used for commercial purposes  All illustrations and images included in CareNotes® are the copyrighted property of [employer]  or Alvaro Albrecht  The above information is an  only  It is not intended as medical advice for individual conditions or treatments  Talk to your doctor, nurse or pharmacist before following any medical regimen to see if it is safe and effective for you

## 2022-04-18 NOTE — CARE ANYWHERE EVISITS
Visit Summary for Purvi Fajardo - Gender: Male - Date of Birth: [id number]  Date: [id number] - Duration: 12 minutes  Patient: Purvi Fajardo  Provider: Seng Jones    Patient Contact Information  Address  [address]; [address] [address]  [phone]    Visit Topics  Flu-Like Symptoms [Added By: Self - 2022-04-18]  sore throat, cough recurring  [Added By: Self - 2022-04-18]    Triage Questions   What is your current physical address in the event of a medical emergency? Answer []  Are you allergic to any medications? Answer []  Are you now or could you be pregnant? Answer []  Do you have any immune system compromise or chronic lung   disease? Answer []  Do you have any vulnerable family members in the home (infant, pregnant, cancer, elderly)? Answer []     Conversation Transcripts  [0A][0A] [Notification] [facility] in [place]  [0A][Notification] Purvi Fajardo has shared health history  Pericosue [place]  [0A]    Diagnosis  Acute bronchitis, unspecified    Procedures  Value: [id number] Code: CPT-4 UNLISTED E&M SERVICE    Medications Prescribed    No prescriptions ordered    Electronically signed by: May Moyer(NPI [id number])

## 2022-05-25 DIAGNOSIS — I10 ESSENTIAL HYPERTENSION: ICD-10-CM

## 2022-05-25 DIAGNOSIS — E78.2 MIXED HYPERLIPIDEMIA: ICD-10-CM

## 2022-05-25 RX ORDER — AMLODIPINE BESYLATE AND ATORVASTATIN CALCIUM 5; 40 MG/1; MG/1
1 TABLET, FILM COATED ORAL DAILY
Qty: 30 TABLET | Refills: 11 | Status: SHIPPED | OUTPATIENT
Start: 2022-05-25

## 2022-07-13 DIAGNOSIS — I10 ESSENTIAL HYPERTENSION: ICD-10-CM

## 2022-07-13 RX ORDER — LISINOPRIL 40 MG/1
TABLET ORAL
Qty: 30 TABLET | Refills: 11 | Status: SHIPPED | OUTPATIENT
Start: 2022-07-13 | End: 2022-08-11

## 2022-08-11 ENCOUNTER — TELEMEDICINE (OUTPATIENT)
Dept: INTERNAL MEDICINE CLINIC | Facility: CLINIC | Age: 72
End: 2022-08-11
Payer: COMMERCIAL

## 2022-08-11 DIAGNOSIS — U07.1 COVID-19: Primary | ICD-10-CM

## 2022-08-11 DIAGNOSIS — I10 ESSENTIAL HYPERTENSION: ICD-10-CM

## 2022-08-11 PROCEDURE — 99213 OFFICE O/P EST LOW 20 MIN: CPT | Performed by: NURSE PRACTITIONER

## 2022-08-11 RX ORDER — LISINOPRIL 40 MG/1
TABLET ORAL
Qty: 90 TABLET | Refills: 4 | Status: SHIPPED | OUTPATIENT
Start: 2022-08-11

## 2022-08-11 NOTE — PROGRESS NOTES
COVID-19 Outpatient Progress Note    Assessment/Plan:    COVID, long conversation had about risks benefits side effects of treatment, he his symptoms are heard generally mild we will hold off on antiviral medication  He has 1 more day that he could potentially treat show he should contact me tomorrow with any worsening of symptoms otherwise increase fluids, vitamins all discussed  Problem List Items Addressed This Visit    None        Disposition:     Patient is fully vaccinated and I recommended self quarantine for 5 days followed by strict mask use for an additional 5 days  If patient were to develop symptoms, they should immediately self isolate and call our office for further guidance  Discussed symptom directed medication options with patient  Discussed vitamin D, vitamin C, and/or zinc supplementation with patient  I have spent 12 minutes directly with the patient  Greater than 50% of this time was spent in counseling/coordination of care regarding: risks and benefits of treatment options and instructions for management  Encounter provider KAYLEY Padilla    Provider located at [address] [place] [address]    Recent Visits  No visits were found meeting these conditions  Showing recent visits within past 7 days and meeting all other requirements  Today's Visits  Date Type Provider Dept   08/11/22 Telemedicine Alma Delia Padilla [facility] today's visits and meeting all other requirements  Future Appointments  No visits were found meeting these conditions  Showing future appointments within next 150 days and meeting all other requirements       The patient was identified by name and date of birth  Markel Banda was informed that this is a telemedicine visit and that the visit is being conducted through [address] [place] [address] Now and patient was informed that this is a secure, HIPAA-compliant platform   He agrees to proceed     My office door was closed  No one else was in the room  He acknowledged consent and understanding of privacy and security of the video platform  The patient has agreed to participate and understands they can discontinue the visit at any time  Home test was positive 8/10/22    Patient is aware this is a billable service  Patient agrees to participate in a virtual check in via telephone or video visit instead of presenting to the office to address urgent/immediate medical needs  Patient is aware this is a billable service  After connecting through [facility], the patient was identified by name and date of birth  Markel Olson was informed that this was a telemedicine visit and that the exam was being conducted confidentially over secure lines  Markel Olson acknowledged consent and understanding of privacy and security of the telemedicine visit  I informed the patient that I have reviewed his record in Epic and presented the opportunity for him to ask any questions regarding the visit today  The patient agreed to participate  Verification of patient location:  Patient is located in the following state in which I hold an active license: [place]    Subjective:   Markel Olson is a 81 y o  male who is concerned about COVID-19  Patient's symptoms include fever, nasal congestion, sore throat and cough       - Date of symptom onset: 8/8/2022      COVID-19 vaccination status: Fully vaccinated (primary series)    Exposure:   Contact with a person who is under investigation (PUI) for or who is positive for COVID-19 within the last 14 days?: No    Hospitalized recently for fever and/or lower respiratory symptoms?: No      Currently a healthcare worker that is involved in direct patient care?: No      Works in a special setting where the risk of COVID-19 transmission may be high? (this may include long-term care, correctional and correction facilities; homeless shelters; assisted-living facilities and group homes ): No      Resident in a special setting where the risk of COVID-19 transmission may be high? (this may include long-term care, correctional and halfway facilities; homeless shelters; assisted-living facilities and group homes ): No      Lab Results   Component Value Date    SARSCOV2 Not Detected 09/29/2020     Past Medical History:   Diagnosis Date    Chronic kidney disease     CKD (chronic kidney disease) stage 3, GFR 30-59 ml/min ([facility] [address] ) 9/24/2019    Depression     Dyshidrosis     Hypertension     Osteoarthritis     Trigger finger      Past Surgical History:   Procedure Laterality Date    COLONOSCOPY  02/25/2010    COLONOSCOPY  09/2020    HEMORRHOID SURGERY      REPLACEMENT TOTAL KNEE Left     TONSILLECTOMY      TRIGGER FINGER RELEASE      WISDOM TOOTH EXTRACTION       Current Outpatient Medications   Medication Sig Dispense Refill    acetaminophen (TYLENOL) 325 mg tablet Take 650 mg by mouth every 6 (six) hours as needed for mild pain      albuterol (PROVENTIL HFA,VENTOLIN HFA) 90 mcg/act inhaler INHALE 2 PUFFS (180 MCG) BY INHALATION ROUTE EVERY 8 HOURS AS NEEDED 6 7 g 0    amLODIPine-atorvastatin (CADUET) 5-40 MG per tablet Take 1 tablet by mouth daily 30 tablet 11    aspirin (ASPIR-81) 81 mg EC tablet Take 1 tablet by mouth daily        benzonatate (TESSALON PERLES) 100 mg capsule Take 1 capsule (100 mg total) by mouth 3 (three) times a day as needed for cough 20 capsule 0    colchicine (COLCRYS) 0 6 mg tablet TAKE 1 TABLET (0 6 MG TOTAL) BY MOUTH 3 (THREE) TIMES A DAY AS NEEDED FOR MUSCLE/JOINT PAIN 90 tablet 5    lisinopril (ZESTRIL) 40 mg tablet TAKE 1 TABLET BY MOUTH EVERY DAY 30 tablet 11    LORazepam (ATIVAN) 1 mg tablet Take 1 mg by mouth as needed        montelukast (SINGULAIR) 10 mg tablet TAKE 1 TABLET BY MOUTH EVERY DAY IN THE EVENING 30 tablet 8    polyethylene glycol (GOLYTELY) 4000 mL solution Take 4,000 mL by mouth once for 1 dose (Patient not taking: Reported on 9/29/2020) 4000 mL 0    predniSONE 10 mg tablet Take 3 tablets for 3 days then 2 tablets for 3 days then 1 tablet for 3 days (Patient not taking: Reported on 3/15/2021) 18 tablet 0     No current facility-administered medications for this visit  Allergies   Allergen Reactions    Cat Hair Extract Anaphylaxis, Hives, Itching, Shortness Of Breath and Swelling    Other Cough     mold    Pollen Extract Other (See Comments) and Shortness Of Breath    Nsaids        Review of Systems   Constitutional: Positive for fever  HENT: Positive for congestion and sore throat  Respiratory: Positive for cough  Objective: There were no vitals filed for this visit  Physical Exam  Constitutional:       Appearance: He is well-developed  HENT:      Head: Normocephalic and atraumatic  Eyes:      Pupils: Pupils are equal, round, and reactive to light  Pulmonary:      Effort: Pulmonary effort is normal    Musculoskeletal:      Cervical back: Normal range of motion  Neurological:      Mental Status: He is alert and oriented to person, place, and time

## 2022-09-12 ENCOUNTER — APPOINTMENT (OUTPATIENT)
Dept: LAB | Facility: CLINIC | Age: 72
End: 2022-09-12
Payer: COMMERCIAL

## 2022-09-12 DIAGNOSIS — Z12.5 SCREENING FOR PROSTATE CANCER: ICD-10-CM

## 2022-09-12 DIAGNOSIS — I10 BENIGN ESSENTIAL HYPERTENSION: ICD-10-CM

## 2022-09-12 DIAGNOSIS — R73.01 IMPAIRED FASTING GLUCOSE: ICD-10-CM

## 2022-09-12 LAB
ALBUMIN SERPL BCP-MCNC: 4 G/DL (ref 3.5–5)
ALP SERPL-CCNC: 98 U/L (ref 46–116)
ALT SERPL W P-5'-P-CCNC: 36 U/L (ref 12–78)
ANION GAP SERPL CALCULATED.3IONS-SCNC: 4 MMOL/L (ref 4–13)
AST SERPL W P-5'-P-CCNC: 26 U/L (ref 5–45)
BASOPHILS # BLD AUTO: 0.07 THOUSANDS/ΜL (ref 0–0.1)
BASOPHILS NFR BLD AUTO: 1 % (ref 0–1)
BILIRUB SERPL-MCNC: 0.63 MG/DL (ref 0.2–1)
BUN SERPL-MCNC: 20 MG/DL (ref 5–25)
CALCIUM SERPL-MCNC: 9.4 MG/DL (ref 8.3–10.1)
CHLORIDE SERPL-SCNC: 107 MMOL/L (ref 96–108)
CHOLEST SERPL-MCNC: 93 MG/DL
CO2 SERPL-SCNC: 27 MMOL/L (ref 21–32)
CREAT SERPL-MCNC: 1.05 MG/DL (ref 0.6–1.3)
EOSINOPHIL # BLD AUTO: 0.2 THOUSAND/ΜL (ref 0–0.61)
EOSINOPHIL NFR BLD AUTO: 3 % (ref 0–6)
ERYTHROCYTE [DISTWIDTH] IN BLOOD BY AUTOMATED COUNT: 13.3 % (ref 11.6–15.1)
EST. AVERAGE GLUCOSE BLD GHB EST-MCNC: 117 MG/DL
GFR SERPL CREATININE-BSD FRML MDRD: 71 ML/MIN/1.73SQ M
GLUCOSE P FAST SERPL-MCNC: 102 MG/DL (ref 65–99)
HBA1C MFR BLD: 5.7 %
HCT VFR BLD AUTO: 43.3 % (ref 36.5–49.3)
HDLC SERPL-MCNC: 45 MG/DL
HGB BLD-MCNC: 14.5 G/DL (ref 12–17)
IMM GRANULOCYTES # BLD AUTO: 0.02 THOUSAND/UL (ref 0–0.2)
IMM GRANULOCYTES NFR BLD AUTO: 0 % (ref 0–2)
LDLC SERPL CALC-MCNC: 37 MG/DL (ref 0–100)
LYMPHOCYTES # BLD AUTO: 1.59 THOUSANDS/ΜL (ref 0.6–4.47)
LYMPHOCYTES NFR BLD AUTO: 27 % (ref 14–44)
MCH RBC QN AUTO: 32.7 PG (ref 26.8–34.3)
MCHC RBC AUTO-ENTMCNC: 33.5 G/DL (ref 31.4–37.4)
MCV RBC AUTO: 98 FL (ref 82–98)
MONOCYTES # BLD AUTO: 0.56 THOUSAND/ΜL (ref 0.17–1.22)
MONOCYTES NFR BLD AUTO: 10 % (ref 4–12)
NEUTROPHILS # BLD AUTO: 3.38 THOUSANDS/ΜL (ref 1.85–7.62)
NEUTS SEG NFR BLD AUTO: 59 % (ref 43–75)
NONHDLC SERPL-MCNC: 48 MG/DL
NRBC BLD AUTO-RTO: 0 /100 WBCS
PLATELET # BLD AUTO: 188 THOUSANDS/UL (ref 149–390)
PMV BLD AUTO: 11.3 FL (ref 8.9–12.7)
POTASSIUM SERPL-SCNC: 4.4 MMOL/L (ref 3.5–5.3)
PROT SERPL-MCNC: 7.3 G/DL (ref 6.4–8.4)
PSA SERPL-MCNC: 3.5 NG/ML (ref 0–4)
RBC # BLD AUTO: 4.44 MILLION/UL (ref 3.88–5.62)
SODIUM SERPL-SCNC: 138 MMOL/L (ref 135–147)
TRIGL SERPL-MCNC: 55 MG/DL
TSH SERPL DL<=0.05 MIU/L-ACNC: 2.61 UIU/ML (ref 0.45–4.5)
WBC # BLD AUTO: 5.82 THOUSAND/UL (ref 4.31–10.16)

## 2022-09-12 PROCEDURE — G0103 PSA SCREENING: HCPCS

## 2022-09-12 PROCEDURE — 83036 HEMOGLOBIN GLYCOSYLATED A1C: CPT

## 2022-09-12 PROCEDURE — 80053 COMPREHEN METABOLIC PANEL: CPT

## 2022-09-12 PROCEDURE — 84443 ASSAY THYROID STIM HORMONE: CPT

## 2022-09-12 PROCEDURE — 36415 COLL VENOUS BLD VENIPUNCTURE: CPT

## 2022-09-12 PROCEDURE — 80061 LIPID PANEL: CPT

## 2022-09-12 PROCEDURE — 85025 COMPLETE CBC W/AUTO DIFF WBC: CPT

## 2022-09-20 ENCOUNTER — OFFICE VISIT (OUTPATIENT)
Dept: INTERNAL MEDICINE CLINIC | Facility: CLINIC | Age: 72
End: 2022-09-20
Payer: COMMERCIAL

## 2022-09-20 VITALS
HEIGHT: 69 IN | RESPIRATION RATE: 16 BRPM | TEMPERATURE: 97.7 F | HEART RATE: 61 BPM | DIASTOLIC BLOOD PRESSURE: 76 MMHG | BODY MASS INDEX: 29.44 KG/M2 | SYSTOLIC BLOOD PRESSURE: 138 MMHG | WEIGHT: 198.8 LBS | OXYGEN SATURATION: 99 %

## 2022-09-20 DIAGNOSIS — R73.01 IMPAIRED FASTING GLUCOSE: ICD-10-CM

## 2022-09-20 DIAGNOSIS — J45.20 MILD INTERMITTENT ASTHMA, UNSPECIFIED WHETHER COMPLICATED: ICD-10-CM

## 2022-09-20 DIAGNOSIS — M1A.9XX0 CHRONIC GOUT INVOLVING TOE OF RIGHT FOOT WITHOUT TOPHUS, UNSPECIFIED CAUSE: ICD-10-CM

## 2022-09-20 DIAGNOSIS — E78.2 MIXED HYPERLIPIDEMIA: ICD-10-CM

## 2022-09-20 DIAGNOSIS — N40.0 BPH WITHOUT URINARY OBSTRUCTION: ICD-10-CM

## 2022-09-20 DIAGNOSIS — Z23 ENCOUNTER FOR IMMUNIZATION: Primary | ICD-10-CM

## 2022-09-20 DIAGNOSIS — N18.2 CKD (CHRONIC KIDNEY DISEASE) STAGE 2, GFR 60-89 ML/MIN: ICD-10-CM

## 2022-09-20 DIAGNOSIS — I10 BENIGN ESSENTIAL HYPERTENSION: ICD-10-CM

## 2022-09-20 PROCEDURE — 90471 IMMUNIZATION ADMIN: CPT | Performed by: INTERNAL MEDICINE

## 2022-09-20 PROCEDURE — 99214 OFFICE O/P EST MOD 30 MIN: CPT | Performed by: INTERNAL MEDICINE

## 2022-09-20 PROCEDURE — 90662 IIV NO PRSV INCREASED AG IM: CPT | Performed by: INTERNAL MEDICINE

## 2022-09-20 NOTE — PROGRESS NOTES
Assessment/Plan:    Diagnoses and all orders for this visit:    Encounter for immunization  -     influenza vaccine, high-dose, PF 0 7 mL (FLUZONE HIGH-DOSE)    Impaired fasting glucose  -     Hemoglobin A1C; Future    Mild intermittent asthma, unspecified whether complicated    Benign essential hypertension  -     CBC and differential; Future  -     Comprehensive metabolic panel; Future  -     Lipid panel; Future  -     TSH, 3rd generation with Free T4 reflex; Future    BPH without urinary obstruction    Chronic gout involving toe of right 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 tophus, unspecified cause    Mixed hyperlipidemia    CKD (chronic kidney disease) stage 2, GFR 60-89 ml/min            Patient Instructions   Lab data reviewed in detail and compared prior    Hypertension stable on present regimen    Hyperlipidemia at goal on atorvastatin    Chronic kidney disease stage 2 has remained quite stable over the last 3 years, continue to keep well hydrated and avoid nonsteroidal anti-inflammatory drugs    Impaired fasting glucose-A1c 5 7, continue low carb diet, exercise and weight loss efforts  Depression and anxiety remained stable with very infrequent use of lorazepam    Gout stable without recent flare    Routine follow-up after labs in 6 months, sooner as needed  Subjective:      Patient ID: Emily Damian is a 81 y o  male    F/u mmp and review labs  Feeling generally well  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, running games at [employer]  Got covid x2, likely from job exposure, no residual    Active w/ work, no other regular exercise  Down about 10 lbs  Minimal L knee pain s/p b/l TKA's  No recent palpitations  Stopped seeing Dr Alecia Gill in [place]      HTN-taking rx as directed, rare home bp's have been stable  HPL-tolerating atorvastatin  CKD-keeping well hydrated, no nsaids  IFG-watching carbs  Depression/anxiety-stable, no recent rx  Rare lorazepam for sleep  Script generally expires before he uses it     Paty Nugent flares recently, continues w/ colchicine daily  Occasional etoh  Notes 2-3x nocturia w/ good flow, no urgency or incontinence            Current Outpatient Medications:     albuterol (PROVENTIL HFA,VENTOLIN HFA) 90 mcg/act inhaler, INHALE 2 PUFFS (180 MCG) BY INHALATION ROUTE EVERY 8 HOURS AS NEEDED, Disp: 6 7 g, Rfl: 0    amLODIPine-atorvastatin (CADUET) 5-40 MG per tablet, Take 1 tablet by mouth daily, Disp: 30 tablet, Rfl: 11    aspirin (ECOTRIN LOW STRENGTH) 81 mg EC tablet, Take 1 tablet by mouth daily  , Disp: , Rfl:     colchicine (COLCRYS) 0 6 mg tablet, TAKE 1 TABLET (0 6 MG TOTAL) BY MOUTH 3 (THREE) TIMES A DAY AS NEEDED FOR MUSCLE/JOINT PAIN, Disp: 90 tablet, Rfl: 5    lisinopril (ZESTRIL) 40 mg tablet, TAKE 1 TABLET BY MOUTH EVERY DAY, Disp: 90 tablet, Rfl: 4    LORazepam (ATIVAN) 1 mg tablet, Take 1 mg by mouth as needed  , Disp: , Rfl:     montelukast (SINGULAIR) 10 mg tablet, TAKE 1 TABLET BY MOUTH EVERY DAY IN THE EVENING, Disp: 30 tablet, Rfl: 8    acetaminophen (TYLENOL) 325 mg tablet, Take 650 mg by mouth every 6 (six) hours as needed for mild pain (Patient not taking: Reported on 9/20/2022), Disp: , Rfl:     benzonatate (TESSALON PERLES) 100 mg capsule, Take 1 capsule (100 mg total) by mouth 3 (three) times a day as needed for cough (Patient not taking: Reported on 9/20/2022), Disp: 20 capsule, Rfl: 0    polyethylene glycol (GOLYTELY) 4000 mL solution, Take 4,000 mL by mouth once for 1 dose (Patient not taking: Reported on 9/29/2020), Disp: 4000 mL, Rfl: 0    predniSONE 10 mg tablet, Take 3 tablets for 3 days then 2 tablets for 3 days then 1 tablet for 3 days (Patient not taking: No sig reported), Disp: 18 tablet, Rfl: 0    Recent Results (from the past 1008 hour(s))   CBC and differential    Collection Time: 09/12/22  8:04 AM   Result Value Ref Range    WBC 5 82 4 31 - 10 16 Thousand/uL    RBC 4 44 3 88 - 5 62 Million/uL    Hemoglobin 14 5 12 0 - 17 0 g/dL    Hematocrit 43 3 36 5 - 49 3 %    MCV 98 82 - 98 fL    MCH 32 7 26 8 - 34 3 pg    MCHC 33 5 31 4 - 37 4 g/dL    RDW 13 3 11 6 - 15 1 %    MPV 11 3 8 9 - 12 7 fL    Platelets 424 938 - 655 Thousands/uL    nRBC 0 /100 WBCs    Neutrophils Relative 59 43 - 75 %    Immat GRANS % 0 0 - 2 %    Lymphocytes Relative 27 14 - 44 %    Monocytes Relative 10 4 - 12 %    Eosinophils Relative 3 0 - 6 %    Basophils Relative 1 0 - 1 %    Neutrophils Absolute 3 38 1 85 - 7 62 Thousands/µL    Immature Grans Absolute 0 02 0 00 - 0 20 Thousand/uL    Lymphocytes Absolute 1 59 0 60 - 4 47 Thousands/µL    Monocytes Absolute 0 56 0 17 - 1 22 Thousand/µL    Eosinophils Absolute 0 20 0 00 - 0 61 Thousand/µL    Basophils Absolute 0 07 0 00 - 0 10 Thousands/µL   Comprehensive metabolic panel    Collection Time: 09/12/22  8:04 AM   Result Value Ref Range    Sodium 138 135 - 147 mmol/L    Potassium 4 4 3 5 - 5 3 mmol/L    Chloride 107 96 - 108 mmol/L    CO2 27 21 - 32 mmol/L    ANION GAP 4 4 - 13 mmol/L    BUN 20 5 - 25 mg/dL    Creatinine 1 05 0 60 - 1 30 mg/dL    Glucose, Fasting 102 (H) 65 - 99 mg/dL    Calcium 9 4 8 3 - 10 1 mg/dL    AST 26 5 - 45 U/L    ALT 36 12 - 78 U/L    Alkaline Phosphatase 98 46 - 116 U/L    Total Protein 7 3 6 4 - 8 4 g/dL    Albumin 4 0 3 5 - 5 0 g/dL    Total Bilirubin 0 63 0 20 - 1 00 mg/dL    eGFR 71 ml/min/1 73sq m   Hemoglobin A1C    Collection Time: 09/12/22  8:04 AM   Result Value Ref Range    Hemoglobin A1C 5 7 (H) Normal 3 8-5 6%; PreDiabetic 5 7-6 4%;  Diabetic >=6 5%; Glycemic control for adults with diabetes <7 0% %     mg/dl   Lipid panel    Collection Time: 09/12/22  8:04 AM   Result Value Ref Range    Cholesterol 93 See Comment mg/dL    Triglycerides 55 See Comment mg/dL    HDL, Direct 45 >=40 mg/dL    LDL Calculated 37 0 - 100 mg/dL    Non-HDL-Chol (CHOL-HDL) 48 mg/dl   TSH, 3rd generation with Free T4 reflex    Collection Time: 09/12/22  8:04 AM   Result Value Ref Range    TSH 3RD [facility] 2 610 0 450 - 4 500 uIU/mL PSA, Total Screen    Collection Time: 09/12/22  8:04 AM   Result Value Ref Range    PSA 3 5 0 0 - 4 0 ng/mL       The following portions of the patient's history were reviewed and updated as appropriate: allergies, current medications, past family history, past medical history, past social history, past surgical history and problem list      Review of Systems   Constitutional: Negative for appetite change, chills, diaphoresis, fatigue, fever and unexpected weight change  HENT: Negative for congestion, hearing loss and rhinorrhea  Eyes: Negative for visual disturbance  Respiratory: Negative for cough, chest tightness, shortness of breath and wheezing  Cardiovascular: Negative for chest pain, palpitations and leg swelling  Gastrointestinal: Negative for abdominal pain and blood in stool  Endocrine: Negative for cold intolerance, heat intolerance, polydipsia and polyuria  Genitourinary: Negative for difficulty urinating, dysuria, frequency and urgency  Musculoskeletal: Negative for arthralgias and myalgias  Skin: Negative for rash  Neurological: Negative for dizziness, weakness, light-headedness and headaches  Hematological: Does not bruise/bleed easily  Psychiatric/Behavioral: Negative for dysphoric mood and sleep disturbance  Objective:      Vitals:    09/20/22 1003   BP: 138/76   Pulse: 61   Resp: 16   Temp: 97 7 °F (36 5 °C)   SpO2: 99%          Physical Exam  Constitutional:       Appearance: He is well-developed  HENT:      Head: Normocephalic and atraumatic  Nose: Nose normal    Eyes:      General: No scleral icterus  Conjunctiva/sclera: Conjunctivae normal       Pupils: Pupils are equal, round, and reactive to light  Neck:      Thyroid: No thyromegaly  Vascular: No JVD  Trachea: No tracheal deviation  Cardiovascular:      Rate and Rhythm: Normal rate and regular rhythm  Heart sounds: No murmur heard  No friction rub  No gallop     Pulmonary: Effort: Pulmonary effort is normal  No respiratory distress  Breath sounds: Normal breath sounds  No wheezing or rales  Musculoskeletal:         General: No deformity  Cervical back: Normal range of motion and neck supple  Lymphadenopathy:      Cervical: No cervical adenopathy  Skin:     General: Skin is warm and dry  Coloration: Skin is not pale  Findings: No erythema or rash  Neurological:      Mental Status: He is alert and oriented to person, place, and time  Cranial Nerves: No cranial nerve deficit  Psychiatric:         Behavior: Behavior normal          Thought Content:  Thought content normal          Judgment: Judgment normal

## 2022-09-20 NOTE — PATIENT INSTRUCTIONS
Lab data reviewed in detail and compared prior    Hypertension stable on present regimen    Hyperlipidemia at goal on atorvastatin    Chronic kidney disease stage 2 has remained quite stable over the last 3 years, continue to keep well hydrated and avoid nonsteroidal anti-inflammatory drugs    Impaired fasting glucose-A1c 5 7, continue low carb diet, exercise and weight loss efforts  Depression and anxiety remained stable with very infrequent use of lorazepam    Gout stable without recent flare    Routine follow-up after labs in 6 months, sooner as needed

## 2022-10-26 ENCOUNTER — OFFICE VISIT (OUTPATIENT)
Dept: INTERNAL MEDICINE CLINIC | Facility: CLINIC | Age: 72
End: 2022-10-26
Payer: COMMERCIAL

## 2022-10-26 VITALS
TEMPERATURE: 98 F | DIASTOLIC BLOOD PRESSURE: 78 MMHG | WEIGHT: 200 LBS | BODY MASS INDEX: 29.62 KG/M2 | SYSTOLIC BLOOD PRESSURE: 132 MMHG | OXYGEN SATURATION: 96 % | HEIGHT: 69 IN | RESPIRATION RATE: 20 BRPM | HEART RATE: 66 BPM

## 2022-10-26 DIAGNOSIS — R05.2 SUBACUTE COUGH: Primary | ICD-10-CM

## 2022-10-26 DIAGNOSIS — I10 BENIGN ESSENTIAL HYPERTENSION: ICD-10-CM

## 2022-10-26 DIAGNOSIS — J45.20 MILD INTERMITTENT ASTHMA, UNSPECIFIED WHETHER COMPLICATED: ICD-10-CM

## 2022-10-26 DIAGNOSIS — R73.01 IMPAIRED FASTING GLUCOSE: ICD-10-CM

## 2022-10-26 DIAGNOSIS — F17.200 SMOKING: ICD-10-CM

## 2022-10-26 PROBLEM — R05.9 COUGH: Status: ACTIVE | Noted: 2022-10-26

## 2022-10-26 PROCEDURE — 99213 OFFICE O/P EST LOW 20 MIN: CPT | Performed by: INTERNAL MEDICINE

## 2022-10-26 RX ORDER — COVID-19 ANTIGEN TEST
KIT MISCELLANEOUS
COMMUNITY
Start: 2022-08-13

## 2022-10-26 RX ORDER — PROMETHAZINE HYDROCHLORIDE AND CODEINE PHOSPHATE 6.25; 1 MG/5ML; MG/5ML
5 SYRUP ORAL EVERY 4 HOURS PRN
Qty: 120 ML | Refills: 0 | Status: SHIPPED | OUTPATIENT
Start: 2022-10-26 | End: 2022-10-27 | Stop reason: SDUPTHER

## 2022-10-26 RX ORDER — FLUTICASONE PROPIONATE 50 MCG
1 SPRAY, SUSPENSION (ML) NASAL DAILY
Qty: 16 G | Refills: 3 | Status: SHIPPED | OUTPATIENT
Start: 2022-10-26

## 2022-10-26 NOTE — ASSESSMENT & PLAN NOTE
Cough spells , and sneeze spells since 7 Oct when he had sore throat   He denies chest pain , sputum production, wheezing, or postnasala drip  Pharynx is clear on PE    - claritin daily  - flonase nasal spray daily in each nostril  - promethazine- coedine syrup

## 2022-10-26 NOTE — PROGRESS NOTES
Assessment/Plan:    Cough    Cough spells , and sneeze spells since 7 Oct when he had sore throat  He denies chest pain , sputum production, wheezing, or postnasala drip  Pharynx is clear on PE    - claritin daily  - flonase nasal spray daily in each nostril  - promethazine- coedine syrup    Impaired fasting glucose  A1c 5 7 improve    Asthma  Not in exacerbation  - continue montelukast  - albuterol prn    Benign essential hypertension  - continue home lisinopril  - continue amlodipin      Subjective:      Patient ID: Zaire Hunt is a 81 y o  male  HPI    PMH of HTN, CKD st2,  impaired fasting glucose, asthma, gout, BPH, colon polyp comes in complaining of cough spells  He had a sore throat with fever on Oct 7th  He states he tested negative for COIVD19 on a home test  He reports he feels fever and chills at home max temperature of 99F  He denies SOB, chest tightness or wheezing, CP, sputum production, N/V, abdominal pain  He tried over the counter pearls that did not help with the cough  Patient is exhausted because he cant sleep at night  Review of Systems   Constitutional: Negative for chills, fatigue and fever  HENT: Negative for ear pain and sore throat  Eyes: Negative for visual disturbance  Respiratory: Positive for cough  Negative for chest tightness, shortness of breath and wheezing  Sneezing   Cardiovascular: Negative for chest pain and palpitations  Gastrointestinal: Negative for abdominal pain and vomiting  Neurological: Negative for seizures and syncope  All other systems reviewed and are negative  Objective:      /78 (BP Location: Left arm, Patient Position: Sitting, Cuff Size: Large)   Pulse 66   Temp 98 °F (36 7 °C) (Tympanic)   Resp 20   Ht 5' 9" (1 753 m)   Wt 90 7 kg (200 lb)   SpO2 96%   BMI 29 53 kg/m²          Physical Exam  Constitutional:       General: He is not in acute distress  Appearance: He is well-developed     HENT:      Head: Normocephalic and atraumatic  Right Ear: External ear normal       Left Ear: External ear normal       Nose: No rhinorrhea  Mouth/Throat:      Mouth: Mucous membranes are moist    Eyes:      Extraocular Movements: Extraocular movements intact  Conjunctiva/sclera: Conjunctivae normal    Neck:      Thyroid: No thyromegaly  Vascular: No JVD  Cardiovascular:      Rate and Rhythm: Normal rate and regular rhythm  Heart sounds: Normal heart sounds  No murmur heard  Pulmonary:      Effort: Pulmonary effort is normal  No respiratory distress  Breath sounds: Normal breath sounds  No wheezing, rhonchi or rales  Abdominal:      General: Bowel sounds are normal  There is no distension  Palpations: Abdomen is soft  Tenderness: There is no abdominal tenderness  There is no guarding  Hernia: No hernia is present  Musculoskeletal:         General: No tenderness  Normal range of motion  Skin:     General: Skin is warm  Findings: No erythema or rash  Neurological:      Mental Status: He is alert and oriented to person, place, and time  Cranial Nerves: No cranial nerve deficit     Psychiatric:         Mood and Affect: Mood normal

## 2022-10-27 DIAGNOSIS — R05.2 SUBACUTE COUGH: ICD-10-CM

## 2022-10-27 RX ORDER — PROMETHAZINE HYDROCHLORIDE AND CODEINE PHOSPHATE 6.25; 1 MG/5ML; MG/5ML
5 SYRUP ORAL EVERY 4 HOURS PRN
Qty: 120 ML | Refills: 0 | Status: SHIPPED | OUTPATIENT
Start: 2022-10-27 | End: 2023-03-27

## 2022-12-25 PROBLEM — R05.9 COUGH: Status: RESOLVED | Noted: 2022-10-26 | Resolved: 2022-12-25

## 2023-03-13 ENCOUNTER — APPOINTMENT (OUTPATIENT)
Dept: LAB | Facility: CLINIC | Age: 73
End: 2023-03-13

## 2023-03-13 ENCOUNTER — OFFICE VISIT (OUTPATIENT)
Dept: DERMATOLOGY | Facility: CLINIC | Age: 73
End: 2023-03-13

## 2023-03-13 VITALS — BODY MASS INDEX: 28.92 KG/M2 | HEIGHT: 70 IN | WEIGHT: 202 LBS

## 2023-03-13 DIAGNOSIS — L57.0 ACTINIC KERATOSIS: Primary | ICD-10-CM

## 2023-03-13 DIAGNOSIS — L82.1 SEBORRHEIC KERATOSIS: ICD-10-CM

## 2023-03-13 DIAGNOSIS — Z13.89 SCREENING FOR SKIN CONDITION: ICD-10-CM

## 2023-03-13 DIAGNOSIS — R73.01 IMPAIRED FASTING GLUCOSE: ICD-10-CM

## 2023-03-13 DIAGNOSIS — I10 BENIGN ESSENTIAL HYPERTENSION: ICD-10-CM

## 2023-03-13 LAB
ALBUMIN SERPL BCP-MCNC: 4.3 G/DL (ref 3.5–5)
ALP SERPL-CCNC: 100 U/L (ref 46–116)
ALT SERPL W P-5'-P-CCNC: 32 U/L (ref 12–78)
ANION GAP SERPL CALCULATED.3IONS-SCNC: 5 MMOL/L (ref 4–13)
AST SERPL W P-5'-P-CCNC: 22 U/L (ref 5–45)
BASOPHILS # BLD AUTO: 0.04 THOUSANDS/ÂΜL (ref 0–0.1)
BASOPHILS NFR BLD AUTO: 0 % (ref 0–1)
BILIRUB SERPL-MCNC: 0.69 MG/DL (ref 0.2–1)
BUN SERPL-MCNC: 19 MG/DL (ref 5–25)
CALCIUM SERPL-MCNC: 9.7 MG/DL (ref 8.3–10.1)
CHLORIDE SERPL-SCNC: 109 MMOL/L (ref 96–108)
CHOLEST SERPL-MCNC: 112 MG/DL
CO2 SERPL-SCNC: 25 MMOL/L (ref 21–32)
CREAT SERPL-MCNC: 1.04 MG/DL (ref 0.6–1.3)
EOSINOPHIL # BLD AUTO: 0 THOUSAND/ÂΜL (ref 0–0.61)
EOSINOPHIL NFR BLD AUTO: 0 % (ref 0–6)
ERYTHROCYTE [DISTWIDTH] IN BLOOD BY AUTOMATED COUNT: 13 % (ref 11.6–15.1)
EST. AVERAGE GLUCOSE BLD GHB EST-MCNC: 111 MG/DL
GFR SERPL CREATININE-BSD FRML MDRD: 72 ML/MIN/1.73SQ M
GLUCOSE P FAST SERPL-MCNC: 129 MG/DL (ref 65–99)
HBA1C MFR BLD: 5.5 %
HCT VFR BLD AUTO: 39 % (ref 36.5–49.3)
HDLC SERPL-MCNC: 57 MG/DL
HGB BLD-MCNC: 12.9 G/DL (ref 12–17)
IMM GRANULOCYTES # BLD AUTO: 0.03 THOUSAND/UL (ref 0–0.2)
IMM GRANULOCYTES NFR BLD AUTO: 0 % (ref 0–2)
LDLC SERPL CALC-MCNC: 42 MG/DL (ref 0–100)
LYMPHOCYTES # BLD AUTO: 0.98 THOUSANDS/ÂΜL (ref 0.6–4.47)
LYMPHOCYTES NFR BLD AUTO: 11 % (ref 14–44)
MCH RBC QN AUTO: 32.1 PG (ref 26.8–34.3)
MCHC RBC AUTO-ENTMCNC: 33.1 G/DL (ref 31.4–37.4)
MCV RBC AUTO: 97 FL (ref 82–98)
MONOCYTES # BLD AUTO: 0.31 THOUSAND/ÂΜL (ref 0.17–1.22)
MONOCYTES NFR BLD AUTO: 3 % (ref 4–12)
NEUTROPHILS # BLD AUTO: 8.01 THOUSANDS/ÂΜL (ref 1.85–7.62)
NEUTS SEG NFR BLD AUTO: 86 % (ref 43–75)
NONHDLC SERPL-MCNC: 55 MG/DL
NRBC BLD AUTO-RTO: 0 /100 WBCS
PLATELET # BLD AUTO: 257 THOUSANDS/UL (ref 149–390)
PMV BLD AUTO: 10.8 FL (ref 8.9–12.7)
POTASSIUM SERPL-SCNC: 4 MMOL/L (ref 3.5–5.3)
PROT SERPL-MCNC: 7.7 G/DL (ref 6.4–8.4)
RBC # BLD AUTO: 4.02 MILLION/UL (ref 3.88–5.62)
SODIUM SERPL-SCNC: 139 MMOL/L (ref 135–147)
TRIGL SERPL-MCNC: 65 MG/DL
TSH SERPL DL<=0.05 MIU/L-ACNC: 0.85 UIU/ML (ref 0.45–4.5)
WBC # BLD AUTO: 9.37 THOUSAND/UL (ref 4.31–10.16)

## 2023-03-13 NOTE — PATIENT INSTRUCTIONS
Actinic Keratosis:  Patient advised lesions are precancers  These should resolve with cryosurgery if not to let us know sun block recommended  Seborrheic Keratosis  Patient reasurred these are normal growths we acquire with age no treatment needed  Screening for Dermatologic Disorders: Nothing else of concern noted on complete exam follow up in 1 year   Treatment with Cryotherapy    The doctor has treated your skin with nitrogen, which is 320 degrees Fahrenheit below zero  He has given the treated area "frostbite "    Stinging should subside within a few hours  You can take Tylenol for pain, if needed  Over the next few days, it is normal if the area becomes reddened, a blood blister, or swollen with fluid  If the lesion treated was near the eye - you could get a swollen eye over the next few days  Do not panic! This is all temporary, and will resolve with time  There is no special treatment - just keep the area clean  Makeup and BandAids can be used, if preferred  When the area starts to dry up and peel off, using Vaseline can help healing  It usually takes up to a month for it to heal   Some lesions are recurrent and may require repeat treatments  If a lesion has not healed in one month, please don't hesitate to contact us  If you have any further questions that are not answered here, please call us  [phone]    Thank you for allowing us to care for you

## 2023-03-13 NOTE — PROGRESS NOTES
[facility]  [address]  [address] [id number]  [phone]  [phone]     MRN: [id number] : 1950  Encounter: [id number]  Patient Information: Carol Suarez  Chief complaint: yearly check up    History of present illness: 74-year-old male presents for overall skin check previous history of actinic keratosis Notes a lesion on his left nose no other concerns noted  Past Medical History:   Diagnosis Date   • Arthritis    • Asthma    • Chronic kidney disease    • CKD (chronic kidney disease) stage 3, GFR 30-59 ml[facility] 2019   • Depression    • Dyshidrosis    • Hypertension    • Osteoarthritis    • Trigger finger      Past Surgical History:   Procedure Laterality Date   • COLONOSCOPY  2010   • COLONOSCOPY  2020   • HEMORRHOID SURGERY     • JOINT REPLACEMENT  18   • REPLACEMENT TOTAL KNEE Left    • TONSILLECTOMY     • TRIGGER FINGER RELEASE     • WISDOM TOOTH EXTRACTION       Social History   Social History     Substance and Sexual Activity   Alcohol Use Yes   • Alcohol/week: 6 0 standard drinks   • Types: 3 Glasses of wine, 2 Cans of beer, 1 Shots of liquor per week    Comment: occasional     Social History     Substance and Sexual Activity   Drug Use No     Social History     Tobacco Use   Smoking Status Former   • Packs/day: 0 00   • Years: 2 00   • Pack years: 0 00   • Types: Cigarettes   • Quit date: 1975   • Years since quittin 6   Smokeless Tobacco Never   Tobacco Comments    Occasional cigar smoker     Family History   Problem Relation Age of Onset   • Coronary artery disease Mother    • Hyperlipidemia Mother    • Hypertension Mother    • Stroke Mother    • Heart disease Mother    • Stroke Father    • Hypertension Father    • Dementia Father    • Asthma Maternal Aunt    • Asthma Maternal Uncle    • Arthritis Maternal Uncle      Meds/Allergies   Allergies   Allergen Reactions   • Cat Hair Extract Anaphylaxis, Hives, Itching, Shortness Of Breath and Swelling   • Other Cough     mold   • Pollen Extract Other (See Comments) and Shortness Of Breath   • Nsaids        Meds:  Prior to Admission medications    Medication Sig Start Date End Date Taking?  Authorizing Provider   albuterol (PROVENTIL HFA,VENTOLIN HFA) 90 mcg/act inhaler INHALE 2 PUFFS (180 MCG) BY INHALATION ROUTE EVERY 8 HOURS AS NEEDED 1/5/22  Yes Layton Douglas MD   amLODIPine-atorvastatin (CADUET) 5-40 MG per tablet Take 1 tablet by mouth daily 5/25/22  Yes Zaire Cross PA-C   aspirin (ECOTRIN LOW STRENGTH) 81 mg EC tablet Take 1 tablet by mouth daily     Yes Historical Provider, MD   colchicine (COLCRYS) 0 6 mg tablet TAKE 1 TABLET (0 6 MG TOTAL) BY MOUTH 3 (THREE) TIMES A DAY AS NEEDED FOR MUSCLE/JOINT PAIN 3/4/22  Yes Marylou Lilly MD   Flowflex COVID-19 Ag Home Test KIT FOLLOW INSTRUCTIONS INCLUDED WITH THE PACKAGE  8/13/22  Yes Historical Provider, MD   fluticasone (FLONASE) 50 mcg/act nasal spray 1 spray into each nostril daily 10/26/22  Yes Marylou Lilly MD   lisinopril (ZESTRIL) 40 mg tablet TAKE 1 TABLET BY MOUTH EVERY DAY 8/11/22  Yes Marylou Lilly MD   LORazepam (ATIVAN) 1 mg tablet Take 1 mg by mouth as needed     Yes Historical Provider, MD   montelukast (SINGULAIR) 10 mg tablet TAKE 1 TABLET BY MOUTH EVERY DAY IN THE EVENING 2/27/23  Yes KAYLEY Li   promethazine-codeine (PHENERGAN WITH CODEINE) 6 25-10 mg/5 mL syrup Take 5 mL by mouth every 4 (four) hours as needed for cough 10/27/22  Yes Marylou Lilly MD   acetaminophen (TYLENOL) 325 mg tablet Take 650 mg by mouth every 6 (six) hours as needed for mild pain  Patient not taking: Reported on 9/20/2022    Historical Provider, MD   benzonatate (TESSALON PERLES) 100 mg capsule Take 1 capsule (100 mg total) by mouth 3 (three) times a day as needed for cough  Patient not taking: Reported on 9/20/2022 4/18/22   KAYLEY Espinosa       Subjective:     Review of Systems:    General: negative for - chills, fatigue, fever,  weight gain or weight loss  Psychological: negative for - anxiety, behavioral disorder, concentration difficulties, decreased libido, depression, irritability, memory difficulties, mood swings, sleep disturbances or suicidal ideation  ENT: negative for - hearing difficulties , nasal congestion, nasal discharge, oral lesions, sinus pain, sneezing, sore throat  Allergy and Immunology: negative for - hives, insect bite sensitivity,  Hematological and Lymphatic: negative for - bleeding problems, blood clots,bruising, swollen lymph nodes  Endocrine: negative for - hair pattern changes, hot flashes, malaise/lethargy, mood swings, palpitations, polydipsia/polyuria, skin changes, temperature intolerance or unexpected weight change  Respiratory: negative for - cough, hemoptysis, orthopnea, shortness of breath, or wheezing  Cardiovascular: negative for - chest pain, dyspnea on exertion, edema,  Gastrointestinal: negative for - abdominal pain, nausea/vomiting  Genito-Urinary: negative for - dysuria, incontinence, irregular/heavy menses or urinary frequency/urgency  Musculoskeletal: negative for - gait disturbance, joint pain, joint stiffness, joint swelling, muscle pain, muscular weakness  Dermatological:  As in HPI  Neurological: negative for confusion, dizziness, headaches, impaired coordination/balance, memory loss, numbness/tingling, seizures, speech problems, tremors or weakness       Objective:   Ht 5' 9 5" (1 765 m)   Wt 91 6 kg (202 lb)   BMI 29 40 kg/m²     Physical Exam:    General Appearance:    Alert, cooperative, no distress   Head:    Normocephalic, without obvious abnormality, atraumatic           Skin:   A full skin exam was performed including scalp, head scalp, eyes, ears, nose, lips, neck, chest, axilla, abdomen, back, buttocks, bilateral upper extremities, bilateral lower extremities, hands, feet, fingers, toes, fingernails, and toenails  Erythematous area noted below normal keratotic papules with greasy stuck on appearance nothing else remarkable noted on complete exam  Physical Exam  HENT:      Head:          Cryotherapy Procedure Note    Pre-operative Diagnosis: actinic keratosis    Plan:  1  Instructed to keep the area dry and clean thereafter  Apply petrolatum if area gets crusty  2  Recommended that the patient use acetaminophen  as needed for pain  Locations: Left nasal wall    Indications: Destruction of actinic keratosis x1    Patient informed of risks (permanent scarring, infection, light or dark discoloration, bleeding, infection, weakness, numbness and recurrence of the lesion) and benefits of the procedure and verbal informed consent obtained  The areas are treated with liquid nitrogen therapy, frozen until ice ball extended 2 mm beyond lesion, allowed to thaw, and treated again  The patient tolerated procedure well  The patient was instructed on post-op care, warned that there may be blister formation, redness and pain  Recommend OTC analgesia as needed for pain  Condition:  Stable    Complications:  none  Assessment:     1  Actinic keratosis        2  Seborrheic keratosis        3  Screening for skin condition              Plan:   Actinic Keratosis:  Patient advised lesions are precancers  These should resolve with cryosurgery if not to let us know sun block recommended  Seborrheic Keratosis  Patient reasurred these are normal growths we acquire with age no treatment needed  Screening for Dermatologic Disorders: Nothing else of concern noted on complete exam follow up in 1 year       Geraldina Mcburney, MD  3/13/2023,1:00 PM    Portions of the record may have been created with voice recognition software   Occasional wrong word or "sound a like" substitutions may have occurred due to the inherent limitations of voice recognition software   Read the chart carefully and recognize, using context, where substitutions have occurred

## 2023-03-13 NOTE — PROGRESS NOTES
[facility] Note     Patient Name: Rai Elizondo  Encounter Date: March 13, 2023     Have you been cared for by a [place] [address] Dermatologist in the last 3 years and, if so, which description applies to you? Yes  I have been here within the last 3 years, and my medical history has NOT changed since that time  I am MALE/not capable of bearing children  REVIEW OF SYSTEMS:  Have you recently had or currently have any of the following? · No changes in my recent health  PAST MEDICAL HISTORY:  Have you personally ever had or currently have any of the following? If "YES," then please provide more detail  · No changes in my medical history  FAMILY HISTORY:  Any "first degree relatives" (parent, brother, sister, or child) with the following? • No changes in my family's known health  PATIENT EXPERIENCE:    • Do you want the Dermatologist to perform a COMPLETE skin exam today including a clinical examination under the "bra and underwear" areas? Yes  • If necessary, do we have your permission to call and leave a detailed message on your Preferred Phone number that includes your specific medical information?   Yes      Allergies   Allergen Reactions   • Cat Hair Extract Anaphylaxis, Hives, Itching, Shortness Of Breath and Swelling   • Other Cough     mold   • Pollen Extract Other (See Comments) and Shortness Of Breath   • Nsaids       Current Outpatient Medications:   •  acetaminophen (TYLENOL) 325 mg tablet, Take 650 mg by mouth every 6 (six) hours as needed for mild pain (Patient not taking: No sig reported), Disp: , Rfl:   •  albuterol (PROVENTIL HFA,VENTOLIN HFA) 90 mcg/act inhaler, INHALE 2 PUFFS (180 MCG) BY INHALATION ROUTE EVERY 8 HOURS AS NEEDED, Disp: 6 7 g, Rfl: 0  •  amLODIPine-atorvastatin (CADUET) 5-40 MG per tablet, Take 1 tablet by mouth daily, Disp: 30 tablet, Rfl: 11  •  aspirin (ECOTRIN LOW STRENGTH) 81 mg EC tablet, Take 1 tablet by mouth daily  , Disp: , Rfl:   •  benzonatate (TESSALON PERLES) 100 mg capsule, Take 1 capsule (100 mg total) by mouth 3 (three) times a day as needed for cough (Patient not taking: No sig reported), Disp: 20 capsule, Rfl: 0  •  colchicine (COLCRYS) 0 6 mg tablet, TAKE 1 TABLET (0 6 MG TOTAL) BY MOUTH 3 (THREE) TIMES A DAY AS NEEDED FOR MUSCLE/JOINT PAIN, Disp: 90 tablet, Rfl: 5  •  Flowflex COVID-19 Ag Home Test KIT, FOLLOW INSTRUCTIONS INCLUDED WITH THE PACKAGE , Disp: , Rfl:   •  fluticasone (FLONASE) 50 mcg/act nasal spray, 1 spray into each nostril daily, Disp: 16 g, Rfl: 3  •  lisinopril (ZESTRIL) 40 mg tablet, TAKE 1 TABLET BY MOUTH EVERY DAY, Disp: 90 tablet, Rfl: 4  •  LORazepam (ATIVAN) 1 mg tablet, Take 1 mg by mouth as needed  , Disp: , Rfl:   •  montelukast (SINGULAIR) 10 mg tablet, TAKE 1 TABLET BY MOUTH EVERY DAY IN THE EVENING, Disp: 30 tablet, Rfl: 8  •  promethazine-codeine (PHENERGAN WITH CODEINE) 6 25-10 mg/5 mL syrup, Take 5 mL by mouth every 4 (four) hours as needed for cough, Disp: 120 mL, Rfl: 0          • Whom besides the patient is providing clinical information about today's encounter?   o NO ADDITIONAL HISTORIAN (patient alone provided history)    Physical Exam and Assessment/Plan by Diagnosis:

## 2023-03-14 DIAGNOSIS — M10.9 GOUT INVOLVING TOE, UNSPECIFIED CAUSE, UNSPECIFIED CHRONICITY, UNSPECIFIED LATERALITY: Primary | ICD-10-CM

## 2023-03-14 RX ORDER — PREDNISONE 20 MG/1
TABLET ORAL
Qty: 10 TABLET | Refills: 0 | Status: SHIPPED | OUTPATIENT
Start: 2023-03-14

## 2023-03-27 ENCOUNTER — OFFICE VISIT (OUTPATIENT)
Dept: INTERNAL MEDICINE CLINIC | Facility: CLINIC | Age: 73
End: 2023-03-27

## 2023-03-27 VITALS
HEIGHT: 70 IN | SYSTOLIC BLOOD PRESSURE: 134 MMHG | BODY MASS INDEX: 28.35 KG/M2 | RESPIRATION RATE: 16 BRPM | DIASTOLIC BLOOD PRESSURE: 78 MMHG | WEIGHT: 198 LBS | HEART RATE: 80 BPM

## 2023-03-27 DIAGNOSIS — N40.0 BPH WITHOUT URINARY OBSTRUCTION: ICD-10-CM

## 2023-03-27 DIAGNOSIS — N18.2 CKD (CHRONIC KIDNEY DISEASE) STAGE 2, GFR 60-89 ML/MIN: ICD-10-CM

## 2023-03-27 DIAGNOSIS — I49.3 PVC'S (PREMATURE VENTRICULAR CONTRACTIONS): ICD-10-CM

## 2023-03-27 DIAGNOSIS — S72.402D CLOSED FRACTURE OF DISTAL END OF LEFT FEMUR WITH ROUTINE HEALING, UNSPECIFIED FRACTURE MORPHOLOGY, SUBSEQUENT ENCOUNTER: ICD-10-CM

## 2023-03-27 DIAGNOSIS — R73.01 IMPAIRED FASTING GLUCOSE: Primary | ICD-10-CM

## 2023-03-27 DIAGNOSIS — M1A.9XX0 CHRONIC GOUT INVOLVING TOE OF RIGHT FOOT WITHOUT TOPHUS, UNSPECIFIED CAUSE: ICD-10-CM

## 2023-03-27 DIAGNOSIS — Z96.653 HISTORY OF BILATERAL KNEE ARTHROPLASTY: ICD-10-CM

## 2023-03-27 DIAGNOSIS — E78.2 MIXED HYPERLIPIDEMIA: ICD-10-CM

## 2023-03-27 DIAGNOSIS — I10 BENIGN ESSENTIAL HYPERTENSION: ICD-10-CM

## 2023-03-27 DIAGNOSIS — J45.20 MILD INTERMITTENT ASTHMA, UNSPECIFIED WHETHER COMPLICATED: ICD-10-CM

## 2023-03-27 DIAGNOSIS — F41.9 ANXIETY: ICD-10-CM

## 2023-03-27 RX ORDER — LORAZEPAM 1 MG/1
1 TABLET ORAL AS NEEDED
Qty: 30 TABLET | Refills: 0 | Status: SHIPPED | OUTPATIENT
Start: 2023-03-27

## 2023-03-27 NOTE — PROGRESS NOTES
Assessment/Plan:    Diagnoses and all orders for this visit:    Impaired fasting glucose  -     Hemoglobin A1C; Future    Mild intermittent asthma, unspecified whether complicated    PVC's (premature ventricular contractions)    Benign essential hypertension    CKD (chronic kidney disease) stage 2, GFR 60-89 ml/min    BPH without urinary obstruction  -     UA (URINE) with reflex to Scope; Future  -     Urine culture; Future    Mixed hyperlipidemia  -     CBC and differential; Future  -     Comprehensive metabolic panel; Future  -     Lipid panel; Future    Chronic gout involving toe of right 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 tophus, unspecified cause  -     Uric acid; Future    History of bilateral knee arthroplasty  -     Ambulatory Referral to Orthopedic Surgery; Future    Closed fracture of distal end of left femur with routine healing, unspecified fracture morphology, subsequent encounter  -     Ambulatory Referral to Orthopedic Surgery; Future    Anxiety  -     LORazepam (ATIVAN) 1 mg tablet; Take 1 tablet (1 mg total) by mouth as needed for sleep            Patient Instructions   Lab data reviewed in detail and compared to prior    Impaired fasting glucose under excellent control with recent weight loss    Hypertension and hyperlipidemia optimally controlled on present regimen    Gout status post recent flare, monitor symptoms moving forward off of colchicine  Check uric acid prior to follow-up    CKD-GFR 72, keep well-hydrated, use NSAIDs judiciously    History of symptomatic PVCs-stable no recent cardiology follow-up  Patient to contact me for any symptoms  Depression and anxiety have remained stable without medication, will refill lorazepam for rare as needed use for sleep    Distal femor fracture-referral to orthopedics for another opinion    Routine follow-up after labs in 6 months, sooner as needed          Subjective:      Patient ID: Sheyla Mclaughlin is a 81 y o  male    F/u mmp and review labs  Feeling generally well except gout flare last week, that improved after first dose of prednisone  Distal femur fx last month tx'd non-operatively, ambulatory w/ moderate pain  Was having significant pain even prior to the fall  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, running games at [employer]  Having lots of fun  Active w/ work, no other regular exercise  Maintaining 10 lb wt loss  Palpitations-rare symptoms, seen by Lindsay Taylor in 9/21  HTN-taking rx as directed, rare home bp's have been stable  HPL-tolerating atorvastatin  CKD-keeping well hydrated, no nsaids  IFG-watching carbs  Depression/anxiety-stable, no recent rx  Rare lorazepam for sleep  Script generally expires before he uses it  Gout-flare as above, stopped colchicine d/t pharmacy warning  He flared while on colcrys  One flare every other year  Occasional etoh  Notes 2-3x nocturia w/ good flow, no urgency or incontinence  Strong smell to urine lately, but no dysuria            Current Outpatient Medications:   •  amLODIPine-atorvastatin (CADUET) 5-40 MG per tablet, Take 1 tablet by mouth daily, Disp: 30 tablet, Rfl: 11  •  aspirin (ECOTRIN LOW STRENGTH) 81 mg EC tablet, Take 1 tablet by mouth daily  , Disp: , Rfl:   •  fluticasone (FLONASE) 50 mcg/act nasal spray, 1 spray into each nostril daily, Disp: 16 g, Rfl: 3  •  lisinopril (ZESTRIL) 40 mg tablet, TAKE 1 TABLET BY MOUTH EVERY DAY, Disp: 90 tablet, Rfl: 4  •  LORazepam (ATIVAN) 1 mg tablet, Take 1 tablet (1 mg total) by mouth as needed for sleep, Disp: 30 tablet, Rfl: 0  •  montelukast (SINGULAIR) 10 mg tablet, TAKE 1 TABLET BY MOUTH EVERY DAY IN THE EVENING, Disp: 90 tablet, Rfl: 3  •  acetaminophen (TYLENOL) 325 mg tablet, Take 650 mg by mouth every 6 (six) hours as needed for mild pain (Patient not taking: Reported on 9/20/2022), Disp: , Rfl:   •  albuterol (PROVENTIL HFA,VENTOLIN HFA) 90 mcg/act inhaler, INHALE 2 PUFFS (180 MCG) BY INHALATION ROUTE EVERY 8 HOURS AS NEEDED, Disp: 6 7 g, Rfl: 0  • colchicine (COLCRYS) 0 6 mg tablet, TAKE 1 TABLET (0 6 MG TOTAL) BY MOUTH 3 (THREE) TIMES A DAY AS NEEDED FOR MUSCLE/JOINT PAIN (Patient not taking: Reported on 3/27/2023), Disp: 90 tablet, Rfl: 5    Recent Results (from the past 1008 hour(s))   CBC and differential    Collection Time: 03/13/23  1:13 PM   Result Value Ref Range    WBC 9 37 4 31 - 10 16 Thousand/uL    RBC 4 02 3 88 - 5 62 Million/uL    Hemoglobin 12 9 12 0 - 17 0 g/dL    Hematocrit 39 0 36 5 - 49 3 %    MCV 97 82 - 98 fL    MCH 32 1 26 8 - 34 3 pg    MCHC 33 1 31 4 - 37 4 g/dL    RDW 13 0 11 6 - 15 1 %    MPV 10 8 8 9 - 12 7 fL    Platelets 422 868 - 706 Thousands/uL    nRBC 0 /100 WBCs    Neutrophils Relative 86 (H) 43 - 75 %    Immat GRANS % 0 0 - 2 %    Lymphocytes Relative 11 (L) 14 - 44 %    Monocytes Relative 3 (L) 4 - 12 %    Eosinophils Relative 0 0 - 6 %    Basophils Relative 0 0 - 1 %    Neutrophils Absolute 8 01 (H) 1 85 - 7 62 Thousands/µL    Immature Grans Absolute 0 03 0 00 - 0 20 Thousand/uL    Lymphocytes Absolute 0 98 0 60 - 4 47 Thousands/µL    Monocytes Absolute 0 31 0 17 - 1 22 Thousand/µL    Eosinophils Absolute 0 00 0 00 - 0 61 Thousand/µL    Basophils Absolute 0 04 0 00 - 0 10 Thousands/µL   Comprehensive metabolic panel    Collection Time: 03/13/23  1:13 PM   Result Value Ref Range    Sodium 139 135 - 147 mmol/L    Potassium 4 0 3 5 - 5 3 mmol/L    Chloride 109 (H) 96 - 108 mmol/L    CO2 25 21 - 32 mmol/L    ANION GAP 5 4 - 13 mmol/L    BUN 19 5 - 25 mg/dL    Creatinine 1 04 0 60 - 1 30 mg/dL    Glucose, Fasting 129 (H) 65 - 99 mg/dL    Calcium 9 7 8 3 - 10 1 mg/dL    AST 22 5 - 45 U/L    ALT 32 12 - 78 U/L    Alkaline Phosphatase 100 46 - 116 U/L    Total Protein 7 7 6 4 - 8 4 g/dL    Albumin 4 3 3 5 - 5 0 g/dL    Total Bilirubin 0 69 0 20 - 1 00 mg/dL    eGFR 72 ml/min/1 73sq m   Lipid panel    Collection Time: 03/13/23  1:13 PM   Result Value Ref Range    Cholesterol 112 See Comment mg/dL    Triglycerides 65 See Comment mg/dL    HDL, Direct 57 >=40 mg/dL    LDL Calculated 42 0 - 100 mg/dL    Non-HDL-Chol (CHOL-HDL) 55 mg/dl   Hemoglobin A1C    Collection Time: 03/13/23  1:13 PM   Result Value Ref Range    Hemoglobin A1C 5 5 Normal 3 8-5 6%; PreDiabetic 5 7-6 4%; Diabetic >=6 5%; Glycemic control for adults with diabetes <7 0% %     mg/dl   TSH, 3rd generation with Free T4 reflex    Collection Time: 03/13/23  1:13 PM   Result Value Ref Range    TSH 3RD GENERATON 0 852 0 450 - 4 500 uIU/mL       The following portions of the patient's history were reviewed and updated as appropriate: allergies, current medications, past family history, past medical history, past social history, past surgical history and problem list      Review of Systems      Objective:      Vitals:    03/27/23 1219   BP: 134/78   Pulse: 80   Resp: 16          Physical Exam  Constitutional:       Appearance: He is well-developed  HENT:      Head: Normocephalic and atraumatic  Nose: Nose normal    Eyes:      General: No scleral icterus  Conjunctiva/sclera: Conjunctivae normal       Pupils: Pupils are equal, round, and reactive to light  Neck:      Thyroid: No thyromegaly  Vascular: No JVD  Trachea: No tracheal deviation  Cardiovascular:      Rate and Rhythm: Normal rate and regular rhythm  Heart sounds: No murmur heard  No friction rub  No gallop  Pulmonary:      Effort: Pulmonary effort is normal  No respiratory distress  Breath sounds: Normal breath sounds  No wheezing or rales  Musculoskeletal:         General: No deformity  Cervical back: Normal range of motion and neck supple  Lymphadenopathy:      Cervical: No cervical adenopathy  Skin:     General: Skin is warm and dry  Coloration: Skin is not pale  Findings: No erythema or rash  Neurological:      Mental Status: He is alert and oriented to person, place, and time  Cranial Nerves: No cranial nerve deficit     Psychiatric: Behavior: Behavior normal          Thought Content:  Thought content normal          Judgment: Judgment normal

## 2023-03-27 NOTE — PATIENT INSTRUCTIONS
Lab data reviewed in detail and compared to prior    Impaired fasting glucose under excellent control with recent weight loss    Hypertension and hyperlipidemia optimally controlled on present regimen    Gout status post recent flare, monitor symptoms moving forward off of colchicine  Check uric acid prior to follow-up    CKD-GFR 72, keep well-hydrated, use NSAIDs judiciously    History of symptomatic PVCs-stable no recent cardiology follow-up  Patient to contact me for any symptoms  Depression and anxiety have remained stable without medication, will refill lorazepam for rare as needed use for sleep    Distal femor fracture-referral to orthopedics for another opinion    Routine follow-up after labs in 6 months, sooner as needed

## 2023-04-18 NOTE — INTERVAL H&P NOTE
Clear liquids next 24 hours advance slowly.  Zofran sent to pharmacy.  Follow-up with your primary care provider tomorrow.  Return for increasing vomiting increasing pain chest pain neck arm jaw pain shortness of breath dizziness passing out unable to hold anything down no improvement over next 24 hours or any other new or worse problems or concerns return to ER.   H&P reviewed  After examining the patient I find no changes in the patients condition since the H&P had been written      Vitals:    09/26/20 1019   BP: 153/85   Pulse: 68   Resp: 18   Temp: 97 8 °F (36 6 °C)   SpO2: 100%

## 2023-04-21 DIAGNOSIS — M1A.9XX0 CHRONIC GOUT INVOLVING TOE OF RIGHT FOOT WITHOUT TOPHUS, UNSPECIFIED CAUSE: Primary | ICD-10-CM

## 2023-04-21 RX ORDER — PREDNISONE 10 MG/1
TABLET ORAL
Qty: 20 TABLET | Refills: 0 | Status: SHIPPED | OUTPATIENT
Start: 2023-04-21 | End: 2023-09-19

## 2023-04-28 ENCOUNTER — PATIENT MESSAGE (OUTPATIENT)
Dept: INTERNAL MEDICINE CLINIC | Facility: CLINIC | Age: 73
End: 2023-04-28

## 2023-05-03 NOTE — PATIENT COMMUNICATION
Left a message for pt to call back possible email to pt  Also put a copy in reception area for pt to

## 2023-08-11 ENCOUNTER — TELEPHONE (OUTPATIENT)
Age: 73
End: 2023-08-11

## 2023-08-11 NOTE — LETTER
August 11, 2023     Patient: Kenny Lyman  YOB: 1950  Date of Visit: 8/11/2023      To Whom it May Concern:    Markel Orma Chris is under my professional care. Markel was seen in my office on 08/05/2023 Markel may return to work on 08/14/2023 with no restrictions . If you have any questions or concerns, please don't hesitate to call [phone].          Sincerely,          Dash Diane

## 2023-08-11 NOTE — TELEPHONE ENCOUNTER
Patient returned [employer] call w/ information    Patient has been out of work since Sat Aug 5th. Had severe congestion, cough, sore throat    Needs a letter to state that he was out of work from 5 Aug and may return to work Aug 14th. Please call patient when letter is in [address] [place] [address]. He can print out from there.     [phone]

## 2023-08-11 NOTE — TELEPHONE ENCOUNTER
----- Message from Roberto Melvin, [facility] sent at 8/11/2023 10:11 AM EDT -----  Regarding: FW: Note  Contact: [phone]  Okay to write sick note  ----- Message -----  From: Sharyle Livers  Sent: 8/11/2023   8:20 AM EDT  To: Yeni Ozuna MD  Subject: FW: Note                                           ----- Message -----  From: Darcie Villanueva  Sent: 8/10/2023  10:12 PM EDT  To: [facility]  Subject: Note                                             I've been out of work since last Friday with a cough and sore throat. I'm feeling alright now but they won't let me go back to work without a sudha from my doctor saying I'm OK to return. If you're absent for more than three days they want this note. Can I get one from you?

## 2023-09-08 ENCOUNTER — RA CDI HCC (OUTPATIENT)
Dept: OTHER | Facility: HOSPITAL | Age: 73
End: 2023-09-08

## 2023-09-08 NOTE — PROGRESS NOTES
[address] [facility] coding opportunities       Chart reviewed, no opportunity found: [address]        Patients Insurance     Medicare Insurance: [facility]

## 2023-09-11 ENCOUNTER — APPOINTMENT (OUTPATIENT)
Dept: LAB | Facility: CLINIC | Age: 73
End: 2023-09-11
Payer: COMMERCIAL

## 2023-09-11 DIAGNOSIS — E78.2 MIXED HYPERLIPIDEMIA: ICD-10-CM

## 2023-09-11 DIAGNOSIS — N40.0 BPH WITHOUT URINARY OBSTRUCTION: ICD-10-CM

## 2023-09-11 DIAGNOSIS — M1A.9XX0 CHRONIC GOUT INVOLVING TOE OF RIGHT FOOT WITHOUT TOPHUS, UNSPECIFIED CAUSE: ICD-10-CM

## 2023-09-11 DIAGNOSIS — R73.01 IMPAIRED FASTING GLUCOSE: ICD-10-CM

## 2023-09-11 LAB
ALBUMIN SERPL BCP-MCNC: 4.5 G/DL (ref 3.5–5)
ALP SERPL-CCNC: 75 U/L (ref 34–104)
ALT SERPL W P-5'-P-CCNC: 27 U/L (ref 7–52)
ANION GAP SERPL CALCULATED.3IONS-SCNC: 8 MMOL/L
AST SERPL W P-5'-P-CCNC: 29 U/L (ref 13–39)
BASOPHILS # BLD AUTO: 0.06 THOUSANDS/ÂΜL (ref 0–0.1)
BASOPHILS NFR BLD AUTO: 1 % (ref 0–1)
BILIRUB SERPL-MCNC: 0.9 MG/DL (ref 0.2–1)
BILIRUB UR QL STRIP: NEGATIVE
BUN SERPL-MCNC: 18 MG/DL (ref 5–25)
CALCIUM SERPL-MCNC: 9.6 MG/DL (ref 8.4–10.2)
CHLORIDE SERPL-SCNC: 103 MMOL/L (ref 96–108)
CHOLEST SERPL-MCNC: 111 MG/DL
CLARITY UR: CLEAR
CO2 SERPL-SCNC: 28 MMOL/L (ref 21–32)
COLOR UR: NORMAL
CREAT SERPL-MCNC: 1.03 MG/DL (ref 0.6–1.3)
EOSINOPHIL # BLD AUTO: 0.16 THOUSAND/ÂΜL (ref 0–0.61)
EOSINOPHIL NFR BLD AUTO: 3 % (ref 0–6)
ERYTHROCYTE [DISTWIDTH] IN BLOOD BY AUTOMATED COUNT: 13 % (ref 11.6–15.1)
EST. AVERAGE GLUCOSE BLD GHB EST-MCNC: 123 MG/DL
GFR SERPL CREATININE-BSD FRML MDRD: 72 ML/MIN/1.73SQ M
GLUCOSE P FAST SERPL-MCNC: 100 MG/DL (ref 65–99)
GLUCOSE UR STRIP-MCNC: NEGATIVE MG/DL
HBA1C MFR BLD: 5.9 %
HCT VFR BLD AUTO: 44.5 % (ref 36.5–49.3)
HDLC SERPL-MCNC: 47 MG/DL
HGB BLD-MCNC: 14.7 G/DL (ref 12–17)
HGB UR QL STRIP.AUTO: NEGATIVE
IMM GRANULOCYTES # BLD AUTO: 0.01 THOUSAND/UL (ref 0–0.2)
IMM GRANULOCYTES NFR BLD AUTO: 0 % (ref 0–2)
KETONES UR STRIP-MCNC: NEGATIVE MG/DL
LDLC SERPL CALC-MCNC: 44 MG/DL (ref 0–100)
LEUKOCYTE ESTERASE UR QL STRIP: NEGATIVE
LYMPHOCYTES # BLD AUTO: 1.45 THOUSANDS/ÂΜL (ref 0.6–4.47)
LYMPHOCYTES NFR BLD AUTO: 27 % (ref 14–44)
MCH RBC QN AUTO: 32.2 PG (ref 26.8–34.3)
MCHC RBC AUTO-ENTMCNC: 33 G/DL (ref 31.4–37.4)
MCV RBC AUTO: 98 FL (ref 82–98)
MONOCYTES # BLD AUTO: 0.49 THOUSAND/ÂΜL (ref 0.17–1.22)
MONOCYTES NFR BLD AUTO: 9 % (ref 4–12)
NEUTROPHILS # BLD AUTO: 3.21 THOUSANDS/ÂΜL (ref 1.85–7.62)
NEUTS SEG NFR BLD AUTO: 60 % (ref 43–75)
NITRITE UR QL STRIP: NEGATIVE
NONHDLC SERPL-MCNC: 64 MG/DL
NRBC BLD AUTO-RTO: 0 /100 WBCS
PH UR STRIP.AUTO: 6.5 [PH]
PLATELET # BLD AUTO: 218 THOUSANDS/UL (ref 149–390)
PMV BLD AUTO: 11.1 FL (ref 8.9–12.7)
POTASSIUM SERPL-SCNC: 4 MMOL/L (ref 3.5–5.3)
PROT SERPL-MCNC: 7.1 G/DL (ref 6.4–8.4)
PROT UR STRIP-MCNC: NEGATIVE MG/DL
RBC # BLD AUTO: 4.56 MILLION/UL (ref 3.88–5.62)
SODIUM SERPL-SCNC: 139 MMOL/L (ref 135–147)
SP GR UR STRIP.AUTO: 1.01 (ref 1–1.03)
TRIGL SERPL-MCNC: 99 MG/DL
URATE SERPL-MCNC: 7.4 MG/DL (ref 3.5–8.5)
UROBILINOGEN UR STRIP-ACNC: <2 MG/DL
WBC # BLD AUTO: 5.38 THOUSAND/UL (ref 4.31–10.16)

## 2023-09-11 PROCEDURE — 87086 URINE CULTURE/COLONY COUNT: CPT

## 2023-09-11 PROCEDURE — 36415 COLL VENOUS BLD VENIPUNCTURE: CPT

## 2023-09-11 PROCEDURE — 81003 URINALYSIS AUTO W/O SCOPE: CPT

## 2023-09-11 PROCEDURE — 80061 LIPID PANEL: CPT

## 2023-09-11 PROCEDURE — 83036 HEMOGLOBIN GLYCOSYLATED A1C: CPT

## 2023-09-11 PROCEDURE — 80053 COMPREHEN METABOLIC PANEL: CPT

## 2023-09-11 PROCEDURE — 84550 ASSAY OF BLOOD/URIC ACID: CPT

## 2023-09-11 PROCEDURE — 85025 COMPLETE CBC W/AUTO DIFF WBC: CPT

## 2023-09-12 LAB — BACTERIA UR CULT: NORMAL

## 2023-09-19 ENCOUNTER — OFFICE VISIT (OUTPATIENT)
Age: 73
End: 2023-09-19
Payer: COMMERCIAL

## 2023-09-19 VITALS
WEIGHT: 205 LBS | OXYGEN SATURATION: 97 % | DIASTOLIC BLOOD PRESSURE: 82 MMHG | HEART RATE: 68 BPM | SYSTOLIC BLOOD PRESSURE: 134 MMHG | HEIGHT: 70 IN | BODY MASS INDEX: 29.35 KG/M2

## 2023-09-19 DIAGNOSIS — Z12.5 SCREENING FOR PROSTATE CANCER: ICD-10-CM

## 2023-09-19 DIAGNOSIS — I10 BENIGN ESSENTIAL HYPERTENSION: ICD-10-CM

## 2023-09-19 DIAGNOSIS — M10.9 GOUT: ICD-10-CM

## 2023-09-19 DIAGNOSIS — R73.01 IMPAIRED FASTING GLUCOSE: Primary | ICD-10-CM

## 2023-09-19 DIAGNOSIS — Z23 NEED FOR PNEUMOCOCCAL VACCINATION: ICD-10-CM

## 2023-09-19 DIAGNOSIS — N18.2 CKD (CHRONIC KIDNEY DISEASE) STAGE 2, GFR 60-89 ML/MIN: ICD-10-CM

## 2023-09-19 DIAGNOSIS — E78.2 MIXED HYPERLIPIDEMIA: ICD-10-CM

## 2023-09-19 PROCEDURE — G0439 PPPS, SUBSEQ VISIT: HCPCS | Performed by: INTERNAL MEDICINE

## 2023-09-19 PROCEDURE — 90677 PCV20 VACCINE IM: CPT | Performed by: INTERNAL MEDICINE

## 2023-09-19 PROCEDURE — 99214 OFFICE O/P EST MOD 30 MIN: CPT | Performed by: INTERNAL MEDICINE

## 2023-09-19 PROCEDURE — 90471 IMMUNIZATION ADMIN: CPT | Performed by: INTERNAL MEDICINE

## 2023-09-19 PROCEDURE — G0009 ADMIN PNEUMOCOCCAL VACCINE: HCPCS | Performed by: INTERNAL MEDICINE

## 2023-09-19 RX ORDER — COLCHICINE 0.6 MG/1
TABLET ORAL
Qty: 90 TABLET | Refills: 5 | Status: SHIPPED | OUTPATIENT
Start: 2023-09-19

## 2023-09-19 NOTE — PATIENT INSTRUCTIONS
Lab data reviewed in detail and compared to prior    Hypertension hyperlipidemia remain optimally controlled on present regimen    Impaired fasting glucose-A1c 5.9, continue to monitor carbs    Gout-refill colchicine    Health maintenance-Prevnar 20 today, consider new COVID-vaccine in about 1 month, flu shot next month, PSA prior to follow-up, otherwise up-to-date

## 2023-09-19 NOTE — PROGRESS NOTES
Assessment and Plan:     Problem List Items Addressed This Visit        Endocrine    Impaired fasting glucose - Primary    Relevant Orders    Hemoglobin A1C       Cardiovascular and Mediastinum    Benign essential hypertension    Relevant Orders    CBC and differential    Comprehensive metabolic panel    Lipid panel       Genitourinary    CKD (chronic kidney disease) stage 2, GFR 60-89 ml/min       Other    Hyperlipidemia    Gout    Relevant Medications    colchicine (COLCRYS) 0.6 mg tablet    Other Relevant Orders    Uric acid   Other Visit Diagnoses     Screening for prostate cancer        Relevant Orders    PSA, Total Screen    Need for pneumococcal vaccination        Relevant Orders    Pneumococcal Conjugate Vaccine 20-valent (Pcv20)           Preventive health issues were discussed with patient, and age appropriate screening tests were ordered as noted in patient's After Visit Summary. Personalized health advice and appropriate referrals for health education or preventive services given if needed, as noted in patient's After Visit Summary. History of Present Illness:     Patient presents for a Medicare Wellness Visit    F/u mmp, awv and review labs  Feeling generally well   Notes cystic lesion on R index finger. No pain, but annoying. 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 Having lots of fun. Active w/ work, no other regular exercise. Appetite has increased, gained 7 lbs. Palpitations-rare symptoms, seen by Griffin Flannery in 9/21. HTN-taking rx as directed, rare home bp's have been stable. HPL-tolerating atorvastatin  CKD-keeping well hydrated, no nsaids. IFG-watching carbs  Depression/anxiety-stable, no recent rx. Rare lorazepam for sleep. Script generally expires before he uses it. Gout-stopped colchicine d/t pharmacy warning. Notes occasional mild flares. Occasional etoh. Notes 2-3x nocturia w/ good flow, no urgency or incontinence.   Strong smell to urine lately, but no dysuria. Patient Care Team:  Ajit Tejada MD as PCP - Javed Vargas MD     Review of Systems:     Review of Systems   Constitutional: Negative for chills and fever. HENT: Negative for ear pain and sore throat. Eyes: Negative for pain and visual disturbance. Respiratory: Negative for cough and shortness of breath. Cardiovascular: Negative for chest pain and palpitations. Gastrointestinal: Negative for abdominal pain and vomiting. Genitourinary: Negative for dysuria and hematuria. Musculoskeletal: Negative for arthralgias and back pain. Skin: Negative for color change and rash. Neurological: Negative for seizures and syncope. All other systems reviewed and are negative.        Problem List:     Patient Active Problem List   Diagnosis   • Asthma   • Benign essential hypertension   • BPH without urinary obstruction   • Elevated liver enzymes   • Hyperlipidemia   • Gout   • Impaired fasting glucose   • Tubular adenoma of colon   • History of bilateral knee arthroplasty   • CKD (chronic kidney disease) stage 2, GFR 60-89 ml/min   • PVC's (premature ventricular contractions)   • Smoking      Past Medical and Surgical History:     Past Medical History:   Diagnosis Date   • Arthritis    • Asthma    • Chronic kidney disease    • CKD (chronic kidney disease) stage 3, GFR 30-59 ml/min ([facility]) 09/24/2019   • Depression    • Dyshidrosis    • Hypertension    • Osteoarthritis    • Trigger finger      Past Surgical History:   Procedure Laterality Date   • COLONOSCOPY  02/25/2010   • COLONOSCOPY  09/2020   • HEMORRHOID SURGERY     • JOINT REPLACEMENT  9/7/18   • REPLACEMENT TOTAL KNEE Left    • TONSILLECTOMY     • TRIGGER FINGER RELEASE     • WISDOM TOOTH EXTRACTION        Family History:     Family History   Problem Relation Age of Onset   • Coronary artery disease Mother    • Hyperlipidemia Mother    • Hypertension Mother    • Stroke Mother    • Heart disease Mother    • Stroke Father • Hypertension Father    • Dementia Father    • Asthma Maternal Aunt    • Asthma Maternal Uncle    • Arthritis Maternal Uncle       Social History:     Social History     Socioeconomic History   • Marital status: /Civil Union     Spouse name: None   • Number of children: None   • Years of education: None   • Highest education level: None   Occupational History   • Occupation: IT   Tobacco Use   • Smoking status: Former     Packs/day: 0.00     Years: 2.00     Total pack years: 0.00     Types: Cigarettes     Quit date: 1975     Years since quittin.1   • Smokeless tobacco: Never   • Tobacco comments:     Occasional cigar smoker   Vaping Use   • Vaping Use: Never used   Substance and Sexual Activity   • Alcohol use: Yes     Alcohol/week: 6.0 standard drinks of alcohol     Types: 3 Glasses of wine, 2 Cans of beer, 1 Shots of liquor per week     Comment: occasional   • Drug use: No   • Sexual activity: Not Currently     Partners: Female   Other Topics Concern   • None   Social History Narrative    Living independently with spouse    No advance directives     Social Determinants of Health     Financial Resource Strain: Low Risk  (2023)    Overall Financial Resource Strain (CARDIA)    • Difficulty of Paying Living Expenses: Not hard at all   Food Insecurity: Not on file   Transportation Needs: No Transportation Needs (2023)    PRAPARE - Transportation    • Lack of Transportation (Medical): No    • Lack of Transportation (Non-Medical):  No   Physical Activity: Insufficiently Active (3/15/2021)    Exercise Vital Sign    • Days of Exercise per Week: 3 days    • Minutes of Exercise per Session: 30 min   Stress: No Stress Concern Present (3/15/2021)    [facility]    • Feeling of Stress : Not at all   Social Connections: Not on file   Intimate Partner Violence: Not on file   Housing Stability: Not on file      Medications and Allergies: Current Outpatient Medications   Medication Sig Dispense Refill   • acetaminophen (TYLENOL) 325 mg tablet Take 650 mg by mouth every 6 (six) hours as needed for mild pain     • albuterol (PROVENTIL HFA,VENTOLIN HFA) 90 mcg/act inhaler INHALE 2 PUFFS (180 MCG) BY INHALATION ROUTE EVERY 8 HOURS AS NEEDED 6.7 g 0   • amLODIPine (NORVASC) 5 mg tablet Take 1 tablet (5 mg total) by mouth daily 90 tablet 3   • aspirin (ECOTRIN LOW STRENGTH) 81 mg EC tablet Take 1 tablet by mouth daily       • atorvastatin (LIPITOR) 40 mg tablet Take 1 tablet (40 mg total) by mouth daily 90 tablet 3   • colchicine (COLCRYS) 0.6 mg tablet 1 po qd, up to tid for acute flare. 90 tablet 5   • fluticasone (FLONASE) 50 mcg/act nasal spray 1 spray into each nostril daily 16 g 3   • lisinopril (ZESTRIL) 40 mg tablet TAKE 1 TABLET BY MOUTH EVERY DAY 90 tablet 5   • LORazepam (ATIVAN) 1 mg tablet Take 1 tablet (1 mg total) by mouth as needed for sleep 30 tablet 0   • montelukast (SINGULAIR) 10 mg tablet TAKE 1 TABLET BY MOUTH EVERY DAY IN THE EVENING 90 tablet 3     No current facility-administered medications for this visit.      Allergies   Allergen Reactions   • Cat Hair Extract Anaphylaxis, Hives, Itching, Shortness Of Breath and Swelling   • Other Cough     mold   • Pollen Extract Other (See Comments) and Shortness Of Breath   • Nsaids       Immunizations:     Immunization History   Administered Date(s) Administered   • COVID-19 MODERNA VACC 0.25 ML IM BOOSTER 11/16/2021   • COVID-19 MODERNA VACC 0.5 ML IM 11/16/2021   • COVID-19 Moderna Vac BIVALENT 12 Yr+ IM 0.5 ML 11/17/2022   • COVID-19 PFIZER VACCINE 0.3 ML IM 03/22/2021, 04/12/2021   • INFLUENZA 10/30/2015, 10/15/2017, 11/06/2017, 11/06/2017, 09/24/2019, 09/20/2022   • Influenza Quadrivalent, 6-35 Months IM 10/09/2014, 10/30/2015   • Influenza, high dose seasonal 0.7 mL 10/01/2018, 09/24/2019, 09/29/2020, 09/20/2022   • Pneumococcal Conjugate 13-Valent 10/01/2018   • Pneumococcal Polysaccharide PPV23 1950   • Tdap 1950, 11/02/2015, 12/15/2016   • Zoster 1950      Health Maintenance:         Topic Date Due   • Colorectal Cancer Screening  09/26/2025   • Hepatitis C Screening  Completed         Topic Date Due   • Pneumococcal Vaccine: 65+ Years (2 - PPSV23 if available, else PCV20) 11/26/2018   • COVID-19 Vaccine (6 - Pfizer series) 03/17/2023   • Influenza Vaccine (1) 09/01/2023      Medicare Screening Tests and Risk Assessments:     Markel is here for his Initial Wellness visit. Health Risk Assessment:   Patient rates overall health as good. Patient feels that their physical health rating is same. Patient is satisfied with their life. Eyesight was rated as same. Hearing was rated as same. Patient feels that their emotional and mental health rating is same. Patients states they are never, rarely angry. Patient states they are never, rarely unusually tired/fatigued. Pain experienced in the last 7 days has been some. Patient's pain rating has been 4/10. Patient states that he has experienced no weight loss or gain in last 6 months. Depression Screening:   PHQ-2 Score: 0      Fall Risk Screening: In the past year, patient has experienced: no history of falling in past year      Home Safety:  Patient does not have trouble with stairs inside or outside of their home. Patient has working smoke alarms and has working carbon monoxide detector. Home safety hazards include: none. Nutrition:   Current diet is Regular. Medications:   Patient is currently taking over-the-counter supplements. OTC medications include: see medication list. Patient is able to manage medications. Activities of Daily Living (ADLs)/Instrumental Activities of Daily Living (IADLs):   Walk and transfer into and out of bed and chair?: Yes  Dress and groom yourself?: Yes    Bathe or shower yourself?: Yes    Feed yourself?  Yes  Do your laundry/housekeeping?: Yes  Manage your money, pay your bills and track your expenses?: Yes  Make your own meals?: Yes    Do your own shopping?: Yes    Previous Hospitalizations:   Any hospitalizations or ED visits within the last 12 months?: No      Advance Care Planning:   Living will: Yes    Advanced directive: Yes      Cognitive Screening:   Provider or family/friend/caregiver concerned regarding cognition?: No    PREVENTIVE SCREENINGS      Cardiovascular Screening:    General: Screening Not Indicated and History Lipid Disorder      Diabetes Screening:     General: Screening Current      Colorectal Cancer Screening:     General: Screening Current      Prostate Cancer Screening:    General: Screening Current      Osteoporosis Screening:    General: Screening Not Indicated      Abdominal Aortic Aneurysm (AAA) Screening:    Risk factors include: age between 70-75 yo and tobacco use        Lung Cancer Screening:     General: Screening Not Indicated      Hepatitis C Screening:    General: Screening Current    Screening, Brief Intervention, and Referral to Treatment (SBIRT)    Screening  Typical number of drinks in a day: 1  Typical number of drinks in a week: 7  Interpretation: Low risk drinking behavior. Single Item Drug Screening:  How often have you used an illegal drug (including marijuana) or a prescription medication for non-medical reasons in the past year? never    Single Item Drug Screen Score: 0  Interpretation: Negative screen for possible drug use disorder    No results found. Physical Exam:     /82 (BP Location: Left arm, Patient Position: Sitting, Cuff Size: Standard)   Pulse 68   Ht 5' 9.5" (1.765 m)   Wt 93 kg (205 lb)   SpO2 97%   BMI 29.84 kg/m²     Physical Exam  Vitals and nursing note reviewed. Constitutional:       General: He is not in acute distress. Appearance: He is well-developed. HENT:      Head: Normocephalic and atraumatic.    Eyes:      Conjunctiva/sclera: Conjunctivae normal.   Cardiovascular:      Rate and Rhythm: Normal rate and regular rhythm. Heart sounds: No murmur heard. Pulmonary:      Effort: Pulmonary effort is normal. No respiratory distress. Breath sounds: Normal breath sounds. Abdominal:      Palpations: Abdomen is soft. Tenderness: There is no abdominal tenderness. Musculoskeletal:         General: No swelling. Cervical back: Neck supple. Skin:     General: Skin is warm and dry. Capillary Refill: Capillary refill takes less than 2 seconds. Neurological:      Mental Status: He is alert.    Psychiatric:         Mood and Affect: Mood normal.          Any Vaughn MD

## 2023-10-02 ENCOUNTER — TELEPHONE (OUTPATIENT)
Age: 73
End: 2023-10-02

## 2023-10-09 DIAGNOSIS — M67.40 GANGLION CYST: Primary | ICD-10-CM

## 2023-11-07 ENCOUNTER — HOSPITAL ENCOUNTER (OUTPATIENT)
Dept: RADIOLOGY | Facility: HOSPITAL | Age: 73
Discharge: HOME | End: 2023-11-07
Payer: COMMERCIAL

## 2023-11-07 ENCOUNTER — TELEPHONE (OUTPATIENT)
Age: 73
End: 2023-11-07

## 2023-11-07 ENCOUNTER — OFFICE VISIT (OUTPATIENT)
Age: 73
End: 2023-11-07
Payer: COMMERCIAL

## 2023-11-07 ENCOUNTER — APPOINTMENT (OUTPATIENT)
Dept: LAB | Facility: HOSPITAL | Age: 73
End: 2023-11-07
Attending: INTERNAL MEDICINE
Payer: COMMERCIAL

## 2023-11-07 VITALS
HEIGHT: 70 IN | OXYGEN SATURATION: 97 % | SYSTOLIC BLOOD PRESSURE: 134 MMHG | TEMPERATURE: 98.9 F | BODY MASS INDEX: 29.2 KG/M2 | WEIGHT: 204 LBS | HEART RATE: 84 BPM | DIASTOLIC BLOOD PRESSURE: 84 MMHG

## 2023-11-07 DIAGNOSIS — J18.9 PNEUMONIA OF BOTH LOWER LOBES DUE TO INFECTIOUS ORGANISM: ICD-10-CM

## 2023-11-07 DIAGNOSIS — J18.9 PNEUMONIA OF BOTH LOWER LOBES DUE TO INFECTIOUS ORGANISM: Primary | ICD-10-CM

## 2023-11-07 LAB
ALBUMIN SERPL BCP-MCNC: 4.9 G/DL (ref 3.5–5)
ALP SERPL-CCNC: 80 U/L (ref 34–104)
ALT SERPL W P-5'-P-CCNC: 22 U/L (ref 7–52)
ANION GAP SERPL CALCULATED.3IONS-SCNC: 8 MMOL/L
AST SERPL W P-5'-P-CCNC: 23 U/L (ref 13–39)
BASOPHILS # BLD AUTO: 0.06 THOUSANDS/ÂΜL (ref 0–0.1)
BASOPHILS NFR BLD AUTO: 1 % (ref 0–1)
BILIRUB SERPL-MCNC: 0.71 MG/DL (ref 0.2–1)
BUN SERPL-MCNC: 15 MG/DL (ref 5–25)
CALCIUM SERPL-MCNC: 9.6 MG/DL (ref 8.4–10.2)
CHLORIDE SERPL-SCNC: 102 MMOL/L (ref 96–108)
CO2 SERPL-SCNC: 28 MMOL/L (ref 21–32)
CREAT SERPL-MCNC: 1.16 MG/DL (ref 0.6–1.3)
EOSINOPHIL # BLD AUTO: 0.19 THOUSAND/ÂΜL (ref 0–0.61)
EOSINOPHIL NFR BLD AUTO: 3 % (ref 0–6)
ERYTHROCYTE [DISTWIDTH] IN BLOOD BY AUTOMATED COUNT: 11.8 % (ref 11.6–15.1)
GFR SERPL CREATININE-BSD FRML MDRD: 63 ML/MIN/1.73SQ M
GLUCOSE SERPL-MCNC: 95 MG/DL (ref 65–140)
HCT VFR BLD AUTO: 44 % (ref 36.5–49.3)
HGB BLD-MCNC: 15.5 G/DL (ref 12–17)
IMM GRANULOCYTES # BLD AUTO: 0.02 THOUSAND/UL (ref 0–0.2)
IMM GRANULOCYTES NFR BLD AUTO: 0 % (ref 0–2)
LYMPHOCYTES # BLD AUTO: 1.32 THOUSANDS/ÂΜL (ref 0.6–4.47)
LYMPHOCYTES NFR BLD AUTO: 18 % (ref 14–44)
MCH RBC QN AUTO: 33 PG (ref 26.8–34.3)
MCHC RBC AUTO-ENTMCNC: 35.2 G/DL (ref 31.4–37.4)
MCV RBC AUTO: 94 FL (ref 82–98)
MONOCYTES # BLD AUTO: 0.6 THOUSAND/ÂΜL (ref 0.17–1.22)
MONOCYTES NFR BLD AUTO: 8 % (ref 4–12)
NEUTROPHILS # BLD AUTO: 5.01 THOUSANDS/ÂΜL (ref 1.85–7.62)
NEUTS SEG NFR BLD AUTO: 70 % (ref 43–75)
NRBC BLD AUTO-RTO: 0 /100 WBCS
PLATELET # BLD AUTO: 245 THOUSANDS/UL (ref 149–390)
PMV BLD AUTO: 9.6 FL (ref 8.9–12.7)
POTASSIUM SERPL-SCNC: 3.9 MMOL/L (ref 3.5–5.3)
PROT SERPL-MCNC: 7.9 G/DL (ref 6.4–8.4)
RBC # BLD AUTO: 4.7 MILLION/UL (ref 3.88–5.62)
SODIUM SERPL-SCNC: 138 MMOL/L (ref 135–147)
WBC # BLD AUTO: 7.2 THOUSAND/UL (ref 4.31–10.16)

## 2023-11-07 PROCEDURE — 99213 OFFICE O/P EST LOW 20 MIN: CPT | Performed by: INTERNAL MEDICINE

## 2023-11-07 PROCEDURE — 87040 BLOOD CULTURE FOR BACTERIA: CPT

## 2023-11-07 PROCEDURE — 71046 X-RAY EXAM CHEST 2 VIEWS: CPT

## 2023-11-07 PROCEDURE — 85025 COMPLETE CBC W/AUTO DIFF WBC: CPT

## 2023-11-07 PROCEDURE — 80053 COMPREHEN METABOLIC PANEL: CPT

## 2023-11-07 PROCEDURE — 36415 COLL VENOUS BLD VENIPUNCTURE: CPT

## 2023-11-07 RX ORDER — PREDNISONE 10 MG/1
10 TABLET ORAL DAILY
Qty: 20 TABLET | Refills: 0 | Status: SHIPPED | OUTPATIENT
Start: 2023-11-07

## 2023-11-07 RX ORDER — AZITHROMYCIN 250 MG/1
TABLET, FILM COATED ORAL
Qty: 6 TABLET | Refills: 0 | Status: SHIPPED | OUTPATIENT
Start: 2023-11-07 | End: 2023-11-12

## 2023-11-07 NOTE — PATIENT INSTRUCTIONS
Check labs and stat x-ray. Start Zithromax and prednisone taper. Also  Mucinex DM maximal strength over-the-counter. Use albuterol 2 puffs every 4 hours as needed for congestion and wheezing.

## 2023-11-07 NOTE — PROGRESS NOTES
Assessment/Plan:    Diagnoses and all orders for this visit:    Pneumonia of both lower lobes due to infectious organism  -     XR chest pa & lateral; Future  -     azithromycin (Zithromax) 250 mg tablet; Take 2 tablets (500 mg total) by mouth daily for 1 day, THEN 1 tablet (250 mg total) daily for 4 days. -     predniSONE 10 mg tablet; Take 1 tablet (10 mg total) by mouth daily 4 po daily x 2, 3 po daily x 2, 2 po daily x 2, 1 po daily x 2  -     CBC and differential; Future  -     Comprehensive metabolic panel; Future  -     Blood culture; Future              Patient Instructions   Check labs and stat x-ray. Start Zithromax and prednisone taper. Also  Mucinex DM maximal strength over-the-counter. Use albuterol 2 puffs every 4 hours as needed for congestion and wheezing. Subjective:      Patient ID: Mahnaz Hammond is a 72 y.o. male    Patient presents acutely for evaluation of cough and fever. Symptoms started approximately 10 days ago. He has had fever as high as 101 that responds to Tylenol but then comes back several hours later. He has had at least low-grade fevers up to 99-1/2 each day. Cough has been minimally productive. He is not having chest pain or shortness of breath. No runny nose or sore throat. He has had 2 COVID tests over the course of illness that were both negative. He carries albuterol, but hasn't used it.            Current Outpatient Medications:     acetaminophen (TYLENOL) 325 mg tablet, Take 650 mg by mouth every 6 (six) hours as needed for mild pain, Disp: , Rfl:     albuterol (PROVENTIL HFA,VENTOLIN HFA) 90 mcg/act inhaler, INHALE 2 PUFFS (180 MCG) BY INHALATION ROUTE EVERY 8 HOURS AS NEEDED, Disp: 6.7 g, Rfl: 0    amLODIPine (NORVASC) 5 mg tablet, Take 1 tablet (5 mg total) by mouth daily, Disp: 90 tablet, Rfl: 3    aspirin (ECOTRIN LOW STRENGTH) 81 mg EC tablet, Take 1 tablet by mouth daily  , Disp: , Rfl:     atorvastatin (LIPITOR) 40 mg tablet, Take 1 tablet (40 mg total) by mouth daily, Disp: 90 tablet, Rfl: 3    azithromycin (Zithromax) 250 mg tablet, Take 2 tablets (500 mg total) by mouth daily for 1 day, THEN 1 tablet (250 mg total) daily for 4 days. , Disp: 6 tablet, Rfl: 0    colchicine (COLCRYS) 0.6 mg tablet, 1 po qd, up to tid for acute flare., Disp: 90 tablet, Rfl: 5    fluticasone (FLONASE) 50 mcg/act nasal spray, 1 spray into each nostril daily, Disp: 16 g, Rfl: 3    lisinopril (ZESTRIL) 40 mg tablet, TAKE 1 TABLET BY MOUTH EVERY DAY, Disp: 90 tablet, Rfl: 5    LORazepam (ATIVAN) 1 mg tablet, Take 1 tablet (1 mg total) by mouth as needed for sleep, Disp: 30 tablet, Rfl: 0    montelukast (SINGULAIR) 10 mg tablet, TAKE 1 TABLET BY MOUTH EVERY DAY IN THE EVENING, Disp: 90 tablet, Rfl: 3    predniSONE 10 mg tablet, Take 1 tablet (10 mg total) by mouth daily 4 po daily x 2, 3 po daily x 2, 2 po daily x 2, 1 po daily x 2, Disp: 20 tablet, Rfl: 0    No results found for this or any previous visit (from the past 1008 hour(s)). The following portions of the patient's history were reviewed and updated as appropriate: allergies, current medications, past family history, past medical history, past social history, past surgical history and problem list.     Review of Systems   Constitutional:  Negative for appetite change, chills, diaphoresis, fatigue, fever and unexpected weight change. HENT:  Negative for congestion, hearing loss and rhinorrhea. Eyes:  Negative for visual disturbance. Respiratory:  Positive for cough. Negative for chest tightness, shortness of breath and wheezing. Cardiovascular:  Negative for chest pain, palpitations and leg swelling. Gastrointestinal:  Negative for abdominal pain and blood in stool. Endocrine: Negative for cold intolerance, heat intolerance, polydipsia and polyuria. Genitourinary:  Negative for difficulty urinating, dysuria, frequency and urgency. Musculoskeletal:  Negative for arthralgias and myalgias.    Skin:  Negative for rash. Neurological:  Negative for dizziness, weakness, light-headedness and headaches. Hematological:  Does not bruise/bleed easily. Psychiatric/Behavioral:  Negative for dysphoric mood and sleep disturbance. Objective:      Vitals:    11/07/23 1517   BP: 134/84   Pulse: 84   Temp: 98.9 °F (37.2 °C)   SpO2: 97%          Physical Exam  Constitutional:       Appearance: He is well-developed. HENT:      Head: Normocephalic and atraumatic. Nose: Nose normal.   Eyes:      General: No scleral icterus. Conjunctiva/sclera: Conjunctivae normal.      Pupils: Pupils are equal, round, and reactive to light. Neck:      Thyroid: No thyromegaly. Vascular: No JVD. Trachea: No tracheal deviation. Cardiovascular:      Rate and Rhythm: Normal rate and regular rhythm. Heart sounds: No murmur heard. No friction rub. No gallop. Pulmonary:      Effort: Pulmonary effort is normal. No respiratory distress. Breath sounds: Normal breath sounds. No wheezing or rales. Comments: Bibasilar crackles with scattered wheezes predominantly in the right mid to upper lung field and prolonged expiratory phase  Musculoskeletal:         General: No deformity. Cervical back: Normal range of motion and neck supple. Lymphadenopathy:      Cervical: No cervical adenopathy. Skin:     General: Skin is warm and dry. Coloration: Skin is not pale. Findings: No erythema or rash. Neurological:      Mental Status: He is alert and oriented to person, place, and time. Cranial Nerves: No cranial nerve deficit. Psychiatric:         Behavior: Behavior normal.         Thought Content:  Thought content normal.         Judgment: Judgment normal.

## 2023-11-08 DIAGNOSIS — R93.89 ABNORMAL CHEST X-RAY: Primary | ICD-10-CM

## 2023-11-08 NOTE — PROGRESS NOTES
Patient notified of abnormality seen on chest x-ray. CT ordered and rationale discussed. We will follow CT accordingly. His bronchitis is improving on current therapy.

## 2023-11-12 LAB — BACTERIA BLD CULT: NORMAL

## 2023-11-14 ENCOUNTER — HOSPITAL ENCOUNTER (OUTPATIENT)
Dept: CT IMAGING | Facility: CLINIC | Age: 73
Discharge: HOME | End: 2023-11-14
Payer: COMMERCIAL

## 2023-11-14 DIAGNOSIS — R93.89 ABNORMAL CHEST X-RAY: ICD-10-CM

## 2023-11-14 PROCEDURE — G1004 CDSM NDSC: HCPCS

## 2023-11-14 PROCEDURE — 71250 CT THORAX DX C-: CPT

## 2023-11-17 ENCOUNTER — OFFICE VISIT (OUTPATIENT)
Age: 73
End: 2023-11-17
Payer: COMMERCIAL

## 2023-11-17 VITALS
BODY MASS INDEX: 29.35 KG/M2 | OXYGEN SATURATION: 97 % | TEMPERATURE: 98.1 F | HEART RATE: 68 BPM | SYSTOLIC BLOOD PRESSURE: 126 MMHG | DIASTOLIC BLOOD PRESSURE: 86 MMHG | WEIGHT: 205 LBS | HEIGHT: 70 IN

## 2023-11-17 DIAGNOSIS — J06.9 UPPER RESPIRATORY TRACT INFECTION, UNSPECIFIED TYPE: Primary | ICD-10-CM

## 2023-11-17 LAB
DME PARACHUTE DELIVERY DATE REQUESTED: NORMAL
DME PARACHUTE ITEM DESCRIPTION: NORMAL
DME PARACHUTE ORDER STATUS: NORMAL
DME PARACHUTE SUPPLIER NAME: NORMAL
DME PARACHUTE SUPPLIER PHONE: NORMAL

## 2023-11-17 PROCEDURE — 99214 OFFICE O/P EST MOD 30 MIN: CPT

## 2023-11-17 PROCEDURE — 96372 THER/PROPH/DIAG INJ SC/IM: CPT

## 2023-11-17 PROCEDURE — 94640 AIRWAY INHALATION TREATMENT: CPT

## 2023-11-17 RX ORDER — IPRATROPIUM BROMIDE AND ALBUTEROL SULFATE 2.5; .5 MG/3ML; MG/3ML
3 SOLUTION RESPIRATORY (INHALATION) ONCE
Status: COMPLETED | OUTPATIENT
Start: 2023-11-17 | End: 2023-11-17

## 2023-11-17 RX ORDER — IPRATROPIUM BROMIDE AND ALBUTEROL SULFATE 2.5; .5 MG/3ML; MG/3ML
3 SOLUTION RESPIRATORY (INHALATION) 3 TIMES DAILY
Qty: 120 ML | Refills: 1 | Status: SHIPPED | OUTPATIENT
Start: 2023-11-17

## 2023-11-17 RX ORDER — PREDNISONE 10 MG/1
TABLET ORAL
Qty: 28 TABLET | Refills: 0 | Status: SHIPPED | OUTPATIENT
Start: 2023-11-17

## 2023-11-17 RX ADMIN — IPRATROPIUM BROMIDE AND ALBUTEROL SULFATE 3 ML: 2.5; .5 SOLUTION RESPIRATORY (INHALATION) at 16:05

## 2023-11-17 NOTE — PROGRESS NOTES
INTERNAL MEDICINE FOLLOW-UP VISIT  [facility] - [facility] INTERNAL MEDICINE [address]    NAME: Markel López  AGE: 72 y.o. SEX: male  : 1950     DATE: 2023     Assessment and Plan:   1. Upper respiratory tract infection, unspecified type  Continues with decreased lung sound bibasilar along with bilateral rhonchi. Does have some wheezing present. He states he is not using his rescue inhaler regularly but does admit to shortness of breath. He does admit he is able to sleep better and the cough does not keep him awake. He did have a CAT scan of his chest which was completed but has not read at this time. Will monitor for this  DuoNeb today in the office. Significant improvement with lung sounds as well as cough and a decrease in shortness of breath after nebulizer. Nebulizer package ordered through [address] [place] [address] through [place] as well as ordered DuoNebs for patient to do 3 times a day until he is feeling better. We will also repeat course of prednisone. Patient to come in next week for a follow-up appointment or sooner if he has not improved      No follow-ups on file. Chief Complaint:     Chief Complaint   Patient presents with   • Cough     Started 3 weeks ago. • Shortness of Breath      History of Present Illness:     Markel is a 74-year-old male patient who presents to the office today with a continued cough. He was treated with antibiotic and prednisone on . He completed the course of medication as well as prednisone however continues with cough and shortness of breath.   He reports some improvement    The following portions of the patient's history were reviewed and updated as appropriate: allergies, current medications, past family history, past medical history, past social history, past surgical history and problem list.     Review of Systems:     Review of Systems   Constitutional:  Negative for appetite change, chills, diaphoresis, fatigue, fever and unexpected weight change. HENT:  Negative for postnasal drip and sneezing. Eyes:  Negative for visual disturbance. Respiratory:  Positive for cough and shortness of breath. Negative for chest tightness. Cardiovascular:  Negative for chest pain, palpitations and leg swelling. Gastrointestinal:  Negative for abdominal pain and blood in stool. Endocrine: Negative for cold intolerance, heat intolerance, polydipsia, polyphagia and polyuria. Genitourinary:  Negative for difficulty urinating, dysuria, frequency and urgency. Musculoskeletal:  Negative for arthralgias and myalgias. Skin:  Negative for rash and wound. Neurological:  Negative for dizziness, weakness, light-headedness and headaches. Hematological:  Negative for adenopathy. Psychiatric/Behavioral:  Negative for confusion, dysphoric mood and sleep disturbance. The patient is not nervous/anxious. Mini neb    Performed by: KAYLEY Lujan  Authorized by: KAYLEY Lujan  Universal Protocol:  Consent: Verbal consent obtained.   Risks and benefits: risks, benefits and alternatives were discussed  Consent given by: patient  Patient understanding: patient states understanding of the procedure being performed    Number of treatments:  1  Treatment 1:   Pre-Procedure     Symptoms:  Wheezing and cough    Medication Administered:  Duoneb - Albuterol 2.5 mg/Atrovent 0.5 mg  Post-Procedure     Symptoms:  Wheezing  Treatment 3:   Pre-Procedure     Symptoms:  Wheezing, shortness of breath and cough         Past Medical History:     Past Medical History:   Diagnosis Date   • Arthritis    • Asthma    • Chronic kidney disease    • CKD (chronic kidney disease) stage 3, GFR 30-59 ml/min ([facility]) 09/24/2019   • Depression    • Dyshidrosis    • Hypertension    • Osteoarthritis    • Trigger finger         Current Medications:     Current Outpatient Medications:   •  acetaminophen (TYLENOL) 325 mg tablet, Take 650 mg by mouth every 6 (six) hours as needed for mild pain, Disp: , Rfl:   •  albuterol (PROVENTIL HFA,VENTOLIN HFA) 90 mcg/act inhaler, INHALE 2 PUFFS (180 MCG) BY INHALATION ROUTE EVERY 8 HOURS AS NEEDED, Disp: 6.7 g, Rfl: 0  •  amLODIPine (NORVASC) 5 mg tablet, Take 1 tablet (5 mg total) by mouth daily, Disp: 90 tablet, Rfl: 3  •  aspirin (ECOTRIN LOW STRENGTH) 81 mg EC tablet, Take 1 tablet by mouth daily  , Disp: , Rfl:   •  atorvastatin (LIPITOR) 40 mg tablet, Take 1 tablet (40 mg total) by mouth daily, Disp: 90 tablet, Rfl: 3  •  colchicine (COLCRYS) 0.6 mg tablet, 1 po qd, up to tid for acute flare., Disp: 90 tablet, Rfl: 5  •  fluticasone (FLONASE) 50 mcg/act nasal spray, 1 spray into each nostril daily, Disp: 16 g, Rfl: 3  •  ipratropium-albuterol (DUO-NEB) 0.5-2.5 mg/3 mL nebulizer solution, Take 3 mL by nebulization 3 (three) times a day, Disp: 120 mL, Rfl: 1  •  lisinopril (ZESTRIL) 40 mg tablet, TAKE 1 TABLET BY MOUTH EVERY DAY, Disp: 90 tablet, Rfl: 5  •  LORazepam (ATIVAN) 1 mg tablet, Take 1 tablet (1 mg total) by mouth as needed for sleep, Disp: 30 tablet, Rfl: 0  •  montelukast (SINGULAIR) 10 mg tablet, TAKE 1 TABLET BY MOUTH EVERY DAY IN THE EVENING, Disp: 90 tablet, Rfl: 3  •  predniSONE 10 mg tablet, Take 1 tablet (10 mg total) by mouth daily 4 po daily x 2, 3 po daily x 2, 2 po daily x 2, 1 po daily x 2, Disp: 20 tablet, Rfl: 0  •  predniSONE 10 mg tablet, Take 4 tablets for 3 days then 3 tablets for 3 days then 2 tablet for 3 days 1 for 1 day, Disp: 28 tablet, Rfl: 0  No current facility-administered medications for this visit. Allergies:      Allergies   Allergen Reactions   • Cat Hair Extract Anaphylaxis, Hives, Itching, Shortness Of Breath and Swelling   • Other Cough     mold   • Pollen Extract Other (See Comments) and Shortness Of Breath   • Nsaids         Physical Exam:     /86 (BP Location: Left arm, Patient Position: Sitting, Cuff Size: Standard)   Pulse 68   Temp 98.1 °F (36.7 °C)   Ht 5' 9.5" (1.765 m)   Wt 93 kg (205 lb)   SpO2 97%   BMI 29.84 kg/m²     Physical Exam  Constitutional:       Appearance: He is well-developed. HENT:      Head: Normocephalic and atraumatic. Eyes:      Conjunctiva/sclera: Conjunctivae normal.      Pupils: Pupils are equal, round, and reactive to light. Cardiovascular:      Rate and Rhythm: Normal rate and regular rhythm. Heart sounds: Normal heart sounds. Pulmonary:      Effort: Pulmonary effort is normal.      Breath sounds: Examination of the right-middle field reveals wheezing and rhonchi. Examination of the left-middle field reveals wheezing and rhonchi. Examination of the right-lower field reveals wheezing and rhonchi. Examination of the left-lower field reveals wheezing and rhonchi. Wheezing and rhonchi present. Abdominal:      General: Bowel sounds are normal.      Palpations: Abdomen is soft. Musculoskeletal:         General: Normal range of motion. Cervical back: Normal range of motion. Skin:     General: Skin is warm and dry. Neurological:      Mental Status: He is alert and oriented to person, place, and time. Data:     Laboratory Results: I have personally reviewed the pertinent laboratory results/reports   Radiology/Other Diagnostic Testing Results: I have personally reviewed pertinent reports.       Jessee Mason, [address] [facility] INTERNAL MEDICINE [address]

## 2023-11-20 DIAGNOSIS — J06.9 UPPER RESPIRATORY TRACT INFECTION, UNSPECIFIED TYPE: Primary | ICD-10-CM

## 2023-11-20 RX ORDER — IPRATROPIUM BROMIDE AND ALBUTEROL SULFATE 2.5; .5 MG/3ML; MG/3ML
3 SOLUTION RESPIRATORY (INHALATION) 3 TIMES DAILY
Qty: 120 ML | Refills: 1 | OUTPATIENT
Start: 2023-11-20

## 2023-11-20 NOTE — TELEPHONE ENCOUNTER
----- Message from [place]. Araseli Samano sent at 11/20/2023  9:12 AM EST -----  Regarding: Nebulizer  Contact: [phone]  Could you change my prescription for the nebulizer to [facility] in [facility]? They are much closer to my house? Thank you .

## 2023-11-21 ENCOUNTER — TELEPHONE (OUTPATIENT)
Age: 73
End: 2023-11-21

## 2023-11-21 DIAGNOSIS — I71.21 ANEURYSM OF ASCENDING AORTA WITHOUT RUPTURE (HCC): Primary | ICD-10-CM

## 2023-11-21 NOTE — TELEPHONE ENCOUNTER
I placed a referral for cardiothoracic surgery at this time, if he still feeling short of breath with a cough? He is seeing Letty Bee tomorrow for a follow-up. She can reassess then unless he is extremely short of breath or having fevers.

## 2023-11-22 ENCOUNTER — OFFICE VISIT (OUTPATIENT)
Age: 73
End: 2023-11-22
Payer: COMMERCIAL

## 2023-11-22 ENCOUNTER — TELEPHONE (OUTPATIENT)
Age: 73
End: 2023-11-22

## 2023-11-22 VITALS
HEIGHT: 69 IN | HEART RATE: 97 BPM | WEIGHT: 203.4 LBS | OXYGEN SATURATION: 98 % | DIASTOLIC BLOOD PRESSURE: 80 MMHG | BODY MASS INDEX: 30.13 KG/M2 | TEMPERATURE: 98.7 F | SYSTOLIC BLOOD PRESSURE: 130 MMHG

## 2023-11-22 DIAGNOSIS — J06.9 UPPER RESPIRATORY TRACT INFECTION, UNSPECIFIED TYPE: ICD-10-CM

## 2023-11-22 DIAGNOSIS — R91.1 PULMONARY NODULE: Primary | ICD-10-CM

## 2023-11-22 DIAGNOSIS — I71.21 ANEURYSM OF ASCENDING AORTA WITHOUT RUPTURE (HCC): ICD-10-CM

## 2023-11-22 PROCEDURE — 99214 OFFICE O/P EST MOD 30 MIN: CPT

## 2023-11-22 NOTE — PROGRESS NOTES
INTERNAL MEDICINE FOLLOW-UP VISIT  [facility] - [facility] INTERNAL MEDICINE [address]    NAME: Markel Keith  AGE: 72 y.o. SEX: male  : 1950     DATE: 2023     Assessment and Plan:     Pulmonary nodule  Incidental 5 mm right upper lobe noncalcified pulmonary nodule and juxtapleural left lower lobe 4 mm pulmonary nodule noted on CAT scan of chest.  We will repeat CAT scan in 1 year. Was a tobacco smoker but quit greater than 30 years ago. Will have an occasional cigar    Thoracic aorta aneurysm  Incidental fusiform aneurysmal enlargement of a sending thoracic aorta measuring 46 mm. Cardiothoracic surgery consult placed. Explained to patient the risks of aneurysm     Upper respiratory infection  Finishing second round of prednisone, feeling a little better but still has residual cough. Using nebulizer duo neb. Will return in 4 weeks to assess lungs or sooner if needed      Return in about 4 weeks (around 2023) for Recheck- lung check, f/u on aneurysm. Chief Complaint:     Chief Complaint   Patient presents with    Follow-up      History of Present Illness:     Markel is a 72-year-old male who presents today in the office for a follow-up post viral infection. He is improving however he continues with a dry cough. He is finishing up his second course of prednisone as well as on DuoNeb at home. The following portions of the patient's history were reviewed and updated as appropriate: allergies, current medications, past family history, past medical history, past social history, past surgical history and problem list.     Review of Systems:     Review of Systems   Constitutional:  Negative for appetite change, chills, diaphoresis, fatigue, fever and unexpected weight change. HENT:  Negative for postnasal drip and sneezing. Eyes:  Negative for visual disturbance. Respiratory:  Positive for cough. Negative for chest tightness and shortness of breath. Cardiovascular:  Negative for chest pain, palpitations and leg swelling. Gastrointestinal:  Negative for abdominal pain and blood in stool. Endocrine: Negative for cold intolerance, heat intolerance, polydipsia, polyphagia and polyuria. Genitourinary:  Negative for difficulty urinating, dysuria, frequency and urgency. Musculoskeletal:  Negative for arthralgias and myalgias. Skin:  Negative for rash and wound. Neurological:  Negative for dizziness, weakness, light-headedness and headaches. Hematological:  Negative for adenopathy. Psychiatric/Behavioral:  Negative for confusion, dysphoric mood and sleep disturbance. The patient is not nervous/anxious.          Past Medical History:     Past Medical History:   Diagnosis Date    Arthritis     Asthma     Chronic kidney disease     CKD (chronic kidney disease) stage 3, GFR 30-59 ml/min ([facility]) 09/24/2019    Depression     Dyshidrosis     Hypertension     Osteoarthritis     Trigger finger         Current Medications:     Current Outpatient Medications:     acetaminophen (TYLENOL) 325 mg tablet, Take 650 mg by mouth every 6 (six) hours as needed for mild pain, Disp: , Rfl:     albuterol (PROVENTIL HFA,VENTOLIN HFA) 90 mcg/act inhaler, INHALE 2 PUFFS (180 MCG) BY INHALATION ROUTE EVERY 8 HOURS AS NEEDED, Disp: 6.7 g, Rfl: 0    amLODIPine (NORVASC) 5 mg tablet, Take 1 tablet (5 mg total) by mouth daily, Disp: 90 tablet, Rfl: 3    aspirin (ECOTRIN LOW STRENGTH) 81 mg EC tablet, Take 1 tablet by mouth daily  , Disp: , Rfl:     atorvastatin (LIPITOR) 40 mg tablet, Take 1 tablet (40 mg total) by mouth daily, Disp: 90 tablet, Rfl: 3    colchicine (COLCRYS) 0.6 mg tablet, 1 po qd, up to tid for acute flare., Disp: 90 tablet, Rfl: 5    fluticasone (FLONASE) 50 mcg/act nasal spray, 1 spray into each nostril daily, Disp: 16 g, Rfl: 3    ipratropium-albuterol (DUO-NEB) 0.5-2.5 mg/3 mL nebulizer solution, Take 3 mL by nebulization 3 (three) times a day, Disp: 120 mL, Rfl: 1    lisinopril (ZESTRIL) 40 mg tablet, TAKE 1 TABLET BY MOUTH EVERY DAY, Disp: 90 tablet, Rfl: 5    LORazepam (ATIVAN) 1 mg tablet, Take 1 tablet (1 mg total) by mouth as needed for sleep, Disp: 30 tablet, Rfl: 0    montelukast (SINGULAIR) 10 mg tablet, TAKE 1 TABLET BY MOUTH EVERY DAY IN THE EVENING, Disp: 90 tablet, Rfl: 3    predniSONE 10 mg tablet, Take 1 tablet (10 mg total) by mouth daily 4 po daily x 2, 3 po daily x 2, 2 po daily x 2, 1 po daily x 2, Disp: 20 tablet, Rfl: 0    predniSONE 10 mg tablet, Take 4 tablets for 3 days then 3 tablets for 3 days then 2 tablet for 3 days 1 for 1 day, Disp: 28 tablet, Rfl: 0     Allergies: Allergies   Allergen Reactions    Cat Hair Extract Anaphylaxis, Hives, Itching, Shortness Of Breath and Swelling    Other Cough     mold    Pollen Extract Other (See Comments) and Shortness Of Breath    Nsaids         Physical Exam:     /80 (BP Location: Left arm, Patient Position: Sitting, Cuff Size: Standard)   Pulse 97   Temp 98.7 °F (37.1 °C) (Temporal)   Ht 5' 9" (1.753 m)   Wt 92.3 kg (203 lb 6.4 oz)   SpO2 98%   BMI 30.04 kg/m²     Physical Exam  Constitutional:       Appearance: He is well-developed. HENT:      Head: Normocephalic and atraumatic. Eyes:      Conjunctiva/sclera: Conjunctivae normal.      Pupils: Pupils are equal, round, and reactive to light. Cardiovascular:      Rate and Rhythm: Normal rate and regular rhythm. Heart sounds: Normal heart sounds. Pulmonary:      Effort: Pulmonary effort is normal.      Breath sounds: Examination of the right-lower field reveals wheezing. Wheezing present. Abdominal:      General: Bowel sounds are normal.      Palpations: Abdomen is soft. Musculoskeletal:         General: Normal range of motion. Cervical back: Normal range of motion. Skin:     General: Skin is warm and dry. Neurological:      Mental Status: He is alert and oriented to person, place, and time.            Data:     Laboratory Results: I have personally reviewed the pertinent laboratory results/reports   Radiology/Other Diagnostic Testing Results: I have personally reviewed pertinent reports.       Alfreda Rice, [address] [facility] INTERNAL MEDICINE [address]

## 2023-11-22 NOTE — LETTER
November 22, 2023     Patient: Jose Hernandez  YOB: 1950  Date of Visit: 11/22/2023      To Whom it May Concern:    Markel Hilliard is under my professional care. Markel was seen in my office on 11/22/2023. Markel may return to work on 12/04/2023 . If you have any questions or concerns, please don't hesitate to call.          Sincerely,          KAYLEY Galaviz        CC: No Recipients

## 2023-11-24 ENCOUNTER — TELEPHONE (OUTPATIENT)
Age: 73
End: 2023-11-24

## 2023-11-24 NOTE — TELEPHONE ENCOUNTER
Patient came in the office and stated that no one has called him from the Thoracic Surgery center in [facility] as of yet. Called their office they stated that the referral has been crossed out and has been rerouted to Vascular Surgery. Patient had left the office, so I called him and informed him of this.

## 2023-11-27 NOTE — TELEPHONE ENCOUNTER
An order for vascular needs to be placed in [facility]? When should patient expect a call from them to set up?

## 2023-11-28 ENCOUNTER — TELEPHONE (OUTPATIENT)
Age: 73
End: 2023-11-28

## 2023-11-28 NOTE — TELEPHONE ENCOUNTER
Patient called to get information on his referral because as per the patient no one has called him from the Thoracic Surgery center. Informed the patient that the referral has been changed to Vascular surgery, provided him the number to Central Scheduling to get assistance setting up an appointment there. Told the patient to call us back if he has any issues.

## 2023-12-03 DIAGNOSIS — J06.9 UPPER RESPIRATORY TRACT INFECTION, UNSPECIFIED TYPE: ICD-10-CM

## 2023-12-04 ENCOUNTER — OFFICE VISIT (OUTPATIENT)
Dept: CARDIAC SURGERY | Facility: CLINIC | Age: 73
End: 2023-12-04
Payer: COMMERCIAL

## 2023-12-04 VITALS
HEIGHT: 69 IN | DIASTOLIC BLOOD PRESSURE: 75 MMHG | SYSTOLIC BLOOD PRESSURE: 128 MMHG | HEART RATE: 94 BPM | WEIGHT: 205 LBS | OXYGEN SATURATION: 96 % | BODY MASS INDEX: 30.36 KG/M2

## 2023-12-04 DIAGNOSIS — I71.21 ANEURYSM OF ASCENDING AORTA WITHOUT RUPTURE (HCC): ICD-10-CM

## 2023-12-04 PROCEDURE — 99214 OFFICE O/P EST MOD 30 MIN: CPT | Performed by: THORACIC SURGERY (CARDIOTHORACIC VASCULAR SURGERY)

## 2023-12-04 RX ORDER — IPRATROPIUM BROMIDE AND ALBUTEROL SULFATE 2.5; .5 MG/3ML; MG/3ML
SOLUTION RESPIRATORY (INHALATION)
Qty: 90 ML | OUTPATIENT
Start: 2023-12-04

## 2023-12-04 NOTE — PROGRESS NOTES
Consultation - Cardiothoracic Surgery   Markel Babb 72 y.o. male MRN: [id number]    Physician Requesting Consult: Leesa Bhatt PA-C     Reason for Consult / Principal Problem: Ascending aortic aneurysm    History of Present Illness: Markel Babb is a 72y.o. year old male who presents today for surgical consultation for ascending aortic aneurysm. He has a PMHx Asthma, arthritis, depression, HTN, OA, trigger finger, who has been referred for evaluation of an incidental finding of an ascending aortic aneurysm measuring 4.6 cm on CT chest. This was obtained after a CXR demonstrated an abnormal finding. Patient had recently had pneumonia, and was being treated with antibiotics. He is feeling better now, and has completed his treatment. He was referred to cardiac surgery for aneurysm evaluation. Upon interview today, patient denies symptoms of chest pain, HILARIO, numbness/tingling/paresthesias, syncope, lightheadedness, lower extremity edema, abdominal pain. He admits to occasional heart burn, unrelated to physical activity. His blood pressure is usually around 718-555 systolic per review of prior office visit notes. He denies family history of aneurysms or dissections. Family history is notable for CAD - his mom had a CABG x4-5 at the age of 71. Maternal uncles both  suddenly at the ages of 28 and 47. He also has a cousin on his mom's side who  suddenly of presumed cardiac reasons at the age of 79 in August. His dad's side of the family has history notable for HTN, strokes and cancer. Patient is a remote former smoker - quit in . He does smoke about one cigar a week in the summer. He has a glass of bourbon every evening. He does not use drugs. He works at [employer] full time doing some occasional heavy lifting.      Past Medical History:  Past Medical History:   Diagnosis Date    Arthritis     Asthma     Chronic kidney disease     CKD (chronic kidney disease) stage 3, GFR 30-59 ml/min ([facility]) 2019    Depression     Dyshidrosis     Hypertension     Osteoarthritis     Trigger finger          Past Surgical History:   Past Surgical History:   Procedure Laterality Date    COLONOSCOPY  2010    COLONOSCOPY  2020    HEMORRHOID SURGERY      JOINT REPLACEMENT  20    REPLACEMENT TOTAL KNEE Left     TONSILLECTOMY      TRIGGER FINGER RELEASE      WISDOM TOOTH EXTRACTION           Family History:  Family History   Problem Relation Age of Onset    Coronary artery disease Mother     Hyperlipidemia Mother     Hypertension Mother     Stroke Mother     Heart disease Mother     Stroke Father     Hypertension Father     Dementia Father     Asthma Maternal Aunt     Asthma Maternal Uncle     Arthritis Maternal Uncle          Social History:    Social History     Substance and Sexual Activity   Alcohol Use Yes    Alcohol/week: 6.0 standard drinks of alcohol    Types: 3 Glasses of wine, 2 Cans of beer, 1 Shots of liquor per week    Comment: occasional     Social History     Substance and Sexual Activity   Drug Use No     Social History     Tobacco Use   Smoking Status Former    Packs/day: 0.00    Years: 2.00    Total pack years: 0.00    Types: Cigarettes    Quit date: 1975    Years since quittin.3   Smokeless Tobacco Never   Tobacco Comments    Occasional cigar smoker       Marital Status: [unfilled]    Home Medications:   Prior to Admission medications    Medication Sig Start Date End Date Taking?  Authorizing Provider   acetaminophen (TYLENOL) 325 mg tablet Take 650 mg by mouth every 6 (six) hours as needed for mild pain    Historical Provider, MD   albuterol (PROVENTIL HFA,VENTOLIN HFA) 90 mcg/act inhaler INHALE 2 PUFFS (180 MCG) BY INHALATION ROUTE EVERY 8 HOURS AS NEEDED 22   Oscar Castellanos MD   amLODIPine (NORVASC) 5 mg tablet Take 1 tablet (5 mg total) by mouth daily 23   Jade Patiño MD   aspirin (ECOTRIN LOW STRENGTH) 81 mg EC tablet Take 1 tablet by mouth daily Historical Provider, MD   atorvastatin (LIPITOR) 40 mg tablet Take 1 tablet (40 mg total) by mouth daily 4/14/23   Hermilo Mccall MD   colchicine (COLCRYS) 0.6 mg tablet 1 po qd, up to tid for acute flare. 9/19/23   Hermilo Mccall MD   fluticasone Sidra Scales) 50 mcg/act nasal spray 1 spray into each nostril daily 10/26/22   Hermilo Mccall MD   ipratropium-albuterol (DUO-NEB) 0.5-2.5 mg/3 mL nebulizer solution Take 3 mL by nebulization 3 (three) times a day 11/17/23   Leroy Epley, CRNP   lisinopril (ZESTRIL) 40 mg tablet TAKE 1 TABLET BY MOUTH EVERY DAY 4/11/23   Hermilo Mccall MD   LORazepam (ATIVAN) 1 mg tablet Take 1 tablet (1 mg total) by mouth as needed for sleep 3/27/23   Hermilo Mccall MD   montelukast (SINGULAIR) 10 mg tablet TAKE 1 TABLET BY MOUTH EVERY DAY IN THE EVENING 3/21/23   KAYLEY Covarrubias   predniSONE 10 mg tablet Take 1 tablet (10 mg total) by mouth daily 4 po daily x 2, 3 po daily x 2, 2 po daily x 2, 1 po daily x 2 11/7/23   Hermilo Mccall MD   predniSONE 10 mg tablet Take 4 tablets for 3 days then 3 tablets for 3 days then 2 tablet for 3 days 1 for 1 day 11/17/23   Leroy Epley, CRNP       Allergies: Allergies   Allergen Reactions    Cat Hair Extract Anaphylaxis, Hives, Itching, Shortness Of Breath and Swelling    Other Cough     mold    Pollen Extract Other (See Comments) and Shortness Of Breath    Nsaids        Review of Systems:     Review of Systems   Constitutional: Negative. HENT: Negative. Eyes: Negative. Respiratory: Negative. Negative for chest tightness. Cardiovascular: Negative. Negative for chest pain, palpitations and leg swelling. Gastrointestinal: Negative. Occasional gastric reflux    Endocrine: Negative. Genitourinary: Negative. Musculoskeletal: Negative. Skin: Negative. Allergic/Immunologic: Negative. Neurological: Negative. Negative for dizziness, syncope, light-headedness and numbness. Hematological: Negative. Psychiatric/Behavioral: Negative. Vital Signs:     Vitals:    12/04/23 1026 12/04/23 1033   BP: 133/82 128/75   BP Location: Left arm Right arm   Patient Position: Sitting Sitting   Cuff Size: Standard Standard   Pulse: 94    SpO2: 96%    Weight: 93 kg (205 lb)    Height: 5' 9" (1.753 m)        Physical Exam:     Physical Exam  Vitals reviewed. Constitutional:       Appearance: Normal appearance. HENT:      Head: Normocephalic and atraumatic. Eyes:      Pupils: Pupils are equal, round, and reactive to light. Neck:      Vascular: No JVD. Cardiovascular:      Rate and Rhythm: Normal rate and regular rhythm. Pulses:           Radial pulses are 2+ on the right side and 2+ on the left side. Dorsalis pedis pulses are 2+ on the right side and 2+ on the left side. Posterior tibial pulses are 2+ on the right side and 2+ on the left side. Heart sounds: No murmur heard. No friction rub. No gallop. Pulmonary:      Effort: Pulmonary effort is normal.      Breath sounds: Normal breath sounds. Abdominal:      General: Bowel sounds are normal.      Palpations: Abdomen is soft. Tenderness: There is no abdominal tenderness. Musculoskeletal:         General: Normal range of motion. Cervical back: Normal range of motion. Skin:     Capillary Refill: Capillary refill takes less than 2 seconds. Neurological:      General: No focal deficit present. Mental Status: He is alert. Sensory: No sensory deficit. Psychiatric:         Mood and Affect: Mood normal.         Behavior: Behavior normal.         Lab Results:               Invalid input(s): "LABGLOM"      Lab Results   Component Value Date    HGBA1C 5.9 (H) 09/11/2023     No results found for: "CKTOTAL", "CKMB", "CKMBINDEX", "TROPONINI"    Imaging Studies:     CT Chest: 11/14/23  IMPRESSION:     1.  Mild bronchiectasis and/or bronchial wall thickening in the right lower lobe with ill-defined opacities which may represent tree-in-bud opacities. This could be due to an infectious or inflammatory process. 2. Subpleural opacity along the medial right lower lobe with maximal dimensions of 11 x 25 mm. There is a prominent adjacent osteophyte arising off the thoracic spine. Potential considerations would include osteophyte induced atelectasis and/or fibrosis,   infection, inflammatory process or less likely neoplastic process. Consider short-term follow-up imaging in 1-2 months after appropriate treatment for reassessment of the parenchymal changes in the right lower lobe. 3. No pathologically enlarged mediastinal lymph nodes. 4. 5 mm right upper lobe noncalcified pulmonary nodule and juxtapleural left lower lobe 4 mm pulmonary nodule. Based on current Fleischner Society 2017 Guidelines on incidental pulmonary nodule, no routine follow-up is needed if the patient is low risk. If the patient is high risk, optional follow-up chest CT at 12 months can be considered. 5. There is fusiform aneurysmal enlargement of the ascending thoracic aorta measuring up to 46 mm. Recommendation is for cardiothoracic surgery consultation. Echocardiogram: 10/30/18  SUMMARY     LEFT VENTRICLE:  Ejection fraction was estimated to be 65 %. There were no regional wall motion abnormalities. There was mild concentric hypertrophy. There was an increased relative contribution of atrial contraction to ventricular filling. MITRAL VALVE:  There was trace regurgitation. AORTIC VALVE:  There was trace regurgitation. HISTORY: PRIOR HISTORY: Risk factors: hypertension and medication-treated hypercholesterolemia. PROCEDURE: The study was performed in the [facility]. This was a routine study. The transthoracic approach was used. The study included complete 2D imaging, M-mode, complete spectral Doppler, and color Doppler. The  heart rate was 80 bpm, at the start of the study. Image quality was adequate.      LEFT VENTRICLE: Size was normal. Ejection fraction was estimated to be 65 %. There were no regional wall motion abnormalities. There was mild concentric hypertrophy. DOPPLER: There was an increased relative contribution of atrial  contraction to ventricular filling. RIGHT VENTRICLE: The size was normal. Systolic function was normal. Wall thickness was normal.     LEFT ATRIUM: Size was normal.     RIGHT ATRIUM: Size was normal.     MITRAL VALVE: Valve structure was normal. There was normal leaflet separation. DOPPLER: The transmitral velocity was within the normal range. There was no evidence for stenosis. There was trace regurgitation. AORTIC VALVE: The valve was trileaflet. Leaflets exhibited normal thickness and normal cuspal separation. DOPPLER: Transaortic velocity was within the normal range. There was no evidence for stenosis. There was trace regurgitation. TRICUSPID VALVE: The valve structure was normal. There was normal leaflet separation. DOPPLER: The transtricuspid velocity was within the normal range. There was no evidence for stenosis. There was no regurgitation. PULMONIC VALVE: Leaflets exhibited normal thickness, no calcification, and normal cuspal separation. DOPPLER: The transpulmonic velocity was within the normal range. There was no regurgitation. PERICARDIUM: There was no pericardial effusion. The pericardium was normal in appearance. AORTA: The root exhibited normal size. I have personally reviewed pertinent reports. Assessment:  Ascending aortic aneurysm; Ongoing ascending aortic replacement workup    Plan:    Markel Conrad has an ascending aorta measuring 46 mm in size at its greatest diameter. As they are asymptomatic, with no family history follow-up monitoring is the treatment plan. Arrangements have been made for future surveillance to be completed with CT chest, without contrast with echocardiogram in 1 Years.  We will also reach out to Dr. Yair Lugo office to reestablish patient with cardiology. Markel Strauss does not meet the following Ascending aortic aneurysm surgical triggers:    * 4.5 cm or > in the setting of + FH of rupture or dissection  * 5 cm with moderate or worse aortic valve disease  *  5.5 cm regardless of aortic valve function and without genetically associated aortic disease   *  Aortic growth > 4mm / year  *  Bicuspid aortic valve with valve dysfunction enough to meet indications for surgery and concomitant ascending aortic aneurysm 4.5 cm or >  * 4.5 cm or > in setting of existing connective tissue disease of Marfan's syndrome, Yandel-Danlos syndrome or familiar aneurysms syndrome      Markel Strauss was comfortable with our recommendations, and their questions were answered to their satisfaction. Thank you for allowing us to participate in the care of this patient. Aortic Aneurysm Instructions were provided to the patient as follows:    1. No lifting more than 50 pounds  2. Maintain a controlled blood pressure with a goal of 120/80. 3. Follow up in Aortic Clinic as recommended with radiology follow up as instructed  4. Report to the ER or call 911 immediately with the following signs / symptoms: sudden onset of back pain, chest pain or shortness of breath. The patient recently had a screening colonoscopy in 9/26/20. Therefore GI referral is not indicated at this time.      SIGNATURE: Td Alfonso PA-C  DATE: 12/04/23  TIME: 10:40 AM

## 2023-12-12 ENCOUNTER — TELEPHONE (OUTPATIENT)
Age: 73
End: 2023-12-12

## 2023-12-12 NOTE — TELEPHONE ENCOUNTER
I just spoke with patient     He is NOT having surgery, he is keeping the appt for 2morr as a 4wk f/u

## 2023-12-12 NOTE — TELEPHONE ENCOUNTER
----- Message from KAYLEY Valenzuela sent at 12/12/2023 11:02 AM EST -----  The 5 pm tomorrow- jose polanco says pre-op... that should be a 40 min spot not my last spot of day

## 2023-12-12 NOTE — TELEPHONE ENCOUNTER
LVM for pt to call us back in regards to pre op appt    We need more info    Also rs him to 12/14    But keeping 12/13-until I speak with him & sent jackson donohue

## 2023-12-13 ENCOUNTER — OFFICE VISIT (OUTPATIENT)
Age: 73
End: 2023-12-13
Payer: COMMERCIAL

## 2023-12-13 VITALS
SYSTOLIC BLOOD PRESSURE: 134 MMHG | WEIGHT: 203 LBS | BODY MASS INDEX: 30.07 KG/M2 | HEART RATE: 78 BPM | DIASTOLIC BLOOD PRESSURE: 80 MMHG | OXYGEN SATURATION: 98 % | HEIGHT: 69 IN | RESPIRATION RATE: 16 BRPM

## 2023-12-13 DIAGNOSIS — N18.2 CKD (CHRONIC KIDNEY DISEASE) STAGE 2, GFR 60-89 ML/MIN: ICD-10-CM

## 2023-12-13 DIAGNOSIS — F41.9 ANXIETY: ICD-10-CM

## 2023-12-13 DIAGNOSIS — J45.20 MILD INTERMITTENT ASTHMA WITHOUT COMPLICATION: ICD-10-CM

## 2023-12-13 DIAGNOSIS — I10 BENIGN ESSENTIAL HYPERTENSION: Primary | ICD-10-CM

## 2023-12-13 DIAGNOSIS — I71.21 ANEURYSM OF ASCENDING AORTA WITHOUT RUPTURE (HCC): ICD-10-CM

## 2023-12-13 PROCEDURE — 99214 OFFICE O/P EST MOD 30 MIN: CPT

## 2023-12-13 RX ORDER — LORAZEPAM 1 MG/1
1 TABLET ORAL AS NEEDED
Qty: 30 TABLET | Refills: 0 | Status: SHIPPED | OUTPATIENT
Start: 2023-12-13

## 2023-12-13 RX ORDER — ALBUTEROL SULFATE 90 UG/1
2 AEROSOL, METERED RESPIRATORY (INHALATION) EVERY 6 HOURS PRN
Qty: 18 G | Refills: 2 | Status: SHIPPED | OUTPATIENT
Start: 2023-12-13

## 2023-12-13 NOTE — PROGRESS NOTES
INTERNAL MEDICINE FOLLOW-UP VISIT  [facility] - [facility] INTERNAL MEDICINE [address]    NAME: Markel Carrasco  AGE: 72 y.o. SEX: male  : 1950     DATE: 2023     Assessment and Plan:   1. Benign essential hypertension  Maintain blood pressure control of 120/80 or less  Limit sodium to no more than 2 g/day. Continue on amlodipine 5 mg, lisinopril 40 mg daily. Follow through with cardiology referral  Repeat blood pressure today is 128/80    2. Aneurysm of ascending aorta without rupture [facility]  Reiterated cardiothoracic surgery recommendations to patient:  1. No lifting more than 50 pounds  2. Maintain a controlled blood pressure with a goal of 120/80. 3. Follow up in Aortic Clinic as recommended with radiology follow up as instructed  4. Report to the ER or call 911 immediately with the following signs / symptoms: sudden onset of back pain, chest pain or shortness of breath. 3. CKD (chronic kidney disease) stage 2, GFR 60-89 ml/min  Avoid nephrotoxic medication such as Advil, Aleve, Motrin and ibuprofen. Tylenol only for pain. Stay well-hydrated with at least 60 ounces of fluids per day    BMI Counseling: Body mass index is 29.98 kg/m². The BMI is above normal. Nutrition recommendations include decreasing portion sizes, encouraging healthy choices of fruits and vegetables, consuming healthier snacks, limiting drinks that contain sugar, moderation in carbohydrate intake, reducing intake of saturated and trans fat and reducing intake of cholesterol. Exercise recommendations include exercising 3-5 times per week. No pharmacotherapy was ordered. Rationale for BMI follow-up plan is due to patient being overweight or obese. No follow-ups on file.        Chief Complaint:     Chief Complaint   Patient presents with   • Follow-up        History of Present Illness:     Markel is a 72-year-old male patient with a past medical history known for asthma, hypertension, hyperlipidemia, CKD and newly diagnosed ascending aortic aneurysm. He presents today in the office for a follow-up. Overall he is feeling well and offers no complaints today  Quit since 1975 cigarettes, 1 cigar per week    The following portions of the patient's history were reviewed and updated as appropriate: allergies, current medications, past family history, past medical history, past social history, past surgical history and problem list.     Review of Systems:     Review of Systems   Constitutional:  Negative for appetite change, chills, diaphoresis, fatigue, fever and unexpected weight change. HENT:  Negative for postnasal drip and sneezing. Eyes:  Negative for visual disturbance. Respiratory:  Negative for chest tightness and shortness of breath. Cardiovascular:  Negative for chest pain, palpitations and leg swelling. Gastrointestinal:  Negative for abdominal pain and blood in stool. Endocrine: Negative for cold intolerance, heat intolerance, polydipsia, polyphagia and polyuria. Genitourinary:  Negative for difficulty urinating, dysuria, frequency and urgency. Musculoskeletal:  Negative for arthralgias and myalgias. Skin:  Negative for rash and wound. Neurological:  Negative for dizziness, weakness, light-headedness and headaches. Hematological:  Negative for adenopathy. Psychiatric/Behavioral:  Negative for confusion, dysphoric mood and sleep disturbance. The patient is not nervous/anxious.          Past Medical History:     Past Medical History:   Diagnosis Date   • Arthritis    • Asthma    • Chronic kidney disease    • CKD (chronic kidney disease) stage 3, GFR 30-59 ml/min ([facility]) 09/24/2019   • Depression    • Dyshidrosis    • Hypertension    • Osteoarthritis    • Trigger finger         Current Medications:     Current Outpatient Medications:   •  acetaminophen (TYLENOL) 325 mg tablet, Take 650 mg by mouth every 6 (six) hours as needed for mild pain, Disp: , Rfl:   •  albuterol (Ventolin HFA) 90 mcg/act inhaler, Inhale 2 puffs every 6 (six) hours as needed for wheezing, Disp: 18 g, Rfl: 2  •  amLODIPine (NORVASC) 5 mg tablet, Take 1 tablet (5 mg total) by mouth daily, Disp: 90 tablet, Rfl: 3  •  aspirin (ECOTRIN LOW STRENGTH) 81 mg EC tablet, Take 1 tablet by mouth daily  , Disp: , Rfl:   •  atorvastatin (LIPITOR) 40 mg tablet, Take 1 tablet (40 mg total) by mouth daily, Disp: 90 tablet, Rfl: 3  •  colchicine (COLCRYS) 0.6 mg tablet, 1 po qd, up to tid for acute flare., Disp: 90 tablet, Rfl: 5  •  fluticasone (FLONASE) 50 mcg/act nasal spray, 1 spray into each nostril daily, Disp: 16 g, Rfl: 3  •  ipratropium-albuterol (DUO-NEB) 0.5-2.5 mg/3 mL nebulizer solution, Take 3 mL by nebulization 3 (three) times a day, Disp: 120 mL, Rfl: 1  •  lisinopril (ZESTRIL) 40 mg tablet, TAKE 1 TABLET BY MOUTH EVERY DAY, Disp: 90 tablet, Rfl: 5  •  LORazepam (ATIVAN) 1 mg tablet, Take 1 tablet (1 mg total) by mouth as needed for sleep, Disp: 30 tablet, Rfl: 0  •  montelukast (SINGULAIR) 10 mg tablet, TAKE 1 TABLET BY MOUTH EVERY DAY IN THE EVENING, Disp: 90 tablet, Rfl: 3     Allergies: Allergies   Allergen Reactions   • Cat Hair Extract Anaphylaxis, Hives, Itching, Shortness Of Breath and Swelling   • Other Cough     mold   • Pollen Extract Other (See Comments) and Shortness Of Breath   • Nsaids         Physical Exam:     /80 (BP Location: Left arm, Patient Position: Sitting, Cuff Size: Adult)   Pulse 78   Resp 16   Ht 5' 9" (1.753 m)   Wt 92.1 kg (203 lb)   SpO2 98%   BMI 29.98 kg/m²     Physical Exam  Constitutional:       Appearance: He is well-developed. HENT:      Head: Normocephalic and atraumatic. Eyes:      Conjunctiva/sclera: Conjunctivae normal.      Pupils: Pupils are equal, round, and reactive to light. Cardiovascular:      Rate and Rhythm: Normal rate and regular rhythm. Heart sounds: Normal heart sounds.    Pulmonary:      Effort: Pulmonary effort is normal.      Breath sounds: Normal breath sounds. Abdominal:      General: Bowel sounds are normal.      Palpations: Abdomen is soft. Musculoskeletal:         General: Normal range of motion. Cervical back: Normal range of motion. Skin:     General: Skin is warm and dry. Neurological:      Mental Status: He is alert and oriented to person, place, and time. Data:     Laboratory Results: I have personally reviewed the pertinent laboratory results/reports   Radiology/Other Diagnostic Testing Results: I have personally reviewed pertinent reports.       Julio Cesar Pedersen, [address] [facility] INTER[address] MEDICINE [address]

## 2023-12-14 ENCOUNTER — TELEPHONE (OUTPATIENT)
Dept: CARDIOLOGY CLINIC | Facility: CLINIC | Age: 73
End: 2023-12-14

## 2023-12-14 NOTE — TELEPHONE ENCOUNTER
----- Message from [address] [employer]. Valentina Wood sent at 12/14/2023  9:40 AM EST -----  Regarding: Echo  Contact: [phone]  I'm getting a second opinion on surgery from the [address] [place], [place] [address]ate . They're requesting I get an echo cardiogram.  Do you schedule that for me? I will be seeing you this coming Monday.

## 2023-12-18 ENCOUNTER — OFFICE VISIT (OUTPATIENT)
Dept: CARDIOLOGY CLINIC | Facility: CLINIC | Age: 73
End: 2023-12-18
Payer: COMMERCIAL

## 2023-12-18 VITALS
WEIGHT: 208 LBS | SYSTOLIC BLOOD PRESSURE: 132 MMHG | BODY MASS INDEX: 30.81 KG/M2 | HEART RATE: 88 BPM | DIASTOLIC BLOOD PRESSURE: 80 MMHG | OXYGEN SATURATION: 97 % | HEIGHT: 69 IN | RESPIRATION RATE: 16 BRPM

## 2023-12-18 DIAGNOSIS — I10 ESSENTIAL HYPERTENSION: ICD-10-CM

## 2023-12-18 DIAGNOSIS — I71.20 THORACIC AORTIC ANEURYSM WITHOUT RUPTURE, UNSPECIFIED PART (HCC): Primary | ICD-10-CM

## 2023-12-18 DIAGNOSIS — I10 BENIGN ESSENTIAL HYPERTENSION: ICD-10-CM

## 2023-12-18 PROCEDURE — 99215 OFFICE O/P EST HI 40 MIN: CPT | Performed by: INTERNAL MEDICINE

## 2023-12-18 RX ORDER — CARVEDILOL 6.25 MG/1
6.25 TABLET ORAL 2 TIMES DAILY WITH MEALS
Qty: 180 TABLET | Refills: 3 | Status: SHIPPED | OUTPATIENT
Start: 2023-12-18 | End: 2023-12-28 | Stop reason: SDUPTHER

## 2023-12-18 RX ORDER — LISINOPRIL 20 MG/1
20 TABLET ORAL 2 TIMES DAILY
Qty: 180 TABLET | Refills: 3 | Status: SHIPPED | OUTPATIENT
Start: 2023-12-18

## 2023-12-18 RX ORDER — LISINOPRIL 20 MG/1
20 TABLET ORAL DAILY
Qty: 180 TABLET | Refills: 3 | Status: SHIPPED | OUTPATIENT
Start: 2023-12-18 | End: 2023-12-18 | Stop reason: SDUPTHER

## 2023-12-18 NOTE — PROGRESS NOTES
Cardiology Office Note    Markel Alves 73 y.o. male MRN: [id number]    12/18/23          Assessment:  TAA-4.5mm  ASCVD  Hypertension   Hyperlipidemia  PVCs      Plan:  CT chest revealed 46 mm aneurysm of the ascending thoracic aorta.  Moderate coronary artery calcifications were also noted.  He established care with CT surgery and the aneurysm was measured at 45 mm.  Continued surveillance was advised.  He also established care with [facility].  Will evaluate with a transthoracic echocardiogram to evaluate his aortic valve.  Will also arrange for a follow-up CT chest in 6 months to assess stability.   At this time he denies anginal symptoms.  Continue aspirin and atorvastatin  Will optimize his antihypertensive regimen and split lisinopril to 20 mg twice daily and start carvedilol 6.25 mg twice daily.  Continue amlodipine..  Lifting/straining restrictions were emphasized.  Ambulatory blood pressure monitoring and maintaining a low sodium diet was advised.   He was advised to notify us with the onset of cardiac symptoms.      Follow up: 6 months or sooner as needed    1. Thoracic aortic aneurysm without rupture, unspecified part (HCC)  Echo complete w/ contrast if indicated    CT chest wo contrast    carvedilol (COREG) 6.25 mg tablet      2. Benign essential hypertension        3. Essential hypertension  lisinopril (ZESTRIL) 20 mg tablet    DISCONTINUED: lisinopril (ZESTRIL) 20 mg tablet            HPI: Markel Alves is a 73 y.o. year old male with history of hypertension and hyperlipidemia presents for routine follow-up.      Past cardiac evaluation:  Holter monitor 10/2018:  Normal sinus rhythm with occasional PVCs (1.4% VE burden)  TTE 2018: EF 65%  Pharmacologic MPI 10/2018:  Negative for ischemia    He has been doing well from a cardiac standpoint. He was noted to have an incidental finding of ascending aortic aneurysm measuring 46 mm on CT chest.  He established care with CT surgery and the  aneurysm was measured at 45 mm.  Continued surveillance was advised.  He also has an upcoming appointment at the [facility].  Moderate coronary artery calcifications were also noted on CT chest.  He is an avid hiker and denies anginal symptoms or equivalents.          Family History: mother with history of CAD s/p CABG at 68; materal uncles with cardiac diseaes; paternal family with history of CVA    Social history: occasional cigar use;       Allergies   Allergen Reactions    Cat Hair Extract Anaphylaxis, Hives, Itching, Shortness Of Breath and Swelling    Other Cough     mold    Pollen Extract Other (See Comments) and Shortness Of Breath    Nsaids          Current Outpatient Medications:     acetaminophen (TYLENOL) 325 mg tablet, Take 650 mg by mouth every 6 (six) hours as needed for mild pain, Disp: , Rfl:     albuterol (Ventolin HFA) 90 mcg/act inhaler, Inhale 2 puffs every 6 (six) hours as needed for wheezing, Disp: 18 g, Rfl: 2    amLODIPine (NORVASC) 5 mg tablet, Take 1 tablet (5 mg total) by mouth daily, Disp: 90 tablet, Rfl: 3    aspirin (ECOTRIN LOW STRENGTH) 81 mg EC tablet, Take 1 tablet by mouth daily  , Disp: , Rfl:     atorvastatin (LIPITOR) 40 mg tablet, Take 1 tablet (40 mg total) by mouth daily, Disp: 90 tablet, Rfl: 3    carvedilol (COREG) 6.25 mg tablet, Take 1 tablet (6.25 mg total) by mouth 2 (two) times a day with meals, Disp: 180 tablet, Rfl: 3    colchicine (COLCRYS) 0.6 mg tablet, 1 po qd, up to tid for acute flare., Disp: 90 tablet, Rfl: 5    fluticasone (FLONASE) 50 mcg/act nasal spray, 1 spray into each nostril daily, Disp: 16 g, Rfl: 3    ipratropium-albuterol (DUO-NEB) 0.5-2.5 mg/3 mL nebulizer solution, Take 3 mL by nebulization 3 (three) times a day, Disp: 120 mL, Rfl: 1    lisinopril (ZESTRIL) 20 mg tablet, Take 1 tablet (20 mg total) by mouth 2 (two) times a day, Disp: 180 tablet, Rfl: 3    LORazepam (ATIVAN) 1 mg tablet, Take 1 tablet (1 mg total) by mouth as  needed for sleep, Disp: 30 tablet, Rfl: 0    montelukast (SINGULAIR) 10 mg tablet, TAKE 1 TABLET BY MOUTH EVERY DAY IN THE EVENING, Disp: 90 tablet, Rfl: 3    Past Medical History:   Diagnosis Date    Arthritis     Asthma     Chronic kidney disease     CKD (chronic kidney disease) stage 3, GFR 30-59 ml/min ([facility]) 2019    Depression     Dyshidrosis     Hypertension     Osteoarthritis     Trigger finger        Family History   Problem Relation Age of Onset    Coronary artery disease Mother     Hyperlipidemia Mother     Hypertension Mother     Stroke Mother     Heart disease Mother     Stroke Father     Hypertension Father     Dementia Father     Asthma Maternal Aunt     Asthma Maternal Uncle     Arthritis Maternal Uncle        Past Surgical History:   Procedure Laterality Date    COLONOSCOPY  2010    COLONOSCOPY  2020    HEMORRHOID SURGERY      JOINT REPLACEMENT  18    REPLACEMENT TOTAL KNEE Left     TONSILLECTOMY      TRIGGER FINGER RELEASE      WISDOM TOOTH EXTRACTION         Social History     Socioeconomic History    Marital status: /Civil Union     Spouse name: Not on file    Number of children: Not on file    Years of education: Not on file    Highest education level: Not on file   Occupational History    Occupation: IT   Tobacco Use    Smoking status: Former     Current packs/day: 0.00     Types: Cigarettes     Quit date: 1973     Years since quittin.4    Smokeless tobacco: Never    Tobacco comments:     Occasional cigar smoker   Vaping Use    Vaping status: Never Used   Substance and Sexual Activity    Alcohol use: Yes     Alcohol/week: 6.0 standard drinks of alcohol     Types: 3 Glasses of wine, 2 Cans of beer, 1 Shots of liquor per week     Comment: occasional    Drug use: No    Sexual activity: Not Currently     Partners: Female   Other Topics Concern    Not on file   Social History Narrative    Living independently with spouse    No advance directives     Social  "Determinants of Health     Financial Resource Strain: Low Risk  (9/19/2023)    Overall Financial Resource Strain (CARDIA)     Difficulty of Paying Living Expenses: Not hard at all   Food Insecurity: Not on file   Transportation Needs: No Transportation Needs (9/19/2023)    PRAPARE - Transportation     Lack of Transportation (Medical): No     Lack of Transportation (Non-Medical): No   Physical Activity: Insufficiently Active (3/15/2021)    Exercise Vital Sign     Days of Exercise per Week: 3 days     Minutes of Exercise per Session: 30 min   Stress: No Stress Concern Present (3/15/2021)    [employer] - Occupational Stress Questionnaire     Feeling of Stress : Not at all   Social Connections: Not on file   Intimate Partner Violence: Not on file   Housing Stability: Not on file       Review of Systems   Constitutional: Negative for diaphoresis, weight gain and weight loss.   HENT:  Negative for congestion.    Cardiovascular:  Negative for chest pain, dyspnea on exertion, irregular heartbeat, leg swelling, near-syncope, orthopnea, palpitations, paroxysmal nocturnal dyspnea and syncope.   Respiratory:  Negative for shortness of breath, sleep disturbances due to breathing and snoring.    Hematologic/Lymphatic: Does not bruise/bleed easily.   Skin:  Negative for rash.   Musculoskeletal:  Negative for myalgias.   Gastrointestinal:  Negative for nausea and vomiting.   Neurological:  Negative for excessive daytime sleepiness and light-headedness.   Psychiatric/Behavioral:  The patient is not nervous/anxious.        Vitals: /80 (BP Location: Left arm, Patient Position: Sitting, Cuff Size: Large)   Pulse 88   Resp 16   Ht 5' 9\" (1.753 m)   Wt 94.3 kg (208 lb)   SpO2 97%   BMI 30.72 kg/m²       Physical Exam:     GEN: Alert and oriented x 3, in no acute distress.  Well appearing and well nourished.   HEENT: Sclera anicteric, conjunctivae pink, mucous membranes moist. Oropharynx clear. "   NECK: Supple, no carotid bruits, no significant JVD. Trachea midline, no thyromegaly.   HEART: Regular rhythm, normal S1 and S2, no murmurs, clicks, gallops or rubs. PMI nondisplaced, no thrills.   LUNGS: Clear to auscultation bilaterally; no wheezes, rales, or rhonchi. No increased work of breathing or signs of respiratory distress.   ABDOMEN: Soft, nontender, nondistended, normoactive bowel sounds.   EXTREMITIES: Skin warm and well perfused, no clubbing, cyanosis, or edema.  NEURO: No focal findings. Normal speech. Mood and affect normal.   SKIN: Normal without suspicious lesions on exposed skin.

## 2023-12-19 ENCOUNTER — HOSPITAL ENCOUNTER (OUTPATIENT)
Dept: NON INVASIVE DIAGNOSTICS | Facility: CLINIC | Age: 73
Discharge: HOME | End: 2023-12-19
Payer: COMMERCIAL

## 2023-12-19 VITALS
HEIGHT: 69 IN | HEART RATE: 88 BPM | DIASTOLIC BLOOD PRESSURE: 80 MMHG | WEIGHT: 208 LBS | BODY MASS INDEX: 30.81 KG/M2 | SYSTOLIC BLOOD PRESSURE: 132 MMHG

## 2023-12-19 DIAGNOSIS — I71.20 THORACIC AORTIC ANEURYSM WITHOUT RUPTURE, UNSPECIFIED PART (HCC): ICD-10-CM

## 2023-12-19 LAB
AORTIC ROOT: 3.5 CM
APICAL FOUR CHAMBER EJECTION FRACTION: 64 %
ASCENDING AORTA: 3.7 CM
E WAVE DECELERATION TIME: 186 MS
E/A RATIO: 0.66
FRACTIONAL SHORTENING: 39 (ref 28–44)
INTERVENTRICULAR SEPTUM IN DIASTOLE (PARASTERNAL SHORT AXIS VIEW): 1 CM
INTERVENTRICULAR SEPTUM: 1 CM (ref 0.6–1.1)
LAAS-AP2: 20.8 CM2
LAAS-AP4: 16.8 CM2
LEFT ATRIUM SIZE: 3.8 CM
LEFT ATRIUM VOLUME (MOD BIPLANE): 54 ML
LEFT ATRIUM VOLUME INDEX (MOD BIPLANE): 25.7 ML/M2
LEFT INTERNAL DIMENSION IN SYSTOLE: 2.7 CM (ref 2.1–4)
LEFT VENTRICULAR INTERNAL DIMENSION IN DIASTOLE: 4.4 CM (ref 3.5–6)
LEFT VENTRICULAR POSTERIOR WALL IN END DIASTOLE: 1.2 CM
LEFT VENTRICULAR STROKE VOLUME: 63 ML
LVSV (TEICH): 63 ML
MV E'TISSUE VEL-SEP: 6 CM/S
MV PEAK A VEL: 1.03 M/S
MV PEAK E VEL: 68 CM/S
MV STENOSIS PRESSURE HALF TIME: 54 MS
MV VALVE AREA P 1/2 METHOD: 4.07
RIGHT ATRIUM AREA SYSTOLE A4C: 13.2 CM2
RIGHT VENTRICLE ID DIMENSION: 2.5 CM
SL CV LEFT ATRIUM LENGTH A2C: 6 CM
SL CV LV EF: 60
SL CV PED ECHO LEFT VENTRICLE DIASTOLIC VOLUME (MOD BIPLANE) 2D: 90 ML
SL CV PED ECHO LEFT VENTRICLE SYSTOLIC VOLUME (MOD BIPLANE) 2D: 27 ML
TR MAX PG: 23 MMHG
TR PEAK VELOCITY: 2.4 M/S
TRICUSPID ANNULAR PLANE SYSTOLIC EXCURSION: 1.8 CM
TRICUSPID VALVE PEAK REGURGITATION VELOCITY: 2.42 M/S

## 2023-12-19 PROCEDURE — 93306 TTE W/DOPPLER COMPLETE: CPT | Performed by: INTERNAL MEDICINE

## 2023-12-19 PROCEDURE — 93306 TTE W/DOPPLER COMPLETE: CPT

## 2023-12-19 NOTE — RESULT ENCOUNTER NOTE
Please let the patient know that there were no clinically significant abnormalities on their echo. We can discuss further at their next appointment. Thanks.

## 2023-12-20 ENCOUNTER — DOCUMENTATION (OUTPATIENT)
Dept: CARDIOLOGY CLINIC | Facility: CLINIC | Age: 73
End: 2023-12-20

## 2023-12-20 ENCOUNTER — TELEPHONE (OUTPATIENT)
Dept: CARDIOLOGY CLINIC | Facility: CLINIC | Age: 73
End: 2023-12-20

## 2023-12-20 NOTE — TELEPHONE ENCOUNTER
----- Message from Jeramie Aguirre MD sent at 12/19/2023  4:25 PM EST -----  Please let the patient know that there were no clinically significant abnormalities on their echo. We can discuss further at their next appointment. Thanks.

## 2023-12-20 NOTE — TELEPHONE ENCOUNTER
Spoke with pt. Patient verbally understood the result of his Echo complete w/ contrast if indicated

## 2023-12-21 ENCOUNTER — TELEPHONE (OUTPATIENT)
Dept: CARDIOLOGY CLINIC | Facility: CLINIC | Age: 73
End: 2023-12-21

## 2023-12-21 NOTE — TELEPHONE ENCOUNTER
----- Message from Jeramie Aguirre MD sent at 12/20/2023  4:40 PM EST -----  Please let the patient know that there were no clinically significant abnormalities on their echo. We can discuss further at their next appointment. Thanks.

## 2023-12-26 ENCOUNTER — TELEPHONE (OUTPATIENT)
Dept: CARDIOLOGY CLINIC | Facility: CLINIC | Age: 73
End: 2023-12-26

## 2023-12-26 NOTE — TELEPHONE ENCOUNTER
----- Message from Markel Alves sent at 12/26/2023 11:34 AM EST -----  Regarding: Coffee  Contact: [phone]  Blood pressures:  12/20/23 8AM 147/87, 5:25PM 150/92, 8:55PM 148/78  12/21/23 6:05AM 137/88, 6:35PM 143/82  12/22/23 6:25AM 134/86  12/24/23 12PM 137/79  12/25/23 8AM 146/88, 10:15AM 143/83, 8:15PM 139/90  12/26/23 6AM 134/82, 11:20AM 125/83  I don't seem to be able to get the systolic down and the diastolic doesn't seem to be consistent.  Would a diuretic help? An ARB? Just curious.

## 2023-12-28 DIAGNOSIS — I71.20 THORACIC AORTIC ANEURYSM WITHOUT RUPTURE, UNSPECIFIED PART (HCC): ICD-10-CM

## 2023-12-28 RX ORDER — CARVEDILOL 12.5 MG/1
12.5 TABLET ORAL 2 TIMES DAILY WITH MEALS
Qty: 180 TABLET | Refills: 6 | Status: SHIPPED | OUTPATIENT
Start: 2023-12-28

## 2023-12-28 NOTE — TELEPHONE ENCOUNTER
Please advise him to increase coreg to 12.5 mg BID.   If that isnt enough we can add diuretic    Arb would not be indicated as he is already on lisinopril

## 2024-02-19 ENCOUNTER — TELEPHONE (OUTPATIENT)
Age: 74
End: 2024-02-19

## 2024-02-19 NOTE — TELEPHONE ENCOUNTER
----- Message from KAYLEY Valenzuela sent at 2/19/2024  8:50 AM EST -----  Regarding: FW: Note  Contact: [phone]  Yes      ----- Message -----  From: Analisa Duran  Sent: 2/19/2024   7:57 AM EST  To: KAYLEY Valenzuela  Subject: FW: Note                                         Ok to write note?  ----- Message -----  From: Markel Alves  Sent: 2/18/2024  12:20 PM EST  To: [facility] Clinical  Subject: Note                                             Niecy and I have been fighting a nasty respiratory bug since last Sunday.  I'm feeling well enough to go back to work this Wednesday but I need a note from my doctor releasing me to do so.  Can I pick this up from your office either Monday or Tuesday?  Yadira is very strict about this.  Thank you.

## 2024-02-19 NOTE — LETTER
February 19, 2024     Patient: Markel Alves  YOB: 1950  Date of Visit: 2/19/2024      To Whom it May Concern:    Markel Alves is under my professional care. Markel may return to work on 2/21/2024 .    If you have any questions or concerns, please don't hesitate to call.         Sincerely,          KAYLEY Urrutia        CC: No Recipients

## 2024-03-19 ENCOUNTER — OFFICE VISIT (OUTPATIENT)
Age: 74
End: 2024-03-19
Payer: COMMERCIAL

## 2024-03-19 ENCOUNTER — APPOINTMENT (OUTPATIENT)
Dept: LAB | Facility: CLINIC | Age: 74
End: 2024-03-19
Payer: COMMERCIAL

## 2024-03-19 VITALS — TEMPERATURE: 98.1 F | HEIGHT: 70 IN | WEIGHT: 207 LBS | BODY MASS INDEX: 29.63 KG/M2

## 2024-03-19 DIAGNOSIS — L82.1 SEBORRHEIC KERATOSIS: Primary | ICD-10-CM

## 2024-03-19 DIAGNOSIS — L57.0 KERATOSIS, ACTINIC: ICD-10-CM

## 2024-03-19 DIAGNOSIS — M10.9 GOUT: ICD-10-CM

## 2024-03-19 DIAGNOSIS — I10 BENIGN ESSENTIAL HYPERTENSION: ICD-10-CM

## 2024-03-19 DIAGNOSIS — D18.01 CHERRY ANGIOMA: ICD-10-CM

## 2024-03-19 DIAGNOSIS — D22.9 MULTIPLE MELANOCYTIC NEVI: ICD-10-CM

## 2024-03-19 DIAGNOSIS — Z12.5 SCREENING FOR PROSTATE CANCER: ICD-10-CM

## 2024-03-19 DIAGNOSIS — R73.01 IMPAIRED FASTING GLUCOSE: ICD-10-CM

## 2024-03-19 LAB
ALBUMIN SERPL BCP-MCNC: 4.5 G/DL (ref 3.5–5)
ALP SERPL-CCNC: 76 U/L (ref 34–104)
ALT SERPL W P-5'-P-CCNC: 27 U/L (ref 7–52)
ANION GAP SERPL CALCULATED.3IONS-SCNC: 7 MMOL/L (ref 4–13)
AST SERPL W P-5'-P-CCNC: 25 U/L (ref 13–39)
BASOPHILS # BLD AUTO: 0.1 THOUSANDS/ÂΜL (ref 0–0.1)
BASOPHILS NFR BLD AUTO: 2 % (ref 0–1)
BILIRUB SERPL-MCNC: 0.97 MG/DL (ref 0.2–1)
BUN SERPL-MCNC: 17 MG/DL (ref 5–25)
CALCIUM SERPL-MCNC: 9.3 MG/DL (ref 8.4–10.2)
CHLORIDE SERPL-SCNC: 104 MMOL/L (ref 96–108)
CHOLEST SERPL-MCNC: 126 MG/DL
CO2 SERPL-SCNC: 29 MMOL/L (ref 21–32)
CREAT SERPL-MCNC: 0.99 MG/DL (ref 0.6–1.3)
EOSINOPHIL # BLD AUTO: 0.32 THOUSAND/ÂΜL (ref 0–0.61)
EOSINOPHIL NFR BLD AUTO: 5 % (ref 0–6)
ERYTHROCYTE [DISTWIDTH] IN BLOOD BY AUTOMATED COUNT: 12.5 % (ref 11.6–15.1)
EST. AVERAGE GLUCOSE BLD GHB EST-MCNC: 117 MG/DL
GFR SERPL CREATININE-BSD FRML MDRD: 76 ML/MIN/1.73SQ M
GLUCOSE P FAST SERPL-MCNC: 113 MG/DL (ref 65–99)
HBA1C MFR BLD: 5.7 %
HCT VFR BLD AUTO: 43.3 % (ref 36.5–49.3)
HDLC SERPL-MCNC: 44 MG/DL
HGB BLD-MCNC: 14.7 G/DL (ref 12–17)
IMM GRANULOCYTES # BLD AUTO: 0.02 THOUSAND/UL (ref 0–0.2)
IMM GRANULOCYTES NFR BLD AUTO: 0 % (ref 0–2)
LDLC SERPL CALC-MCNC: 58 MG/DL (ref 0–100)
LYMPHOCYTES # BLD AUTO: 1.55 THOUSANDS/ÂΜL (ref 0.6–4.47)
LYMPHOCYTES NFR BLD AUTO: 25 % (ref 14–44)
MCH RBC QN AUTO: 32.7 PG (ref 26.8–34.3)
MCHC RBC AUTO-ENTMCNC: 33.9 G/DL (ref 31.4–37.4)
MCV RBC AUTO: 96 FL (ref 82–98)
MONOCYTES # BLD AUTO: 0.54 THOUSAND/ÂΜL (ref 0.17–1.22)
MONOCYTES NFR BLD AUTO: 9 % (ref 4–12)
NEUTROPHILS # BLD AUTO: 3.63 THOUSANDS/ÂΜL (ref 1.85–7.62)
NEUTS SEG NFR BLD AUTO: 59 % (ref 43–75)
NONHDLC SERPL-MCNC: 82 MG/DL
NRBC BLD AUTO-RTO: 0 /100 WBCS
PLATELET # BLD AUTO: 205 THOUSANDS/UL (ref 149–390)
PMV BLD AUTO: 11.4 FL (ref 8.9–12.7)
POTASSIUM SERPL-SCNC: 4 MMOL/L (ref 3.5–5.3)
PROT SERPL-MCNC: 6.8 G/DL (ref 6.4–8.4)
PSA SERPL-MCNC: 1.67 NG/ML (ref 0–4)
RBC # BLD AUTO: 4.5 MILLION/UL (ref 3.88–5.62)
SODIUM SERPL-SCNC: 140 MMOL/L (ref 135–147)
TRIGL SERPL-MCNC: 119 MG/DL
URATE SERPL-MCNC: 7.5 MG/DL (ref 3.5–8.5)
WBC # BLD AUTO: 6.16 THOUSAND/UL (ref 4.31–10.16)

## 2024-03-19 PROCEDURE — 83036 HEMOGLOBIN GLYCOSYLATED A1C: CPT

## 2024-03-19 PROCEDURE — 99213 OFFICE O/P EST LOW 20 MIN: CPT | Performed by: DERMATOLOGY

## 2024-03-19 PROCEDURE — 80053 COMPREHEN METABOLIC PANEL: CPT

## 2024-03-19 PROCEDURE — 17000 DESTRUCT PREMALG LESION: CPT | Performed by: DERMATOLOGY

## 2024-03-19 PROCEDURE — 84550 ASSAY OF BLOOD/URIC ACID: CPT

## 2024-03-19 PROCEDURE — 85025 COMPLETE CBC W/AUTO DIFF WBC: CPT

## 2024-03-19 PROCEDURE — 36415 COLL VENOUS BLD VENIPUNCTURE: CPT

## 2024-03-19 PROCEDURE — 17003 DESTRUCT PREMALG LES 2-14: CPT | Performed by: DERMATOLOGY

## 2024-03-19 PROCEDURE — G0103 PSA SCREENING: HCPCS

## 2024-03-19 PROCEDURE — 80061 LIPID PANEL: CPT

## 2024-03-19 NOTE — PROGRESS NOTES
"[facility] Note     Patient Name: Markel Alves  Encounter Date: 3/19/24     Have you been cared for by a [facility] Dermatologist in the last 3 years and, if so, which description applies to you?    Found an ascending aortic aneurysm in November, getting CT scan in may to determine if needed surgery or not.    Yes.  I have been here within the last 3 years, and my medical history has NOT changed since that time.  I am MALE/not capable of bearing children.    REVIEW OF SYSTEMS:  Have you recently had or currently have any of the following? No changes in my recent health.   PAST MEDICAL HISTORY:  Have you personally ever had or currently have any of the following?  If \"YES,\" then please provide more detail. No changes in my medical history.   HISTORY OF IMMUNOSUPPRESSION: Do you have a history of any of the following:  Systemic Immunosuppression such as Diabetes, Biologic or Immunotherapy, Chemotherapy, Organ Transplantation, Bone Marrow Transplantation?  No     Answering \"YES\" requires the addition of the dotphrase \"IMMUNOSUPPRESSED\" as the first diagnosis of the patient's visit.   FAMILY HISTORY:  Any \"first degree relatives\" (parent, brother, sister, or child) with the following?    No changes in my family's known health.   PATIENT EXPERIENCE:    Do you want the Dermatologist to perform a COMPLETE skin exam today including a clinical examination under the \"bra and underwear\" areas?  Yes  If necessary, do we have your permission to call and leave a detailed message on your Preferred Phone number that includes your specific medical information?  Yes      Allergies   Allergen Reactions    Cat Hair Extract Anaphylaxis, Hives, Itching, Shortness Of Breath and Swelling    Horse Protein Anaphylaxis    Other Cough     mold    Pollen Extract Other (See Comments) and Shortness Of Breath    Nsaids       Current Outpatient Medications:     acetaminophen (TYLENOL) 325 mg tablet, Take 650 mg by mouth every 6 " "(six) hours as needed for mild pain, Disp: , Rfl:     albuterol (Ventolin HFA) 90 mcg/act inhaler, Inhale 2 puffs every 6 (six) hours as needed for wheezing, Disp: 18 g, Rfl: 2    amLODIPine (NORVASC) 5 mg tablet, Take 1 tablet (5 mg total) by mouth daily, Disp: 90 tablet, Rfl: 3    aspirin (ECOTRIN LOW STRENGTH) 81 mg EC tablet, Take 1 tablet by mouth daily  , Disp: , Rfl:     atorvastatin (LIPITOR) 40 mg tablet, Take 1 tablet (40 mg total) by mouth daily, Disp: 90 tablet, Rfl: 3    carvedilol (COREG) 12.5 mg tablet, Take 1 tablet (12.5 mg total) by mouth 2 (two) times a day with meals, Disp: 180 tablet, Rfl: 6    colchicine (COLCRYS) 0.6 mg tablet, 1 po qd, up to tid for acute flare., Disp: 90 tablet, Rfl: 5    fluticasone (FLONASE) 50 mcg/act nasal spray, 1 spray into each nostril daily, Disp: 16 g, Rfl: 3    ipratropium-albuterol (DUO-NEB) 0.5-2.5 mg/3 mL nebulizer solution, Take 3 mL by nebulization 3 (three) times a day, Disp: 120 mL, Rfl: 1    lisinopril (ZESTRIL) 20 mg tablet, Take 1 tablet (20 mg total) by mouth 2 (two) times a day, Disp: 180 tablet, Rfl: 3    LORazepam (ATIVAN) 1 mg tablet, Take 1 tablet (1 mg total) by mouth as needed for sleep, Disp: 30 tablet, Rfl: 0    montelukast (SINGULAIR) 10 mg tablet, TAKE 1 TABLET BY MOUTH EVERY DAY IN THE EVENING, Disp: 90 tablet, Rfl: 3        Returning patient present for skin, SOC on lower back, possible fanny k.    Whom besides the patient is providing clinical information about today's encounter?   NO ADDITIONAL HISTORIAN (patient alone provided history)    Physical Exam and Assessment/Plan by Diagnosis:      MELANOCYTIC NEVI (\"Moles\")    Physical Exam:  Anatomic Location Affected: Mostly on sun-exposed areas of the trunk and extremities  Morphological Description:  Scattered, 1-4mm round to ovoid, symmetrical-appearing, even bordered, skin colored to dark brown macules/papules, mostly in sun-exposed areas  Pertinent Positives:  Pertinent " "Negatives:    Additional History of Present Condition:  Present for years    Assessment and Plan:  Based on a thorough discussion of this condition and the management approach to it (including a comprehensive discussion of the known risks, side effects and potential benefits of treatment), the patient (family) agrees to implement the following specific plan:  Provided handout with information regarding the ABCDE's of moles   Recommend routine skin exams every year.   Sun avoidance, protective clothing (known as UPF clothing), and the use of at least SPF 30 sunscreens is advised. Sunscreen should be reapplied every two hours when outside.       SEBORRHEIC KERATOSIS; NON-INFLAMED    Physical Exam:  Anatomic Location Affected:  scattered across trunk, extremities,  face  Morphological Description:  Flat and raised, waxy, smooth to warty textured, yellow to brownish-grey to dark brown to blackish, discrete, \"stuck-on\" appearing papules.  Pertinent Positives:  Pertinent Negatives:    Additional History of Present Condition:  Patient reports new bumps on the skin.  Denies itch, burn, pain, bleeding or ulceration.  Present constantly; nothing seems to make it worse or better.  No prior treatment.      Assessment and Plan:  Based on a thorough discussion of this condition and the management approach to it (including a comprehensive discussion of the known risks, side effects and potential benefits of treatment), the patient (family) agrees to implement the following specific plan:  Reassured benign        ANGIOMA (\"CHERRY ANGIOMA\")    Physical Exam:  Anatomic Location: scattered across sun exposed areas of the trunk and extremities   Morphologic Description: Firm red to reddish-blue discrete papules  Pertinent Positives:  Pertinent Negatives:    Additional History of Present Condition:  Present on exam.     Assessment and Plan:  Reassured benign        ACTINIC KERATOSIS    Physical Exam:  Anatomic Location Affected:  " Face  Morphological Description:  Scaly pink papules  Pertinent Positives:  Pertinent Negatives:    Physical Exam  HENT:      Head:           Assessment and Plan:  Based on a thorough discussion of this condition and the management approach to it (including a comprehensive discussion of the known risks, side effects and potential benefits of treatment), the patient (family) agrees to implement the following specific plan:  When outside we recommend using a wide brim hat, sunglasses, long sleeve and pants, sunscreen with SPF 30+ with reapplication every 2 hours, or SPF specific clothing   liquid nitrogen to treat areas. Consent obtained. Expect area to blister, crust, and then fall off within 2 weeks. Please use vaseline.                                     PROCEDURE:  DESTRUCTION OF PRE-MALIGNANT LESIONS  After a thorough discussion of treatment options and risk/benefits/alternatives (including but not limited to local pain, scarring, dyspigmentation, blistering, and possible superinfection), verbal and written consent were obtained and the aforementioned lesions were treated on with cryotherapy using liquid nitrogen x 1 cycle for 5-10 seconds.    TOTAL NUMBER of 2 pre-malignant lesions were treated today on the ANATOMIC LOCATION: face.     The patient tolerated the procedure well, and after-care instructions were provided.         Scribe Attestation      I,:  Felicity Koenig am acting as a scribe while in the presence of the attending physician.:       I,:  Thomas Cortes MD personally performed the services described in this documentation    as scribed in my presence.:

## 2024-03-26 ENCOUNTER — OFFICE VISIT (OUTPATIENT)
Age: 74
End: 2024-03-26
Payer: COMMERCIAL

## 2024-03-26 VITALS
HEIGHT: 70 IN | OXYGEN SATURATION: 98 % | DIASTOLIC BLOOD PRESSURE: 84 MMHG | SYSTOLIC BLOOD PRESSURE: 140 MMHG | RESPIRATION RATE: 18 BRPM | BODY MASS INDEX: 29.63 KG/M2 | HEART RATE: 60 BPM | WEIGHT: 207 LBS

## 2024-03-26 DIAGNOSIS — I71.20 THORACIC AORTIC ANEURYSM WITHOUT RUPTURE, UNSPECIFIED PART (HCC): ICD-10-CM

## 2024-03-26 DIAGNOSIS — R73.01 IMPAIRED FASTING GLUCOSE: ICD-10-CM

## 2024-03-26 DIAGNOSIS — I71.21 ANEURYSM OF ASCENDING AORTA WITHOUT RUPTURE (HCC): ICD-10-CM

## 2024-03-26 DIAGNOSIS — M1A.9XX0 CHRONIC GOUT INVOLVING TOE OF RIGHT FOOT WITHOUT TOPHUS, UNSPECIFIED CAUSE: ICD-10-CM

## 2024-03-26 DIAGNOSIS — E78.2 MIXED HYPERLIPIDEMIA: ICD-10-CM

## 2024-03-26 DIAGNOSIS — Z12.5 SCREENING FOR PROSTATE CANCER: ICD-10-CM

## 2024-03-26 DIAGNOSIS — Z23 NEED FOR COVID-19 VACCINE: ICD-10-CM

## 2024-03-26 DIAGNOSIS — I10 BENIGN ESSENTIAL HYPERTENSION: Primary | ICD-10-CM

## 2024-03-26 PROCEDURE — 99214 OFFICE O/P EST MOD 30 MIN: CPT | Performed by: INTERNAL MEDICINE

## 2024-03-26 PROCEDURE — 91320 SARSCV2 VAC 30MCG TRS-SUC IM: CPT | Performed by: INTERNAL MEDICINE

## 2024-03-26 PROCEDURE — 90480 ADMN SARSCOV2 VAC 1/ONLY CMP: CPT | Performed by: INTERNAL MEDICINE

## 2024-03-26 RX ORDER — CARVEDILOL 12.5 MG/1
25 TABLET ORAL 2 TIMES DAILY WITH MEALS
Start: 2024-03-26

## 2024-03-26 NOTE — PROGRESS NOTES
Assessment/Plan:    Diagnoses and all orders for this visit:    Benign essential hypertension  -     CBC and differential; Future  -     Comprehensive metabolic panel; Future  -     Lipid panel; Future    Aneurysm of ascending aorta without rupture (HCC)    Impaired fasting glucose  -     Hemoglobin A1C; Future    Chronic gout involving toe of right 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 tophus, unspecified cause  -     Uric acid; Future    Mixed hyperlipidemia  -     TSH, 3rd generation with Free T4 reflex; Future    Screening for prostate cancer  -     PSA, Total Screen; Future    Thoracic aortic aneurysm without rupture, unspecified part (HCC)  -     carvedilol (COREG) 12.5 mg tablet; Take 2 tablets (25 mg total) by mouth 2 (two) times a day with meals    Need for COVID-19 vaccine  -     COVID-19 Pfizer mRNA vaccine 12 yr and older (GRAY cap vial or pre-filled syringe)              Patient Instructions   Lab data reviewed in detail and compared to prior    Impaired fasting glucose-stable with improved A1c down to 5.7    Hypertension-borderline control in light of ascending thoracic aortic aneurysm will increase carvedilol to 25 mg twice daily.  [facility]  Let me know if any side effects.    Hyperlipidemia-LDL at goal on atorvastatin    Routine follow-up after labs in 6 months, sooner as needed.    Subjective:      Patient ID: Markel Alves is a 73 y.o. 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  Having lots of fun.     Ganglion cyst on R index finger.  No pain, but annoying.       Active w/ work, no other regular exercise. Appetite has increased, gained 7 lbs.   TAA-45mm f/b CT surgery [facility], f/u scan scheduled in May.   Palpitations-rare symptoms, seen by [facility] in 9/21.   HTN-taking rx as directed, home bp's around 140/80's    HPL-tolerating atorvastatin  CKD-keeping well hydrated, no nsaids.  IFG-watching  carbs  Depression/anxiety-stable, no recent rx.  Rare lorazepam for sleep. Script generally expires before he uses it.   Gout- Notes occasional mild flares.  Hasn't needed colchicine.   Occasional etoh.   Notes 2-3x nocturia w/ good flow, no urgency or incontinence.             Current Outpatient Medications:     acetaminophen (TYLENOL) 325 mg tablet, Take 650 mg by mouth every 6 (six) hours as needed for mild pain, Disp: , Rfl:     albuterol (Ventolin HFA) 90 mcg/act inhaler, Inhale 2 puffs every 6 (six) hours as needed for wheezing, Disp: 18 g, Rfl: 2    amLODIPine (NORVASC) 5 mg tablet, Take 1 tablet (5 mg total) by mouth daily, Disp: 90 tablet, Rfl: 3    aspirin (ECOTRIN LOW STRENGTH) 81 mg EC tablet, Take 1 tablet by mouth daily  , Disp: , Rfl:     atorvastatin (LIPITOR) 40 mg tablet, Take 1 tablet (40 mg total) by mouth daily, Disp: 90 tablet, Rfl: 3    carvedilol (COREG) 12.5 mg tablet, Take 2 tablets (25 mg total) by mouth 2 (two) times a day with meals, Disp: , Rfl:     colchicine (COLCRYS) 0.6 mg tablet, 1 po qd, up to tid for acute flare., Disp: 90 tablet, Rfl: 5    ipratropium-albuterol (DUO-NEB) 0.5-2.5 mg/3 mL nebulizer solution, Take 3 mL by nebulization 3 (three) times a day, Disp: 120 mL, Rfl: 1    lisinopril (ZESTRIL) 20 mg tablet, Take 1 tablet (20 mg total) by mouth 2 (two) times a day, Disp: 180 tablet, Rfl: 3    LORazepam (ATIVAN) 1 mg tablet, Take 1 tablet (1 mg total) by mouth as needed for sleep, Disp: 30 tablet, Rfl: 0    montelukast (SINGULAIR) 10 mg tablet, take 1 tablet by mouth every evening, Disp: 330 tablet, Rfl: 0    fluticasone (FLONASE) 50 mcg/act nasal spray, 1 spray into each nostril daily, Disp: 16 g, Rfl: 3    Recent Results (from the past 1008 hour(s))   CBC and differential    Collection Time: 03/19/24  8:58 AM   Result Value Ref Range    WBC 6.16 4.31 - 10.16 Thousand/uL    RBC 4.50 3.88 - 5.62 Million/uL    Hemoglobin 14.7 12.0 - 17.0 g/dL    Hematocrit 43.3 36.5 - 49.3 %     MCV 96 82 - 98 fL    MCH 32.7 26.8 - 34.3 pg    MCHC 33.9 31.4 - 37.4 g/dL    RDW 12.5 11.6 - 15.1 %    MPV 11.4 8.9 - 12.7 fL    Platelets 205 149 - 390 Thousands/uL    nRBC 0 /100 WBCs    Neutrophils Relative 59 43 - 75 %    Immature Grans % 0 0 - 2 %    Lymphocytes Relative 25 14 - 44 %    Monocytes Relative 9 4 - 12 %    Eosinophils Relative 5 0 - 6 %    Basophils Relative 2 (H) 0 - 1 %    Neutrophils Absolute 3.63 1.85 - 7.62 Thousands/µL    Absolute Immature Grans 0.02 0.00 - 0.20 Thousand/uL    Absolute Lymphocytes 1.55 0.60 - 4.47 Thousands/µL    Absolute Monocytes 0.54 0.17 - 1.22 Thousand/µL    Eosinophils Absolute 0.32 0.00 - 0.61 Thousand/µL    Basophils Absolute 0.10 0.00 - 0.10 Thousands/µL   Comprehensive metabolic panel    Collection Time: 03/19/24  8:58 AM   Result Value Ref Range    Sodium 140 135 - 147 mmol/L    Potassium 4.0 3.5 - 5.3 mmol/L    Chloride 104 96 - 108 mmol/L    CO2 29 21 - 32 mmol/L    ANION GAP 7 4 - 13 mmol/L    BUN 17 5 - 25 mg/dL    Creatinine 0.99 0.60 - 1.30 mg/dL    Glucose, Fasting 113 (H) 65 - 99 mg/dL    Calcium 9.3 8.4 - 10.2 mg/dL    AST 25 13 - 39 U/L    ALT 27 7 - 52 U/L    Alkaline Phosphatase 76 34 - 104 U/L    Total Protein 6.8 6.4 - 8.4 g/dL    Albumin 4.5 3.5 - 5.0 g/dL    Total Bilirubin 0.97 0.20 - 1.00 mg/dL    eGFR 76 ml/min/1.73sq m   Lipid panel    Collection Time: 03/19/24  8:58 AM   Result Value Ref Range    Cholesterol 126 See Comment mg/dL    Triglycerides 119 See Comment mg/dL    HDL, Direct 44 >=40 mg/dL    LDL Calculated 58 0 - 100 mg/dL    Non-HDL-Chol (CHOL-HDL) 82 mg/dl   Hemoglobin A1C    Collection Time: 03/19/24  8:58 AM   Result Value Ref Range    Hemoglobin A1C 5.7 (H) Normal 4.0-5.6%; PreDiabetic 5.7-6.4%; Diabetic >=6.5%; Glycemic control for adults with diabetes <7.0% %     mg/dl   Uric acid    Collection Time: 03/19/24  8:58 AM   Result Value Ref Range    Uric Acid 7.5 3.5 - 8.5 mg/dL   PSA, Total Screen    Collection Time:  03/19/24  8:58 AM   Result Value Ref Range    PSA 1.67 0.00 - 4.00 ng/mL       The following portions of the patient's history were reviewed and updated as appropriate: allergies, current medications, past family history, past medical history, past social history, past surgical history and problem list.     Review of Systems   Constitutional:  Negative for appetite change, chills, diaphoresis, fatigue, fever and unexpected weight change.   HENT:  Negative for congestion, hearing loss and rhinorrhea.    Eyes:  Negative for visual disturbance.   Respiratory:  Negative for cough, chest tightness, shortness of breath and wheezing.    Cardiovascular:  Negative for chest pain, palpitations and leg swelling.   Gastrointestinal:  Negative for abdominal pain and blood in stool.   Endocrine: Negative for cold intolerance, heat intolerance, polydipsia and polyuria.   Genitourinary:  Negative for difficulty urinating, dysuria, frequency and urgency.   Musculoskeletal:  Negative for arthralgias and myalgias.   Skin:  Negative for rash.   Neurological:  Negative for dizziness, weakness, light-headedness and headaches.   Hematological:  Does not bruise/bleed easily.   Psychiatric/Behavioral:  Negative for dysphoric mood and sleep disturbance.          Objective:      Vitals:    03/26/24 1426   BP: 140/84   Pulse: 60   Resp: 18   SpO2: 98%          Physical Exam  Constitutional:       Appearance: He is well-developed.   HENT:      Head: Normocephalic and atraumatic.      Nose: Nose normal.   Eyes:      General: No scleral icterus.     Conjunctiva/sclera: Conjunctivae normal.      Pupils: Pupils are equal, round, and reactive to light.   Neck:      Thyroid: No thyromegaly.      Vascular: No JVD.      Trachea: No tracheal deviation.   Cardiovascular:      Rate and Rhythm: Normal rate and regular rhythm.      Heart sounds: No murmur heard.     No friction rub. No gallop.   Pulmonary:      Effort: Pulmonary effort is normal. No  respiratory distress.      Breath sounds: Normal breath sounds. No wheezing or rales.   Musculoskeletal:         General: No deformity.      Cervical back: Normal range of motion and neck supple.   Lymphadenopathy:      Cervical: No cervical adenopathy.   Skin:     General: Skin is warm and dry.      Coloration: Skin is not pale.      Findings: No erythema or rash.   Neurological:      Mental Status: He is alert and oriented to person, place, and time.      Cranial Nerves: No cranial nerve deficit.   Psychiatric:         Behavior: Behavior normal.         Thought Content: Thought content normal.         Judgment: Judgment normal.

## 2024-03-26 NOTE — PATIENT INSTRUCTIONS
Lab data reviewed in detail and compared to prior    Impaired fasting glucose-stable with improved A1c down to 5.7    Hypertension-borderline control in light of ascending thoracic aortic aneurysm will increase carvedilol to 25 mg twice daily.  [facility].  Let me know if any side effects.    Hyperlipidemia-LDL at goal on atorvastatin    Routine follow-up after labs in 6 months, sooner as needed.

## 2024-04-10 DIAGNOSIS — M1A.9XX0 CHRONIC GOUT INVOLVING TOE OF RIGHT FOOT WITHOUT TOPHUS, UNSPECIFIED CAUSE: Primary | ICD-10-CM

## 2024-04-10 RX ORDER — PREDNISONE 10 MG/1
TABLET ORAL
Qty: 19 TABLET | Refills: 0 | Status: SHIPPED | OUTPATIENT
Start: 2024-04-10

## 2024-04-15 DIAGNOSIS — I10 BENIGN ESSENTIAL HYPERTENSION: ICD-10-CM

## 2024-04-15 RX ORDER — ATORVASTATIN CALCIUM 40 MG/1
40 TABLET, FILM COATED ORAL DAILY
Qty: 90 TABLET | Refills: 3 | Status: SHIPPED | OUTPATIENT
Start: 2024-04-15

## 2024-04-15 RX ORDER — AMLODIPINE BESYLATE 5 MG/1
5 TABLET ORAL DAILY
Qty: 90 TABLET | Refills: 3 | Status: SHIPPED | OUTPATIENT
Start: 2024-04-15

## 2024-05-20 ENCOUNTER — HOSPITAL ENCOUNTER (OUTPATIENT)
Dept: CT IMAGING | Facility: HOSPITAL | Age: 74
Discharge: HOME | End: 2024-05-20
Payer: COMMERCIAL

## 2024-05-20 DIAGNOSIS — I71.20 THORACIC AORTIC ANEURYSM WITHOUT RUPTURE, UNSPECIFIED PART (HCC): ICD-10-CM

## 2024-05-20 PROCEDURE — G1004 CDSM NDSC: HCPCS

## 2024-05-20 PROCEDURE — 71250 CT THORAX DX C-: CPT

## 2024-05-30 ENCOUNTER — PATIENT MESSAGE (OUTPATIENT)
Dept: CARDIOLOGY CLINIC | Facility: CLINIC | Age: 74
End: 2024-05-30

## 2024-05-30 ENCOUNTER — TELEPHONE (OUTPATIENT)
Age: 74
End: 2024-05-30

## 2024-05-30 DIAGNOSIS — I71.20 THORACIC AORTIC ANEURYSM WITHOUT RUPTURE, UNSPECIFIED PART (HCC): ICD-10-CM

## 2024-05-30 RX ORDER — CARVEDILOL 12.5 MG/1
25 TABLET ORAL 2 TIMES DAILY WITH MEALS
Qty: 180 TABLET | Refills: 1 | Status: SHIPPED | OUTPATIENT
Start: 2024-05-30

## 2024-05-30 NOTE — TELEPHONE ENCOUNTER
"Spoke with pt and Dr. DALEY's message was relayed. Pt verbally understood    Pt red his CT report and noticed that it says that his \"Pulmonary artery enlargement at 3.8 cm.\" Pt is concern.    Please advise.    Thanks!    "

## 2024-05-30 NOTE — TELEPHONE ENCOUNTER
Pt is reaching out on how much longer for his CT results    He had it done on 5/20/2024, still not completed as of yet.    Please advise

## 2024-05-30 NOTE — TELEPHONE ENCOUNTER
Patient called stating he missed a call.  Call was disconnected before being able to relay any message.

## 2024-05-30 NOTE — TELEPHONE ENCOUNTER
The enlargement of pulmonary artery could be related to lung issues.    I see there are some abnormalities noted in the lungs by CT of the chest.  Not sure whether he sees a lung doctor.    He should discuss this with his primary care physician and get a pulmonary evaluation if not done already.

## 2024-05-30 NOTE — TELEPHONE ENCOUNTER
"Called and lvm for pt to give the office a call back regarding  message pertaining to the \"Pulmonary artery enlargement at 3.8 cm\".  "

## 2024-05-30 NOTE — TELEPHONE ENCOUNTER
Reason for call:   [x] Refill   [] Prior Auth  [] Other:     Office:   [] PCP/Provider -   [x] Specialty/Provider - Bertram    Medication: carvedilol (COREG) 12.5 mg tablet     Dose/Frequency:     25 mg, Oral, 2 times daily with meals       Quantity: 180    Pharmacy:   [id number] - [place], [place] - [address] [place] [address]       Does the patient have enough for 3 days?   [] Yes   [x] No - Send as HP to POD

## 2024-05-30 NOTE — TELEPHONE ENCOUNTER
Pt called stating that he was disconnected when receiving message regard CT results, gave provider's message from below. Pt states that he does not see a lung doctor, advised to follow up with PCP and get a pulmonary evaluation. Pt understood.

## 2024-05-30 NOTE — TELEPHONE ENCOUNTER
Please call the patient.  CT of the chest shows no major change in the size of the thoracic aortic aneurysm measuring 4.5 cm.  Will need repeat study in 1 year.

## 2024-06-21 ENCOUNTER — OFFICE VISIT (OUTPATIENT)
Dept: CARDIOLOGY CLINIC | Facility: CLINIC | Age: 74
End: 2024-06-21
Payer: COMMERCIAL

## 2024-06-21 VITALS
HEART RATE: 62 BPM | WEIGHT: 209 LBS | OXYGEN SATURATION: 98 % | HEIGHT: 70 IN | BODY MASS INDEX: 29.92 KG/M2 | RESPIRATION RATE: 16 BRPM | DIASTOLIC BLOOD PRESSURE: 80 MMHG | SYSTOLIC BLOOD PRESSURE: 130 MMHG

## 2024-06-21 DIAGNOSIS — I10 BENIGN ESSENTIAL HYPERTENSION: ICD-10-CM

## 2024-06-21 DIAGNOSIS — I71.20 THORACIC AORTIC ANEURYSM (TAA), UNSPECIFIED PART, UNSPECIFIED WHETHER RUPTURED (HCC): Primary | ICD-10-CM

## 2024-06-21 PROCEDURE — 99214 OFFICE O/P EST MOD 30 MIN: CPT | Performed by: INTERNAL MEDICINE

## 2024-06-21 NOTE — PROGRESS NOTES
Cardiology Office Note    Markel Alves 74 y.o. male MRN: [id number]    06/21/24          Assessment:  TAA-4.5mm  ASCVD  Hypertension   Hyperlipidemia  PVCs  IFG      Plan:  TAA stable.  Will continue routine surveillance.  Continue aspirin and atorvastatin  Continue Norvasc, Coreg, and lisinopril  Lifting/straining restrictions emphasized  Ambulatory blood pressure monitoring and maintaining a low sodium diet was advised.   He was advised to notify us with the onset of cardiac symptoms.      Follow up: 6 months or sooner as needed    1. Thoracic aortic aneurysm (TAA), unspecified part, unspecified whether ruptured (HCC)  CT chest wo contrast      2. Benign essential hypertension                HPI: Markel Alves is a 74 y.o. year old male with history of hypertension and hyperlipidemia presents for routine follow-up.      Past cardiac evaluation:  Holter monitor 10/2018:  Normal sinus rhythm with occasional PVCs (1.4% VE burden)  TTE 2018: EF 65%  Pharmacologic MPI 10/2018:  Negative for ischemia  CT chest 5/2024: TAA stable at 4.5cm  TTE 12/2023: EF: 60%, g1dd, mild MR    He has a been doing well from a cardiac standpoint since his last evaluation.  TAA stable.  Blood pressure has been well-controlled on ambulatory monitoring.   He denies anginal symptoms or any other cardiac concerns at this time.      Family History: mother with history of CAD s/p CABG at 68; materal uncles with cardiac diseaes; paternal family with history of CVA    Social history: occasional cigar use;       Allergies   Allergen Reactions    Cat Hair Extract Anaphylaxis, Hives, Itching, Shortness Of Breath and Swelling    Horse Protein Anaphylaxis    Other Cough     mold    Pollen Extract Other (See Comments) and Shortness Of Breath    Nsaids          Current Outpatient Medications:     acetaminophen (TYLENOL) 325 mg tablet, Take 650 mg by mouth every 6 (six) hours as needed for mild pain, Disp: , Rfl:     albuterol (Ventolin HFA) 90 mcg/act  inhaler, Inhale 2 puffs every 6 (six) hours as needed for wheezing, Disp: 18 g, Rfl: 2    amLODIPine (NORVASC) 5 mg tablet, take 1 tablet by mouth once daily, Disp: 90 tablet, Rfl: 3    aspirin (ECOTRIN LOW STRENGTH) 81 mg EC tablet, Take 1 tablet by mouth daily  , Disp: , Rfl:     atorvastatin (LIPITOR) 40 mg tablet, take 1 tablet by mouth once daily, Disp: 90 tablet, Rfl: 3    carvedilol (COREG) 12.5 mg tablet, Take 2 tablets (25 mg total) by mouth 2 (two) times a day with meals, Disp: 180 tablet, Rfl: 1    colchicine (COLCRYS) 0.6 mg tablet, 1 po qd, up to tid for acute flare., Disp: 90 tablet, Rfl: 5    fluticasone (FLONASE) 50 mcg/act nasal spray, 1 spray into each nostril daily, Disp: 16 g, Rfl: 3    ipratropium-albuterol (DUO-NEB) 0.5-2.5 mg/3 mL nebulizer solution, Take 3 mL by nebulization 3 (three) times a day, Disp: 120 mL, Rfl: 1    lisinopril (ZESTRIL) 20 mg tablet, Take 1 tablet (20 mg total) by mouth 2 (two) times a day, Disp: 180 tablet, Rfl: 3    LORazepam (ATIVAN) 1 mg tablet, Take 1 tablet (1 mg total) by mouth as needed for sleep, Disp: 30 tablet, Rfl: 0    montelukast (SINGULAIR) 10 mg tablet, take 1 tablet by mouth every evening, Disp: 330 tablet, Rfl: 0    predniSONE 10 mg tablet, Take 4 tablets for 2 days then 3 tablets for 2 days then 2 tablet for 2 days 1 for 1 day (Patient not taking: Reported on 4/15/2024), Disp: 19 tablet, Rfl: 0    Past Medical History:   Diagnosis Date    Arthritis     Asthma     Chronic kidney disease     CKD (chronic kidney disease) stage 3, GFR 30-59 ml/min ([facility]) 09/24/2019    Depression     Dyshidrosis     Hypertension     Osteoarthritis     Trigger finger        Family History   Problem Relation Age of Onset    Coronary artery disease Mother     Hyperlipidemia Mother     Hypertension Mother     Stroke Mother     Heart disease Mother     Stroke Father     Hypertension Father     Dementia Father     Asthma Maternal Aunt     Asthma Maternal Uncle     Arthritis  Maternal Uncle        Past Surgical History:   Procedure Laterality Date    COLONOSCOPY  2010    COLONOSCOPY  2020    HEMORRHOID SURGERY      JOINT REPLACEMENT  20    REPLACEMENT TOTAL KNEE Left     TONSILLECTOMY      TRIGGER FINGER RELEASE      WISDOM TOOTH EXTRACTION         Social History     Socioeconomic History    Marital status: /Civil Union     Spouse name: Not on file    Number of children: Not on file    Years of education: Not on file    Highest education level: Not on file   Occupational History    Occupation: IT   Tobacco Use    Smoking status: Former     Current packs/day: 0.00     Types: Cigarettes     Quit date: 1973     Years since quittin.9    Smokeless tobacco: Never    Tobacco comments:     Occasional cigar smoker   Vaping Use    Vaping status: Never Used   Substance and Sexual Activity    Alcohol use: Yes     Alcohol/week: 6.0 standard drinks of alcohol     Types: 3 Glasses of wine, 2 Cans of beer, 1 Shots of liquor per week     Comment: occasional    Drug use: No    Sexual activity: Not Currently     Partners: Female   Other Topics Concern    Not on file   Social History Narrative    Living independently with spouse    No advance directives     Social Determinants of Health     Financial Resource Strain: Low Risk  (2023)    Overall Financial Resource Strain (CARDIA)     Difficulty of Paying Living Expenses: Not hard at all   Food Insecurity: Not on file   Transportation Needs: No Transportation Needs (2023)    PRAPARE - Transportation     Lack of Transportation (Medical): No     Lack of Transportation (Non-Medical): No   Physical Activity: Insufficiently Active (3/15/2021)    Exercise Vital Sign     Days of Exercise per Week: 3 days     Minutes of Exercise per Session: 30 min   Stress: No Stress Concern Present (3/15/2021)    [employer] [employer] [place] [employer] - Occupational Stress Questionnaire     Feeling of Stress : Not at all   Social  "Connections: Not on file   Intimate Partner Violence: Not on file   Housing Stability: Not on file       Review of Systems   Constitutional: Negative for diaphoresis, weight gain and weight loss.   HENT:  Negative for congestion.    Cardiovascular:  Negative for chest pain, dyspnea on exertion, irregular heartbeat, leg swelling, near-syncope, orthopnea, palpitations, paroxysmal nocturnal dyspnea and syncope.   Respiratory:  Negative for shortness of breath, sleep disturbances due to breathing and snoring.    Hematologic/Lymphatic: Does not bruise/bleed easily.   Skin:  Negative for rash.   Musculoskeletal:  Negative for myalgias.   Gastrointestinal:  Negative for nausea and vomiting.   Neurological:  Negative for excessive daytime sleepiness and light-headedness.   Psychiatric/Behavioral:  The patient is not nervous/anxious.        Vitals: /80 (BP Location: Right arm, Patient Position: Sitting, Cuff Size: Standard)   Pulse 62   Resp 16   Ht 5' 10\" (1.778 m)   Wt 94.8 kg (209 lb)   SpO2 98%   BMI 29.99 kg/m²       Physical Exam:     GEN: Alert and oriented x 3, in no acute distress.  Well appearing and well nourished.   HEENT: Sclera anicteric, conjunctivae pink, mucous membranes moist. Oropharynx clear.   NECK: Supple, no carotid bruits, no significant JVD. Trachea midline, no thyromegaly.   HEART: Regular rhythm, normal S1 and S2, no murmurs, clicks, gallops or rubs. PMI nondisplaced, no thrills.   LUNGS: Clear to auscultation bilaterally; no wheezes, rales, or rhonchi. No increased work of breathing or signs of respiratory distress.   ABDOMEN: Soft, nontender, nondistended, normoactive bowel sounds.   EXTREMITIES: Skin warm and well perfused, no clubbing, cyanosis, or edema.  NEURO: No focal findings. Normal speech. Mood and affect normal.   SKIN: Normal without suspicious lesions on exposed skin.              "

## 2024-07-15 ENCOUNTER — PATIENT MESSAGE (OUTPATIENT)
Dept: CARDIOLOGY CLINIC | Facility: CLINIC | Age: 74
End: 2024-07-15

## 2024-07-16 ENCOUNTER — TELEPHONE (OUTPATIENT)
Dept: CARDIOLOGY CLINIC | Facility: CLINIC | Age: 74
End: 2024-07-16

## 2024-07-16 NOTE — TELEPHONE ENCOUNTER
Markel Alves   to [facility] (supporting Jeramie Aguirre MD)         7/15/24  9:05 AM  My test results were supposed to have been forwarded to [facility].  It looks like the CT scan did not get there.  I have an appointment there tomorrow.  Is there some way I can get the scan to take with me?                Patient is requesting result for ct please advise

## 2024-07-16 NOTE — TELEPHONE ENCOUNTER
There is a CT of the chest done on May 20, 2024    Report is in the chart.    A copy can be printed for the patient.    U john should be able to look up the report through care everywhere.

## 2024-09-03 DIAGNOSIS — I71.20 THORACIC AORTIC ANEURYSM WITHOUT RUPTURE, UNSPECIFIED PART (HCC): ICD-10-CM

## 2024-09-04 RX ORDER — CARVEDILOL 12.5 MG/1
TABLET ORAL
Qty: 180 TABLET | Refills: 1 | Status: SHIPPED | OUTPATIENT
Start: 2024-09-04

## 2024-09-20 ENCOUNTER — APPOINTMENT (EMERGENCY)
Dept: CT IMAGING | Facility: HOSPITAL | Age: 74
End: 2024-09-20
Payer: COMMERCIAL

## 2024-09-20 ENCOUNTER — HOSPITAL ENCOUNTER (EMERGENCY)
Facility: HOSPITAL | Age: 74
Discharge: HOME | End: 2024-09-20
Attending: EMERGENCY MEDICINE
Payer: COMMERCIAL

## 2024-09-20 VITALS
SYSTOLIC BLOOD PRESSURE: 144 MMHG | TEMPERATURE: 99.1 F | DIASTOLIC BLOOD PRESSURE: 83 MMHG | OXYGEN SATURATION: 99 % | RESPIRATION RATE: 20 BRPM | HEART RATE: 70 BPM

## 2024-09-20 DIAGNOSIS — R19.5 DARK STOOLS: ICD-10-CM

## 2024-09-20 DIAGNOSIS — K52.9 ILEITIS: Primary | ICD-10-CM

## 2024-09-20 LAB
ABO GROUP BLD: NORMAL
ALBUMIN SERPL BCG-MCNC: 4 G/DL (ref 3.5–5)
ALP SERPL-CCNC: 64 U/L (ref 34–104)
ALT SERPL W P-5'-P-CCNC: 14 U/L (ref 7–52)
ANION GAP SERPL CALCULATED.3IONS-SCNC: 6 MMOL/L (ref 4–13)
AST SERPL W P-5'-P-CCNC: 14 U/L (ref 13–39)
BASOPHILS # BLD AUTO: 0.03 THOUSANDS/ΜL (ref 0–0.1)
BASOPHILS NFR BLD AUTO: 1 % (ref 0–1)
BILIRUB SERPL-MCNC: 0.88 MG/DL (ref 0.2–1)
BLD GP AB SCN SERPL QL: NEGATIVE
BUN SERPL-MCNC: 17 MG/DL (ref 5–25)
CALCIUM SERPL-MCNC: 9.1 MG/DL (ref 8.4–10.2)
CHLORIDE SERPL-SCNC: 104 MMOL/L (ref 96–108)
CO2 SERPL-SCNC: 29 MMOL/L (ref 21–32)
CREAT SERPL-MCNC: 0.97 MG/DL (ref 0.6–1.3)
EOSINOPHIL # BLD AUTO: 0.19 THOUSAND/ΜL (ref 0–0.61)
EOSINOPHIL NFR BLD AUTO: 3 % (ref 0–6)
ERYTHROCYTE [DISTWIDTH] IN BLOOD BY AUTOMATED COUNT: 12.3 % (ref 11.6–15.1)
GFR SERPL CREATININE-BSD FRML MDRD: 77 ML/MIN/1.73SQ M
GLUCOSE SERPL-MCNC: 99 MG/DL (ref 65–140)
HCT VFR BLD AUTO: 42.6 % (ref 36.5–49.3)
HGB BLD-MCNC: 14.3 G/DL (ref 12–17)
IMM GRANULOCYTES # BLD AUTO: 0.02 THOUSAND/UL (ref 0–0.2)
IMM GRANULOCYTES NFR BLD AUTO: 0 % (ref 0–2)
LACTATE SERPL-SCNC: 0.5 MMOL/L (ref 0.5–2)
LIPASE SERPL-CCNC: 12 U/L (ref 11–82)
LYMPHOCYTES # BLD AUTO: 1.57 THOUSANDS/ΜL (ref 0.6–4.47)
LYMPHOCYTES NFR BLD AUTO: 26 % (ref 14–44)
MCH RBC QN AUTO: 32.4 PG (ref 26.8–34.3)
MCHC RBC AUTO-ENTMCNC: 33.6 G/DL (ref 31.4–37.4)
MCV RBC AUTO: 96 FL (ref 82–98)
MONOCYTES # BLD AUTO: 0.7 THOUSAND/ΜL (ref 0.17–1.22)
MONOCYTES NFR BLD AUTO: 12 % (ref 4–12)
NEUTROPHILS # BLD AUTO: 3.48 THOUSANDS/ΜL (ref 1.85–7.62)
NEUTS SEG NFR BLD AUTO: 58 % (ref 43–75)
NRBC BLD AUTO-RTO: 0 /100 WBCS
PLATELET # BLD AUTO: 180 THOUSANDS/UL (ref 149–390)
PMV BLD AUTO: 10.5 FL (ref 8.9–12.7)
POTASSIUM SERPL-SCNC: 3.5 MMOL/L (ref 3.5–5.3)
PROT SERPL-MCNC: 6.8 G/DL (ref 6.4–8.4)
RBC # BLD AUTO: 4.42 MILLION/UL (ref 3.88–5.62)
RH BLD: POSITIVE
SODIUM SERPL-SCNC: 139 MMOL/L (ref 135–147)
SPECIMEN EXPIRATION DATE: NORMAL
WBC # BLD AUTO: 5.99 THOUSAND/UL (ref 4.31–10.16)

## 2024-09-20 PROCEDURE — 86901 BLOOD TYPING SEROLOGIC RH(D): CPT | Performed by: EMERGENCY MEDICINE

## 2024-09-20 PROCEDURE — 80053 COMPREHEN METABOLIC PANEL: CPT | Performed by: EMERGENCY MEDICINE

## 2024-09-20 PROCEDURE — 83690 ASSAY OF LIPASE: CPT | Performed by: EMERGENCY MEDICINE

## 2024-09-20 PROCEDURE — 96374 THER/PROPH/DIAG INJ IV PUSH: CPT

## 2024-09-20 PROCEDURE — 99284 EMERGENCY DEPT VISIT MOD MDM: CPT

## 2024-09-20 PROCEDURE — 86900 BLOOD TYPING SEROLOGIC ABO: CPT | Performed by: EMERGENCY MEDICINE

## 2024-09-20 PROCEDURE — 36415 COLL VENOUS BLD VENIPUNCTURE: CPT | Performed by: EMERGENCY MEDICINE

## 2024-09-20 PROCEDURE — 83605 ASSAY OF LACTIC ACID: CPT | Performed by: EMERGENCY MEDICINE

## 2024-09-20 PROCEDURE — 99285 EMERGENCY DEPT VISIT HI MDM: CPT | Performed by: EMERGENCY MEDICINE

## 2024-09-20 PROCEDURE — 86850 RBC ANTIBODY SCREEN: CPT | Performed by: EMERGENCY MEDICINE

## 2024-09-20 PROCEDURE — 74177 CT ABD & PELVIS W/CONTRAST: CPT

## 2024-09-20 PROCEDURE — 85025 COMPLETE CBC W/AUTO DIFF WBC: CPT | Performed by: EMERGENCY MEDICINE

## 2024-09-20 RX ORDER — PANTOPRAZOLE SODIUM 40 MG/10ML
40 INJECTION, POWDER, LYOPHILIZED, FOR SOLUTION INTRAVENOUS ONCE
Status: COMPLETED | OUTPATIENT
Start: 2024-09-20 | End: 2024-09-20

## 2024-09-20 RX ADMIN — PANTOPRAZOLE SODIUM 40 MG: 40 INJECTION, POWDER, FOR SOLUTION INTRAVENOUS at 16:03

## 2024-09-20 RX ADMIN — IOHEXOL 100 ML: 350 INJECTION, SOLUTION INTRAVENOUS at 16:49

## 2024-09-20 NOTE — Clinical Note
Markel Alves was seen and treated in our emergency department on 9/20/2024.                Diagnosis:     Markel  may return to work on return date.    He may return on this date: 09/26/2024         If you have any questions or concerns, please don't hesitate to call.      Meme Wagner, DO    ______________________________           _______________          _______________  Hospital Representative                              Date                                Time

## 2024-09-20 NOTE — ED PROVIDER NOTES
1. Ileitis    2. Dark stools      ED Disposition       ED Disposition   Discharge    Condition   Stable    Date/Time   Fri Sep 20, 2024  5:07 PM    Comment   Markel Alves discharge to home/self care.                   Assessment & Plan       Medical Decision Making  74-year-old male presents for evaluation with 2 days of epigastric abdominal pain, chills, and dark stools.  On exam, patient with a low-grade temp of 99.1, otherwise with normal vitals, in no acute distress.  Patient noted to have epigastric/periumbilical abdominal tenderness on exam.  Differential diagnosis includes, but is not limited to, peptic ulcer disease, gastritis, gastroenteritis, pancreatitis, gallbladder disease, or other acute gastrointestinal pathology.  Patient evaluated with CBC, CMP, lipase, lactate, and CT scan of the abdomen and pelvis.  IV Protonix ordered for symptomatic treatment.    Patient's lab work unremarkable, patient's hemoglobin stable at 14.  CT scan showed a nonspecific diffuse ileitis.  Patient's dark stools likely secondary to acute ileitis.  Given that he has a stable hemoglobin and is not on any antiplatelet or anticoagulant medications, stable for discharge at this time.  Patient declined any pain medications at this time.  Patient given symptomatic care instructions, discharged home in stable condition with strict ED return precautions.    Amount and/or Complexity of Data Reviewed  Labs: ordered. Decision-making details documented in ED Course.  Radiology: ordered.    Risk  Prescription drug management.                ED Course as of 09/20/24 1743   Fri Sep 20, 2024   1619 Hemoglobin: 14.3       Medications   pantoprazole (PROTONIX) injection 40 mg (40 mg Intravenous Given 9/20/24 1603)   iohexol (OMNIPAQUE) 350 MG/ML injection (MULTI-DOSE) 100 mL (100 mL Intravenous Given 9/20/24 1649)       History of Present Illness       74-year-old male with a past medical history of hypertension, CKD, and a known thoracic  aortic aneurysm presents for evaluation with epigastric abdominal pain, dark stools, and chills.  Patient states that symptoms been ongoing for the past 2 days.  He denies any known fevers.  He states that his abdominal pain is intermittent, describes it as a sharp pain.  Pain does not radiate into the back.  He denies any associated nausea or vomiting.  Patient states that he is having dark stools, but has not noticed any bright red blood.  Patient no longer takes aspirin and is not on any other antiplatelet or anticoagulant medications.  Patient denies any chest pain, shortness of breath, or urinary complaints at this time.        Review of Systems   Constitutional:  Positive for chills. Negative for fever.   Respiratory:  Negative for shortness of breath.    Cardiovascular:  Negative for chest pain.   Gastrointestinal:  Positive for abdominal pain. Negative for nausea and vomiting.        Dark stools   Genitourinary:  Negative for dysuria and hematuria.   All other systems reviewed and are negative.          Objective     ED Triage Vitals [09/20/24 1427]   Temperature Pulse Blood Pressure Respirations SpO2 Patient Position - Orthostatic VS   99.1 °F (37.3 °C) 70 144/83 20 99 % Sitting      Temp Source Heart Rate Source BP Location FiO2 (%) Pain Score    Oral Monitor Left arm -- --        Physical Exam  Vitals and nursing note reviewed.   Constitutional:       General: He is awake. He is not in acute distress.     Appearance: He is not toxic-appearing.   HENT:      Head: Normocephalic and atraumatic.   Eyes:      General: Vision grossly intact. Gaze aligned appropriately.   Cardiovascular:      Rate and Rhythm: Normal rate and regular rhythm.      Heart sounds: Normal heart sounds.   Pulmonary:      Effort: Pulmonary effort is normal. No respiratory distress.      Breath sounds: Normal breath sounds.   Abdominal:      General: There is no distension.      Palpations: Abdomen is soft.      Tenderness: There is  abdominal tenderness in the epigastric area and periumbilical area. There is guarding.   Musculoskeletal:      Cervical back: Full passive range of motion without pain and neck supple.   Skin:     General: Skin is warm and dry.   Neurological:      General: No focal deficit present.      Mental Status: He is alert and oriented to person, place, and time.         Labs Reviewed   LIPASE - Normal       Result Value    Lipase 12     LACTIC ACID, PLASMA (W/REFLEX IF RESULT > 2.0) - Normal    LACTIC ACID 0.5      Narrative:     Result may be elevated if tourniquet was used during collection.   CBC AND DIFFERENTIAL    WBC 5.99      RBC 4.42      Hemoglobin 14.3      Hematocrit 42.6      MCV 96      MCH 32.4      MCHC 33.6      RDW 12.3      MPV 10.5      Platelets 180      nRBC 0      Segmented % 58      Immature Grans % 0      Lymphocytes % 26      Monocytes % 12      Eosinophils Relative 3      Basophils Relative 1      Absolute Neutrophils 3.48      Absolute Immature Grans 0.02      Absolute Lymphocytes 1.57      Absolute Monocytes 0.70      Eosinophils Absolute 0.19      Basophils Absolute 0.03     COMPREHENSIVE METABOLIC PANEL    Sodium 139      Potassium 3.5      Chloride 104      CO2 29      ANION GAP 6      BUN 17      Creatinine 0.97      Glucose 99      Calcium 9.1      AST 14      ALT 14      Alkaline Phosphatase 64      Total Protein 6.8      Albumin 4.0      Total Bilirubin 0.88      eGFR 77      Narrative:     National Kidney Disease Foundation guidelines for Chronic Kidney Disease (CKD):     Stage 1 with normal or high GFR (GFR > 90 mL/min/1.73 square meters)    Stage 2 Mild CKD (GFR = 60-89 mL/min/1.73 square meters)    Stage 3A Moderate CKD (GFR = 45-59 mL/min/1.73 square meters)    Stage 3B Moderate CKD (GFR = 30-44 mL/min/1.73 square meters)    Stage 4 Severe CKD (GFR = 15-29 mL/min/1.73 square meters)    Stage 5 End Stage CKD (GFR <15 mL/min/1.73 square meters)  Note: GFR calculation is accurate only  with a steady state creatinine   TYPE AND SCREEN    ABO Grouping O      Rh Factor Positive      Antibody Screen Negative      Specimen Expiration Date 2024     ABORH RECHECK     CT abdomen pelvis with contrast   Final Interpretation by Sunny Kitchen MD ([id number])      1.  Nonspecific diffuse ileitis. Infectious and inflammatory etiologies should be considered.         Workstation performed: [id number]             Procedures    ED Medication and Procedure Management   Prior to Admission Medications   Prescriptions Last Dose Informant Patient Reported? Taking?   LORazepam (ATIVAN) 1 mg tablet  Self No No   Sig: Take 1 tablet (1 mg total) by mouth as needed for sleep   acetaminophen (TYLENOL) 325 mg tablet  Self Yes No   Sig: Take 650 mg by mouth every 6 (six) hours as needed for mild pain   albuterol (Ventolin HFA) 90 mcg/act inhaler  Self No No   Sig: Inhale 2 puffs every 6 (six) hours as needed for wheezing   amLODIPine (NORVASC) 5 mg tablet   No No   Sig: take 1 tablet by mouth once daily   aspirin (ECOTRIN LOW STRENGTH) 81 mg EC tablet  Self Yes No   Sig: Take 1 tablet by mouth daily     atorvastatin (LIPITOR) 40 mg tablet   No No   Sig: take 1 tablet by mouth once daily   carvedilol (COREG) 12.5 mg tablet   No No   Sig: take 2 tablets by mouth twice a day with meals   colchicine (COLCRYS) 0.6 mg tablet  Self No No   Si po qd, up to tid for acute flare.   fluticasone (FLONASE) 50 mcg/act nasal spray  Self No No   Si spray into each nostril daily   ipratropium-albuterol (DUO-NEB) 0.5-2.5 mg/3 mL nebulizer solution  Self No No   Sig: Take 3 mL by nebulization 3 (three) times a day   lisinopril (ZESTRIL) 20 mg tablet   No No   Sig: Take 1 tablet (20 mg total) by mouth 2 (two) times a day   montelukast (SINGULAIR) 10 mg tablet   No No   Sig: take 1 tablet by mouth every evening   predniSONE 10 mg tablet   No No   Sig: Take 4 tablets for 2 days then 3 tablets for 2 days then 2 tablet for 2 days 1  for 1 day   Patient not taking: Reported on 4/15/2024      Facility-Administered Medications: None     Discharge Medication List as of 9/20/2024  5:19 PM        CONTINUE these medications which have NOT CHANGED    Details   acetaminophen (TYLENOL) 325 mg tablet Take 650 mg by mouth every 6 (six) hours as needed for mild pain, Historical Med      albuterol (Ventolin HFA) 90 mcg/act inhaler Inhale 2 puffs every 6 (six) hours as needed for wheezing, Starting Wed 12/13/2023, Normal      amLODIPine (NORVASC) 5 mg tablet take 1 tablet by mouth once daily, Starting Mon 4/15/2024, Normal      aspirin (ECOTRIN LOW STRENGTH) 81 mg EC tablet Take 1 tablet by mouth daily  , Historical Med      atorvastatin (LIPITOR) 40 mg tablet take 1 tablet by mouth once daily, Starting Mon 4/15/2024, Normal      carvedilol (COREG) 12.5 mg tablet take 2 tablets by mouth twice a day with meals, Normal      colchicine (COLCRYS) 0.6 mg tablet 1 po qd, up to tid for acute flare., Normal      fluticasone (FLONASE) 50 mcg/act nasal spray 1 spray into each nostril daily, Starting Wed 10/26/2022, Normal      ipratropium-albuterol (DUO-NEB) 0.5-2.5 mg/3 mL nebulizer solution Take 3 mL by nebulization 3 (three) times a day, Starting Fri 11/17/2023, Normal      lisinopril (ZESTRIL) 20 mg tablet Take 1 tablet (20 mg total) by mouth 2 (two) times a day, Starting Mon 12/18/2023, Normal      LORazepam (ATIVAN) 1 mg tablet Take 1 tablet (1 mg total) by mouth as needed for sleep, Starting Wed 12/13/2023, Normal      montelukast (SINGULAIR) 10 mg tablet take 1 tablet by mouth every evening, Starting Tue 3/26/2024, Normal      predniSONE 10 mg tablet Take 4 tablets for 2 days then 3 tablets for 2 days then 2 tablet for 2 days 1 for 1 day, Normal           No discharge procedures on file.     Meme Wagner, DO  09/20/24 174

## 2024-10-14 ENCOUNTER — APPOINTMENT (OUTPATIENT)
Dept: LAB | Facility: CLINIC | Age: 74
End: 2024-10-14
Payer: COMMERCIAL

## 2024-10-14 DIAGNOSIS — Z12.5 SCREENING FOR PROSTATE CANCER: ICD-10-CM

## 2024-10-14 DIAGNOSIS — R73.01 IMPAIRED FASTING GLUCOSE: ICD-10-CM

## 2024-10-14 DIAGNOSIS — M1A.9XX0 CHRONIC GOUT INVOLVING TOE OF RIGHT FOOT WITHOUT TOPHUS, UNSPECIFIED CAUSE: ICD-10-CM

## 2024-10-14 DIAGNOSIS — E78.2 MIXED HYPERLIPIDEMIA: ICD-10-CM

## 2024-10-14 DIAGNOSIS — I10 BENIGN ESSENTIAL HYPERTENSION: ICD-10-CM

## 2024-10-14 LAB
ALBUMIN SERPL BCG-MCNC: 4.3 G/DL (ref 3.5–5)
ALP SERPL-CCNC: 81 U/L (ref 34–104)
ALT SERPL W P-5'-P-CCNC: 20 U/L (ref 7–52)
ANION GAP SERPL CALCULATED.3IONS-SCNC: 6 MMOL/L (ref 4–13)
AST SERPL W P-5'-P-CCNC: 21 U/L (ref 13–39)
BASOPHILS # BLD AUTO: 0.06 THOUSANDS/ΜL (ref 0–0.1)
BASOPHILS NFR BLD AUTO: 1 % (ref 0–1)
BILIRUB SERPL-MCNC: 0.71 MG/DL (ref 0.2–1)
BUN SERPL-MCNC: 19 MG/DL (ref 5–25)
CALCIUM SERPL-MCNC: 8.7 MG/DL (ref 8.4–10.2)
CHLORIDE SERPL-SCNC: 104 MMOL/L (ref 96–108)
CHOLEST SERPL-MCNC: 115 MG/DL
CO2 SERPL-SCNC: 29 MMOL/L (ref 21–32)
CREAT SERPL-MCNC: 0.94 MG/DL (ref 0.6–1.3)
EOSINOPHIL # BLD AUTO: 0.27 THOUSAND/ΜL (ref 0–0.61)
EOSINOPHIL NFR BLD AUTO: 4 % (ref 0–6)
ERYTHROCYTE [DISTWIDTH] IN BLOOD BY AUTOMATED COUNT: 12.4 % (ref 11.6–15.1)
EST. AVERAGE GLUCOSE BLD GHB EST-MCNC: 123 MG/DL
GFR SERPL CREATININE-BSD FRML MDRD: 80 ML/MIN/1.73SQ M
GLUCOSE P FAST SERPL-MCNC: 95 MG/DL (ref 65–99)
HBA1C MFR BLD: 5.9 %
HCT VFR BLD AUTO: 41.4 % (ref 36.5–49.3)
HDLC SERPL-MCNC: 40 MG/DL
HGB BLD-MCNC: 13.9 G/DL (ref 12–17)
IMM GRANULOCYTES # BLD AUTO: 0.02 THOUSAND/UL (ref 0–0.2)
IMM GRANULOCYTES NFR BLD AUTO: 0 % (ref 0–2)
LDLC SERPL CALC-MCNC: 55 MG/DL (ref 0–100)
LYMPHOCYTES # BLD AUTO: 1.65 THOUSANDS/ΜL (ref 0.6–4.47)
LYMPHOCYTES NFR BLD AUTO: 25 % (ref 14–44)
MCH RBC QN AUTO: 32.9 PG (ref 26.8–34.3)
MCHC RBC AUTO-ENTMCNC: 33.6 G/DL (ref 31.4–37.4)
MCV RBC AUTO: 98 FL (ref 82–98)
MONOCYTES # BLD AUTO: 0.62 THOUSAND/ΜL (ref 0.17–1.22)
MONOCYTES NFR BLD AUTO: 9 % (ref 4–12)
NEUTROPHILS # BLD AUTO: 4.03 THOUSANDS/ΜL (ref 1.85–7.62)
NEUTS SEG NFR BLD AUTO: 61 % (ref 43–75)
NONHDLC SERPL-MCNC: 75 MG/DL
NRBC BLD AUTO-RTO: 0 /100 WBCS
PLATELET # BLD AUTO: 205 THOUSANDS/UL (ref 149–390)
PMV BLD AUTO: 11.2 FL (ref 8.9–12.7)
POTASSIUM SERPL-SCNC: 3.9 MMOL/L (ref 3.5–5.3)
PROT SERPL-MCNC: 6.6 G/DL (ref 6.4–8.4)
PSA SERPL-MCNC: 1.74 NG/ML (ref 0–4)
RBC # BLD AUTO: 4.23 MILLION/UL (ref 3.88–5.62)
SODIUM SERPL-SCNC: 139 MMOL/L (ref 135–147)
TRIGL SERPL-MCNC: 98 MG/DL
TSH SERPL DL<=0.05 MIU/L-ACNC: 4.09 UIU/ML (ref 0.45–4.5)
URATE SERPL-MCNC: 6.4 MG/DL (ref 3.5–8.5)
WBC # BLD AUTO: 6.65 THOUSAND/UL (ref 4.31–10.16)

## 2024-10-14 PROCEDURE — 85025 COMPLETE CBC W/AUTO DIFF WBC: CPT

## 2024-10-14 PROCEDURE — 84550 ASSAY OF BLOOD/URIC ACID: CPT

## 2024-10-14 PROCEDURE — 84443 ASSAY THYROID STIM HORMONE: CPT

## 2024-10-14 PROCEDURE — 80053 COMPREHEN METABOLIC PANEL: CPT

## 2024-10-14 PROCEDURE — 83036 HEMOGLOBIN GLYCOSYLATED A1C: CPT

## 2024-10-14 PROCEDURE — G0103 PSA SCREENING: HCPCS

## 2024-10-14 PROCEDURE — 36415 COLL VENOUS BLD VENIPUNCTURE: CPT

## 2024-10-14 PROCEDURE — 80061 LIPID PANEL: CPT

## 2024-10-15 ENCOUNTER — RA CDI HCC (OUTPATIENT)
Dept: OTHER | Facility: HOSPITAL | Age: 74
End: 2024-10-15

## 2024-10-15 PROBLEM — I12.9 HYPERTENSIVE CKD (CHRONIC KIDNEY DISEASE): Status: ACTIVE | Noted: 2024-10-15

## 2024-10-15 NOTE — PROGRESS NOTES
HCC coding opportunities          Chart Reviewed number of suggestions sent to Provider: 1  I12.9     Patients Insurance     Medicare Insurance: [facility] Medicare Advantage

## 2024-10-20 DIAGNOSIS — I71.20 THORACIC AORTIC ANEURYSM WITHOUT RUPTURE, UNSPECIFIED PART (HCC): ICD-10-CM

## 2024-10-21 RX ORDER — CARVEDILOL 12.5 MG/1
TABLET ORAL
Qty: 360 TABLET | Refills: 1 | Status: SHIPPED | OUTPATIENT
Start: 2024-10-21

## 2024-10-22 ENCOUNTER — OFFICE VISIT (OUTPATIENT)
Age: 74
End: 2024-10-22
Payer: COMMERCIAL

## 2024-10-22 VITALS
HEIGHT: 70 IN | BODY MASS INDEX: 29.49 KG/M2 | HEART RATE: 54 BPM | DIASTOLIC BLOOD PRESSURE: 84 MMHG | WEIGHT: 206 LBS | SYSTOLIC BLOOD PRESSURE: 132 MMHG | RESPIRATION RATE: 16 BRPM | OXYGEN SATURATION: 100 %

## 2024-10-22 DIAGNOSIS — N40.0 BPH WITHOUT URINARY OBSTRUCTION: ICD-10-CM

## 2024-10-22 DIAGNOSIS — Z23 ENCOUNTER FOR IMMUNIZATION: Primary | ICD-10-CM

## 2024-10-22 DIAGNOSIS — R73.01 IMPAIRED FASTING GLUCOSE: ICD-10-CM

## 2024-10-22 DIAGNOSIS — N18.2 CKD (CHRONIC KIDNEY DISEASE) STAGE 2, GFR 60-89 ML/MIN: ICD-10-CM

## 2024-10-22 DIAGNOSIS — Z23 NEED FOR COVID-19 VACCINE: ICD-10-CM

## 2024-10-22 DIAGNOSIS — M1A.9XX0 CHRONIC GOUT INVOLVING TOE OF RIGHT FOOT WITHOUT TOPHUS, UNSPECIFIED CAUSE: ICD-10-CM

## 2024-10-22 DIAGNOSIS — I71.21 ANEURYSM OF ASCENDING AORTA WITHOUT RUPTURE (HCC): ICD-10-CM

## 2024-10-22 DIAGNOSIS — I12.9 HYPERTENSIVE KIDNEY DISEASE WITH STAGE 2 CHRONIC KIDNEY DISEASE: ICD-10-CM

## 2024-10-22 DIAGNOSIS — J45.20 MILD INTERMITTENT ASTHMA, UNSPECIFIED WHETHER COMPLICATED: ICD-10-CM

## 2024-10-22 DIAGNOSIS — R10.13 DYSPEPSIA: ICD-10-CM

## 2024-10-22 DIAGNOSIS — E78.2 MIXED HYPERLIPIDEMIA: ICD-10-CM

## 2024-10-22 DIAGNOSIS — N18.2 HYPERTENSIVE KIDNEY DISEASE WITH STAGE 2 CHRONIC KIDNEY DISEASE: ICD-10-CM

## 2024-10-22 PROCEDURE — G0439 PPPS, SUBSEQ VISIT: HCPCS | Performed by: INTERNAL MEDICINE

## 2024-10-22 PROCEDURE — 90662 IIV NO PRSV INCREASED AG IM: CPT

## 2024-10-22 PROCEDURE — 99214 OFFICE O/P EST MOD 30 MIN: CPT | Performed by: INTERNAL MEDICINE

## 2024-10-22 PROCEDURE — 91320 SARSCV2 VAC 30MCG TRS-SUC IM: CPT

## 2024-10-22 PROCEDURE — G0008 ADMIN INFLUENZA VIRUS VAC: HCPCS

## 2024-10-22 PROCEDURE — 90480 ADMN SARSCOV2 VAC 1/ONLY CMP: CPT

## 2024-10-22 RX ORDER — PANTOPRAZOLE SODIUM 40 MG/1
40 TABLET, DELAYED RELEASE ORAL
Qty: 30 TABLET | Refills: 1 | Status: SHIPPED | OUTPATIENT
Start: 2024-10-22 | End: 2025-04-20

## 2024-10-22 NOTE — ASSESSMENT & PLAN NOTE
Orders:    Basic metabolic panel; Future    CBC and differential; Future    Lipid panel; Future    Hepatic function panel; Future

## 2024-10-22 NOTE — ASSESSMENT & PLAN NOTE
Lab Results   Component Value Date    EGFR 80 10/14/2024    EGFR 77 09/20/2024    EGFR 76 03/19/2024    CREATININE 0.94 10/14/2024    CREATININE 0.97 09/20/2024    CREATININE 0.99 03/19/2024

## 2024-10-22 NOTE — PATIENT INSTRUCTIONS
Lab data reviewed in detail and compared to prior    Hypertension hyperlipidemia stable on present regimen    Impaired fasting glucose remains stable    Dyspepsia with dark stool-hemoglobin and MCV have remained stable.  No alarm symptoms.  Will give 2-month therapeutic trial of pantoprazole, start 40 mg daily 30 minutes prior to breakfast.  Contact me next week if your symptoms persist, would pursue GI workup at that time.    Health maintenance-flu and COVID boosters today, I recommend RSV and 2 to 3 weeks based upon history of asthma

## 2024-10-22 NOTE — PROGRESS NOTES
Ambulatory Visit  Name: Markel Alves      : 1950      MRN: [id number]  Encounter Provider: Manjit Allison MD  Encounter Date: 10/22/2024   Encounter department: [facility] INTERNAL MEDICINE [facility] [address]    Assessment & Plan  Encounter for immunization    Orders:    influenza vaccine, high-dose, PF 0.5 mL (Fluzone High Dose)    Need for COVID-19 vaccine    Orders:    COVID-19 Pfizer mRNA vaccine 12 yr and older (Comirnaty pre-filled syringe)    Impaired fasting glucose    Orders:    Hemoglobin A1C; Future    CKD (chronic kidney disease) stage 2, GFR 60-89 ml/min  Lab Results   Component Value Date    EGFR 80 10/14/2024    EGFR 77 2024    EGFR 76 2024    CREATININE 0.94 10/14/2024    CREATININE 0.97 2024    CREATININE 0.99 2024            BPH without urinary obstruction         Mixed hyperlipidemia    Orders:    Basic metabolic panel; Future    CBC and differential; Future    Lipid panel; Future    Hepatic function panel; Future    Chronic gout involving toe of right 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 tophus, unspecified cause    Orders:    Uric acid; Future    Hypertensive kidney disease with stage 2 chronic kidney disease  Lab Results   Component Value Date    EGFR 80 10/14/2024    EGFR 77 2024    EGFR 76 2024    CREATININE 0.94 10/14/2024    CREATININE 0.97 2024    CREATININE 0.99 2024            Aneurysm of ascending aorta without rupture (HCC)    Orders:    Ambulatory Referral to Cardiology; Future    Dyspepsia    Orders:    pantoprazole (PROTONIX) 40 mg tablet; Take 1 tablet (40 mg total) by mouth daily before breakfast    Mild intermittent asthma, unspecified whether complicated            Preventive health issues were discussed with patient, and age appropriate screening tests were ordered as noted in patient's After Visit Summary. Personalized health advice and appropriate referrals for health education or preventive services given if needed, as noted in patient's After  Visit Summary.    History of Present Illness     F/u mmp, awv and review labs  Feeling generally well   Seen in ed for mid epigastric pain w/ dark, not black, stools 9/20, labs stable, CT w/ ileitis.  Still w/ occasional dyspepsia.    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  Having lots of fun.     Ganglion cyst on R index finger.  No pain, but annoying.       Active w/ work, no other regular exercise. Appetite has increased, gained 7 lbs.   TAA-45mm f/b CT surgery [facility], f/u scan scheduled Nov 5.    Palpitations-rare symptoms, seen by AJ 6/24  HTN-taking rx as directed, no regular home bp's.   HPL-tolerating atorvastatin  CKD-keeping well hydrated, no nsaids.  IFG-watching carbs  Allergies stable w/ monthly injections  Depression/anxiety-stable, no recent rx.  Rare lorazepam for sleep. Script generally expires before he uses it.   Gout- Notes occasional mild flares.  Hasn't needed colchicine.   Occasional etoh.   Notes 2-3x nocturia w/ good flow, no urgency or incontinence.          Patient Care Team:  Manjit Allison MD as PCP - General  Manjit Allison MD    Review of Systems  Medical History Reviewed by provider this encounter:  Tobacco  Allergies  Meds  Problems  Med Hx  Surg Hx  Fam Hx       Annual Wellness Visit Questionnaire   Max is here for his Subsequent Wellness visit.     Health Risk Assessment:   Patient rates overall health as fair. Patient feels that their physical health rating is same. Patient is satisfied with their life. Eyesight was rated as same. Hearing was rated as same. Patient feels that their emotional and mental health rating is same. Patients states they are never, rarely angry. Patient states they are never, rarely unusually tired/fatigued. Pain experienced in the last 7 days has been some. Patient states that he has experienced no weight loss or gain in last 6 months.     Depression Screening:   PHQ-2 Score: 0      Fall Risk Screening:   In the past  year, patient has experienced: no history of falling in past year      Home Safety:  Patient does not have trouble with stairs inside or outside of their home. Patient has working smoke alarms and has working carbon monoxide detector. Home safety hazards include: none.     Nutrition:   Current diet is Regular.     Medications:   Patient is currently taking over-the-counter supplements. OTC medications include: see medication list. Patient is able to manage medications.     Activities of Daily Living (ADLs)/Instrumental Activities of Daily Living (IADLs):   Walk and transfer into and out of bed and chair?: Yes  Dress and groom yourself?: Yes    Bathe or shower yourself?: Yes    Feed yourself? Yes  Do your laundry/housekeeping?: Yes  Manage your money, pay your bills and track your expenses?: Yes  Make your own meals?: Yes    Do your own shopping?: Yes    Previous Hospitalizations:   Any hospitalizations or ED visits within the last 12 months?: Yes    How many hospitalizations have you had in the last year?: 1-2    Advance Care Planning:   Living will: Yes    Advanced directive: Yes      Cognitive Screening:   Provider or family/friend/caregiver concerned regarding cognition?: No    PREVENTIVE SCREENINGS      Cardiovascular Screening:    General: Screening Not Indicated and History Lipid Disorder      Diabetes Screening:     General: Screening Current      Colorectal Cancer Screening:     General: Screening Current      Prostate Cancer Screening:    General: Screening Current      Osteoporosis Screening:    General: Screening Not Indicated      Abdominal Aortic Aneurysm (AAA) Screening:    Risk factors include: age between 65-76 yo and tobacco use        Lung Cancer Screening:     General: Screening Not Indicated      Hepatitis C Screening:    General: Screening Current    Screening, Brief Intervention, and Referral to Treatment (SBIRT)    Screening  Typical number of drinks in a day: 1  Typical number of drinks in a  "week: 7  Interpretation: Low risk drinking behavior.    Single Item Drug Screening:  How often have you used an illegal drug (including marijuana) or a prescription medication for non-medical reasons in the past year? never    Single Item Drug Screen Score: 0  Interpretation: Negative screen for possible drug use disorder    Social Determinants of Health     Financial Resource Strain: Low Risk  (9/19/2023)    Overall Financial Resource Strain (CARDIA)     Difficulty of Paying Living Expenses: Not hard at all   Food Insecurity: No Food Insecurity (10/22/2024)    Hunger Vital Sign     Worried About Running Out of Food in the Last Year: Never true     Ran Out of Food in the Last Year: Never true   Transportation Needs: No Transportation Needs (10/22/2024)    PRAPARE - Transportation     Lack of Transportation (Medical): No     Lack of Transportation (Non-Medical): No   Housing Stability: Unknown (10/22/2024)    Housing Stability Vital Sign     Unable to Pay for Housing in the Last Year: No     Homeless in the Last Year: No   Utilities: Not At Risk (10/22/2024)    [facility] Utilities     Threatened with loss of utilities: No     No results found.    Objective     /84 (BP Location: Left arm, Patient Position: Sitting, Cuff Size: Standard)   Pulse (!) 54   Resp 16   Ht 5' 10\" (1.778 m)   Wt 93.4 kg (206 lb)   SpO2 100%   BMI 29.56 kg/m²     Physical Exam  Constitutional:       Appearance: He is well-developed.   HENT:      Head: Normocephalic and atraumatic.      Nose: Nose normal.   Eyes:      General: No scleral icterus.     Conjunctiva/sclera: Conjunctivae normal.      Pupils: Pupils are equal, round, and reactive to light.   Neck:      Thyroid: No thyromegaly.      Vascular: No JVD.      Trachea: No tracheal deviation.   Cardiovascular:      Rate and Rhythm: Normal rate and regular rhythm.      Heart sounds: No murmur heard.     No friction rub. No gallop.   Pulmonary:      Effort: Pulmonary effort is normal. No " respiratory distress.      Breath sounds: Normal breath sounds. No wheezing or rales.   Abdominal:      General: Bowel sounds are normal. There is no distension.      Palpations: Abdomen is soft. There is no mass.      Tenderness: There is abdominal tenderness. There is no guarding or rebound.      Comments: Mid epigastric ttp   Musculoskeletal:         General: No tenderness.      Cervical back: Normal range of motion and neck supple.   Lymphadenopathy:      Cervical: No cervical adenopathy.   Skin:     General: Skin is warm and dry.      Findings: No erythema or rash.   Neurological:      Mental Status: He is alert and oriented to person, place, and time.      Cranial Nerves: No cranial nerve deficit.   Psychiatric:         Behavior: Behavior normal.         Thought Content: Thought content normal.         Judgment: Judgment normal.

## 2024-11-05 ENCOUNTER — HOSPITAL ENCOUNTER (OUTPATIENT)
Dept: CT IMAGING | Facility: HOSPITAL | Age: 74
Discharge: HOME | End: 2024-11-05
Payer: COMMERCIAL

## 2024-11-05 DIAGNOSIS — R91.1 PULMONARY NODULE: ICD-10-CM

## 2024-11-05 PROCEDURE — 71250 CT THORAX DX C-: CPT

## 2024-11-05 PROCEDURE — G1004 CDSM NDSC: HCPCS

## 2024-11-14 DIAGNOSIS — R10.13 DYSPEPSIA: ICD-10-CM

## 2024-11-14 RX ORDER — PANTOPRAZOLE SODIUM 40 MG/1
TABLET, DELAYED RELEASE ORAL
Qty: 90 TABLET | Refills: 0 | Status: SHIPPED | OUTPATIENT
Start: 2024-11-14

## 2024-11-15 ENCOUNTER — RESULTS FOLLOW-UP (OUTPATIENT)
Age: 74
End: 2024-11-15

## 2024-11-26 ENCOUNTER — TRANSCRIBE ORDERS (OUTPATIENT)
Dept: CARDIAC SURGERY | Facility: CLINIC | Age: 74
End: 2024-11-26

## 2024-11-26 DIAGNOSIS — I71.21 ANEURYSM OF ASCENDING AORTA WITHOUT RUPTURE (HCC): Primary | ICD-10-CM

## 2024-12-02 DIAGNOSIS — I10 ESSENTIAL HYPERTENSION: ICD-10-CM

## 2024-12-02 RX ORDER — LISINOPRIL 20 MG/1
20 TABLET ORAL 2 TIMES DAILY
Qty: 180 TABLET | Refills: 3 | Status: SHIPPED | OUTPATIENT
Start: 2024-12-02

## 2024-12-02 NOTE — TELEPHONE ENCOUNTER
Refill received from [facility] for Lisinopril through fax.    Previously, Dr. Bobo's pt.    Meds pending.

## 2024-12-06 ENCOUNTER — OFFICE VISIT (OUTPATIENT)
Dept: CARDIOLOGY CLINIC | Facility: CLINIC | Age: 74
End: 2024-12-06
Payer: COMMERCIAL

## 2024-12-06 ENCOUNTER — TELEPHONE (OUTPATIENT)
Dept: CARDIOLOGY CLINIC | Facility: CLINIC | Age: 74
End: 2024-12-06

## 2024-12-06 VITALS
HEART RATE: 66 BPM | BODY MASS INDEX: 29.16 KG/M2 | SYSTOLIC BLOOD PRESSURE: 118 MMHG | DIASTOLIC BLOOD PRESSURE: 70 MMHG | HEIGHT: 71 IN | WEIGHT: 208.3 LBS | OXYGEN SATURATION: 96 %

## 2024-12-06 DIAGNOSIS — R06.09 DYSPNEA ON EXERTION: ICD-10-CM

## 2024-12-06 DIAGNOSIS — R00.2 PALPITATIONS: ICD-10-CM

## 2024-12-06 DIAGNOSIS — I71.21 ANEURYSM OF ASCENDING AORTA WITHOUT RUPTURE (HCC): ICD-10-CM

## 2024-12-06 DIAGNOSIS — I12.9 HYPERTENSIVE KIDNEY DISEASE WITH STAGE 2 CHRONIC KIDNEY DISEASE: Primary | ICD-10-CM

## 2024-12-06 DIAGNOSIS — N18.2 HYPERTENSIVE KIDNEY DISEASE WITH STAGE 2 CHRONIC KIDNEY DISEASE: Primary | ICD-10-CM

## 2024-12-06 PROCEDURE — 99214 OFFICE O/P EST MOD 30 MIN: CPT | Performed by: STUDENT IN AN ORGANIZED HEALTH CARE EDUCATION/TRAINING PROGRAM

## 2024-12-06 PROCEDURE — 93000 ELECTROCARDIOGRAM COMPLETE: CPT | Performed by: STUDENT IN AN ORGANIZED HEALTH CARE EDUCATION/TRAINING PROGRAM

## 2024-12-06 NOTE — ASSESSMENT & PLAN NOTE
"Patient with symptoms of dyspnea exertion that been going on for 6 months.  He reports that he has not had worsenings or intolerance just feels little bit more short of breath with his day-to-day activities.  Associate with this sometimes he gets a little bit of lightheadedness but has not passed out, would not describe it as dizziness.  Symptoms last for few seconds.  He has had recent blood work in October while the symptoms have been going on, he reports that they are pretty stable for the past 6 months -so I will forego repeating blood work at this time.  He was educated on \"red flag\" symptoms to look out for presentation to the ED.  EKG today shows sinus rhythm  "

## 2024-12-06 NOTE — ASSESSMENT & PLAN NOTE
The patient is currently being followed by CT surgery at [facility], he has his most recent imaging 11/5/2024 which showed stable size from 5/20/2024 at 4.5 cm.  He was reeducated on blood pressure control, exercise restrictions, and avoiding fluoroquinolone antibiotics.  Will follow-up imaging in 6 months

## 2024-12-06 NOTE — PROGRESS NOTES
[facility] Cardiology  Follow up note  Markel Alves 74 y.o. male MRN: [id number]        1. Hypertensive kidney disease with stage 2 chronic kidney disease  Assessment & Plan:  Lab Results   Component Value Date    EGFR 80 10/14/2024    EGFR 77 09/20/2024    EGFR 76 03/19/2024    CREATININE 0.94 10/14/2024    CREATININE 0.97 09/20/2024    CREATININE 0.99 03/19/2024   Blood pressure well-controlled on current regimen, blood patient continue current medications.  Recent BMP reviewed.  2. Aneurysm of ascending aorta without rupture (HCC)  Assessment & Plan:  The patient is currently being followed by CT surgery at [facility], he has his most recent imaging 11/5/2024 which showed stable size from 5/20/2024 at 4.5 cm.  He was reeducated on blood pressure control, exercise restrictions, and avoiding fluoroquinolone antibiotics.  Will follow-up imaging in 6 months  Orders:  -     Ambulatory Referral to Cardiology  3. Dyspnea on exertion  -     POCT ECG  -     AMB extended holter monitor; Future; Expected date: 12/06/2024  -     NM myocardial perfusion spect (rx stress and/or rest); Future; Expected date: 12/06/2024      Assessment & Plan  Aneurysm of ascending aorta without rupture (HCC)  The patient is currently being followed by CT surgery at [facility], he has his most recent imaging 11/5/2024 which showed stable size from 5/20/2024 at 4.5 cm.  He was reeducated on blood pressure control, exercise restrictions, and avoiding fluoroquinolone antibiotics.  Will follow-up imaging in 6 months  Hypertensive kidney disease with stage 2 chronic kidney disease  Lab Results   Component Value Date    EGFR 80 10/14/2024    EGFR 77 09/20/2024    EGFR 76 03/19/2024    CREATININE 0.94 10/14/2024    CREATININE 0.97 09/20/2024    CREATININE 0.99 03/19/2024   Blood pressure well-controlled on current regimen, blood patient continue current medications.  Recent BMP reviewed.  Dyspnea on exertion  Patient with  "symptoms of dyspnea exertion that been going on for 6 months.  He reports that he has not had worsenings or intolerance just feels little bit more short of breath with his day-to-day activities.  Associate with this sometimes he gets a little bit of lightheadedness but has not passed out, would not describe it as dizziness.  Symptoms last for few seconds.  He has had recent blood work in October while the symptoms have been going on, he reports that they are pretty stable for the past 6 months -so I will forego repeating blood work at this time.  He was educated on \"red flag\" symptoms to look out for presentation to the ED.  EKG today shows sinus rhythm  Palpitations  See above      HPI:   Markel Alves is a 74 y.o. year old male thoracic aortic aneurysm, CKD, hypertension, former smoker who is presenting for follow-up.  Today he reports that he has been noticing worsening dyspnea on exertion for past 6 months.  He does not report a change in his exertional tolerance but does report that he does get a little bit more short of breath with exertion.  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  Associate with this sometimes he does have brief episodes of presyncope associate with lightheadedness and sometimes palpitations.  He does not report any chest pain associate with this.  Outside this he has not noticed any lower extreme edema, orthopnea, PND.  He reports that he has had COVID 6 weeks prior but the symptoms been going on longer.  He reports that the symptoms started around the same time his carvedilol was increased.      Review of Systems    Past Medical History:   Diagnosis Date    Arthritis     Asthma     Chronic kidney disease     CKD (chronic kidney disease) stage 3, GFR 30-59 ml/min ([facility]) 09/24/2019    Depression     Dyshidrosis     Hypertension     Osteoarthritis     Trigger finger      Social History     Substance and Sexual Activity "   Alcohol Use Yes    Alcohol/week: 6.0 standard drinks of alcohol    Types: 3 Glasses of wine, 2 Cans of beer, 1 Shots of liquor per week    Comment: occasional     Social History     Substance and Sexual Activity   Drug Use No     Social History     Tobacco Use   Smoking Status Former    Current packs/day: 0.00    Types: Cigarettes    Quit date: 1973    Years since quittin.3   Smokeless Tobacco Never   Tobacco Comments    Occasional cigar smoker       Allergies:  Allergies   Allergen Reactions    Cat Hair Extract Anaphylaxis, Hives, Itching, Shortness Of Breath and Swelling    Horse Protein Anaphylaxis    Other Cough     mold    Pollen Extract Other (See Comments) and Shortness Of Breath    Nsaids        Medications:     Current Outpatient Medications:     acetaminophen (TYLENOL) 325 mg tablet, Take 650 mg by mouth every 6 (six) hours as needed for mild pain, Disp: , Rfl:     albuterol (Ventolin HFA) 90 mcg/act inhaler, Inhale 2 puffs every 6 (six) hours as needed for wheezing, Disp: 18 g, Rfl: 2    amLODIPine (NORVASC) 5 mg tablet, take 1 tablet by mouth once daily, Disp: 90 tablet, Rfl: 3    atorvastatin (LIPITOR) 40 mg tablet, take 1 tablet by mouth once daily, Disp: 90 tablet, Rfl: 3    carvedilol (COREG) 12.5 mg tablet, take 2 tablets by mouth twice a day with meals, Disp: 360 tablet, Rfl: 1    colchicine (COLCRYS) 0.6 mg tablet, 1 po qd, up to tid for acute flare., Disp: 90 tablet, Rfl: 5    fluticasone (FLONASE) 50 mcg/act nasal spray, 1 spray into each nostril daily, Disp: 16 g, Rfl: 3    ipratropium-albuterol (DUO-NEB) 0.5-2.5 mg/3 mL nebulizer solution, Take 3 mL by nebulization 3 (three) times a day, Disp: 120 mL, Rfl: 1    lisinopril (ZESTRIL) 20 mg tablet, Take 1 tablet (20 mg total) by mouth 2 (two) times a day, Disp: 180 tablet, Rfl: 3    LORazepam (ATIVAN) 1 mg tablet, Take 1 tablet (1 mg total) by mouth as needed for sleep, Disp: 30 tablet, Rfl: 0    montelukast (SINGULAIR) 10 mg tablet,  take 1 tablet by mouth every evening, Disp: 330 tablet, Rfl: 0    pantoprazole (PROTONIX) 40 mg tablet, take 1 tablet by mouth once daily before breakfast, Disp: 90 tablet, Rfl: 0      Vitals:    12/06/24 0752   BP: 118/70   Pulse: 66   SpO2: 96%     Weight (last 2 days)       Date/Time Weight    12/06/24 0752 94.5 (208.3)          Physical Exam  Vitals and nursing note reviewed.   Constitutional:       General: He is not in acute distress.     Appearance: He is well-developed.   HENT:      Head: Normocephalic and atraumatic.   Eyes:      Conjunctiva/sclera: Conjunctivae normal.   Cardiovascular:      Rate and Rhythm: Normal rate and regular rhythm.      Heart sounds: Murmur heard.   Pulmonary:      Effort: Pulmonary effort is normal. No respiratory distress.      Breath sounds: Normal breath sounds.   Abdominal:      Palpations: Abdomen is soft.      Tenderness: There is no abdominal tenderness.   Musculoskeletal:         General: No swelling.      Cervical back: Neck supple.   Skin:     General: Skin is warm and dry.      Capillary Refill: Capillary refill takes less than 2 seconds.   Neurological:      Mental Status: He is alert.   Psychiatric:         Mood and Affect: Mood normal.         Laboratory Studies:    Lab Results   Component Value Date    WBC 6.65 10/14/2024    HGB 13.9 10/14/2024    HCT 41.4 10/14/2024    MCV 98 10/14/2024     10/14/2024     Lab Results   Component Value Date    SODIUM 139 10/14/2024    K 3.9 10/14/2024     10/14/2024    CO2 29 10/14/2024    BUN 19 10/14/2024    CREATININE 0.94 10/14/2024    GLUC 99 09/20/2024    CALCIUM 8.7 10/14/2024     Lab Results   Component Value Date    HGBA1C 5.9 (H) 10/14/2024         Cardiac testing:     EKG reviewed personally:   12/6/2024: NSR     Echocardiogram:  LVEF 60%, grade 1 diastolic dysfunction, normal RV, mild MR, mild TR     Stress tests:    Anthony Owens MD    Portions of the record may have been created with voice recognition  "software.  Occasional wrong word or \"sound a like\" substitutions may have occurred due to the inherent limitations of voice recognition software.  Read the chart carefully and recognize, using context, where substitutions have occurred.   "

## 2024-12-06 NOTE — PATIENT INSTRUCTIONS
1) discussed aortic dilatation restrictions and blood pressure control, including avoidance of fluoroquinolone antibiotics  2) we will obtain an event monitor, echocardiogram, and stress test for evaluation of your symptoms of lightheadedness and decreased exertional tolerance  3) we will follow-up in 2 months

## 2024-12-06 NOTE — ASSESSMENT & PLAN NOTE
Lab Results   Component Value Date    EGFR 80 10/14/2024    EGFR 77 09/20/2024    EGFR 76 03/19/2024    CREATININE 0.94 10/14/2024    CREATININE 0.97 09/20/2024    CREATININE 0.99 03/19/2024   Blood pressure well-controlled on current regimen, blood patient continue current medications.  Recent BMP reviewed.

## 2024-12-06 NOTE — TELEPHONE ENCOUNTER
14 day Zio XT has been placed on the pt in the office after today's ov with Dr. Owens.    Zio has been registered on the Zio website. [id number].

## 2024-12-20 ENCOUNTER — RESULTS FOLLOW-UP (OUTPATIENT)
Dept: CARDIOLOGY CLINIC | Facility: CLINIC | Age: 74
End: 2024-12-20

## 2024-12-20 ENCOUNTER — HOSPITAL ENCOUNTER (OUTPATIENT)
Dept: NON INVASIVE DIAGNOSTICS | Facility: CLINIC | Age: 74
Discharge: HOME | End: 2024-12-20
Payer: COMMERCIAL

## 2024-12-20 VITALS
WEIGHT: 208 LBS | SYSTOLIC BLOOD PRESSURE: 118 MMHG | DIASTOLIC BLOOD PRESSURE: 70 MMHG | BODY MASS INDEX: 29.12 KG/M2 | HEART RATE: 58 BPM | HEIGHT: 71 IN

## 2024-12-20 VITALS
HEART RATE: 58 BPM | SYSTOLIC BLOOD PRESSURE: 188 MMHG | HEIGHT: 71 IN | DIASTOLIC BLOOD PRESSURE: 98 MMHG | BODY MASS INDEX: 29.12 KG/M2 | WEIGHT: 208 LBS | OXYGEN SATURATION: 98 %

## 2024-12-20 DIAGNOSIS — R06.09 DYSPNEA ON EXERTION: ICD-10-CM

## 2024-12-20 DIAGNOSIS — I71.21 ANEURYSM OF ASCENDING AORTA WITHOUT RUPTURE (HCC): ICD-10-CM

## 2024-12-20 LAB
AORTIC ROOT: 3.7 CM
APICAL FOUR CHAMBER EJECTION FRACTION: 58 %
ASCENDING AORTA: 4.4 CM
AV LVOT MEAN GRADIENT: 2 MMHG
AV LVOT PEAK GRADIENT: 4 MMHG
AV REGURGITATION PRESSURE HALF TIME: 973 MS
CHEST PAIN STATEMENT: NORMAL
DOP CALC LVOT PEAK VEL VTI: 25.26 CM
DOP CALC LVOT PEAK VEL: 1 M/S
E WAVE DECELERATION TIME: 241 MS
E/A RATIO: 0.84
FRACTIONAL SHORTENING: 30 (ref 28–44)
INTERVENTRICULAR SEPTUM IN DIASTOLE (PARASTERNAL SHORT AXIS VIEW): 1.4 CM
INTERVENTRICULAR SEPTUM: 1.4 CM (ref 0.6–1.1)
LAAS-AP2: 22.2 CM2
LAAS-AP4: 17.8 CM2
LEFT ATRIUM SIZE: 3.9 CM
LEFT ATRIUM VOLUME (MOD BIPLANE): 57 ML
LEFT ATRIUM VOLUME INDEX (MOD BIPLANE): 26.6 ML/M2
LEFT INTERNAL DIMENSION IN SYSTOLE: 3.2 CM (ref 2.1–4)
LEFT VENTRICULAR INTERNAL DIMENSION IN DIASTOLE: 4.6 CM (ref 3.5–6)
LEFT VENTRICULAR POSTERIOR WALL IN END DIASTOLE: 1.4 CM
LEFT VENTRICULAR STROKE VOLUME: 57 ML
LVSV (TEICH): 57 ML
MAX DIASTOLIC BP: 104 MMHG
MAX PREDICTED HEART RATE: 148 BPM
MV E'TISSUE VEL-SEP: 6 CM/S
MV PEAK A VEL: 1.03 M/S
MV PEAK E VEL: 87 CM/S
MV STENOSIS PRESSURE HALF TIME: 70 MS
MV VALVE AREA P 1/2 METHOD: 3.14
NUC STRESS EJECTION FRACTION: 54 %
PROTOCOL NAME: NORMAL
RATE PRESSURE PRODUCT: NORMAL
REASON FOR TERMINATION: NORMAL
RIGHT ATRIUM AREA SYSTOLE A4C: 14.8 CM2
RIGHT VENTRICLE ID DIMENSION: 2.2 CM
SL CV AV DECELERATION TIME RETROGRADE: 3356 MS
SL CV AV PEAK GRADIENT RETROGRADE: 76 MMHG
SL CV LEFT ATRIUM LENGTH A2C: 6.1 CM
SL CV LV EF: 60
SL CV PED ECHO LEFT VENTRICLE DIASTOLIC VOLUME (MOD BIPLANE) 2D: 98 ML
SL CV PED ECHO LEFT VENTRICLE SYSTOLIC VOLUME (MOD BIPLANE) 2D: 41 ML
SL CV REST NUCLEAR ISOTOPE DOSE: 10.3 MCI
SL CV STRESS NUCLEAR ISOTOPE DOSE: 32.2 MCI
SL CV STRESS RECOVERY BP: NORMAL MMHG
SL CV STRESS RECOVERY HR: 69 BPM
SL CV STRESS RECOVERY O2 SAT: 99 %
STRESS ANGINA INDEX: 0
STRESS BASELINE BP: NORMAL MMHG
STRESS BASELINE HR: 58 BPM
STRESS O2 SAT REST: 98 %
STRESS PEAK HR: 93 BPM
STRESS POST EXERCISE DUR MIN: 3 MIN
STRESS POST EXERCISE DUR SEC: 0 SEC
STRESS POST O2 SAT PEAK: 98 %
STRESS POST PEAK BP: 206 MMHG
STRESS POST PEAK HR: 93 BPM
STRESS POST PEAK SYSTOLIC BP: 206 MMHG
STRESS/REST PERFUSION RATIO: 1.04
TARGET HR FORMULA: NORMAL
TEST INDICATION: NORMAL
TR MAX PG: 29 MMHG
TR PEAK VELOCITY: 2.7 M/S
TRICUSPID ANNULAR PLANE SYSTOLIC EXCURSION: 1.9 CM
TRICUSPID VALVE PEAK REGURGITATION VELOCITY: 2.7 M/S

## 2024-12-20 PROCEDURE — 78452 HT MUSCLE IMAGE SPECT MULT: CPT | Performed by: INTERNAL MEDICINE

## 2024-12-20 PROCEDURE — A9502 TC99M TETROFOSMIN: HCPCS

## 2024-12-20 PROCEDURE — 93306 TTE W/DOPPLER COMPLETE: CPT | Performed by: INTERNAL MEDICINE

## 2024-12-20 PROCEDURE — 93306 TTE W/DOPPLER COMPLETE: CPT

## 2024-12-20 PROCEDURE — 93017 CV STRESS TEST TRACING ONLY: CPT

## 2024-12-20 PROCEDURE — 78452 HT MUSCLE IMAGE SPECT MULT: CPT

## 2024-12-20 PROCEDURE — 93018 CV STRESS TEST I&R ONLY: CPT | Performed by: INTERNAL MEDICINE

## 2024-12-20 PROCEDURE — 93016 CV STRESS TEST SUPVJ ONLY: CPT | Performed by: INTERNAL MEDICINE

## 2024-12-20 RX ORDER — REGADENOSON 0.08 MG/ML
0.4 INJECTION, SOLUTION INTRAVENOUS ONCE
Status: COMPLETED | OUTPATIENT
Start: 2024-12-20 | End: 2024-12-20

## 2024-12-20 RX ADMIN — REGADENOSON 0.4 MG: 0.08 INJECTION, SOLUTION INTRAVENOUS at 11:50

## 2024-12-23 ENCOUNTER — RESULTS FOLLOW-UP (OUTPATIENT)
Dept: CARDIAC SURGERY | Facility: CLINIC | Age: 74
End: 2024-12-23

## 2024-12-23 LAB
CHEST PAIN STATEMENT: NORMAL
MAX DIASTOLIC BP: 104 MMHG
MAX PREDICTED HEART RATE: 148 BPM
PROTOCOL NAME: NORMAL
REASON FOR TERMINATION: NORMAL
STRESS POST EXERCISE DUR MIN: 3 MIN
STRESS POST EXERCISE DUR SEC: 0 SEC
STRESS POST PEAK HR: 93 BPM
STRESS POST PEAK SYSTOLIC BP: 206 MMHG
TARGET HR FORMULA: NORMAL
TEST INDICATION: NORMAL

## 2024-12-30 ENCOUNTER — TELEPHONE (OUTPATIENT)
Dept: OTHER | Facility: HOSPITAL | Age: 74
End: 2024-12-30

## 2024-12-30 ENCOUNTER — CLINICAL SUPPORT (OUTPATIENT)
Dept: CARDIOLOGY CLINIC | Facility: CLINIC | Age: 74
End: 2024-12-30
Payer: COMMERCIAL

## 2024-12-30 DIAGNOSIS — R06.09 DYSPNEA ON EXERTION: ICD-10-CM

## 2024-12-30 PROCEDURE — 93248 EXT ECG>7D<15D REV&INTERPJ: CPT | Performed by: STUDENT IN AN ORGANIZED HEALTH CARE EDUCATION/TRAINING PROGRAM

## 2025-01-21 ENCOUNTER — OFFICE VISIT (OUTPATIENT)
Age: 75
End: 2025-01-21
Payer: COMMERCIAL

## 2025-01-21 VITALS
HEART RATE: 66 BPM | DIASTOLIC BLOOD PRESSURE: 72 MMHG | SYSTOLIC BLOOD PRESSURE: 128 MMHG | BODY MASS INDEX: 32.96 KG/M2 | WEIGHT: 210 LBS | HEIGHT: 67 IN | OXYGEN SATURATION: 98 % | TEMPERATURE: 97.2 F

## 2025-01-21 DIAGNOSIS — Z13.89 SCREENING FOR SKIN CONDITION: Primary | ICD-10-CM

## 2025-01-21 DIAGNOSIS — D22.9 MULTIPLE MELANOCYTIC NEVI: ICD-10-CM

## 2025-01-21 DIAGNOSIS — D18.01 CHERRY ANGIOMA: ICD-10-CM

## 2025-01-21 DIAGNOSIS — L57.0 KERATOSIS, ACTINIC: ICD-10-CM

## 2025-01-21 DIAGNOSIS — L82.1 SEBORRHEIC KERATOSIS: ICD-10-CM

## 2025-01-21 DIAGNOSIS — L11.1 TRANSIENT ACANTHOLYTIC DERMATOSIS (GROVER): ICD-10-CM

## 2025-01-21 PROCEDURE — 99214 OFFICE O/P EST MOD 30 MIN: CPT | Performed by: STUDENT IN AN ORGANIZED HEALTH CARE EDUCATION/TRAINING PROGRAM

## 2025-01-21 PROCEDURE — 17000 DESTRUCT PREMALG LESION: CPT | Performed by: STUDENT IN AN ORGANIZED HEALTH CARE EDUCATION/TRAINING PROGRAM

## 2025-01-21 RX ORDER — TRIAMCINOLONE ACETONIDE 1 MG/G
CREAM TOPICAL 2 TIMES DAILY
Qty: 453 G | Refills: 1 | Status: SHIPPED | OUTPATIENT
Start: 2025-01-21

## 2025-01-21 NOTE — PATIENT INSTRUCTIONS
"Treatment with Cryotherapy    The doctor has treated your skin with nitrogen, which is 320 degrees Fahrenheit below zero.  He has given the treated area \"frostbite.\"    Stinging should subside within a few hours.  You can take Tylenol for pain, if needed.    Over the next few days, it is normal if the area becomes reddened, a blood blister, or swollen with fluid.  If the lesion treated was near the eye - you could get a swollen eye over the next few days.  Do not panic!  This is all temporary, and will resolve with time.    There is no special treatment - just keep the area clean.  Makeup and BandAids can be used, if preferred.    When the area starts to dry up and peel off, using Vaseline can help healing.    It usually takes up to a month for it to heal.  Some lesions are recurrent and may require repeat treatments.  If a lesion has not healed in one month, please don't hesitate to contact us.      If you have any further questions that are not answered here, please call us.  [phone].    Thank you for allowing us to care for you.       RANSIENT ACANTHOLYTIC DERMATOSIS (\"KAY DISEASE\")  Assessment and Plan:  Based on a thorough discussion of this condition and the management approach to it (including a comprehensive discussion of the known risks, side effects and potential benefits of treatment), the patient (family) agrees to implement the following specific plan:  Triamcinolone cream 0.1%    What is transient acantholytic dermatosis?  Transient acantholytic dermatosis causes an itchy truncal rash characterised by acantholysis on histopathology. It is an acquired acantholytic dermatosis, in contrast to the rare and persistent inherited acantholytic dermatoses, Darier disease and Caridad-Caridad disease.  Transient acantholytic dermatosis is also known as Kay disease.    Who gets transient acantholytic dermatosis?  Transient acantholytic dermatosis most often affects men over 50. It is less common in women " or younger people. It is common in those who are unwell in some way but can arise in quite healthy people as well. It has been reported in association with several medications, including:  Anastrozole  Vemurafenib  Dabrafenib  Cetuximab  Mercury  D-penicillamine.    What causes transient acantholytic dermatosis?  The cause of transient acantholytic dermatosis is unknown. Sometimes, it follows sweating or some unexpected heat stress, so there has been a suspicion that it may relate to sweat or sweat ducts. But it also may arise in quite dry skin. Many affected individuals are sun damaged.    What are the clinical features of transient acantholytic dermatosis?  Transient acantholytic dermatosis often starts quite suddenly. It is more common in winter than in summer. Characteristics include:  The most common sites are central back, mid chest.  Lesions are small red, crusted or eroded papules.  There may be vesicles and non-follicular pustules.  Lesions may bleed.  Although frequently intensely itchy, transient acantholytic dermatosis may cause no symptoms  So-called 'transient' acantholytic dermatosis can often prove persistent (chronic).    What are the complications of transient acantholytic dermatosis?  Transient acantholytic dermatosis may be complicated by the development of dermatitis, usually in a discoid pattern with round or oval, dry or crusted plaques.  The plaques start on the chest and back and may spread to affect the limbs.    How is transient acantholytic dermatosis diagnosed?  Transient acantholytic dermatosis is usually diagnosed clinically, but a skin biopsy may be necessary.  The pathology of transient acantholytic dermatosis is characteristic, with acantholysis ( skin cells) with or without dyskeratosis (abnormal rounded skin cells). Spongiotic dermatitis may also be noted.    What is the differential diagnosis for transient acantholytic dermatosis?  Disorders that may appear similar to  transient acantholytic dermatosis are:  Darier disease  Pemphigus foliaceus (erosions)  Seborrhoeic dermatitis (involvement of skin folds and hair-bearing sites, flaky)  Discoid and other forms of dermatitis (larger dry or blistered plaques)  Folliculitis (affects the hair follicles, best seen with dermoscopy).    What is the treatment of transient acantholytic dermatosis?  There is no curative treatment for transient acantholytic dermatosis, but the following suggestions may be helpful.  Remain cool, as sweating may induce more itchy spots.  Moisturising creams or antipruritic lotions containing menthol and camphor can reduce the desire to scratch.  Apply a mild topical steroid such as hydrocortisone, if possible as a lotion. It can be applied frequently to the affected areas to relieve itching.  Dermatitis, if present, responds topical and systemic steroids.  Calcipotriol has been reported to be of benefit.  A course of tetracycline or an oral antifungal medication such as itraconazole helps some patients.  Phototherapy can be helpful, but may also provoke the disease.  Oral retinoids such as acitretin or isotretinoin have been reported to be helpful and may be required for several months. However, they have important side effects and are not necessary for mild cases.  Methotrexate and etanercept that also have been reported to help.    How can transient acantholytic dermatosis be prevented?  Keep cool. Wear garments designed to prevent sweat rash.    What is the outlook for transient acantholytic dermatosis?  The duration of transient acantholytic dermatosis is variable -- from days to decades. It can come and go, often with a seasonal variation.

## 2025-01-21 NOTE — PROGRESS NOTES
"[facility] Dermatology Clinic Note     Patient Name: Markel Alves  Encounter Date: January 21, 2025     Have you been cared for by a [facility] Dermatologist in the last 3 years and, if so, which description applies to you?    Yes.  I have been here within the last 3 years, and my medical history has NOT changed since that time.  I am MALE/not capable of bearing children.    REVIEW OF SYSTEMS:  Have you recently had or currently have any of the following? No changes in my recent health.   PAST MEDICAL HISTORY:  Have you personally ever had or currently have any of the following?  If \"YES,\" then please provide more detail. No changes in my medical history.   HISTORY OF IMMUNOSUPPRESSION: Do you have a history of any of the following:  Systemic Immunosuppression such as Diabetes, Biologic or Immunotherapy, Chemotherapy, Organ Transplantation, Bone Marrow Transplantation or Prednisone?  No     Answering \"YES\" requires the addition of the dotphrase \"IMMUNOSUPPRESSED\" as the first diagnosis of the patient's visit.   FAMILY HISTORY:  Any \"first degree relatives\" (parent, brother, sister, or child) with the following?    No changes in my family's known health.   PATIENT EXPERIENCE:    Do you want the Dermatologist to perform a COMPLETE skin exam today including a clinical examination under the \"bra and underwear\" areas?  Yes  If necessary, do we have your permission to call and leave a detailed message on your Preferred Phone number that includes your specific medical information?  Yes      Allergies   Allergen Reactions   • Cat Hair Extract Anaphylaxis, Hives, Itching, Shortness Of Breath and Swelling   • Horse Protein Anaphylaxis   • Other Cough     mold   • Pollen Extract Other (See Comments) and Shortness Of Breath   • Nsaids       Current Outpatient Medications:   •  acetaminophen (TYLENOL) 325 mg tablet, Take 650 mg by mouth every 6 (six) hours as needed for mild pain, Disp: , Rfl:   •  albuterol (Ventolin HFA) 90 " mcg/act inhaler, Inhale 2 puffs every 6 (six) hours as needed for wheezing, Disp: 18 g, Rfl: 2  •  amLODIPine (NORVASC) 5 mg tablet, take 1 tablet by mouth once daily, Disp: 90 tablet, Rfl: 3  •  atorvastatin (LIPITOR) 40 mg tablet, take 1 tablet by mouth once daily, Disp: 90 tablet, Rfl: 3  •  carvedilol (COREG) 12.5 mg tablet, take 2 tablets by mouth twice a day with meals, Disp: 360 tablet, Rfl: 1  •  colchicine (COLCRYS) 0.6 mg tablet, 1 po qd, up to tid for acute flare., Disp: 90 tablet, Rfl: 5  •  fluticasone (FLONASE) 50 mcg/act nasal spray, 1 spray into each nostril daily, Disp: 16 g, Rfl: 3  •  ipratropium-albuterol (DUO-NEB) 0.5-2.5 mg/3 mL nebulizer solution, Take 3 mL by nebulization 3 (three) times a day, Disp: 120 mL, Rfl: 1  •  lisinopril (ZESTRIL) 20 mg tablet, Take 1 tablet (20 mg total) by mouth 2 (two) times a day, Disp: 180 tablet, Rfl: 3  •  LORazepam (ATIVAN) 1 mg tablet, Take 1 tablet (1 mg total) by mouth as needed for sleep, Disp: 30 tablet, Rfl: 0  •  montelukast (SINGULAIR) 10 mg tablet, take 1 tablet by mouth every evening, Disp: 330 tablet, Rfl: 0  •  triamcinolone (KENALOG) 0.1 % cream, Apply topically 2 (two) times a day To the rash on the chest as needed., Disp: 453 g, Rfl: 1  •  pantoprazole (PROTONIX) 40 mg tablet, take 1 tablet by mouth once daily before breakfast, Disp: 90 tablet, Rfl: 0          Whom besides the patient is providing clinical information about today's encounter?   NO ADDITIONAL HISTORIAN (patient alone provided history)    Physical Exam and Assessment/Plan by Diagnosis:    Chief complaint: patient is a 73 y/o male present for a routine skin exam. Patient has no history of skin cancer and has a few spots of concern on the back and face.     ACTINIC KERATOSIS WITH CRYOSURGERY PROCEDURE    ACTINIC KERATOSIS    Physical Exam:  Anatomic Location Affected:  left eyebrow   Morphological Description:  scaly erythematous papule    Additional History of Present Condition:   present on exam    Assessment and Plan:  Based on a thorough discussion of this condition and the management approach to it (including a comprehensive discussion of the known risks, side effects and potential benefits of treatment), the patient (family) agrees to implement the following specific plan:    Cryotherapy performed today   Signed consent obtained     Actinic keratoses are very common on sites repeatedly exposed to the sun, especially the backs of the hands and the face.  They are considered precancers and have a low risk of turning into squamous cell carcinoma. It is rare for a solitary actinic keratosis to evolve into a squamous cell carcinoma (SCC), but the risk is 10-15% when more than 10 actinic keratoses are present. A tender, thickened, ulcerated or enlarging actinic keratosis is suspicious of SCC.    Actinic keratoses may be prevented by strict sun protection. If already present, keratoses may improve with a very high sun protection factor (50+) broad-spectrum sunscreen applied at least daily to affected areas, year-round.  We recommend that sun protective clothing and hats and sunglasses be worn whenever possible.  Note that you can make you own UPF 30 rate clothing using just your own washing machine with a product called sun guard    There are several different options for treating actinic keratoses    Topical “medications such as 5-fluorouracil or Aldara  - good for field treatment ie treats what's seen and not seen    Cryotherapy - good for single spots but treats “only what we see” versus a field treatment    Photodynamic therapy - involves application of a light sensitizing medicine and then exposure to a special light, also a good field treatment        PROCEDURE:  DESTRUCTION OF PRE-MALIGNANT LESIONS  After a thorough discussion of treatment options and risk/benefits/alternatives (including but not limited to local pain, scarring, dyspigmentation, blistering, and possible superinfection),  "verbal and written consent were obtained and the aforementioned lesions were treated on with cryotherapy using liquid nitrogen x 1 cycle for 5-10 seconds.    TOTAL NUMBER of 1 pre-malignant lesions were treated today on the ANATOMIC LOCATION: left eyebrow.     The patient tolerated the procedure well, and after-care instructions were provided.      MELANOCYTIC NEVI (\"Moles\")    Physical Exam:  Anatomic Location Affected: Mostly on sun-exposed areas of the trunk and extremities.   Morphological Description:  Scattered, 1-4mm round to ovoid, symmetrical-appearing, even bordered, skin colored to dark brown macules/papules, mostly in sun-exposed areas  Pertinent Positives:  Pertinent Negatives:    Additional History of Present Condition:  present on exam    Assessment and Plan:  Based on a thorough discussion of this condition and the management approach to it (including a comprehensive discussion of the known risks, side effects and potential benefits of treatment), the patient (family) agrees to implement the following specific plan:  Provided handout with information regarding the ABCDE's of moles   Recommend routine skin exams every year   Sun avoidance, protective clothing (known as UPF clothing), and the use of at least SPF 30 sunscreens is advised. Sunscreen should be reapplied every two hours when outside.     SEBORRHEIC KERATOSIS; NON-INFLAMED    Physical Exam:  Anatomic Location Affected:  scattered across trunk, extremities,  face  Morphological Description:  Flat and raised, waxy, smooth to warty textured, yellow to brownish-grey to dark brown to blackish, discrete, \"stuck-on\" appearing papules.  Pertinent Positives:  Pertinent Negatives:    Additional History of Present Condition:  Patient reports new bumps on the skin.  Denies itch, burn, pain, bleeding or ulceration.  Present constantly; nothing seems to make it worse or better.  No prior treatment.      Assessment and Plan:  Based on a thorough " "discussion of this condition and the management approach to it (including a comprehensive discussion of the known risks, side effects and potential benefits of treatment), the patient (family) agrees to implement the following specific plan:  Reassured benign  Scheduled cosmetic removal on 4/10/25 OF Seborrheic Keratosis on back      ANGIOMA (\"CHERRY ANGIOMA\")    Physical Exam:  Anatomic Location: scattered across sun exposed areas of the trunk and extremities   Morphologic Description: Firm red to reddish-blue discrete papules  Pertinent Positives:  Pertinent Negatives:    Additional History of Present Condition:  Present on exam.     Assessment and Plan:  Reassured benign    TRANSIENT ACANTHOLYTIC DERMATOSIS (\"KAY DISEASE\")    Physical Exam:  Anatomic Location Affected:  abdomen  Morphological Description:     Pertinent Positives:  Pertinent Negatives:    Additional History of Present Condition:  present on exam    Assessment and Plan:  Based on a thorough discussion of this condition and the management approach to it (including a comprehensive discussion of the known risks, side effects and potential benefits of treatment), the patient (family) agrees to implement the following specific plan:  Triamcinolone cream 0.1% cream daily as needed    What is transient acantholytic dermatosis?  Transient acantholytic dermatosis causes an itchy truncal rash characterised by acantholysis on histopathology. It is an acquired acantholytic dermatosis, in contrast to the rare and persistent inherited acantholytic dermatoses, Darier disease and Caridad-Caridad disease.  Transient acantholytic dermatosis is also known as Kay disease.    Who gets transient acantholytic dermatosis?  Transient acantholytic dermatosis most often affects men over 50. It is less common in women or younger people. It is common in those who are unwell in some way but can arise in quite healthy people as well. It has been reported in association with " several medications, including:  Anastrozole  Vemurafenib  Dabrafenib  Cetuximab  Mercury  D-penicillamine.    What causes transient acantholytic dermatosis?  The cause of transient acantholytic dermatosis is unknown. Sometimes, it follows sweating or some unexpected heat stress, so there has been a suspicion that it may relate to sweat or sweat ducts. But it also may arise in quite dry skin. Many affected individuals are sun damaged.    What are the clinical features of transient acantholytic dermatosis?  Transient acantholytic dermatosis often starts quite suddenly. It is more common in winter than in summer. Characteristics include:  The most common sites are central back, mid chest.  Lesions are small red, crusted or eroded papules.  There may be vesicles and non-follicular pustules.  Lesions may bleed.  Although frequently intensely itchy, transient acantholytic dermatosis may cause no symptoms  So-called 'transient' acantholytic dermatosis can often prove persistent (chronic).    What are the complications of transient acantholytic dermatosis?  Transient acantholytic dermatosis may be complicated by the development of dermatitis, usually in a discoid pattern with round or oval, dry or crusted plaques.  The plaques start on the chest and back and may spread to affect the limbs.    How is transient acantholytic dermatosis diagnosed?  Transient acantholytic dermatosis is usually diagnosed clinically, but a skin biopsy may be necessary.  The pathology of transient acantholytic dermatosis is characteristic, with acantholysis ( skin cells) with or without dyskeratosis (abnormal rounded skin cells). Spongiotic dermatitis may also be noted.    What is the differential diagnosis for transient acantholytic dermatosis?  Disorders that may appear similar to transient acantholytic dermatosis are:  Darier disease  Pemphigus foliaceus (erosions)  Seborrhoeic dermatitis (involvement of skin folds and hair-bearing  sites, flaky)  Discoid and other forms of dermatitis (larger dry or blistered plaques)  Folliculitis (affects the hair follicles, best seen with dermoscopy).    What is the treatment of transient acantholytic dermatosis?  There is no curative treatment for transient acantholytic dermatosis, but the following suggestions may be helpful.  Remain cool, as sweating may induce more itchy spots.  Moisturising creams or antipruritic lotions containing menthol and camphor can reduce the desire to scratch.  Apply a mild topical steroid such as hydrocortisone, if possible as a lotion. It can be applied frequently to the affected areas to relieve itching.  Dermatitis, if present, responds topical and systemic steroids.  Calcipotriol has been reported to be of benefit.  A course of tetracycline or an oral antifungal medication such as itraconazole helps some patients.  Phototherapy can be helpful, but may also provoke the disease.  Oral retinoids such as acitretin or isotretinoin have been reported to be helpful and may be required for several months. However, they have important side effects and are not necessary for mild cases.  Methotrexate and etanercept that also have been reported to help.    How can transient acantholytic dermatosis be prevented?  Keep cool. Wear garments designed to prevent sweat rash.    What is the outlook for transient acantholytic dermatosis?  The duration of transient acantholytic dermatosis is variable -- from days to decades. It can come and go, often with a seasonal variation.    Scribe Attestation    I,:  Zulma Villagran am acting as a scribe while in the presence of the attending physician.:       I,:  Torey Zapien, DO personally performed the services described in this documentation    as scribed in my presence.:

## 2025-01-23 ENCOUNTER — OFFICE VISIT (OUTPATIENT)
Dept: CARDIOLOGY CLINIC | Facility: CLINIC | Age: 75
End: 2025-01-23
Payer: COMMERCIAL

## 2025-01-23 VITALS
OXYGEN SATURATION: 97 % | RESPIRATION RATE: 16 BRPM | DIASTOLIC BLOOD PRESSURE: 74 MMHG | SYSTOLIC BLOOD PRESSURE: 118 MMHG | HEIGHT: 67 IN | HEART RATE: 68 BPM | WEIGHT: 211 LBS | BODY MASS INDEX: 33.12 KG/M2

## 2025-01-23 DIAGNOSIS — I71.21 ANEURYSM OF ASCENDING AORTA WITHOUT RUPTURE (HCC): ICD-10-CM

## 2025-01-23 DIAGNOSIS — I49.3 PVC'S (PREMATURE VENTRICULAR CONTRACTIONS): Primary | ICD-10-CM

## 2025-01-23 DIAGNOSIS — N18.2 CKD (CHRONIC KIDNEY DISEASE) STAGE 2, GFR 60-89 ML/MIN: ICD-10-CM

## 2025-01-23 PROCEDURE — 99214 OFFICE O/P EST MOD 30 MIN: CPT | Performed by: STUDENT IN AN ORGANIZED HEALTH CARE EDUCATION/TRAINING PROGRAM

## 2025-01-23 NOTE — ASSESSMENT & PLAN NOTE
Patient with recent event monitor obtained for palpitations, overall ectopy burden was low but did have triggered events associated with a PAC and PVC.  He had episodes of SVT the longest of which was 16 beats, he did not have any symptoms during this time.  The patient reports his symptoms of palpitations have actually improved, he is currently on carvedilol 12.5 twice daily.  We will continue to monitor.  He was given ED precautions, if his symptoms worsen we could increase his medications.

## 2025-01-23 NOTE — PROGRESS NOTES
[facility] Cardiology  Follow up note  Markel Alves 74 y.o. male MRN: [id number]            Assessment & Plan  PVC's (premature ventricular contractions)  Patient with recent event monitor obtained for palpitations, overall ectopy burden was low but did have triggered events associated with a PAC and PVC.  He had episodes of SVT the longest of which was 16 beats, he did not have any symptoms during this time.  The patient reports his symptoms of palpitations have actually improved, he is currently on carvedilol 12.5 twice daily.  We will continue to monitor.  He was given ED precautions, if his symptoms worsen we could increase his medications.  Aneurysm of ascending aorta without rupture (HCC)  Patient has TAA being followed by ANNY Waddell, noted to have stable size in 11/5/2024.  Risk factor modification previously discussed with patient.  Currently on ACE inhibitor, beta-blocker, discussed exercise precautions, avoid fluoroquinolone antibiotics.  CKD (chronic kidney disease) stage 2, GFR 60-89 ml/min  Lab Results   Component Value Date    EGFR 80 10/14/2024    EGFR 77 09/20/2024    EGFR 76 03/19/2024    CREATININE 0.94 10/14/2024    CREATININE 0.97 09/20/2024    CREATININE 0.99 03/19/2024   GFR has been relatively stable, okay to continue with lisinopril        HPI:   Markel Alevs is a 74 y.o. year old male TAA being followed by CANDICE (Stable on TTE 12/20/2024, CKD, HTN, former smoker, recently seen by me for HILARIO and palpitations, patient's stress test/echo with no new significant findings. Event monitor showed patient being symptomatic with ectopic beats     Currently denies any fever, chills, fatigue, new visual changes, lightheadedness, syncope, chest pain, shortness of breath at rest or with exertion, orthopnea, PND, nausea, vomiting, diarrhea, dark or bright red blood in stools, lower extremity swelling, leg claudication.         Review of Systems    Past Medical History:   Diagnosis Date    Arthritis     Asthma      Chronic kidney disease     CKD (chronic kidney disease) stage 3, GFR 30-59 ml/min ([facility]) 2019    Depression     Dyshidrosis     Hypertension     Osteoarthritis     Trigger finger      Social History     Substance and Sexual Activity   Alcohol Use Yes    Alcohol/week: 6.0 standard drinks of alcohol    Types: 3 Glasses of wine, 2 Cans of beer, 1 Shots of liquor per week    Comment: occasional     Social History     Substance and Sexual Activity   Drug Use No     Social History     Tobacco Use   Smoking Status Former    Current packs/day: 0.00    Types: Cigarettes    Quit date: 1973    Years since quittin.5   Smokeless Tobacco Never   Tobacco Comments    Occasional cigar smoker       Allergies:  Allergies   Allergen Reactions    Cat Hair Extract Anaphylaxis, Hives, Itching, Shortness Of Breath and Swelling    Horse Protein Anaphylaxis    Other Cough     mold    Pollen Extract Other (See Comments) and Shortness Of Breath    Nsaids        Medications:     Current Outpatient Medications:     acetaminophen (TYLENOL) 325 mg tablet, Take 650 mg by mouth every 6 (six) hours as needed for mild pain, Disp: , Rfl:     albuterol (Ventolin HFA) 90 mcg/act inhaler, Inhale 2 puffs every 6 (six) hours as needed for wheezing, Disp: 18 g, Rfl: 2    amLODIPine (NORVASC) 5 mg tablet, take 1 tablet by mouth once daily, Disp: 90 tablet, Rfl: 3    atorvastatin (LIPITOR) 40 mg tablet, take 1 tablet by mouth once daily, Disp: 90 tablet, Rfl: 3    carvedilol (COREG) 12.5 mg tablet, take 2 tablets by mouth twice a day with meals, Disp: 360 tablet, Rfl: 1    colchicine (COLCRYS) 0.6 mg tablet, 1 po qd, up to tid for acute flare., Disp: 90 tablet, Rfl: 5    fluticasone (FLONASE) 50 mcg/act nasal spray, 1 spray into each nostril daily, Disp: 16 g, Rfl: 3    ipratropium-albuterol (DUO-NEB) 0.5-2.5 mg/3 mL nebulizer solution, Take 3 mL by nebulization 3 (three) times a day, Disp: 120 mL, Rfl: 1    lisinopril (ZESTRIL) 20 mg tablet,  Take 1 tablet (20 mg total) by mouth 2 (two) times a day, Disp: 180 tablet, Rfl: 3    LORazepam (ATIVAN) 1 mg tablet, Take 1 tablet (1 mg total) by mouth as needed for sleep, Disp: 30 tablet, Rfl: 0    montelukast (SINGULAIR) 10 mg tablet, take 1 tablet by mouth every evening, Disp: 330 tablet, Rfl: 0    pantoprazole (PROTONIX) 40 mg tablet, take 1 tablet by mouth once daily before breakfast, Disp: 90 tablet, Rfl: 0    triamcinolone (KENALOG) 0.1 % cream, Apply topically 2 (two) times a day To the rash on the chest as needed., Disp: 453 g, Rfl: 1      There were no vitals filed for this visit.  Weight (last 2 days)       None          Physical Exam  Vitals and nursing note reviewed.   Constitutional:       General: He is not in acute distress.     Appearance: He is well-developed.   HENT:      Head: Normocephalic and atraumatic.   Eyes:      Conjunctiva/sclera: Conjunctivae normal.   Cardiovascular:      Rate and Rhythm: Normal rate and regular rhythm.      Heart sounds: No murmur heard.  Pulmonary:      Effort: Pulmonary effort is normal. No respiratory distress.      Breath sounds: Normal breath sounds.   Abdominal:      Palpations: Abdomen is soft.      Tenderness: There is no abdominal tenderness.   Musculoskeletal:         General: No swelling.      Cervical back: Neck supple.   Skin:     General: Skin is warm and dry.      Capillary Refill: Capillary refill takes less than 2 seconds.   Neurological:      Mental Status: He is alert.   Psychiatric:         Mood and Affect: Mood normal.         Laboratory Studies:    Laboratory studies personally reviewed    Cardiac testing:     EKG reviewed personally:   12/6/2024: NSR      Echocardiogram:  LVEF 60%, grade 1 diastolic dysfunction, normal RV, mild MR, mild TR     12/20/2024: LVEF 60%, G1DD, mild AR, mild MR, Mild TR, ascending aorta 4.4cm     Stress tests:    12/20/2024: NST w/o ischemia     Anthony Owens MD    Portions of the record may have been created with  "voice recognition software.  Occasional wrong word or \"sound a like\" substitutions may have occurred due to the inherent limitations of voice recognition software.  Read the chart carefully and recognize, using context, where substitutions have occurred.   "

## 2025-01-23 NOTE — ASSESSMENT & PLAN NOTE
Patient has TAA being followed by ANNY Waddell, noted to have stable size in 11/5/2024.  Risk factor modification previously discussed with patient.  Currently on ACE inhibitor, beta-blocker, discussed exercise precautions, avoid fluoroquinolone antibiotics.

## 2025-01-23 NOTE — ASSESSMENT & PLAN NOTE
Lab Results   Component Value Date    EGFR 80 10/14/2024    EGFR 77 09/20/2024    EGFR 76 03/19/2024    CREATININE 0.94 10/14/2024    CREATININE 0.97 09/20/2024    CREATININE 0.99 03/19/2024   GFR has been relatively stable, okay to continue with lisinopril

## 2025-02-03 ENCOUNTER — OFFICE VISIT (OUTPATIENT)
Age: 75
End: 2025-02-03
Payer: COMMERCIAL

## 2025-02-03 ENCOUNTER — TELEPHONE (OUTPATIENT)
Age: 75
End: 2025-02-03

## 2025-02-03 VITALS
HEART RATE: 64 BPM | DIASTOLIC BLOOD PRESSURE: 80 MMHG | BODY MASS INDEX: 33.27 KG/M2 | SYSTOLIC BLOOD PRESSURE: 124 MMHG | WEIGHT: 212 LBS | HEIGHT: 67 IN | RESPIRATION RATE: 16 BRPM | OXYGEN SATURATION: 97 %

## 2025-02-03 DIAGNOSIS — I71.21 ANEURYSM OF ASCENDING AORTA WITHOUT RUPTURE (HCC): ICD-10-CM

## 2025-02-03 DIAGNOSIS — G45.9 TIA (TRANSIENT ISCHEMIC ATTACK): Primary | ICD-10-CM

## 2025-02-03 DIAGNOSIS — G45.3 AMAUROSIS FUGAX OF RIGHT EYE: ICD-10-CM

## 2025-02-03 PROCEDURE — G2211 COMPLEX E/M VISIT ADD ON: HCPCS | Performed by: INTERNAL MEDICINE

## 2025-02-03 PROCEDURE — 99214 OFFICE O/P EST MOD 30 MIN: CPT | Performed by: INTERNAL MEDICINE

## 2025-02-03 RX ORDER — ASPIRIN 81 MG/1
81 TABLET, CHEWABLE ORAL DAILY
Qty: 30 TABLET | Refills: 0 | Status: SHIPPED | OUTPATIENT
Start: 2025-02-03

## 2025-02-03 NOTE — TELEPHONE ENCOUNTER
Aqs per Dr. Estefany Ramos requested to see the patient in the office today. Patient has been scheduled to come in at 1:20 pm. He said that was the only time he could come in today because he is busy later on the day

## 2025-02-03 NOTE — PROGRESS NOTES
Name: Markel Alves      : 1950      MRN: [id number]  Encounter Provider: Manjit Allison MD  Encounter Date: 2/3/2025   Encounter department: [facility] INTERNAL MEDICINE [address]  :  Assessment & Plan  TIA (transient ischemic attack)  74-year-old gentleman with history of hypertension, hyperlipidemia and ascending thoracic aortic dilatation presented to ophthalmologist with visual disturbance concerning for amaurosis fugax.  Will complete stroke workup.  Echocardiogram from  reviewed.  Patient encouraged to start back on baby aspirin and monitor symptoms closely.  Orders:    aspirin 81 mg chewable tablet; Chew 1 tablet (81 mg total) daily    CTA head and neck w wo contrast; Future    CT chest w contrast; Future    AMB extended holter monitor; Future    MRI brain w wo contrast; Future    Amaurosis fugax of right eye    Orders:    aspirin 81 mg chewable tablet; Chew 1 tablet (81 mg total) daily    CTA head and neck w wo contrast; Future    CT chest w contrast; Future    AMB extended holter monitor; Future    Aneurysm of ascending aorta without rupture (HCC)    Orders:    CT chest w contrast; Future           History of Present Illness   I received a phone call from Dr. Harkins with concern for amaurosis fugax.  Patient had visual disturbance and had his first visit with Dr. Harkins earlier today.    Patient describes 2 different visual disturbances.  The first he says is like he is looking through multiple different lenses at different locations in his visual field right greater than the left and notices there are sometimes some flashes of light.  The other disturbance is like a blackness in all but 1 small seemingly central area in his visual field.  There is no jaylene curtain going down or across the visual field.  There is no associated headache, palpitations, numbness, tingling, weakness, dysarthria.      Review of Systems   Constitutional:  Negative for appetite change, chills, diaphoresis,  "fatigue, fever and unexpected weight change.   HENT:  Negative for congestion, hearing loss and rhinorrhea.    Eyes:  Negative for visual disturbance.   Respiratory:  Negative for cough, chest tightness, shortness of breath and wheezing.    Cardiovascular:  Negative for chest pain, palpitations and leg swelling.   Gastrointestinal:  Negative for abdominal pain and blood in stool.   Endocrine: Negative for cold intolerance, heat intolerance, polydipsia and polyuria.   Genitourinary:  Negative for difficulty urinating, dysuria, frequency and urgency.   Musculoskeletal:  Negative for arthralgias and myalgias.   Skin:  Negative for rash.   Neurological:  Negative for dizziness, weakness, light-headedness and headaches.   Hematological:  Does not bruise/bleed easily.   Psychiatric/Behavioral:  Negative for dysphoric mood and sleep disturbance.        Objective   /80 (BP Location: Left arm, Patient Position: Sitting, Cuff Size: Standard)   Pulse 64   Resp 16   Ht 5' 7\" (1.702 m)   Wt 96.2 kg (212 lb)   SpO2 97%   BMI 33.20 kg/m²      Physical Exam  Constitutional:       Appearance: He is well-developed.   HENT:      Head: Normocephalic and atraumatic.      Nose: Nose normal.   Eyes:      General: No scleral icterus.     Conjunctiva/sclera: Conjunctivae normal.      Pupils: Pupils are equal, round, and reactive to light.   Neck:      Thyroid: No thyromegaly.      Vascular: No JVD.      Trachea: No tracheal deviation.   Cardiovascular:      Rate and Rhythm: Normal rate and regular rhythm.      Heart sounds: No murmur heard.     No friction rub. No gallop.   Pulmonary:      Effort: Pulmonary effort is normal. No respiratory distress.      Breath sounds: Normal breath sounds. No wheezing or rales.   Musculoskeletal:         General: No deformity.      Cervical back: Normal range of motion and neck supple.   Lymphadenopathy:      Cervical: No cervical adenopathy.   Skin:     General: Skin is warm and dry.      " Coloration: Skin is not pale.      Findings: No erythema or rash.   Neurological:      Mental Status: He is alert and oriented to person, place, and time.      Cranial Nerves: No cranial nerve deficit.   Psychiatric:         Behavior: Behavior normal.         Thought Content: Thought content normal.         Judgment: Judgment normal.

## 2025-02-05 DIAGNOSIS — G45.9 TIA (TRANSIENT ISCHEMIC ATTACK): Primary | ICD-10-CM

## 2025-02-14 DIAGNOSIS — M10.9 ACUTE GOUT, UNSPECIFIED CAUSE, UNSPECIFIED SITE: Primary | ICD-10-CM

## 2025-02-14 RX ORDER — PREDNISONE 10 MG/1
TABLET ORAL
Qty: 28 TABLET | Refills: 0 | Status: SHIPPED | OUTPATIENT
Start: 2025-02-14

## 2025-02-18 ENCOUNTER — APPOINTMENT (OUTPATIENT)
Dept: LAB | Facility: CLINIC | Age: 75
End: 2025-02-18
Payer: COMMERCIAL

## 2025-02-18 DIAGNOSIS — G45.9 TIA (TRANSIENT ISCHEMIC ATTACK): ICD-10-CM

## 2025-02-18 LAB
ANION GAP SERPL CALCULATED.3IONS-SCNC: 7 MMOL/L (ref 4–13)
BUN SERPL-MCNC: 18 MG/DL (ref 5–25)
CALCIUM SERPL-MCNC: 9.6 MG/DL (ref 8.4–10.2)
CHLORIDE SERPL-SCNC: 102 MMOL/L (ref 96–108)
CO2 SERPL-SCNC: 30 MMOL/L (ref 21–32)
CREAT SERPL-MCNC: 0.96 MG/DL (ref 0.6–1.3)
GFR SERPL CREATININE-BSD FRML MDRD: 78 ML/MIN/1.73SQ M
GLUCOSE P FAST SERPL-MCNC: 121 MG/DL (ref 65–99)
POTASSIUM SERPL-SCNC: 4.1 MMOL/L (ref 3.5–5.3)
SODIUM SERPL-SCNC: 139 MMOL/L (ref 135–147)

## 2025-02-18 PROCEDURE — 80048 BASIC METABOLIC PNL TOTAL CA: CPT

## 2025-02-18 PROCEDURE — 36415 COLL VENOUS BLD VENIPUNCTURE: CPT

## 2025-02-19 ENCOUNTER — RESULTS FOLLOW-UP (OUTPATIENT)
Age: 75
End: 2025-02-19

## 2025-02-25 ENCOUNTER — RESULTS FOLLOW-UP (OUTPATIENT)
Age: 75
End: 2025-02-25

## 2025-02-25 ENCOUNTER — HOSPITAL ENCOUNTER (OUTPATIENT)
Dept: MRI IMAGING | Facility: HOSPITAL | Age: 75
Discharge: HOME | End: 2025-02-25
Attending: INTERNAL MEDICINE
Payer: COMMERCIAL

## 2025-02-25 DIAGNOSIS — G45.9 TIA (TRANSIENT ISCHEMIC ATTACK): ICD-10-CM

## 2025-02-25 PROCEDURE — A9585 GADOBUTROL INJECTION: HCPCS | Performed by: INTERNAL MEDICINE

## 2025-02-25 PROCEDURE — 70553 MRI BRAIN STEM W/O & W/DYE: CPT

## 2025-02-25 RX ORDER — GADOBUTROL 604.72 MG/ML
9 INJECTION INTRAVENOUS
Status: COMPLETED | OUTPATIENT
Start: 2025-02-25 | End: 2025-02-25

## 2025-02-25 RX ADMIN — GADOBUTROL 9 ML: 604.72 INJECTION INTRAVENOUS at 06:20

## 2025-02-27 ENCOUNTER — HOSPITAL ENCOUNTER (OUTPATIENT)
Dept: CT IMAGING | Facility: HOSPITAL | Age: 75
End: 2025-02-27
Attending: INTERNAL MEDICINE
Payer: COMMERCIAL

## 2025-02-27 DIAGNOSIS — G45.9 TIA (TRANSIENT ISCHEMIC ATTACK): ICD-10-CM

## 2025-02-27 DIAGNOSIS — G45.3 AMAUROSIS FUGAX OF RIGHT EYE: ICD-10-CM

## 2025-02-27 DIAGNOSIS — I71.21 ANEURYSM OF ASCENDING AORTA WITHOUT RUPTURE (HCC): ICD-10-CM

## 2025-02-27 PROCEDURE — 70496 CT ANGIOGRAPHY HEAD: CPT

## 2025-02-27 PROCEDURE — 71260 CT THORAX DX C+: CPT

## 2025-02-27 PROCEDURE — 70498 CT ANGIOGRAPHY NECK: CPT

## 2025-02-27 RX ADMIN — IOHEXOL 100 ML: 350 INJECTION, SOLUTION INTRAVENOUS at 15:28

## 2025-03-08 DIAGNOSIS — G45.9 TIA (TRANSIENT ISCHEMIC ATTACK): ICD-10-CM

## 2025-03-08 DIAGNOSIS — G45.3 AMAUROSIS FUGAX OF RIGHT EYE: ICD-10-CM

## 2025-03-09 RX ORDER — ASPIRIN 81 MG/1
81 TABLET, CHEWABLE ORAL DAILY
Qty: 30 TABLET | Refills: 0 | Status: SHIPPED | OUTPATIENT
Start: 2025-03-09

## 2025-03-25 ENCOUNTER — OFFICE VISIT (OUTPATIENT)
Age: 75
End: 2025-03-25
Payer: COMMERCIAL

## 2025-03-25 VITALS
HEIGHT: 67 IN | DIASTOLIC BLOOD PRESSURE: 74 MMHG | BODY MASS INDEX: 33.27 KG/M2 | OXYGEN SATURATION: 98 % | HEART RATE: 76 BPM | SYSTOLIC BLOOD PRESSURE: 126 MMHG | WEIGHT: 212 LBS

## 2025-03-25 DIAGNOSIS — J06.9 UPPER RESPIRATORY TRACT INFECTION, UNSPECIFIED TYPE: Primary | ICD-10-CM

## 2025-03-25 PROCEDURE — G2211 COMPLEX E/M VISIT ADD ON: HCPCS

## 2025-03-25 PROCEDURE — 99213 OFFICE O/P EST LOW 20 MIN: CPT

## 2025-03-25 NOTE — LETTER
March 25, 2025     Patient: Markel Alves  YOB: 1950  Date of Visit: 3/25/2025      To Whom it May Concern:    Markel Alves is under my professional care. Markel was seen in my office on 3/25/2025. Markel may return to work on 3/26/25 . Please excuse Markel from work 3/19-3/23/25.     If you have any questions or concerns, please don't hesitate to call.         Sincerely,          Annabelle Peck PA-C        CC: No Recipients

## 2025-03-25 NOTE — PROGRESS NOTES
Name: Markel Alves      : 1950      MRN: [id number]  Encounter Provider: Annabelle Peck PA-C  Encounter Date: 3/25/2025   Encounter department: [facility] INTERNAL MEDICINE [address]  :  Assessment & Plan  Upper respiratory tract infection, unspecified type  Patient is able to return to work tomorrow, 3/26.  Please excuse him from work 319-323/25.  Continue to increase fluid intake and use albuterol inhaler every 6 hours as needed for wheezing..  Notify the office of any worsening symptoms.              History of Present Illness   Patient is a 73 yo male that presents today for a URI that started last Wednesday.  He is exposed to kids at work.  He is eating, drinking, and sleeping okay.  He feels almost 90% better.  He does continue to have a dry cough.  He did not COVID test at home.      Review of Systems   Constitutional:  Negative for chills, fatigue and fever.   HENT:  Negative for ear discharge, ear pain, postnasal drip, rhinorrhea, sinus pressure, sinus pain, sore throat, tinnitus and trouble swallowing.    Eyes:  Negative for pain, discharge and itching.   Respiratory:  Positive for cough (Dry) and wheezing. Negative for chest tightness and shortness of breath.    Cardiovascular:  Negative for chest pain, palpitations and leg swelling.   Gastrointestinal:  Negative for abdominal pain, constipation, diarrhea, nausea and vomiting.   Endocrine: Negative for polydipsia, polyphagia and polyuria.   Genitourinary:  Negative for difficulty urinating, frequency, hematuria and urgency.   Musculoskeletal:  Negative for arthralgias, joint swelling and myalgias.   Skin:  Negative for color change.   Allergic/Immunologic: Negative for environmental allergies.   Neurological:  Negative for dizziness, weakness, light-headedness, numbness and headaches.   Hematological:  Negative for adenopathy.   Psychiatric/Behavioral:  Negative for decreased concentration and sleep disturbance. The patient is not  "nervous/anxious.        Objective   /74   Pulse 76   Ht 5' 7\" (1.702 m)   Wt 96.2 kg (212 lb)   SpO2 98%   BMI 33.20 kg/m²      Physical Exam  Vitals and nursing note reviewed.   Constitutional:       General: He is awake.      Appearance: Normal appearance. He is well-developed and well-groomed. He is obese.   HENT:      Head: Normocephalic and atraumatic.      Right Ear: Hearing and external ear normal.      Left Ear: Hearing and external ear normal.      Nose: Nose normal.      Mouth/Throat:      Lips: Pink.      Mouth: Mucous membranes are moist.   Eyes:      General: Lids are normal. Vision grossly intact. Gaze aligned appropriately.      Conjunctiva/sclera: Conjunctivae normal.   Neck:      Vascular: No carotid bruit.      Trachea: Trachea and phonation normal.   Cardiovascular:      Rate and Rhythm: Normal rate and regular rhythm.      Heart sounds: Normal heart sounds, S1 normal and S2 normal. No murmur heard.     No friction rub. No gallop.   Pulmonary:      Effort: Pulmonary effort is normal.      Breath sounds: Normal breath sounds. Decreased air movement present. No decreased breath sounds, wheezing, rhonchi or rales.   Abdominal:      General: Abdomen is protuberant.   Musculoskeletal:      Cervical back: Neck supple.      Right lower leg: No edema.      Left lower leg: No edema.   Skin:     General: Skin is warm.      Capillary Refill: Capillary refill takes less than 2 seconds.   Neurological:      Mental Status: He is alert.   Psychiatric:         Attention and Perception: Attention and perception normal.         Mood and Affect: Mood and affect normal.         Speech: Speech normal.         Behavior: Behavior normal. Behavior is cooperative.         Thought Content: Thought content normal.         Cognition and Memory: Cognition and memory normal.         Judgment: Judgment normal.         "

## 2025-03-31 ENCOUNTER — TELEMEDICINE (OUTPATIENT)
Dept: OTHER | Facility: HOSPITAL | Age: 75
End: 2025-03-31
Payer: COMMERCIAL

## 2025-03-31 DIAGNOSIS — R05.1 ACUTE COUGH: Primary | ICD-10-CM

## 2025-03-31 PROCEDURE — 99213 OFFICE O/P EST LOW 20 MIN: CPT | Performed by: NURSE PRACTITIONER

## 2025-03-31 RX ORDER — DOXYCYCLINE 100 MG/1
100 TABLET ORAL 2 TIMES DAILY
Qty: 14 TABLET | Refills: 0 | Status: SHIPPED | OUTPATIENT
Start: 2025-03-31 | End: 2025-04-04

## 2025-03-31 NOTE — PROGRESS NOTES
Virtual Regular Visit  Name: Markel Alves      : 1950      MRN: [id number]  Encounter Provider: KAYLEY Vargas  Encounter Date: 3/31/2025   Encounter department: VIRTUAL CARE       Verification of patient location:  Patient is located at Home in the following state in which I hold an active license [place] :  Assessment & Plan  Acute cough    Orders:    doxycycline (ADOXA) 100 MG tablet; Take 1 tablet (100 mg total) by mouth 2 (two) times a day for 7 days    XR chest pa and lateral; Future        Encounter provider KAYLEY Vargas    The patient was identified by name and date of birth. Markel Alves was informed that this is a telemedicine visit and that the visit is being conducted through the Epic Embedded platform. He agrees to proceed..  My office door was closed. No one else was in the room.  He acknowledged consent and understanding of privacy and security of the video platform. The patient has agreed to participate and understands they can discontinue the visit at any time.    Patient is aware this is a billable service.     History obtained from: patient  History of Present Illness     This is a 74 year old male here today for video visit. He states not last week but the week before he was sick.  He states he thought he covid but was negative.  He was feeling better and went back to work.  He states then he started with fever 3 days ago.  He denies a cough at time.  He states he did do a tele visit and was given zofran which did help.  He did not test for covid/flu.  No sinus pressure.        Review of Systems   Constitutional:  Positive for activity change, chills, fatigue and fever.   HENT:  Positive for congestion.    Respiratory:  Positive for cough.    Neurological: Negative.    Hematological: Negative.        Objective   There were no vitals taken for this visit.    Physical Exam  Constitutional:       General: He is not in acute distress.     Appearance: Normal appearance. He is  not ill-appearing or toxic-appearing.   HENT:      Head: Normocephalic and atraumatic.      Nose: Congestion present.   Pulmonary:      Effort: Pulmonary effort is normal. No respiratory distress.   Neurological:      Mental Status: He is alert and oriented to person, place, and time.   Psychiatric:         Mood and Affect: Mood normal.         Behavior: Behavior normal.         Thought Content: Thought content normal.         Judgment: Judgment normal.         Visit Time  Total Visit Duration: 8 minutes not including the time spent for establishing the audio/video connection.

## 2025-03-31 NOTE — PATIENT INSTRUCTIONS
Since your covid/flu is negative.  Will start antibiotic.  Take probiotic. Get chest xray.  Tylenol as needed.  OTC cough medication as needed.  Follow up with PCP.  Go to Er with any worsening symptoms.

## 2025-04-01 ENCOUNTER — HOSPITAL ENCOUNTER (INPATIENT)
Facility: HOSPITAL | Age: 75
LOS: 3 days | Discharge: HOME | DRG: 871 | End: 2025-04-04
Attending: EMERGENCY MEDICINE | Admitting: INTERNAL MEDICINE
Payer: COMMERCIAL

## 2025-04-01 ENCOUNTER — TELEPHONE (OUTPATIENT)
Age: 75
End: 2025-04-01

## 2025-04-01 ENCOUNTER — APPOINTMENT (EMERGENCY)
Dept: RADIOLOGY | Facility: HOSPITAL | Age: 75
DRG: 871 | End: 2025-04-01
Payer: COMMERCIAL

## 2025-04-01 DIAGNOSIS — J18.9 PNEUMONIA: Primary | ICD-10-CM

## 2025-04-01 DIAGNOSIS — B95.3 BACTEREMIA DUE TO STREPTOCOCCUS PNEUMONIAE: ICD-10-CM

## 2025-04-01 DIAGNOSIS — R78.81 BACTEREMIA DUE TO STREPTOCOCCUS PNEUMONIAE: ICD-10-CM

## 2025-04-01 DIAGNOSIS — R09.02 HYPOXIA: ICD-10-CM

## 2025-04-01 PROBLEM — J45.20 MILD INTERMITTENT ASTHMA WITHOUT COMPLICATION: Status: ACTIVE | Noted: 2024-07-16

## 2025-04-01 PROBLEM — I10 HTN (HYPERTENSION): Status: ACTIVE | Noted: 2024-01-30

## 2025-04-01 PROBLEM — N17.9 AKI (ACUTE KIDNEY INJURY) (HCC): Status: ACTIVE | Noted: 2019-09-24

## 2025-04-01 PROBLEM — J96.01 ACUTE RESPIRATORY FAILURE WITH HYPOXIA (HCC): Status: ACTIVE | Noted: 2025-04-01

## 2025-04-01 LAB
ALBUMIN SERPL BCG-MCNC: 3.9 G/DL (ref 3.5–5)
ALP SERPL-CCNC: 61 U/L (ref 34–104)
ALT SERPL W P-5'-P-CCNC: 15 U/L (ref 7–52)
ANION GAP SERPL CALCULATED.3IONS-SCNC: 10 MMOL/L (ref 4–13)
ANISOCYTOSIS BLD QL SMEAR: PRESENT
APTT PPP: 28 SECONDS (ref 23–34)
AST SERPL W P-5'-P-CCNC: 22 U/L (ref 13–39)
ATRIAL RATE: 87 BPM
BASOPHILS # BLD MANUAL: 0 THOUSAND/UL (ref 0–0.1)
BASOPHILS NFR MAR MANUAL: 0 % (ref 0–1)
BILIRUB SERPL-MCNC: 1.57 MG/DL (ref 0.2–1)
BUN SERPL-MCNC: 40 MG/DL (ref 5–25)
CALCIUM SERPL-MCNC: 9.7 MG/DL (ref 8.4–10.2)
CHLORIDE SERPL-SCNC: 97 MMOL/L (ref 96–108)
CO2 SERPL-SCNC: 28 MMOL/L (ref 21–32)
CREAT SERPL-MCNC: 1.64 MG/DL (ref 0.6–1.3)
EOSINOPHIL # BLD MANUAL: 0 THOUSAND/UL (ref 0–0.4)
EOSINOPHIL NFR BLD MANUAL: 0 % (ref 0–6)
ERYTHROCYTE [DISTWIDTH] IN BLOOD BY AUTOMATED COUNT: 12.9 % (ref 11.6–15.1)
FLUAV RNA RESP QL NAA+PROBE: NEGATIVE
FLUBV RNA RESP QL NAA+PROBE: NEGATIVE
GFR SERPL CREATININE-BSD FRML MDRD: 41 ML/MIN/1.73SQ M
GLUCOSE SERPL-MCNC: 136 MG/DL (ref 65–140)
HCT VFR BLD AUTO: 41.4 % (ref 36.5–49.3)
HGB BLD-MCNC: 14.1 G/DL (ref 12–17)
INR PPP: 1.06 (ref 0.85–1.19)
LACTATE SERPL-SCNC: 1.4 MMOL/L (ref 0.5–2)
LYMPHOCYTES # BLD AUTO: 0.66 THOUSAND/UL (ref 0.6–4.47)
LYMPHOCYTES # BLD AUTO: 6 % (ref 14–44)
MCH RBC QN AUTO: 32.2 PG (ref 26.8–34.3)
MCHC RBC AUTO-ENTMCNC: 34.1 G/DL (ref 31.4–37.4)
MCV RBC AUTO: 95 FL (ref 82–98)
METAMYELOCYTE ABSOLUTE CT: 0.08 THOUSAND/UL (ref 0–0.1)
METAMYELOCYTES NFR BLD MANUAL: 1 % (ref 0–1)
MONOCYTES # BLD AUTO: 0.33 THOUSAND/UL (ref 0–1.22)
MONOCYTES NFR BLD: 4 % (ref 4–12)
NEUTROPHILS # BLD MANUAL: 7.23 THOUSAND/UL (ref 1.85–7.62)
NEUTS BAND NFR BLD MANUAL: 3 % (ref 0–8)
NEUTS SEG NFR BLD AUTO: 84 % (ref 43–75)
P AXIS: 27 DEGREES
PLATELET # BLD AUTO: 198 THOUSANDS/UL (ref 149–390)
PLATELET BLD QL SMEAR: ADEQUATE
PMV BLD AUTO: 10 FL (ref 8.9–12.7)
POIKILOCYTOSIS BLD QL SMEAR: PRESENT
POLYCHROMASIA BLD QL SMEAR: PRESENT
POTASSIUM SERPL-SCNC: 4.4 MMOL/L (ref 3.5–5.3)
PR INTERVAL: 148 MS
PROCALCITONIN SERPL-MCNC: 2.18 NG/ML
PROT SERPL-MCNC: 8 G/DL (ref 6.4–8.4)
PROTHROMBIN TIME: 14.5 SECONDS (ref 12.3–15)
QRS AXIS: 34 DEGREES
QRSD INTERVAL: 82 MS
QT INTERVAL: 328 MS
QTC INTERVAL: 394 MS
RBC # BLD AUTO: 4.38 MILLION/UL (ref 3.88–5.62)
RBC MORPH BLD: PRESENT
RSV RNA RESP QL NAA+PROBE: NEGATIVE
SARS-COV-2 RNA RESP QL NAA+PROBE: NEGATIVE
SODIUM SERPL-SCNC: 135 MMOL/L (ref 135–147)
T WAVE AXIS: 33 DEGREES
VARIANT LYMPHS # BLD AUTO: 2 %
VENTRICULAR RATE: 87 BPM
WBC # BLD AUTO: 8.31 THOUSAND/UL (ref 4.31–10.16)

## 2025-04-01 PROCEDURE — 93010 ELECTROCARDIOGRAM REPORT: CPT | Performed by: INTERNAL MEDICINE

## 2025-04-01 PROCEDURE — 85007 BL SMEAR W/DIFF WBC COUNT: CPT

## 2025-04-01 PROCEDURE — 93005 ELECTROCARDIOGRAM TRACING: CPT

## 2025-04-01 PROCEDURE — 36415 COLL VENOUS BLD VENIPUNCTURE: CPT

## 2025-04-01 PROCEDURE — 99223 1ST HOSP IP/OBS HIGH 75: CPT | Performed by: INTERNAL MEDICINE

## 2025-04-01 PROCEDURE — 71045 X-RAY EXAM CHEST 1 VIEW: CPT

## 2025-04-01 PROCEDURE — 87154 CUL TYP ID BLD PTHGN 6+ TRGT: CPT

## 2025-04-01 PROCEDURE — 85730 THROMBOPLASTIN TIME PARTIAL: CPT

## 2025-04-01 PROCEDURE — 94760 N-INVAS EAR/PLS OXIMETRY 1: CPT

## 2025-04-01 PROCEDURE — 85027 COMPLETE CBC AUTOMATED: CPT

## 2025-04-01 PROCEDURE — 85610 PROTHROMBIN TIME: CPT

## 2025-04-01 PROCEDURE — 84145 PROCALCITONIN (PCT): CPT

## 2025-04-01 PROCEDURE — 99285 EMERGENCY DEPT VISIT HI MDM: CPT

## 2025-04-01 PROCEDURE — 83605 ASSAY OF LACTIC ACID: CPT

## 2025-04-01 PROCEDURE — 87040 BLOOD CULTURE FOR BACTERIA: CPT

## 2025-04-01 PROCEDURE — 94664 DEMO&/EVAL PT USE INHALER: CPT

## 2025-04-01 PROCEDURE — 87181 SC STD AGAR DILUTION PER AGT: CPT | Performed by: EMERGENCY MEDICINE

## 2025-04-01 PROCEDURE — 80053 COMPREHEN METABOLIC PANEL: CPT

## 2025-04-01 PROCEDURE — 99285 EMERGENCY DEPT VISIT HI MDM: CPT | Performed by: EMERGENCY MEDICINE

## 2025-04-01 PROCEDURE — 0241U HB NFCT DS VIR RESP RNA 4 TRGT: CPT

## 2025-04-01 RX ORDER — HEPARIN SODIUM 5000 [USP'U]/ML
5000 INJECTION, SOLUTION INTRAVENOUS; SUBCUTANEOUS EVERY 8 HOURS SCHEDULED
Status: DISCONTINUED | OUTPATIENT
Start: 2025-04-02 | End: 2025-04-04 | Stop reason: HOSPADM

## 2025-04-01 RX ORDER — ALBUTEROL SULFATE 90 UG/1
2 INHALANT RESPIRATORY (INHALATION) EVERY 6 HOURS PRN
Status: DISCONTINUED | OUTPATIENT
Start: 2025-04-01 | End: 2025-04-04 | Stop reason: HOSPADM

## 2025-04-01 RX ORDER — SODIUM CHLORIDE 9 MG/ML
125 INJECTION, SOLUTION INTRAVENOUS CONTINUOUS
Status: DISCONTINUED | OUTPATIENT
Start: 2025-04-01 | End: 2025-04-03

## 2025-04-01 RX ORDER — LORAZEPAM 1 MG/1
1 TABLET ORAL
Status: DISCONTINUED | OUTPATIENT
Start: 2025-04-01 | End: 2025-04-04 | Stop reason: HOSPADM

## 2025-04-01 RX ORDER — FLUTICASONE PROPIONATE 50 MCG
1 SPRAY, SUSPENSION (ML) NASAL DAILY
Status: DISCONTINUED | OUTPATIENT
Start: 2025-04-02 | End: 2025-04-04 | Stop reason: HOSPADM

## 2025-04-01 RX ORDER — BENZONATATE 100 MG/1
100 CAPSULE ORAL 3 TIMES DAILY PRN
Status: DISCONTINUED | OUTPATIENT
Start: 2025-04-01 | End: 2025-04-02

## 2025-04-01 RX ORDER — ATORVASTATIN CALCIUM 40 MG/1
40 TABLET, FILM COATED ORAL DAILY
Status: DISCONTINUED | OUTPATIENT
Start: 2025-04-02 | End: 2025-04-04 | Stop reason: HOSPADM

## 2025-04-01 RX ORDER — ONDANSETRON 2 MG/ML
4 INJECTION INTRAMUSCULAR; INTRAVENOUS EVERY 6 HOURS PRN
Status: DISCONTINUED | OUTPATIENT
Start: 2025-04-01 | End: 2025-04-04 | Stop reason: HOSPADM

## 2025-04-01 RX ORDER — MONTELUKAST SODIUM 10 MG/1
10 TABLET ORAL EVERY EVENING
Status: DISCONTINUED | OUTPATIENT
Start: 2025-04-01 | End: 2025-04-04 | Stop reason: HOSPADM

## 2025-04-01 RX ORDER — CARVEDILOL 12.5 MG/1
12.5 TABLET ORAL 2 TIMES DAILY WITH MEALS
Status: DISCONTINUED | OUTPATIENT
Start: 2025-04-02 | End: 2025-04-04 | Stop reason: HOSPADM

## 2025-04-01 RX ORDER — ASPIRIN 81 MG/1
81 TABLET, CHEWABLE ORAL DAILY
Status: DISCONTINUED | OUTPATIENT
Start: 2025-04-02 | End: 2025-04-04 | Stop reason: HOSPADM

## 2025-04-01 RX ORDER — AMLODIPINE BESYLATE 5 MG/1
5 TABLET ORAL DAILY
Status: DISCONTINUED | OUTPATIENT
Start: 2025-04-02 | End: 2025-04-04 | Stop reason: HOSPADM

## 2025-04-01 RX ORDER — ACETAMINOPHEN 325 MG/1
650 TABLET ORAL EVERY 6 HOURS PRN
Status: DISCONTINUED | OUTPATIENT
Start: 2025-04-01 | End: 2025-04-04 | Stop reason: HOSPADM

## 2025-04-01 RX ORDER — ACETAMINOPHEN 325 MG/1
650 TABLET ORAL ONCE
Status: COMPLETED | OUTPATIENT
Start: 2025-04-01 | End: 2025-04-01

## 2025-04-01 RX ADMIN — SODIUM CHLORIDE 1000 ML: 0.9 INJECTION, SOLUTION INTRAVENOUS at 19:19

## 2025-04-01 RX ADMIN — SODIUM CHLORIDE 125 ML/HR: 0.9 INJECTION, SOLUTION INTRAVENOUS at 20:06

## 2025-04-01 RX ADMIN — CEFTRIAXONE SODIUM 1000 MG: 10 INJECTION, POWDER, FOR SOLUTION INTRAVENOUS at 19:19

## 2025-04-01 RX ADMIN — MONTELUKAST 10 MG: 10 TABLET, FILM COATED ORAL at 21:45

## 2025-04-01 RX ADMIN — ACETAMINOPHEN 650 MG: 325 TABLET, FILM COATED ORAL at 18:57

## 2025-04-01 RX ADMIN — AZITHROMYCIN MONOHYDRATE 500 MG: 500 INJECTION, POWDER, LYOPHILIZED, FOR SOLUTION INTRAVENOUS at 21:45

## 2025-04-01 NOTE — ASSESSMENT & PLAN NOTE
Notes of a creatinine of 1.6  Baseline approximately 1 suspect likely from volume depletion   we will provide IV fluids  Repeat BMP in the morning

## 2025-04-01 NOTE — ASSESSMENT & PLAN NOTE
Blood pressure controlled  Continue amlodipine, beta-blocker  Hold lisinopril in the setting of acute kidney injury

## 2025-04-01 NOTE — TELEPHONE ENCOUNTER
Patients wife is calling because he is not felling well for the last couple of days and he has been running a fever and is running a fever since Saturday morning. 101.8 at about 11:50 am this morning and currently today 103.6 at 3:40 pm. Patient has been advised to take the patient to the ER since it has not broken since it started on Saturday. Asked the patients wife if he has had any Tylenol or Motrin? She stated that he has been on Tylenol since the fever started on Saturday and that he could use the maximum strength cold and flu. Patient has had nausea, and was given nausea pills by the telemedicine specialist and he has been suffering with chills. Patients wife requested we call the ER and let them know that they are on their way over, patients wife was requesting a fast pass for her , please advise

## 2025-04-01 NOTE — ASSESSMENT & PLAN NOTE
presented with shortness of breath, cough and fever of 102 degrees  Secondary to lower lobe pneumonia  Will place on ceftriaxone, azithromycin   check RSV, COVID Currently in process  WBC count currently within normal  However requiring 4 L nasal cannula  Wean as able

## 2025-04-01 NOTE — ED PROVIDER NOTES
Time reflects when diagnosis was documented in both MDM as applicable and the Disposition within this note       Time User Action Codes Description Comment    4/1/2025  7:07 PM Ubaldo Peters Add [J18.9] Pneumonia     4/1/2025  7:08 PM Ubaldo Peters Add [R09.02] Hypoxia           ED Disposition       ED Disposition   Admit    Condition   Stable    Date/Time   Tue Apr 1, 2025  7:07 PM    Comment   Case was discussed with Dr. Hagen and the patient's admission status was agreed to be Admission Status: inpatient status to the service of Dr. Hagen .               Assessment & Plan       Medical Decision Making  74-year-old male, presents with cough, fever, and difficulty breathing.  Differential diagnosis includes pneumonia, bronchitis, pulmonary effusion, heart failure among other diagnoses.  Patient noted to be tachypneic, hypoxic on room air, oxygen saturation improved when placed on 4 L nasal cannula supplemental oxygen.  Normal sinus rhythm on cardiac monitor.  Chest x-ray, EKG, labs ordered.  Will continue to monitor in ED and reevaluate.    Amount and/or Complexity of Data Reviewed  Labs: ordered. Decision-making details documented in ED Course.  Radiology: ordered and independent interpretation performed. Decision-making details documented in ED Course.  ECG/medicine tests: ordered and independent interpretation performed. Decision-making details documented in ED Course.    Risk  OTC drugs.  Decision regarding hospitalization.        ED Course as of 04/01/25 1920 Tue Apr 01, 2025   1858 Chest x-ray dependently reviewed myself compared to previous imaging.  Right lower lobe infiltrate.   1918 Creatinine(!): 1.64  Elevated BUN and creatinine from previous, possibly due to dehydration, IV fluids ordered.   1918 Patient started on IV antibiotics for pneumonia, discussed with hospitalist, will admit patient.  Has hypoxia requiring supplemental oxygen.        Medications   ceftriaxone (ROCEPHIN) 1 g/50 mL in  dextrose IVPB (1,000 mg Intravenous New Bag 4/1/25 1919)   sodium chloride 0.9 % bolus 1,000 mL (1,000 mL Intravenous New Bag 4/1/25 1919)   heparin (porcine) subcutaneous injection 5,000 Units (has no administration in time range)   benzonatate (TESSALON PERLES) capsule 100 mg (has no administration in time range)   acetaminophen (TYLENOL) tablet 650 mg (has no administration in time range)   ondansetron (ZOFRAN) injection 4 mg (has no administration in time range)   sodium chloride 0.9 % infusion (has no administration in time range)   albuterol (PROVENTIL HFA,VENTOLIN HFA) inhaler 2 puff (has no administration in time range)   amLODIPine (NORVASC) tablet 5 mg (has no administration in time range)   aspirin chewable tablet 81 mg (has no administration in time range)   atorvastatin (LIPITOR) tablet 40 mg (has no administration in time range)   carvedilol (COREG) tablet 12.5 mg (has no administration in time range)   fluticasone (FLONASE) 50 mcg/act nasal spray 1 spray (has no administration in time range)   LORazepam (ATIVAN) tablet 1 mg (has no administration in time range)   montelukast (SINGULAIR) tablet 10 mg (has no administration in time range)   ceftriaxone (ROCEPHIN) 1 g/50 mL in dextrose IVPB (has no administration in time range)   azithromycin (ZITHROMAX) 500 mg in sodium chloride 0.9% 250mL IVPB 500 mg (has no administration in time range)   acetaminophen (TYLENOL) tablet 650 mg (650 mg Oral Given 4/1/25 1857)       ED Risk Strat Scores                    Identification of Seniors at Risk      Flowsheet Row Most Recent Value   (ISAR) Identification of Seniors at Risk    Before the illness or injury that brought you to the Emergency, did you need someone to help you on a regular basis? 0 Filed at: 04/01/2025 1833   In the last 24 hours, have you needed more help than usual? 1 Filed at: 04/01/2025 1833   Have you been hospitalized for one or more nights during the past 6 months? 0 Filed at: 04/01/2025 1833    In general, do you see well? 0 Filed at: 04/01/2025 1833   In general, do you have serious problems with your memory? 0 Filed at: 04/01/2025 1833   Do you take more than three different medications every day? 1 Filed at: 04/01/2025 1833   ISAR Score 2 Filed at: 04/01/2025 1833                SBIRT 20yo+      Flowsheet Row Most Recent Value   Initial Alcohol Screen: US AUDIT-C     1. How often do you have a drink containing alcohol? 6 Filed at: 04/01/2025 1834   2. How many drinks containing alcohol do you have on a typical day you are drinking?  0 Filed at: 04/01/2025 1834   3b. FEMALE Any Age, or MALE 65+: How often do you have 4 or more drinks on one occassion? 0 Filed at: 04/01/2025 1834   Audit-C Score 6 Filed at: 04/01/2025 1834   JOSE: How many times in the past year have you...    Used an illegal drug or used a prescription medication for non-medical reasons? Never Filed at: 04/01/2025 1834                            History of Present Illness       Chief Complaint   Patient presents with    Shortness of Breath     Pt presents to ER from home by Saeid BROWN for reports of URI x7-10 days. He was improving but then reports getting acutely worse over the last 4 days. T max 104 at home, last dose of fever medications was about 6 hours ago but pt unsure of medication. EMS treats with 18g L wrist, reports RA saturations of 85%, treats with 1 duoneb and then reports follow up sp02 of 92% on 4 L NC  - no history of oxygen dependence. EMS reports stable vitals but a tympanic temp of 102.4.        Past Medical History:   Diagnosis Date    Arthritis     Asthma     Chronic kidney disease     CKD (chronic kidney disease) stage 3, GFR 30-59 ml/min ([facility]) 09/24/2019    Depression     Dyshidrosis     Hypertension     Osteoarthritis     Trigger finger       Past Surgical History:   Procedure Laterality Date    COLONOSCOPY  02/25/2010    COLONOSCOPY  09/2020    HEMORRHOID SURGERY      JOINT REPLACEMENT  9/7/18     REPLACEMENT TOTAL KNEE Left     TONSILLECTOMY      TRIGGER FINGER RELEASE      WISDOM TOOTH EXTRACTION        Family History   Problem Relation Age of Onset    Coronary artery disease Mother     Hyperlipidemia Mother     Hypertension Mother     Stroke Mother     Heart disease Mother     Stroke Father     Hypertension Father     Dementia Father     Asthma Maternal Aunt     Asthma Maternal Uncle     Arthritis Maternal Uncle       Social History     Tobacco Use    Smoking status: Former     Current packs/day: 0.00     Types: Cigarettes     Quit date: 1973     Years since quittin.7    Smokeless tobacco: Never    Tobacco comments:     Occasional cigar smoker   Vaping Use    Vaping status: Never Used   Substance Use Topics    Alcohol use: Yes     Alcohol/week: 6.0 standard drinks of alcohol     Types: 3 Glasses of wine, 2 Cans of beer, 1 Shots of liquor per week     Comment: occasional    Drug use: No      E-Cigarette/Vaping    E-Cigarette Use Never User       E-Cigarette/Vaping Substances    Nicotine No     THC No     CBD No     Flavoring No     Other No     Unknown No       I have reviewed and agree with the history as documented.     74-year-old male, presents with cough and shortness of breath.  Patient states he had URI symptoms about a week ago and was getting better, started to get worse over the past few days.  Patient reports fevers, cough, difficulty breathing.  Has associated diarrhea, denies any vomiting.  Patient with no history of lung or cardiac disease, is not on home oxygen.  Does not smoke.      History provided by:  Patient   used: No    Shortness of Breath  Associated symptoms: fever    Associated symptoms: no vomiting        Review of Systems   Constitutional:  Positive for fever.   HENT:  Positive for congestion.    Respiratory:  Positive for shortness of breath.    Cardiovascular: Negative.    Gastrointestinal:  Positive for diarrhea. Negative for vomiting.            Objective       ED Triage Vitals [04/01/25 1831]   Temperature Pulse Blood Pressure Respirations SpO2 Patient Position - Orthostatic VS   (!) 102.2 °F (39 °C) 92 167/87 (!) 26 92 % Sitting      Temp Source Heart Rate Source BP Location FiO2 (%) Pain Score    Oral Monitor Right arm -- No Pain      Vitals      Date and Time Temp Pulse SpO2 Resp BP Pain Score FACES Pain Rating User   04/01/25 1831 102.2 °F (39 °C) 92 92 % 26 167/87 No Pain -- AM            Physical Exam  Vitals and nursing note reviewed.   HENT:      Head: Normocephalic and atraumatic.      Mouth/Throat:      Mouth: Mucous membranes are moist.      Pharynx: Oropharynx is clear.   Eyes:      Extraocular Movements: Extraocular movements intact.      Pupils: Pupils are equal, round, and reactive to light.   Cardiovascular:      Rate and Rhythm: Normal rate and regular rhythm.   Pulmonary:      Comments: Tachypneic  Diminished breath sounds to right base, no wheezing  Abdominal:      Palpations: Abdomen is soft.      Tenderness: There is no abdominal tenderness.   Musculoskeletal:         General: Normal range of motion.      Cervical back: Normal range of motion and neck supple.   Skin:     General: Skin is warm and dry.   Neurological:      General: No focal deficit present.      Mental Status: He is alert and oriented to person, place, and time.         Results Reviewed       Procedure Component Value Units Date/Time    Comprehensive metabolic panel [[id number]]  (Abnormal) Collected: 04/01/25 1839    Lab Status: Final result Specimen: Blood from Arm, Left Updated: 04/01/25 1907     Sodium 135 mmol/L      Potassium 4.4 mmol/L      Chloride 97 mmol/L      CO2 28 mmol/L      ANION GAP 10 mmol/L      BUN 40 mg/dL      Creatinine 1.64 mg/dL      Glucose 136 mg/dL      Calcium 9.7 mg/dL      AST 22 U/L      ALT 15 U/L      Alkaline Phosphatase 61 U/L      Total Protein 8.0 g/dL      Albumin 3.9 g/dL      Total Bilirubin 1.57 mg/dL      eGFR 41  ml/min/1.73sq m     Narrative:      National Kid[employer] Foundation guidelines for Chronic Kidney Disease (CKD):     Stage 1 with normal or high GFR (GFR > 90 mL/min/1.73 square meters)    Stage 2 Mild CKD (GFR = 60-89 mL/min/1.73 square meters)    Stage 3A Moderate CKD (GFR = 45-59 mL/min/1.73 square meters)    Stage 3B Moderate CKD (GFR = 30-44 mL/min/1.73 square meters)    Stage 4 Severe CKD (GFR = 15-29 mL/min/1.73 square meters)    Stage 5 End Stage CKD (GFR <15 mL/min/1.73 square meters)  Note: GFR calculation is accurate only with a steady state creatinine    Lactic acid, plasma (w/reflex if result > 2.0) [[id number]]  (Normal) Collected: 04/01/25 1839    Lab Status: Final result Specimen: Blood from Arm, Left Updated: 04/01/25 1906     LACTIC ACID 1.4 mmol/L     Narrative:      Result may be elevated if tourniquet was used during collection.    Protime-INR [[id number]]  (Normal) Collected: 04/01/25 1839    Lab Status: Final result Specimen: Blood from Arm, Left Updated: 04/01/25 1901     Protime 14.5 seconds      INR 1.06    Narrative:      INR Therapeutic Range    Indication                                             INR Range      Atrial Fibrillation                                               2.0-3.0  Hypercoagulable State                                    2.0.2.3  Left Ventricular Asist Device                            2.0-3.0  Mechanical Heart Valve                                  -    Aortic(with afib, MI, embolism, HF, LA enlargement,    and/or coagulopathy)                                     2.0-3.0 (2.5-3.5)     Mitral                                                             2.5-3.5  Prosthetic/Bioprosthetic Heart Valve               2.0-3.0  Venous thromboembolism (VTE: VT, PE        2.0-3.0    APTT [[id number]]  (Normal) Collected: 04/01/25 1839    Lab Status: Final result Specimen: Blood from Arm, Left Updated: 04/01/25 1901     PTT 28 seconds     CBC and differential [[id number]]   (Normal) Collected: 04/01/25 1839    Lab Status: Preliminary result Specimen: Blood from Arm, Left Updated: 04/01/25 1847     WBC 8.31 Thousand/uL      RBC 4.38 Million/uL      Hemoglobin 14.1 g/dL      Hematocrit 41.4 %      MCV 95 fL      MCH 32.2 pg      MCHC 34.1 g/dL      RDW 12.9 %      MPV 10.0 fL      Platelets 198 Thousands/uL     Blood culture #1 [[id number]] Collected: 04/01/25 1830    Lab Status: In process Specimen: Blood from Arm, Right Updated: 04/01/25 1845    Procalcitonin [[id number]] Collected: 04/01/25 1839    Lab Status: In process Specimen: Blood from Arm, Left Updated: 04/01/25 1845    Blood culture #2 [[id number]] Collected: 04/01/25 1839    Lab Status: In process Specimen: Blood from Arm, Left Updated: 04/01/25 1844    COVID/FLU/RSV [[id number]] Collected: 04/01/25 1839    Lab Status: In process Specimen: Nares from Nose Updated: 04/01/25 1844    UA w Reflex to Microscopic w Reflex to Culture [[id number]]     Lab Status: No result Specimen: Urine             XR chest 1 view portable    (Results Pending)       ECG 12 Lead Documentation Only    Date/Time: 4/1/2025 7:11 PM    Performed by: Ubaldo Peters MD  Authorized by: Ubaldo Peters MD    ECG reviewed by me, the ED Provider: yes    Patient location:  ED  Interpretation:     Interpretation: normal    Rate:     ECG rate:  87    ECG rate assessment: normal    Rhythm:     Rhythm: sinus rhythm    Ectopy:     Ectopy: none    QRS:     QRS axis:  Normal    QRS intervals:  Normal  Conduction:     Conduction: normal    ST segments:     ST segments:  Normal      ED Medication and Procedure Management   Prior to Admission Medications   Prescriptions Last Dose Informant Patient Reported? Taking?   LORazepam (ATIVAN) 1 mg tablet  Self No No   Sig: Take 1 tablet (1 mg total) by mouth as needed for sleep   acetaminophen (TYLENOL) 325 mg tablet  Self Yes No   Sig: Take 650 mg by mouth every 6 (six) hours as needed for mild pain   albuterol (Ventolin HFA) 90  mcg/act inhaler  Self No No   Sig: Inhale 2 puffs every 6 (six) hours as needed for wheezing   amLODIPine (NORVASC) 5 mg tablet   No No   Sig: take 1 tablet by mouth once daily   aspirin 81 mg chewable tablet   No No   Sig: chew and swallow 1 tablet by mouth once daily   atorvastatin (LIPITOR) 40 mg tablet   No No   Sig: take 1 tablet by mouth once daily   carvedilol (COREG) 12.5 mg tablet   No No   Sig: take 2 tablets by mouth twice a day with meals   colchicine (COLCRYS) 0.6 mg tablet  Self No No   Si po qd, up to tid for acute flare.   doxycycline (ADOXA) 100 MG tablet   No No   Sig: Take 1 tablet (100 mg total) by mouth 2 (two) times a day for 7 days   fluticasone (FLONASE) 50 mcg/act nasal spray  Self No No   Si spray into each nostril daily   ipratropium-albuterol (DUO-NEB) 0.5-2.5 mg/3 mL nebulizer solution  Self No No   Sig: Take 3 mL by nebulization 3 (three) times a day   lisinopril (ZESTRIL) 20 mg tablet   No No   Sig: Take 1 tablet (20 mg total) by mouth 2 (two) times a day   montelukast (SINGULAIR) 10 mg tablet   No No   Sig: take 1 tablet by mouth every evening   predniSONE 10 mg tablet   No No   Sig: Take 4 tablets daily until gout flare resolves, then taper to 30 mg for 1 day, 20 mg for 1 day, then 10 mg for 1 day.   triamcinolone (KENALOG) 0.1 % cream   No No   Sig: Apply topically 2 (two) times a day To the rash on the chest as needed.      Facility-Administered Medications: None     Patient's Medications   Discharge Prescriptions    No medications on file     No discharge procedures on file.  ED SEPSIS DOCUMENTATION   Time reflects when diagnosis was documented in both MDM as applicable and the Disposition within this note       Time User Action Codes Description Comment    2025  7:07 PM Ubaldo Peters [J18.9] Pneumonia     2025  7:08 PM Ubaldo Peters [R09.02] Hypoxia                  Ubaldo Peters MD  [id number]

## 2025-04-01 NOTE — PLAN OF CARE
Problem: DISCHARGE PLANNING  Goal: Discharge to home or other facility with appropriate resources  Description: INTERVENTIONS:- Identify barriers to discharge w/patient and caregiver- Arrange for needed discharge resources and transportation as appropriate- Identify discharge learning needs (meds, wound care, etc.)- Arrange for interpretive services to assist at discharge as needed- Refer to Case Management Department for coordinating discharge planning if the patient needs post-hospital services based on physician/advanced practitioner order or complex needs related to functional status, cognitive ability, or social support system  Outcome: Progressing

## 2025-04-01 NOTE — H&P
H&P - Hospitalist   Name: Markel Alves 74 y.o. male I MRN: [id number]  Unit/Bed#: ED 15 I Date of Admission: 4/1/2025   Date of Service: 4/1/2025 I Hospital Day: 0     Assessment & Plan  Pneumonia    presented with shortness of breath, cough and fever of 102 degrees  Secondary to lower lobe pneumonia  Will place on ceftriaxone, azithromycin   check RSV, COVID Currently in process  WBC count currently within normal  However requiring 4 L nasal cannula  Wean as able  Acute respiratory failure with hypoxia (HCC)   Presented with sob, acute hypoxic respiratory failure currently requiring 4 l nc  Likely 2/2 to PNA as seen on cxr  Will place on ceftriaxone and azithromycin     Hyperlipidemia  Continue statin    MODE (acute kidney injury) (HCC)  Notes of a creatinine of 1.6  Baseline approximately 1 suspect likely from volume depletion   we will provide IV fluids  Repeat BMP in the morning  HTN (hypertension)  Blood pressure controlled  Continue amlodipine, beta-blocker  Hold lisinopril in the setting of acute kidney injury      VTE Pharmacologic Prophylaxis:   Moderate Risk (Score 3-4) - Pharmacological DVT Prophylaxis Ordered: heparin.  Code Status:  full code  Discussion with family: Patient declined call to .     Anticipated Length of Stay: Patient will be admitted on an inpatient basis with an anticipated length of stay of greater than 2 midnights secondary to pneumonia..    History of Present Illness   Chief Complaint: Shortness of breath    Markel Alves is a 74 y.o. male with past medical history significant hypertension, CKD, hyperlipidemia and is present with shortness of breath and cough.  Reports shortness of breath cough and fever that began last couple days.  Otherwise denies any acute complaints.  Chest pain, abdominal pain, nausea vomiting, diarrhea or any other complaints    Review of Systems   Constitutional:  Positive for fever. Negative for appetite change, chills, diaphoresis, fatigue and  unexpected weight change.   HENT:  Negative for congestion, rhinorrhea and sore throat.    Eyes:  Negative for photophobia and visual disturbance.   Respiratory:  Positive for cough and shortness of breath. Negative for wheezing.    Cardiovascular:  Negative for chest pain, palpitations and leg swelling.   Gastrointestinal:  Negative for abdominal pain, anal bleeding, blood in stool, constipation, diarrhea, nausea and vomiting.   Genitourinary:  Negative for decreased urine volume, difficulty urinating, dysuria, flank pain, frequency, hematuria and urgency.   Musculoskeletal:  Negative for arthralgias, back pain, joint swelling and myalgias.   Skin:  Negative for color change and rash.   Neurological:  Negative for dizziness, seizures, facial asymmetry, speech difficulty, numbness and headaches.   Psychiatric/Behavioral:  Negative for agitation, confusion and decreased concentration. The patient is not nervous/anxious.        Historical Information   Past Medical History:   Diagnosis Date    Arthritis     Asthma     Chronic kidney disease     CKD (chronic kidney disease) stage 3, GFR 30-59 ml/min ([facility]) 2019    Depression     Dyshidrosis     Hypertension     Osteoarthritis     Trigger finger      Past Surgical History:   Procedure Laterality Date    COLONOSCOPY  2010    COLONOSCOPY  2020    HEMORRHOID SURGERY      JOINT REPLACEMENT  20    REPLACEMENT TOTAL KNEE Left     TONSILLECTOMY      TRIGGER FINGER RELEASE      WISDOM TOOTH EXTRACTION       Social History     Tobacco Use    Smoking status: Former     Current packs/day: 0.00     Types: Cigarettes     Quit date: 1973     Years since quittin.7    Smokeless tobacco: Never    Tobacco comments:     Occasional cigar smoker   Vaping Use    Vaping status: Never Used   Substance and Sexual Activity    Alcohol use: Yes     Alcohol/week: 6.0 standard drinks of alcohol     Types: 3 Glasses of wine, 2 Cans of beer, 1 Shots of liquor per week      Comment: occasional    Drug use: No    Sexual activity: Not Currently     Partners: Female     E-Cigarette/Vaping    E-Cigarette Use Never User      E-Cigarette/Vaping Substances    Nicotine No     THC No     CBD No     Flavoring No     Other No     Unknown No      Family History   Problem Relation Age of Onset    Coronary artery disease Mother     Hyperlipidemia Mother     Hypertension Mother     Stroke Mother     Heart disease Mother     Stroke Father     Hypertension Father     Dementia Father     Asthma Maternal Aunt     Asthma Maternal Uncle     Arthritis Maternal Uncle      Social History:  Marital Status: /Civil Union     Patient Pre-hospital Living Situation: Home  Patient Pre-hospital Level of Mobility: walks  Patient Pre-hospital Diet Restrictions: none    Meds/Allergies   I have reviewed home medications with patient personally.  Prior to Admission medications    Medication Sig Start Date End Date Taking? Authorizing Provider   acetaminophen (TYLENOL) 325 mg tablet Take 650 mg by mouth every 6 (six) hours as needed for mild pain    Historical Provider, MD   albuterol (Ventolin HFA) 90 mcg/act inhaler Inhale 2 puffs every 6 (six) hours as needed for wheezing 12/13/23   KAYLEY Valenzuela   amLODIPine (NORVASC) 5 mg tablet take 1 tablet by mouth once daily 4/15/24   Manjit Allison MD   aspirin 81 mg chewable tablet chew and swallow 1 tablet by mouth once daily 3/9/25   Manjit Allison MD   atorvastatin (LIPITOR) 40 mg tablet take 1 tablet by mouth once daily 4/15/24   Manjit Allison MD   carvedilol (COREG) 12.5 mg tablet take 2 tablets by mouth twice a day with meals 10/21/24   Manjit Allison MD   colchicine (COLCRYS) 0.6 mg tablet 1 po qd, up to tid for acute flare. 9/19/23   Manjit Allison MD   doxycycline (ADOXA) 100 MG tablet Take 1 tablet (100 mg total) by mouth 2 (two) times a day for 7 days 3/31/25 4/7/25  KAYLEY Vargas   fluticasone (FLONASE) 50 mcg/act nasal spray 1 spray  into each nostril daily 10/26/22   Manjit Allison MD   ipratropium-albuterol (DUO-NEB) 0.5-2.5 mg/3 mL nebulizer solution Take 3 mL by nebulization 3 (three) times a day 11/17/23   KAYLEY Valenzuela   lisinopril (ZESTRIL) 20 mg tablet Take 1 tablet (20 mg total) by mouth 2 (two) times a day 12/2/24   Georgette Garibay PA-C   LORazepam (ATIVAN) 1 mg tablet Take 1 tablet (1 mg total) by mouth as needed for sleep 12/13/23   KAYLEY Valenzuela   montelukast (SINGULAIR) 10 mg tablet take 1 tablet by mouth every evening 3/6/25   Misha Multani DO   predniSONE 10 mg tablet Take 4 tablets daily until gout flare resolves, then taper to 30 mg for 1 day, 20 mg for 1 day, then 10 mg for 1 day. 2/14/25   Annabelle Peck PA-C   triamcinolone (KENALOG) 0.1 % cream Apply topically 2 (two) times a day To the rash on the chest as needed. 1/21/25   Torey Zapien DO     Allergies   Allergen Reactions    Cat Dander Anaphylaxis, Hives, Itching, Shortness Of Breath and Swelling    Horse Protein Anaphylaxis    Other Cough     mold    Pollen Extract Other (See Comments) and Shortness Of Breath    Nsaids        Objective :  Temp:  [102.2 °F (39 °C)] 102.2 °F (39 °C)  HR:  [92] 92  BP: (167)/(87) 167/87  Resp:  [26] 26  SpO2:  [92 %] 92 %  O2 Device: Nasal cannula    Physical Exam  Constitutional:       General: He is not in acute distress.     Appearance: He is well-developed. He is not diaphoretic.   HENT:      Head: Normocephalic and atraumatic.      Nose: Nose normal.      Mouth/Throat:      Pharynx: No oropharyngeal exudate.   Eyes:      General: No scleral icterus.     Conjunctiva/sclera: Conjunctivae normal.   Cardiovascular:      Rate and Rhythm: Normal rate and regular rhythm.      Heart sounds: Normal heart sounds. No murmur heard.     No friction rub. No gallop.   Pulmonary:      Effort: Pulmonary effort is normal. No respiratory distress.      Breath sounds: Normal breath sounds. No wheezing or rales.   Chest:       Chest wall: No tenderness.   Abdominal:      General: Bowel sounds are normal. There is no distension.      Palpations: Abdomen is soft.      Tenderness: There is no abdominal tenderness. There is no guarding.   Musculoskeletal:         General: No tenderness or deformity. Normal range of motion.      Cervical back: Normal range of motion and neck supple.   Skin:     General: Skin is warm and dry.      Findings: No erythema.   Neurological:      Mental Status: He is alert. Mental status is at baseline.          Lines/Drains:            Lab Results: I have reviewed the following results:  Results from last 7 days   Lab Units 04/01/25  1839   WBC Thousand/uL 8.31   HEMOGLOBIN g/dL 14.1   HEMATOCRIT % 41.4   PLATELETS Thousands/uL 198     Results from last 7 days   Lab Units 04/01/25  1839   SODIUM mmol/L 135   POTASSIUM mmol/L 4.4   CHLORIDE mmol/L 97   CO2 mmol/L 28   BUN mg/dL 40*   CREATININE mg/dL 1.64*   ANION GAP mmol/L 10   CALCIUM mg/dL 9.7   ALBUMIN g/dL 3.9   TOTAL BILIRUBIN mg/dL 1.57*   ALK PHOS U/L 61   ALT U/L 15   AST U/L 22   GLUCOSE RANDOM mg/dL 136     Results from last 7 days   Lab Units 04/01/25  1839   INR  1.06         Lab Results   Component Value Date    HGBA1C 5.9 (H) 10/14/2024    HGBA1C 5.7 (H) 03/19/2024    HGBA1C 5.9 (H) 09/11/2023     Results from last 7 days   Lab Units 04/01/25  1839   LACTIC ACID mmol/L 1.4       Imaging Results Review: I personally reviewed the following image studies/reports in PACS and discussed pertinent findings with Radiology: chest xray. My interpretation of the radiology images/reports is:  .  Other Study Results Review: EKG was reviewed.     Administrative Statements   I have spent a total time of 76 minutes in caring for this patient on the day of the visit/encounter including Diagnostic results.    ** Please Note: This note has been constructed using a voice recognition system. **

## 2025-04-01 NOTE — ASSESSMENT & PLAN NOTE
Presented with sob, acute hypoxic respiratory failure currently requiring 4 l nc  Likely 2/2 to PNA as seen on cxr  Will place on ceftriaxone and azithromycin

## 2025-04-02 PROBLEM — R65.20 SEPSIS WITH ACUTE RENAL FAILURE (HCC): Status: ACTIVE | Noted: 2025-04-02

## 2025-04-02 PROBLEM — A41.9 SEPSIS WITH ACUTE RENAL FAILURE (HCC): Status: ACTIVE | Noted: 2025-04-02

## 2025-04-02 PROBLEM — N17.9 SEPSIS WITH ACUTE RENAL FAILURE (HCC): Status: ACTIVE | Noted: 2025-04-02

## 2025-04-02 LAB
ALBUMIN SERPL BCG-MCNC: 3.1 G/DL (ref 3.5–5)
ALP SERPL-CCNC: 48 U/L (ref 34–104)
ALT SERPL W P-5'-P-CCNC: 12 U/L (ref 7–52)
ANION GAP SERPL CALCULATED.3IONS-SCNC: 6 MMOL/L (ref 4–13)
AST SERPL W P-5'-P-CCNC: 17 U/L (ref 13–39)
BACTERIA UR QL AUTO: ABNORMAL /HPF
BASOPHILS # BLD AUTO: 0.02 THOUSANDS/ÂΜL (ref 0–0.1)
BASOPHILS NFR BLD AUTO: 0 % (ref 0–1)
BILIRUB DIRECT SERPL-MCNC: 0.28 MG/DL (ref 0–0.2)
BILIRUB SERPL-MCNC: 0.88 MG/DL (ref 0.2–1)
BILIRUB UR QL STRIP: NEGATIVE
BUN SERPL-MCNC: 36 MG/DL (ref 5–25)
CALCIUM SERPL-MCNC: 8.6 MG/DL (ref 8.4–10.2)
CHLORIDE SERPL-SCNC: 103 MMOL/L (ref 96–108)
CLARITY UR: CLEAR
CO2 SERPL-SCNC: 27 MMOL/L (ref 21–32)
COLOR UR: YELLOW
CREAT SERPL-MCNC: 1.37 MG/DL (ref 0.6–1.3)
EOSINOPHIL # BLD AUTO: 0.02 THOUSAND/ÂΜL (ref 0–0.61)
EOSINOPHIL NFR BLD AUTO: 0 % (ref 0–6)
ERYTHROCYTE [DISTWIDTH] IN BLOOD BY AUTOMATED COUNT: 13 % (ref 11.6–15.1)
GFR SERPL CREATININE-BSD FRML MDRD: 51 ML/MIN/1.73SQ M
GLUCOSE SERPL-MCNC: 121 MG/DL (ref 65–140)
GLUCOSE UR STRIP-MCNC: NEGATIVE MG/DL
HCT VFR BLD AUTO: 34.2 % (ref 36.5–49.3)
HGB BLD-MCNC: 11.6 G/DL (ref 12–17)
HGB UR QL STRIP.AUTO: ABNORMAL
IMM GRANULOCYTES # BLD AUTO: 0.11 THOUSAND/UL (ref 0–0.2)
IMM GRANULOCYTES NFR BLD AUTO: 1 % (ref 0–2)
KETONES UR STRIP-MCNC: NEGATIVE MG/DL
L PNEUMO1 AG UR QL IA.RAPID: NEGATIVE
LEUKOCYTE ESTERASE UR QL STRIP: NEGATIVE
LYMPHOCYTES # BLD AUTO: 0.65 THOUSANDS/ÂΜL (ref 0.6–4.47)
LYMPHOCYTES NFR BLD AUTO: 7 % (ref 14–44)
MCH RBC QN AUTO: 32.4 PG (ref 26.8–34.3)
MCHC RBC AUTO-ENTMCNC: 33.9 G/DL (ref 31.4–37.4)
MCV RBC AUTO: 96 FL (ref 82–98)
MONOCYTES # BLD AUTO: 0.64 THOUSAND/ÂΜL (ref 0.17–1.22)
MONOCYTES NFR BLD AUTO: 7 % (ref 4–12)
NEUTROPHILS # BLD AUTO: 8.07 THOUSANDS/ÂΜL (ref 1.85–7.62)
NEUTS SEG NFR BLD AUTO: 85 % (ref 43–75)
NITRITE UR QL STRIP: NEGATIVE
NON-SQ EPI CELLS URNS QL MICRO: ABNORMAL /HPF
NRBC BLD AUTO-RTO: 0 /100 WBCS
PH UR STRIP.AUTO: 6 [PH]
PLATELET # BLD AUTO: 165 THOUSANDS/UL (ref 149–390)
PMV BLD AUTO: 10.3 FL (ref 8.9–12.7)
POTASSIUM SERPL-SCNC: 3.5 MMOL/L (ref 3.5–5.3)
PROCALCITONIN SERPL-MCNC: 2.4 NG/ML
PROT SERPL-MCNC: 6.1 G/DL (ref 6.4–8.4)
PROT UR STRIP-MCNC: ABNORMAL MG/DL
RBC # BLD AUTO: 3.58 MILLION/UL (ref 3.88–5.62)
RBC #/AREA URNS AUTO: ABNORMAL /HPF
S PNEUM AG UR QL: POSITIVE
SODIUM SERPL-SCNC: 136 MMOL/L (ref 135–147)
SP GR UR STRIP.AUTO: 1.03 (ref 1–1.03)
UROBILINOGEN UR STRIP-ACNC: 3 MG/DL
WBC # BLD AUTO: 9.51 THOUSAND/UL (ref 4.31–10.16)
WBC #/AREA URNS AUTO: ABNORMAL /HPF

## 2025-04-02 PROCEDURE — 80076 HEPATIC FUNCTION PANEL: CPT | Performed by: INTERNAL MEDICINE

## 2025-04-02 PROCEDURE — 85025 COMPLETE CBC W/AUTO DIFF WBC: CPT | Performed by: INTERNAL MEDICINE

## 2025-04-02 PROCEDURE — 84145 PROCALCITONIN (PCT): CPT | Performed by: INTERNAL MEDICINE

## 2025-04-02 PROCEDURE — 94664 DEMO&/EVAL PT USE INHALER: CPT

## 2025-04-02 PROCEDURE — 87077 CULTURE AEROBIC IDENTIFY: CPT | Performed by: INTERNAL MEDICINE

## 2025-04-02 PROCEDURE — 81001 URINALYSIS AUTO W/SCOPE: CPT | Performed by: INTERNAL MEDICINE

## 2025-04-02 PROCEDURE — 94760 N-INVAS EAR/PLS OXIMETRY 1: CPT

## 2025-04-02 PROCEDURE — 87205 SMEAR GRAM STAIN: CPT | Performed by: INTERNAL MEDICINE

## 2025-04-02 PROCEDURE — 80048 BASIC METABOLIC PNL TOTAL CA: CPT | Performed by: INTERNAL MEDICINE

## 2025-04-02 PROCEDURE — 99232 SBSQ HOSP IP/OBS MODERATE 35: CPT | Performed by: INTERNAL MEDICINE

## 2025-04-02 PROCEDURE — 87070 CULTURE OTHR SPECIMN AEROBIC: CPT | Performed by: INTERNAL MEDICINE

## 2025-04-02 PROCEDURE — 87449 NOS EACH ORGANISM AG IA: CPT | Performed by: INTERNAL MEDICINE

## 2025-04-02 PROCEDURE — 87181 SC STD AGAR DILUTION PER AGT: CPT | Performed by: INTERNAL MEDICINE

## 2025-04-02 RX ORDER — GUAIFENESIN 600 MG/1
600 TABLET, EXTENDED RELEASE ORAL EVERY 12 HOURS SCHEDULED
Status: DISCONTINUED | OUTPATIENT
Start: 2025-04-02 | End: 2025-04-04 | Stop reason: HOSPADM

## 2025-04-02 RX ORDER — METHYLPREDNISOLONE SODIUM SUCCINATE 40 MG/ML
40 INJECTION, POWDER, LYOPHILIZED, FOR SOLUTION INTRAMUSCULAR; INTRAVENOUS EVERY 12 HOURS SCHEDULED
Status: DISCONTINUED | OUTPATIENT
Start: 2025-04-02 | End: 2025-04-04

## 2025-04-02 RX ORDER — BENZONATATE 100 MG/1
200 CAPSULE ORAL 3 TIMES DAILY PRN
Status: DISCONTINUED | OUTPATIENT
Start: 2025-04-02 | End: 2025-04-04 | Stop reason: HOSPADM

## 2025-04-02 RX ADMIN — CEFTRIAXONE SODIUM 2000 MG: 10 INJECTION, POWDER, FOR SOLUTION INTRAVENOUS at 17:35

## 2025-04-02 RX ADMIN — ALBUTEROL SULFATE 2 PUFF: 90 AEROSOL, METERED RESPIRATORY (INHALATION) at 09:10

## 2025-04-02 RX ADMIN — CARVEDILOL 12.5 MG: 12.5 TABLET, FILM COATED ORAL at 09:15

## 2025-04-02 RX ADMIN — GUAIFENESIN 600 MG: 600 TABLET ORAL at 10:26

## 2025-04-02 RX ADMIN — AMLODIPINE BESYLATE 5 MG: 5 TABLET ORAL at 09:15

## 2025-04-02 RX ADMIN — ATORVASTATIN CALCIUM 40 MG: 40 TABLET, FILM COATED ORAL at 09:14

## 2025-04-02 RX ADMIN — METHYLPREDNISOLONE SODIUM SUCCINATE 40 MG: 40 INJECTION, POWDER, FOR SOLUTION INTRAMUSCULAR; INTRAVENOUS at 21:50

## 2025-04-02 RX ADMIN — ACETAMINOPHEN 650 MG: 325 TABLET, FILM COATED ORAL at 10:26

## 2025-04-02 RX ADMIN — HEPARIN SODIUM 5000 UNITS: 5000 INJECTION INTRAVENOUS; SUBCUTANEOUS at 14:20

## 2025-04-02 RX ADMIN — HEPARIN SODIUM 5000 UNITS: 5000 INJECTION INTRAVENOUS; SUBCUTANEOUS at 21:50

## 2025-04-02 RX ADMIN — CARVEDILOL 12.5 MG: 12.5 TABLET, FILM COATED ORAL at 17:37

## 2025-04-02 RX ADMIN — BENZONATATE 100 MG: 100 CAPSULE ORAL at 09:14

## 2025-04-02 RX ADMIN — AZITHROMYCIN MONOHYDRATE 500 MG: 500 INJECTION, POWDER, LYOPHILIZED, FOR SOLUTION INTRAVENOUS at 18:46

## 2025-04-02 RX ADMIN — HEPARIN SODIUM 5000 UNITS: 5000 INJECTION INTRAVENOUS; SUBCUTANEOUS at 05:44

## 2025-04-02 RX ADMIN — BENZONATATE 200 MG: 100 CAPSULE ORAL at 22:02

## 2025-04-02 RX ADMIN — SODIUM CHLORIDE 125 ML/HR: 0.9 INJECTION, SOLUTION INTRAVENOUS at 09:20

## 2025-04-02 RX ADMIN — ASPIRIN 81 MG: 81 TABLET, CHEWABLE ORAL at 09:15

## 2025-04-02 RX ADMIN — ACETAMINOPHEN 650 MG: 325 TABLET, FILM COATED ORAL at 22:02

## 2025-04-02 RX ADMIN — GUAIFENESIN 600 MG: 600 TABLET ORAL at 21:50

## 2025-04-02 RX ADMIN — SODIUM CHLORIDE 125 ML/HR: 0.9 INJECTION, SOLUTION INTRAVENOUS at 22:08

## 2025-04-02 RX ADMIN — METHYLPREDNISOLONE SODIUM SUCCINATE 40 MG: 40 INJECTION, POWDER, FOR SOLUTION INTRAMUSCULAR; INTRAVENOUS at 10:26

## 2025-04-02 RX ADMIN — MONTELUKAST 10 MG: 10 TABLET, FILM COATED ORAL at 18:45

## 2025-04-02 RX ADMIN — ACETAMINOPHEN 650 MG: 325 TABLET, FILM COATED ORAL at 01:42

## 2025-04-02 NOTE — ASSESSMENT & PLAN NOTE
Presented with sob, acute hypoxic respiratory failure currently requiring 4 l nc  Likely 2/2 to PNA as seen on cxr

## 2025-04-02 NOTE — PROGRESS NOTES
Progress Note - Hospitalist   Name: Markel Alves 74 y.o. male I MRN: [id number]  Unit/Bed#: -01 I Date of Admission: 4/1/2025   Date of Service: 4/2/2025 I Hospital Day: 1    Assessment & Plan  Pneumonia    presented with shortness of breath, cough and fever of 102 degrees  Secondary to lower right lobe pneumonia  Will place on ceftriaxone, azithromycin  Flu/covid/rsv negative  WBC count currently within normal  Check RP2, strep pneumo and legionella Ag's  Wean o2 as able  Acute respiratory failure with hypoxia (HCC)   Presented with sob, acute hypoxic respiratory failure currently requiring 4 l nc  Likely 2/2 to PNA as seen on cxr      Hyperlipidemia  Continue statin    MODE (acute kidney injury) (HCC)  Notes of a creatinine of 1.6  Baseline approximately 1 suspect likely from volume depletion   Improving w/ IV hydration  Follow-up BMP  HTN (hypertension)  Blood pressure controlled  Continue amlodipine, beta-blocker  Hold lisinopril in the setting of acute kidney injury  Sepsis with acute renal failure (HCC)  POA as evidenced by fever, tachycardia heart rate 92, tachypnea respiratory rate 26 with source being right lower lobe pneumonia.  Procalcitonin rising, will trend.  Lactic acid normal.  Continue hydration and other management as above.    VTE Pharmacologic Prophylaxis: VTE Score: 5 on heparin    Mobility:   Basic Mobility Inpatient Raw Score: 18  JH-HLM Goal: 6: Walk 10 steps or more  JH-HLM Achieved: 6: Walk 10 steps or more  JH-HLM Goal achieved. Continue to encourage appropriate mobility.    Patient Centered Rounds: I performed bedside rounds with nursing staff today.   Discussions with Specialists or Other Care Team Provider: N/A    Education and Discussions with Family / Patient: Updated  (wife) at bedside.    Current Length of Stay: 1 day(s)  Current Patient Status: Inpatient   Certification Statement: The patient will continue to require additional inpatient hospital stay due to  treatment plan as outlined above  Discharge Plan: Anticipate discharge in 24-48 hrs to home.    Code Status: Level 1 - Full Code    Subjective   Complains of cough, malaise, intermittent fevers and anorexia.  No nausea, vomiting.  There is been intermittent diarrhea.  No dysuria.    Objective :  Temp:  [99 °F (37.2 °C)-102.2 °F (39 °C)] 99 °F (37.2 °C)  HR:  [63-92] 68  BP: (111-167)/(71-87) 128/74  Resp:  [20-26] 20  SpO2:  [91 %-95 %] 93 %  O2 Device: Nasal cannula  Nasal Cannula O2 Flow Rate (L/min):  [2 L/min] 2 L/min    Body mass index is 33.2 kg/m².     Input and Output Summary (last 24 hours):     Intake/Output Summary (Last 24 hours) at 4/2/2025 0907  Last data filed at 4/2/2025 0847  Gross per 24 hour   Intake 120 ml   Output 500 ml   Net -380 ml       Physical Exam  Constitutional:       Appearance: He is well-developed.   HENT:      Head: Normocephalic and atraumatic.      Nose: Nose normal.   Eyes:      General: No scleral icterus.     Conjunctiva/sclera: Conjunctivae normal.      Pupils: Pupils are equal, round, and reactive to light.   Neck:      Thyroid: No thyromegaly.      Vascular: No JVD.      Trachea: No tracheal deviation.   Cardiovascular:      Rate and Rhythm: Normal rate and regular rhythm.      Heart sounds: No murmur heard.     No friction rub. No gallop.   Pulmonary:      Effort: Pulmonary effort is normal. No respiratory distress.      Breath sounds: Wheezing and rales present.      Comments: Scattered wheezes, right lower lobe crackles, prolonged expiratory phase  Abdominal:      General: Bowel sounds are normal. There is no distension.      Palpations: Abdomen is soft. There is no mass.      Tenderness: There is no abdominal tenderness. There is no guarding or rebound.   Musculoskeletal:         General: No tenderness.      Cervical back: Normal range of motion and neck supple.   Lymphadenopathy:      Cervical: No cervical adenopathy.   Skin:     General: Skin is warm and dry.       Findings: No erythema or rash.   Neurological:      Mental Status: He is alert and oriented to person, place, and time.      Cranial Nerves: No cranial nerve deficit.   Psychiatric:         Behavior: Behavior normal.         Thought Content: Thought content normal.         Judgment: Judgment normal.           Lines/Drains:              Lab Results: I have reviewed the following results:   Results from last 7 days   Lab Units 04/02/25  0454 04/01/25  1839   WBC Thousand/uL 9.51 8.31   HEMOGLOBIN g/dL 11.6* 14.1   HEMATOCRIT % 34.2* 41.4   PLATELETS Thousands/uL 165 198   BANDS PCT %  --  3   SEGS PCT % 85*  --    LYMPHO PCT % 7* 6*   MONO PCT % 7 4   EOS PCT % 0 0     Results from last 7 days   Lab Units 04/02/25  0454   SODIUM mmol/L 136   POTASSIUM mmol/L 3.5   CHLORIDE mmol/L 103   CO2 mmol/L 27   BUN mg/dL 36*   CREATININE mg/dL 1.37*   ANION GAP mmol/L 6   CALCIUM mg/dL 8.6   ALBUMIN g/dL 3.1*   TOTAL BILIRUBIN mg/dL 0.88   ALK PHOS U/L 48   ALT U/L 12   AST U/L 17   GLUCOSE RANDOM mg/dL 121     Results from last 7 days   Lab Units 04/01/25  1839   INR  1.06             Results from last 7 days   Lab Units 04/02/25  0454 04/01/25  1839   LACTIC ACID mmol/L  --  1.4   PROCALCITONIN ng/ml 2.40* 2.18*       Recent Cultures (last 7 days):   Results from last 7 days   Lab Units 04/01/25  1839 04/01/25  1830   BLOOD CULTURE  Received in Microbiology Lab. Culture in Progress. Received in Microbiology Lab. Culture in Progress.             Last 24 Hours Medication List:     Current Facility-Administered Medications:     acetaminophen (TYLENOL) tablet 650 mg, Q6H PRN    albuterol (PROVENTIL HFA,VENTOLIN HFA) inhaler 2 puff, Q6H PRN    amLODIPine (NORVASC) tablet 5 mg, Daily    aspirin chewable tablet 81 mg, Daily    atorvastatin (LIPITOR) tablet 40 mg, Daily    azithromycin (ZITHROMAX) 500 mg in sodium chloride 0.9% 250mL IVPB 500 mg, Q24H    benzonatate (TESSALON PERLES) capsule 100 mg, TID PRN    carvedilol (COREG)  tablet 12.5 mg, BID With Meals    cefTRIAXone (ROCEPHIN) 2,000 mg in dextrose 5 % 50 mL IVPB, Q24H    fluticasone (FLONASE) 50 mcg/act nasal spray 1 spray, Daily    heparin (porcine) subcutaneous injection 5,000 Units, Q8H CATHY    LORazepam (ATIVAN) tablet 1 mg, HS PRN    montelukast (SINGULAIR) tablet 10 mg, QPM    ondansetron (ZOFRAN) injection 4 mg, Q6H PRN    sodium chloride 0.9 % infusion, Continuous, Last Rate: 125 mL/hr (04/01/25 2006)    Administrative Statements   Today, Patient Was Seen By: Manjit Allison MD      **Please Note: This note may have been constructed using a voice recognition system.**

## 2025-04-02 NOTE — UTILIZATION REVIEW
Initial Clinical Review    Admission: Date/Time/Statement:   Admission Orders (From admission, onward)       Ordered        04/01/25 1908  INPATIENT ADMISSION  Once                          Orders Placed This Encounter   Procedures    INPATIENT ADMISSION     Standing Status:   Standing     Number of Occurrences:   1     Level of Care:   Med Surg [16]     Estimated length of stay:   More than 2 Midnights     Certification:   I certify that inpatient services are medically necessary for this patient for a duration of greater than two midnights. See H&P and MD Progress Notes for additional information about the patient's course of treatment.     ED Arrival Information       Expected   -    Arrival   4/1/2025 18:25    Acuity   Emergent              Means of arrival   Ambulance    Escorted by   Emergency Med Services - Other    Service   Hospitalist    Admission type   Emergency              Arrival complaint   -             Chief Complaint   Patient presents with    Shortness of Breath     Pt presents to ER from home by Saeid BROWN for reports of URI x7-10 days. He was improving but then reports getting acutely worse over the last 4 days. T max 104 at home, last dose of fever medications was about 6 hours ago but pt unsure of medication. EMS treats with 18g L wrist, reports RA saturations of 85%, treats with 1 duoneb and then reports follow up sp02 of 92% on 4 L NC  - no history of oxygen dependence. EMS reports stable vitals but a tympanic temp of 102.4.        Initial Presentation: 74 y.o. male to the ED from home via EMS with complaints of shortness of breath. Admitted under to inpatient for pneumonia.  H/O hypertension, CKD, hyperlipidemia.  Has had shortness of breath for a couple days.  Arrives with tachypnea, febrile with temp 102.2. Diminished breath sounds. WBCs WNL.  Placed on 4 LNC.  CXR shows: right sided pneumonia, possible aspiration. In the ED, given 1 liter iv fluids, started on IV abx.   Anticipated  Length of Stay/Certification Statement: Anticipated Length of Stay: Patient will be admitted on an inpatient basis with an anticipated length of stay of greater than 2 midnights secondary to pneumonia..     Date: 4/2   Day 2:   Continue with IV abx.  Wean oxygen as able.  Creat improving with IV fluids.  REcheck BMP.  Having intermittent diarrhea. Wheezing and rales heard in lungs.  Prolonged expiratory phase. Continue IV steroids, nebulizers. Remains on 2LNC. PRcal rising, continue to trend.  Lactic acid WNL.    ED Treatment-Medication Administration from 04/01/2025 1825 to 04/01/2025 1954         Date/Time Order Dose Route Action     04/01/2025 1857 acetaminophen (TYLENOL) tablet 650 mg 650 mg Oral Given     04/01/2025 1919 ceftriaxone (ROCEPHIN) 1 g/50 mL in dextrose IVPB 1,000 mg Intravenous New Bag     04/01/2025 1919 sodium chloride 0.9 % bolus 1,000 mL 1,000 mL Intravenous New Bag            Scheduled Medications:  amLODIPine, 5 mg, Oral, Daily  aspirin, 81 mg, Oral, Daily  atorvastatin, 40 mg, Oral, Daily  azithromycin, 500 mg, Intravenous, Q24H  carvedilol, 12.5 mg, Oral, BID With Meals  cefTRIAXone, 2,000 mg, Intravenous, Q24H  fluticasone, 1 spray, Nasal, Daily  guaiFENesin, 600 mg, Oral, Q12H CATHY  heparin (porcine), 5,000 Units, Subcutaneous, Q8H CATHY  methylPREDNISolone sodium succinate, 40 mg, Intravenous, Q12H CATHY  montelukast, 10 mg, Oral, QPM      Continuous IV Infusions:  sodium chloride, 125 mL/hr, Intravenous, Continuous      PRN Meds:  acetaminophen, 650 mg, Oral, Q6H PRN  albuterol, 2 puff, Inhalation, Q6H PRN  benzonatate, 200 mg, Oral, TID PRN  LORazepam, 1 mg, Oral, HS PRN  ondansetron, 4 mg, Intravenous, Q6H PRN      ED Triage Vitals [04/01/25 1831]   Temperature Pulse Respirations Blood Pressure SpO2 Pain Score   (!) 102.2 °F (39 °C) 92 (!) 26 167/87 92 % No Pain     Weight (last 2 days)       None            Vital Signs (last 3 days)       Date/Time Temp Pulse Resp BP MAP (mmHg) SpO2  Calculated FIO2 (%) - Nasal Cannula Nasal Cannula O2 Flow Rate (L/min) O2 Device Patient Position - Orthostatic VS Pain    04/02/25 1026 -- -- -- -- -- -- -- -- -- -- Med Not Given for Pain - for MAR use only    04/02/25 09:22:24 100 °F (37.8 °C) 72 -- -- -- 90 % -- -- -- -- --    04/02/25 0753 -- -- -- -- -- -- 28 2 L/min Nasal cannula -- --    04/02/25 07:44:36 99 °F (37.2 °C) 68 -- 128/74 92 93 % -- -- -- -- --    04/02/25 05:44:37 100 °F (37.8 °C) 63 -- -- -- 95 % -- -- -- -- --    04/02/25 0142 -- -- -- -- -- -- -- -- -- -- Med Not Given for Pain - for MAR use only    04/02/25 01:37:59 101.9 °F (38.8 °C) 76 -- -- -- 91 % -- -- -- -- --    04/01/25 2228 99 °F (37.2 °C) 68 20 118/76 99 -- -- -- -- -- --    04/01/25 2149 -- -- -- -- -- -- 28 2 L/min Nasal cannula -- --    04/01/25 20:00:55 100.5 °F (38.1 °C) 81 20 111/71 84 92 % -- -- -- -- --    04/01/25 2000 -- -- -- -- -- -- -- -- -- -- No Pain    04/01/25 1930 -- 82 21 -- -- 94 % 28 2 L/min Nasal cannula -- --    04/01/25 1921 -- -- -- -- -- -- -- -- -- -- No Pain    04/01/25 1915 -- 85 25 -- -- 94 % 28 2 L/min Nasal cannula -- --    04/01/25 1831 102.2 °F (39 °C) 92 26 167/87 -- 92 % -- -- Nasal cannula Sitting No Pain              Pertinent Labs/Diagnostic Test Results:   Radiology:  XR chest 1 view portable   Final Interpretation by Mc Chinchilla MD (04/02 0528)      Right basilar airspace consolidation may represent pneumonia, aspiration and/or atelectasis. Patient being treated for pneumonia per clinical provider note.      Small right pleural effusion.      Workstation performed: [id number]           Cardiology:  ECG 12 lead   Final Result by Leelee Newberry MD (04/01 1926)   Normal sinus rhythm   Normal ECG   No previous ECGs available   Confirmed by Leelee Newberry ([id number]) on 4/1/2025 7:26:22 PM              Results from last 7 days   Lab Units 04/01/25  1839   SARS-COV-2  Negative     Results from last 7 days   Lab Units  04/02/25  0454 04/01/25  1839   WBC Thousand/uL 9.51 8.31   HEMOGLOBIN g/dL 11.6* 14.1   HEMATOCRIT % 34.2* 41.4   PLATELETS Thousands/uL 165 198   TOTAL NEUT ABS Thousands/µL 8.07*  --    BANDS PCT %  --  3         Results from last 7 days   Lab Units 04/02/25  0454 04/01/25  1839   SODIUM mmol/L 136 135   POTASSIUM mmol/L 3.5 4.4   CHLORIDE mmol/L 103 97   CO2 mmol/L 27 28   ANION GAP mmol/L 6 10   BUN mg/dL 36* 40*   CREATININE mg/dL 1.37* 1.64*   EGFR ml/min/1.73sq m 51 41   CALCIUM mg/dL 8.6 9.7     Results from last 7 days   Lab Units 04/02/25  0454 04/01/25  1839   AST U/L 17 22   ALT U/L 12 15   ALK PHOS U/L 48 61   TOTAL PROTEIN g/dL 6.1* 8.0   ALBUMIN g/dL 3.1* 3.9   TOTAL BILIRUBIN mg/dL 0.88 1.57*   BILIRUBIN DIRECT mg/dL 0.28*  --          Results from last 7 days   Lab Units 04/02/25  0454 04/01/25  1839   GLUCOSE RANDOM mg/dL 121 136           Results from last 7 days   Lab Units 04/01/25  1839   PROTIME seconds 14.5   INR  1.06   PTT seconds 28         Results from last 7 days   Lab Units 04/02/25  0454 04/01/25  1839   PROCALCITONIN ng/ml 2.40* 2.18*     Results from last 7 days   Lab Units 04/01/25  1839   LACTIC ACID mmol/L 1.4         Results from last 7 days   Lab Units 04/02/25  0137   CLARITY UA  Clear   COLOR UA  Yellow   SPEC GRAV UA  1.028   PH UA  6.0   GLUCOSE UA mg/dl Negative   KETONES UA mg/dl Negative   BLOOD UA  Small*   PROTEIN UA mg/dl 100 (2+)*   NITRITE UA  Negative   BILIRUBIN UA  Negative   UROBILINOGEN UA (BE) mg/dl 3.0*   LEUKOCYTES UA  Negative   WBC UA /hpf 2-4*   RBC UA /hpf 2-4*   BACTERIA UA /hpf None Seen   EPITHELIAL CELLS WET PREP /hpf None Seen     Results from last 7 days   Lab Units 04/01/25  1839   INFLUENZA A PCR  Negative   INFLUENZA B PCR  Negative   RSV PCR  Negative     Results from last 7 days   Lab Units 04/01/25  1839 04/01/25  1830   BLOOD CULTURE  Received in Microbiology Lab. Culture in Progress. Received in Microbiology Lab. Culture in Progress.        Past Medical History:   Diagnosis Date    Arthritis     Asthma     Chronic kidney disease     CKD (chronic kidney disease) stage 3, GFR 30-59 ml/min ([facility]) 09/24/2019    Depression     Dyshidrosis     Hypertension     Osteoarthritis     Trigger finger      Present on Admission:   MODE (acute kidney injury) (HCC)   Hyperlipidemia   HTN (hypertension)   Acute respiratory failure with hypoxia (HCC)   Pneumonia   Sepsis with acute renal failure ([facility])      Admitting Diagnosis: Shortness of breath [R06.02]  Pneumonia [J18.9]  Hypoxia [R09.02]  Age/Sex: 74 y.o. male    Network Utilization Review Department  ATTENTION: Please call with any questions or concerns to [phone] and carefully listen to the prompts so that you are directed to the right person. All voicemails are confidential.   For Discharge needs, contact Care Management DC Support Team at [phone] opt. [phone]  Send all requests for admission clinical reviews, approved or denied determinations and any other requests to dedicated fax number below belonging to the campus where the patient is receiving treatment. List of dedicated fax numbers for the Facilities:  FACILITY NAME UR FAX NUMBER   ADMISSION DENIALS (Administrative/Medical Necessity) [phone]   DISCHARGE SUPPORT TEAM (NETWORK) [phone]   PARENT CHILD HEALTH (Maternity/NICU/Pediatrics) [phone]   [facility] [phone]   [facility] [phone]   [facility] [phone]   [facility] [phone]   Replaced by [facility] [phone]   [facility] [phone]   [facility] [phone]   [facility] [phone]   [facility] [phone]   [facility] [phone]   [facility] [phone]   [facility]  [facility] [phone]

## 2025-04-02 NOTE — ASSESSMENT & PLAN NOTE
Notes of a creatinine of 1.6  Baseline approximately 1 suspect likely from volume depletion   Improving w/ IV hydration  Follow-up BMP

## 2025-04-02 NOTE — ASSESSMENT & PLAN NOTE
POA as evidenced by fever, tachycardia heart rate 92, tachypnea respiratory rate 26 with source being right lower lobe pneumonia.  Procalcitonin rising, will trend.  Lactic acid normal.  Continue hydration and other management as above.

## 2025-04-02 NOTE — RESPIRATORY THERAPY NOTE
RT Protocol Note  Markel Alves 74 y.o. male MRN: [id number]  Unit/Bed#: -01 Encounter: [id number]    Assessment    Principal Problem:    Pneumonia  Active Problems:    Hyperlipidemia    MODE (acute kidney injury) ([facility])    HTN (hypertension)    Acute respiratory failure with hypoxia ([facility])      Home Pulmonary Medications:  Albtuerol PRN       Past Medical History:   Diagnosis Date    Arthritis     Asthma     Chronic kidney disease     CKD (chronic kidney disease) stage 3, GFR 30-59 ml/min ([facility]) 2019    Depression     Dyshidrosis     Hypertension     Osteoarthritis     Trigger finger      Social History     Socioeconomic History    Marital status: /Civil Union     Spouse name: None    Number of children: None    Years of education: None    Highest education level: None   Occupational History    Occupation: IT   Tobacco Use    Smoking status: Former     Current packs/day: 0.00     Types: Cigarettes     Quit date: 1973     Years since quittin.7    Smokeless tobacco: Never    Tobacco comments:     Occasional cigar smoker   Vaping Use    Vaping status: Never Used   Substance and Sexual Activity    Alcohol use: Yes     Alcohol/week: 6.0 standard drinks of alcohol     Types: 3 Glasses of wine, 2 Cans of beer, 1 Shots of liquor per week     Comment: occasional    Drug use: No    Sexual activity: Not Currently     Partners: Female   Other Topics Concern    None   Social History Narrative    Living independently with spouse    No advance directives     Social Drivers of Health     Financial Resource Strain: Low Risk  (2023)    Overall Financial Resource Strain (CARDIA)     Difficulty of Paying Living Expenses: Not hard at all   Food Insecurity: No Food Insecurity (2025)    Nursing - Inadequate Food Risk Classification     Worried About Running Out of Food in the Last Year: Never true     Ran Out of Food in the Last Year: Never true     Ran Out of Food in the Last Year: Never true  "  Transportation Needs: No Transportation Needs (2025)    Nursing - Transportation Risk Classification     Lack of Transportation: Not on file     Lack of Transportation: No   Physical Activity: Insufficiently Active (3/15/2021)    Exercise Vital Sign     Days of Exercise per Week: 3 days     Minutes of Exercise per Session: 30 min   Stress: No Stress Concern Present (3/15/2021)    [employer] [facility] [employer] [facility] - Occupational Stress Questionnaire     Feeling of Stress : Not at all   Social Connections: Not on file   Intimate Partner Violence: Unknown (2025)    Nursing IPS     Feels Physically and Emotionally Safe: Not on file     Physically Hurt by Someone: Not on file     Humiliated or Emotionally Abused by Someone: Not on file     Physically Hurt by Someone: No     Hurt or Threatened by Someone: No   Housing Stability: Unknown (2025)    Nursing: Inadequate Housing Risk Classification     Has Housing: Not on file     Worried About Losing Housing: Not on file     Unable to Get Utilities: Not on file     Unable to Pay for Housing in the Last Year: No     Has Housin       Subjective         Objective    Physical Exam:   Assessment Type: Assess only  General Appearance: Awake, Alert  Respiratory Pattern: Normal  Chest Assessment: Chest expansion symmetrical  Bilateral Breath Sounds: Diminished    Vitals:  Blood pressure 128/74, pulse 68, temperature 99 °F (37.2 °C), resp. rate 20, height 5' 7\" (1.702 m), SpO2 93%.          Imaging and other studies:           Plan    Respiratory Plan: Home Bronchodilator Patient pathway        Resp Comments: patient with history of asthma admitted with PNA no idciation for scheduled txs continue with PRN MDI   "

## 2025-04-02 NOTE — ASSESSMENT & PLAN NOTE
presented with shortness of breath, cough and fever of 102 degrees  Secondary to lower right lobe pneumonia  Will place on ceftriaxone, azithromycin  Flu/covid/rsv negative  WBC count currently within normal  Check RP2, strep pneumo and legionella Ag's  Wean o2 as able

## 2025-04-02 NOTE — CASE MANAGEMENT
Case Management Assessment & Discharge Planning Note    Patient name Markel Alves  Location /-01 MRN [id number]  : 1950 Date 2025       Current Admission Date: 2025  Current Admission Diagnosis:Pneumonia   Patient Active Problem List    Diagnosis Date Noted Date Diagnosed    Sepsis with acute renal failure (HCC) 2025     Acute respiratory failure with hypoxia (HCC) 2025     Pneumonia 2025     Dyspnea on exertion 2024     Palpitations 2024     Mild intermittent asthma without complication 2024     HTN (hypertension) 2024     Ascending aortic aneurysm (HCC) 2023     Smoking 2020     MODE (acute kidney injury) (HCC) 2019     PVC's (premature ventricular contractions) 2019     History of bilateral knee arthroplasty 2019     Elevated liver enzymes 2017     BPH without urinary obstruction 2016     Tubular adenoma of colon 2015     Asthma 10/09/2014     Hyperlipidemia 2014     Gout 2014     Impaired fasting glucose 2014     Hypertensive CKD (chronic kidney disease) 2014       LOS (days): 1  Geometric Mean LOS (GMLOS) (days): 4  Days to GMLOS:3.2     OBJECTIVE:    Risk of Unplanned Readmission Score: 14.04         Current admission status: Inpatient       Preferred Pharmacy:   [id number] [place], [place] - [address]  [address] [address]  Phone: [phone] Fax: [phone]    [facility] [place], [place] - [address]  [address] [address]  Phone: [phone] Fax: [phone]    Primary Care Provider: Manjit Allison MD    Primary Insurance: [employer]  Secondary Insurance:     ASSESSMENT:  Active Health Care Proxies       Niecy Tubbs Health Care Agent - Spouse   Primary Phone: [phone] (Home)                 Advance Directives  Does patient have a Health Care POA?: Yes  Does patient have  Advance Directives?: Yes  Advance Directives: Living will, Power of  for finance, Power of  for health care         Readmission Root Cause  30 Day Readmission: No    Patient Information  Admitted from:: Home  Mental Status: Alert  During Assessment patient was accompanied by: Spouse  Assessment information provided by:: Patient  Primary Caregiver: Self  Support Systems: Spouse/significant other  County of Residence:  [place]  What city do you live in?: [place]  Home entry access options. Select all that apply.: Stairs  Number of steps to enter home.: One Flight  Do the steps have railings?: Yes  Type of Current Residence: 2 story home  Upon entering residence, is there a bedroom on the main floor (no further steps)?: No  A bedroom is located on the following floor levels of residence (select all that apply):: 2nd Floor  Upon entering residence, is there a bathroom on the main floor (no further steps)?: Yes  Number of steps to 2nd floor from main floor: One Flight  Living Arrangements: Lives w/ Spouse/significant other  Is patient a ?: Yes ([employer])  Is patient active with [facility] ( Affairs)?: No    Activities of Daily Living Prior to Admission  Functional Status: Independent  Completes ADLs independently?: Yes  Ambulates independently?: Yes  Does patient use assisted devices?: No  Does patient currently own DME?: Yes  What DME does the patient currently own?: Walker  Does patient have a history of Outpatient Therapy (PT/OT)?: Yes  Does the patient have a history of Short-Term Rehab?: No  Does patient have a history of HHC?: Yes  Does patient currently have HHC?: No         Patient Information Continued  Income Source: Employed  Does patient have prescription coverage?: Yes  Can the patient afford their medications and any related supplies (such as glucometers or test strips)?: Yes  Does patient receive dialysis treatments?: No  Does patient have a history of substance abuse?:  No  Does patient have a history of Mental Health Diagnosis?: No         Means of Transportation  Means of Transport to Appts:: Drives Self          DISCHARGE DETAILS:    Discharge planning discussed with:: Patient and wife  Freedom of Choice: Yes  Comments - Freedom of Choice: No anticipated DC needs  CM contacted family/caregiver?: Yes  Were Treatment Team discharge recommendations reviewed with patient/caregiver?: Yes  Did patient/caregiver verbalize understanding of patient care needs?: Yes  Were patient/caregiver advised of the risks associated with not following Treatment Team discharge recommendations?: Yes    Contacts  Patient Contacts: wife  Relationship to Patient:: Family  Contact Method: In Person  Reason/Outcome: Continuity of Care    Requested Home Health Care         Is the patient interested in HHC at discharge?: No    DME Referral Provided  Referral made for DME?: No    Other Referral/Resources/Interventions Provided:  Referral Comments: No anticipated DC needs         Treatment Team Recommendation: Home  Discharge Destination Plan:: Home  Transport at Discharge : Family

## 2025-04-02 NOTE — RESPIRATORY THERAPY NOTE
RT Protocol Note  Markel Alves 74 y.o. male MRN: [id number]  Unit/Bed#: -01 Encounter: [id number]    Assessment    Principal Problem:    Pneumonia  Active Problems:    Hyperlipidemia    MODE (acute kidney injury) ([facility])    HTN (hypertension)    Acute respiratory failure with hypoxia ([facility])      Home Pulmonary Medications:  Albuterol MDI PRN       Past Medical History:   Diagnosis Date    Arthritis     Asthma     Chronic kidney disease     CKD (chronic kidney disease) stage 3, GFR 30-59 ml/min ([facility]) 2019    Depression     Dyshidrosis     Hypertension     Osteoarthritis     Trigger finger      Social History     Socioeconomic History    Marital status: /Civil Union     Spouse name: None    Number of children: None    Years of education: None    Highest education level: None   Occupational History    Occupation: IT   Tobacco Use    Smoking status: Former     Current packs/day: 0.00     Types: Cigarettes     Quit date: 1973     Years since quittin.7    Smokeless tobacco: Never    Tobacco comments:     Occasional cigar smoker   Vaping Use    Vaping status: Never Used   Substance and Sexual Activity    Alcohol use: Yes     Alcohol/week: 6.0 standard drinks of alcohol     Types: 3 Glasses of wine, 2 Cans of beer, 1 Shots of liquor per week     Comment: occasional    Drug use: No    Sexual activity: Not Currently     Partners: Female   Other Topics Concern    None   Social History Narrative    Living independently with spouse    No advance directives     Social Drivers of Health     Financial Resource Strain: Low Risk  (2023)    Overall Financial Resource Strain (CARDIA)     Difficulty of Paying Living Expenses: Not hard at all   Food Insecurity: No Food Insecurity (2025)    Nursing - Inadequate Food Risk Classification     Worried About Running Out of Food in the Last Year: Never true     Ran Out of Food in the Last Year: Never true     Ran Out of Food in the Last Year: Never true  "  Transportation Needs: No Transportation Needs (2025)    Nursing - Transportation Risk Classification     Lack of Transportation: Not on file     Lack of Transportation: No   Physical Activity: Insufficiently Active (3/15/2021)    Exercise Vital Sign     Days of Exercise per Week: 3 days     Minutes of Exercise per Session: 30 min   Stress: No Stress Concern Present (3/15/2021)    [employer] [facility] [employer] [facility] - Occupational Stress Questionnaire     Feeling of Stress : Not at all   Social Connections: Not on file   Intimate Partner Violence: Unknown (2025)    Nursing IPS     Feels Physically and Emotionally Safe: Not on file     Physically Hurt by Someone: Not on file     Humiliated or Emotionally Abused by Someone: Not on file     Physically Hurt by Someone: No     Hurt or Threatened by Someone: No   Housing Stability: Unknown (2025)    Nursing: Inadequate Housing Risk Classification     Has Housing: Not on file     Worried About Losing Housing: Not on file     Unable to Get Utilities: Not on file     Unable to Pay for Housing in the Last Year: No     Has Housin       Subjective         Objective    Physical Exam:   Assessment Type: Assess only  General Appearance: Awake  Respiratory Pattern: Normal  Chest Assessment: Chest expansion symmetrical  Bilateral Breath Sounds: Diminished    Vitals:  Blood pressure 111/71, pulse 81, temperature 100.5 °F (38.1 °C), resp. rate 20, height 5' 7\" (1.702 m), SpO2 92%.          Imaging and other studies:           Plan    Respiratory Plan: Home Bronchodilator Patient pathway        Resp Comments: patient with history of asthma admitted for PNA, no wheezing present at this time, will continue with pilar's home PRN MDI   "

## 2025-04-03 PROBLEM — R78.81 BACTEREMIA DUE TO STREPTOCOCCUS PNEUMONIAE: Status: ACTIVE | Noted: 2025-04-03

## 2025-04-03 PROBLEM — B95.3 BACTEREMIA DUE TO STREPTOCOCCUS PNEUMONIAE: Status: ACTIVE | Noted: 2025-04-03

## 2025-04-03 LAB
ANION GAP SERPL CALCULATED.3IONS-SCNC: 8 MMOL/L (ref 4–13)
BASOPHILS # BLD AUTO: 0.01 THOUSANDS/ÂΜL (ref 0–0.1)
BASOPHILS NFR BLD AUTO: 0 % (ref 0–1)
BUN SERPL-MCNC: 29 MG/DL (ref 5–25)
CALCIUM SERPL-MCNC: 9 MG/DL (ref 8.4–10.2)
CHLORIDE SERPL-SCNC: 106 MMOL/L (ref 96–108)
CO2 SERPL-SCNC: 25 MMOL/L (ref 21–32)
CREAT SERPL-MCNC: 1.03 MG/DL (ref 0.6–1.3)
EOSINOPHIL # BLD AUTO: 0 THOUSAND/ÂΜL (ref 0–0.61)
EOSINOPHIL NFR BLD AUTO: 0 % (ref 0–6)
ERYTHROCYTE [DISTWIDTH] IN BLOOD BY AUTOMATED COUNT: 13.2 % (ref 11.6–15.1)
GFR SERPL CREATININE-BSD FRML MDRD: 72 ML/MIN/1.73SQ M
GLUCOSE SERPL-MCNC: 171 MG/DL (ref 65–140)
HCT VFR BLD AUTO: 36.8 % (ref 36.5–49.3)
HGB BLD-MCNC: 12.4 G/DL (ref 12–17)
IMM GRANULOCYTES # BLD AUTO: 0.22 THOUSAND/UL (ref 0–0.2)
IMM GRANULOCYTES NFR BLD AUTO: 2 % (ref 0–2)
LYMPHOCYTES # BLD AUTO: 0.62 THOUSANDS/ÂΜL (ref 0.6–4.47)
LYMPHOCYTES NFR BLD AUTO: 5 % (ref 14–44)
MCH RBC QN AUTO: 32.5 PG (ref 26.8–34.3)
MCHC RBC AUTO-ENTMCNC: 33.7 G/DL (ref 31.4–37.4)
MCV RBC AUTO: 96 FL (ref 82–98)
MONOCYTES # BLD AUTO: 0.48 THOUSAND/ÂΜL (ref 0.17–1.22)
MONOCYTES NFR BLD AUTO: 4 % (ref 4–12)
NEUTROPHILS # BLD AUTO: 10.08 THOUSANDS/ÂΜL (ref 1.85–7.62)
NEUTS SEG NFR BLD AUTO: 89 % (ref 43–75)
NRBC BLD AUTO-RTO: 0 /100 WBCS
PLATELET # BLD AUTO: 189 THOUSANDS/UL (ref 149–390)
PMV BLD AUTO: 10 FL (ref 8.9–12.7)
POTASSIUM SERPL-SCNC: 3.9 MMOL/L (ref 3.5–5.3)
RBC # BLD AUTO: 3.82 MILLION/UL (ref 3.88–5.62)
SODIUM SERPL-SCNC: 139 MMOL/L (ref 135–147)
WBC # BLD AUTO: 11.41 THOUSAND/UL (ref 4.31–10.16)

## 2025-04-03 PROCEDURE — 99232 SBSQ HOSP IP/OBS MODERATE 35: CPT | Performed by: INTERNAL MEDICINE

## 2025-04-03 PROCEDURE — 87040 BLOOD CULTURE FOR BACTERIA: CPT | Performed by: INTERNAL MEDICINE

## 2025-04-03 PROCEDURE — 80048 BASIC METABOLIC PNL TOTAL CA: CPT | Performed by: INTERNAL MEDICINE

## 2025-04-03 PROCEDURE — 85025 COMPLETE CBC W/AUTO DIFF WBC: CPT | Performed by: INTERNAL MEDICINE

## 2025-04-03 PROCEDURE — 94664 DEMO&/EVAL PT USE INHALER: CPT

## 2025-04-03 RX ADMIN — MONTELUKAST 10 MG: 10 TABLET, FILM COATED ORAL at 17:19

## 2025-04-03 RX ADMIN — HEPARIN SODIUM 5000 UNITS: 5000 INJECTION INTRAVENOUS; SUBCUTANEOUS at 21:48

## 2025-04-03 RX ADMIN — GUAIFENESIN 600 MG: 600 TABLET ORAL at 09:30

## 2025-04-03 RX ADMIN — CARVEDILOL 12.5 MG: 12.5 TABLET, FILM COATED ORAL at 09:30

## 2025-04-03 RX ADMIN — METHYLPREDNISOLONE SODIUM SUCCINATE 40 MG: 40 INJECTION, POWDER, FOR SOLUTION INTRAMUSCULAR; INTRAVENOUS at 09:30

## 2025-04-03 RX ADMIN — HEPARIN SODIUM 5000 UNITS: 5000 INJECTION INTRAVENOUS; SUBCUTANEOUS at 06:15

## 2025-04-03 RX ADMIN — AMLODIPINE BESYLATE 5 MG: 5 TABLET ORAL at 09:30

## 2025-04-03 RX ADMIN — FLUTICASONE PROPIONATE 1 SPRAY: 50 SPRAY, METERED NASAL at 09:35

## 2025-04-03 RX ADMIN — ASPIRIN 81 MG: 81 TABLET, CHEWABLE ORAL at 09:30

## 2025-04-03 RX ADMIN — CARVEDILOL 12.5 MG: 12.5 TABLET, FILM COATED ORAL at 17:19

## 2025-04-03 RX ADMIN — ALBUTEROL SULFATE 2 PUFF: 90 AEROSOL, METERED RESPIRATORY (INHALATION) at 04:47

## 2025-04-03 RX ADMIN — GUAIFENESIN 600 MG: 600 TABLET ORAL at 21:48

## 2025-04-03 RX ADMIN — HEPARIN SODIUM 5000 UNITS: 5000 INJECTION INTRAVENOUS; SUBCUTANEOUS at 16:03

## 2025-04-03 RX ADMIN — AZITHROMYCIN MONOHYDRATE 500 MG: 500 INJECTION, POWDER, LYOPHILIZED, FOR SOLUTION INTRAVENOUS at 18:46

## 2025-04-03 RX ADMIN — CEFTRIAXONE SODIUM 2000 MG: 10 INJECTION, POWDER, FOR SOLUTION INTRAVENOUS at 17:45

## 2025-04-03 RX ADMIN — ATORVASTATIN CALCIUM 40 MG: 40 TABLET, FILM COATED ORAL at 09:30

## 2025-04-03 RX ADMIN — METHYLPREDNISOLONE SODIUM SUCCINATE 40 MG: 40 INJECTION, POWDER, FOR SOLUTION INTRAMUSCULAR; INTRAVENOUS at 21:48

## 2025-04-03 NOTE — ASSESSMENT & PLAN NOTE
Antibiotics as above  48 hours surveillance cultures to be drawn this evening  No new murmur or sequela of endocarditis, hold off on TTE for now

## 2025-04-03 NOTE — UTILIZATION REVIEW
NOTIFICATION OF INPATIENT ADMISSION   AUTHORIZATION REQUEST   SERVICING FACILITY:   [place] [address]  Tax ID: [id number]  NPI: [id number] ATTENDING PROVIDER:  Attending Name and NPI#: Manjit Allison Md [[id number]]  Address: [address] [place] [address]  Phone: [phone]     ADMISSION INFORMATION:  Place of Service: Inpatient [facility]  Place of Service Code: 21  Inpatient Admission Date/Time: 4/1/25  7:08 PM  Discharge Date/Time: No discharge date for patient encounter.  Admitting Diagnosis Code/Description:  Shortness of breath [R06.02]  Pneumonia [J18.9]  Hypoxia [R09.02]     UTILIZATION REVIEW CONTACT:  Yudy Williamson Utilization   Network Utilization Review Department  Phone: [phone]  Fax [phone]  Email: [email or web address]  Contact for approvals/pending authorizations, clinical reviews, and discharge.     PHYSICIAN ADVISORY SERVICES:  Medical Necessity Denial & [facility] Review  Phone: [phone]  Fax: [phone]  Email: [email or web address]     DISCHARGE SUPPORT TEAM:  For Patients Discharge Needs & Updates  Phone: [phone] opt. 2 Fax: [phone]  Email: [email or web address]

## 2025-04-03 NOTE — ASSESSMENT & PLAN NOTE
presented with shortness of breath, cough and fever of 102 degrees  Secondary to lower right lobe pneumonia  Strep pneumonia urinary antigen positive  1 of 2 admission blood cultures positive for strep pneumonia  Continue Rocephin, azithromycin and steroids, transition from Solu-Medrol to prednisone  Flu/covid/rsv negative  Wean o2 as able

## 2025-04-03 NOTE — ASSESSMENT & PLAN NOTE
Notes of a creatinine of 1.6  Baseline approximately 1 suspect likely from volume depletion   Back to baseline with hydration, DC IVF  Follow-up BMP

## 2025-04-03 NOTE — ASSESSMENT & PLAN NOTE
Blood pressure controlled  Continue amlodipine, beta-blocker  Hold lisinopril in the setting of acute kidney injury, resume if hypertensive

## 2025-04-03 NOTE — PLAN OF CARE
Problem: DISCHARGE PLANNING  Goal: Discharge to home or other facility with appropriate resources  Description: INTERVENTIONS:- Identify barriers to discharge w/patient and caregiver- Arrange for needed discharge resources and transportation as appropriate- Identify discharge learning needs (meds, wound care, etc.)- Arrange for interpretive services to assist at discharge as needed- Refer to Case Management Department for coordinating discharge planning if the patient needs post-hospital services based on physician/advanced practitioner order or complex needs related to functional status, cognitive ability, or social support system  INTERVENTIONS:- Identify barriers to discharge w/patient and caregiver- Arrange for needed discharge resources and transportation as appropriate- Identify discharge learning needs (meds, wound care, etc.)- Arrange for interpretive services to assist at discharge as needed- Refer to Case Management Department for coordinating discharge planning if the patient needs post-hospital services based on physician/advanced practitioner order or complex needs related to functional status, cognitive ability, or social support system  Outcome: Progressing     Problem: PAIN - ADULT  Goal: Verbalizes/displays adequate comfort level or baseline comfort level  Description: Interventions:- Encourage patient to monitor pain and request assistance- Assess pain using appropriate pain scale- Administer analgesics based on type and severity of pain and evaluate response- Implement non-pharmacological measures as appropriate and evaluate response- Consider cultural and social influences on pain and pain management- Notify physician/advanced practitioner if interventions unsuccessful or patient reports new pain  Outcome: Progressing     Problem: INFECTION - ADULT  Goal: Absence or prevention of progression during hospitalization  Description: INTERVENTIONS:- Assess and monitor for signs and symptoms of  infection- Monitor lab/diagnostic results- Monitor all insertion sites, i.e. indwelling lines, tubes, and drains- Monitor endotracheal if appropriate and nasal secretions for changes in amount and color- [place] appropriate cooling/warming therapies per order- Administer medications as ordered- Instruct and encourage patient and family to use good hand hygiene technique- Identify and instruct in appropriate isolation precautions for identified infection/condition  Outcome: Progressing  Goal: Absence of fever/infection during neutropenic period  Description: INTERVENTIONS:- Monitor WBC  Outcome: Progressing     Problem: SAFETY ADULT  Goal: Patient will remain free of falls  Description: INTERVENTIONS:- Educate patient/family on patient safety including physical limitations- Instruct patient to call for assistance with activity - Consult OT/PT to assist with strengthening/mobility - Keep Call bell within reach- Keep bed low and locked with side rails adjusted as appropriate- Keep care items and personal belongings within reach- Initiate and maintain comfort rounds- Make Fall Risk Sign visible to staff- Offer Toileting every 2 Hours, in advance of need- Initiate/Maintain 2alarm- Obtain necessary fall risk management equipment: 2- Apply yellow socks and bracelet for high fall risk patients- Consider moving patient to room near nurses station  Outcome: Progressing  Goal: Maintain or return to baseline ADL function  Description: INTERVENTIONS:-  Assess patient's ability to carry out ADLs; assess patient's baseline for ADL function and identify physical deficits which impact ability to perform ADLs (bathing, care of mouth/teeth, toileting, grooming, dressing, etc.)- Assess/evaluate cause of self-care deficits - Assess range of motion- Assess patient's mobility; develop plan if impaired- Assess patient's need for assistive devices and provide as appropriate- Encourage maximum independence but intervene and supervise when  necessary- Involve family in performance of ADLs- Assess for home care needs following discharge - Consider OT consult to assist with ADL evaluation and planning for discharge- Provide patient education as appropriate  Outcome: Progressing  Goal: Maintains/Returns to pre admission functional level  Description: INTERVENTIONS:- Perform AM-PAC 6 Click Basic Mobility/ Daily Activity assessment daily.- Set and communicate daily mobility goal to care team and patient/family/caregiver. - Collaborate with rehabilitation services on mobility goals if consulted- Perform Range of Motion 2 times a day.- Reposition patient every 2 hours.- Dangle patient 2 times a day- Stand patient 2 times a day- Ambulate patient 2 times a day- Out of bed to chair 2 times a day - Out of bed for meals 2 times a day- Out of bed for toileting- Record patient progress and toleration of activity level   Outcome: Progressing     Problem: Knowledge Deficit  Goal: Patient/family/caregiver demonstrates understanding of disease process, treatment plan, medications, and discharge instructions  Description: Complete learning assessment and assess knowledge base.Interventions:- Provide teaching at level of understanding- Provide teaching via preferred learning methods  Outcome: Progressing

## 2025-04-03 NOTE — RESPIRATORY THERAPY NOTE
RT Protocol Note  Markel Alves 74 y.o. male MRN: [id number]  Unit/Bed#: -01 Encounter: [id number]    Assessment    Principal Problem:    Pneumonia  Active Problems:    Hyperlipidemia    MODE (acute kidney injury) ([facility])    HTN (hypertension)    Acute respiratory failure with hypoxia ([facility])    Sepsis with acute renal failure ([facility])      Home Pulmonary Medications:     25 0927   Respiratory Protocol   Protocol Initiated? No   Protocol Selection Respiratory   Language Barrier? No   Medical & Social History Reviewed? Yes   Diagnostic Studies Reviewed? Yes   Physical Assessment Performed? Yes   Respiratory Plan Home Bronchodilator Patient pathway   Respiratory Assessment   Resp Comments continue with current orders of albuterol mdi prn   Additional Assessments   Pulse 63   SpO2 93 %            Past Medical History:   Diagnosis Date    Arthritis     Asthma     Chronic kidney disease     CKD (chronic kidney disease) stage 3, GFR 30-59 ml/min ([facility]) 2019    Depression     Dyshidrosis     Hypertension     Osteoarthritis     Trigger finger      Social History     Socioeconomic History    Marital status: /Civil Union     Spouse name: None    Number of children: None    Years of education: None    Highest education level: None   Occupational History    Occupation: IT   Tobacco Use    Smoking status: Former     Current packs/day: 0.00     Types: Cigarettes     Quit date: 1973     Years since quittin.7    Smokeless tobacco: Never    Tobacco comments:     Occasional cigar smoker   Vaping Use    Vaping status: Never Used   Substance and Sexual Activity    Alcohol use: Yes     Alcohol/week: 6.0 standard drinks of alcohol     Types: 3 Glasses of wine, 2 Cans of beer, 1 Shots of liquor per week     Comment: occasional    Drug use: No    Sexual activity: Not Currently     Partners: Female   Other Topics Concern    None   Social History Narrative    Living independently with spouse    No advance directives  "    Social Drivers of Health     Financial Resource Strain: Low Risk  (2023)    Overall Financial Resource Strain (CARDIA)     Difficulty of Paying Living Expenses: Not hard at all   Food Insecurity: No Food Insecurity (2025)    Nursing - Inadequate Food Risk Classification     Worried About Running Out of Food in the Last Year: Never true     Ran Out of Food in the Last Year: Never true     Ran Out of Food in the Last Year: Never true   Transportation Needs: No Transportation Needs (2025)    Nursing - Transportation Risk Classification     Lack of Transportation: Not on file     Lack of Transportation: No   Physical Activity: Insufficiently Active (3/15/2021)    Exercise Vital Sign     Days of Exercise per Week: 3 days     Minutes of Exercise per Session: 30 min   Stress: No Stress Concern Present (3/15/2021)    [employer] [place] [employer] - Occupational Stress Questionnaire     Feeling of Stress : Not at all   Social Connections: Not on file   Intimate Partner Violence: Unknown (2025)    Nursing IPS     Feels Physically and Emotionally Safe: Not on file     Physically Hurt by Someone: Not on file     Humiliated or Emotionally Abused by Someone: Not on file     Physically Hurt by Someone: No     Hurt or Threatened by Someone: No   Housing Stability: Unknown (2025)    Nursing: Inadequate Housing Risk Classification     Has Housing: Not on file     Worried About Losing Housing: Not on file     Unable to Get Utilities: Not on file     Unable to Pay for Housing in the Last Year: No     Has Housin       Subjective         Objective    Physical Exam:        Vitals:  Blood pressure 137/81, pulse 63, temperature 97.6 °F (36.4 °C), temperature source Oral, resp. rate 18, height 5' 7\" (1.702 m), weight 96.2 kg (212 lb), SpO2 93%.          Imaging and other studies:           Plan    Respiratory Plan: Home Bronchodilator Patient pathway        Resp Comments: continue with current orders " of albuterol mdi prn

## 2025-04-03 NOTE — ASSESSMENT & PLAN NOTE
Presented with sob, acute hypoxic respiratory failure currently requiring 2 L nasal cannula  Likely 2/2 to PNA as seen on cxr

## 2025-04-03 NOTE — TELEPHONE ENCOUNTER
Patient called and wanted to know the reason why thoracic was crossed out and why he needs to see vascular surgery now? [At ___ Weeks Gestation] : at [unfilled] weeks gestation [Multiple Gestation] : multiple gestation [Speech Delay w/ Normal Development] : patient has speech delay with normal development [FreeTextEntry1] : 5lb9oz [de-identified] : Twin [FreeTextEntry3] : Speech dealy- spoke at 5 years old, speech services since last year.

## 2025-04-03 NOTE — UTILIZATION REVIEW
Initial Clinical Review    Admission: Date/Time/Statement:   Admission Orders (From admission, onward)       Ordered        04/01/25 1908  INPATIENT ADMISSION  Once                          Orders Placed This Encounter   Procedures    INPATIENT ADMISSION     Standing Status:   Standing     Number of Occurrences:   1     Level of Care:   Med Surg [16]     Estimated length of stay:   More than 2 Midnights     Certification:   I certify that inpatient services are medically necessary for this patient for a duration of greater than two midnights. See H&P and MD Progress Notes for additional information about the patient's course of treatment.     ED Arrival Information       Expected   -    Arrival   4/1/2025 18:25    Acuity   Emergent              Means of arrival   Ambulance    Escorted by   Emergency Med Services - Other    Service   Hospitalist    Admission type   Emergency              Arrival complaint   -             Chief Complaint   Patient presents with    Shortness of Breath     Pt presents to ER from home by Saeid BROWN for reports of URI x7-10 days. He was improving but then reports getting acutely worse over the last 4 days. T max 104 at home, last dose of fever medications was about 6 hours ago but pt unsure of medication. EMS treats with 18g L wrist, reports RA saturations of 85%, treats with 1 duoneb and then reports follow up sp02 of 92% on 4 L NC  - no history of oxygen dependence. EMS reports stable vitals but a tympanic temp of 102.4.        Initial Presentation: 74 y.o. male to the ED from home via EMS with complaints of shortness of breath.  Admitted to inpatient for pneumonia.  H/O htn, CKD, HLD.  On arrival, he is tachypneic, febrile. Creat on arrival, 1.6 up from baseline of about 1.  Given 1 liter IV fluids in ED.  Repeat BMP. Requiring 4 LNC.  Started on IV abx.  CXR shows: Right basilar airspace consolidation may represent pneumonia, aspiration and/or atelectasis. Patient being treated for  pneumonia per clinical provider note.    Anticipated Length of Stay/Certification Statement: Patient will be admitted on an inpatient basis with an anticipated length of stay of greater than 2 midnights secondary to pneumonia..     Date: 4/2   Day 2:  Complaining of cough, malaise.  Having intermittent diarrhea. Wheezing and rales heard in lungs.  Continue with iv abx.     Date: 4/3  Day 3: Has surpassed a 2nd midnight with active treatments and services. Initially admitted for pneumonia.  Has been receiving IV abx, steroids.  Continues with anorexia and weakness, coughing. Rales heard in lungs.         ED Treatment-Medication Administration from 04/01/2025 1825 to 04/01/2025 1954         Date/Time Order Dose Route Action     04/01/2025 1857 acetaminophen (TYLENOL) tablet 650 mg 650 mg Oral Given     04/01/2025 1919 ceftriaxone (ROCEPHIN) 1 g/50 mL in dextrose IVPB 1,000 mg Intravenous New Bag     04/01/2025 1919 sodium chloride 0.9 % bolus 1,000 mL 1,000 mL Intravenous New Bag            Scheduled Medications:  amLODIPine, 5 mg, Oral, Daily  aspirin, 81 mg, Oral, Daily  atorvastatin, 40 mg, Oral, Daily  azithromycin, 500 mg, Intravenous, Q24H  carvedilol, 12.5 mg, Oral, BID With Meals  cefTRIAXone, 2,000 mg, Intravenous, Q24H  fluticasone, 1 spray, Nasal, Daily  guaiFENesin, 600 mg, Oral, Q12H CATHY  heparin (porcine), 5,000 Units, Subcutaneous, Q8H CATHY  methylPREDNISolone sodium succinate, 40 mg, Intravenous, Q12H CATHY  montelukast, 10 mg, Oral, QPM      Continuous IV Infusions:     PRN Meds:  acetaminophen, 650 mg, Oral, Q6H PRN  albuterol, 2 puff, Inhalation, Q6H PRN  benzonatate, 200 mg, Oral, TID PRN  LORazepam, 1 mg, Oral, HS PRN  ondansetron, 4 mg, Intravenous, Q6H PRN      ED Triage Vitals [04/01/25 1831]   Temperature Pulse Respirations Blood Pressure SpO2 Pain Score   (!) 102.2 °F (39 °C) 92 (!) 26 167/87 92 % No Pain     Weight (last 2 days)       Date/Time Weight    04/02/25 2117 96.2 (212)             Vital Signs (last 3 days)       Date/Time Temp Pulse Resp BP MAP (mmHg) SpO2 Calculated FIO2 (%) - Nasal Cannula Nasal Cannula O2 Flow Rate (L/min) O2 Device Patient Position - Orthostatic VS Pain    04/03/25 1026 -- -- -- -- -- 94 % -- -- -- -- --    04/03/25 0927 97.6 °F (36.4 °C) 63 -- 137/81 100 93 % -- -- None (Room air) Lying --    04/03/25 09:26:27 -- -- -- 137/81 100 -- -- -- -- -- --    04/03/25 07:41:44 98 °F (36.7 °C) -- 18 136/82 100 -- 28 2 L/min Nasal cannula Lying --    04/02/25 22:12:25 98.3 °F (36.8 °C) 62 19 143/78 100 96 % -- -- -- -- --    04/02/25 2202 -- -- -- -- -- -- -- -- -- -- 3    04/02/25 2117 98.3 °F (36.8 °C) 62 20 126/74 -- -- -- -- -- -- --    04/02/25 2100 -- -- -- -- -- -- -- -- -- -- No Pain    04/02/25 14:26:25 98.3 °F (36.8 °C) 62 -- 126/74 91 91 % -- -- -- -- --    04/02/25 1300 -- -- -- -- -- -- -- -- -- -- No Pain    04/02/25 1026 -- -- -- -- -- -- -- -- -- -- Med Not Given for Pain - for MAR use only    04/02/25 09:22:24 100 °F (37.8 °C) 72 -- -- -- 90 % -- -- -- -- --    04/02/25 0753 -- -- -- -- -- -- 28 2 L/min Nasal cannula -- --    04/02/25 07:44:36 99 °F (37.2 °C) 68 -- 128/74 92 93 % -- -- -- -- --    04/02/25 05:44:37 100 °F (37.8 °C) 63 -- -- -- 95 % -- -- -- -- --    04/02/25 0142 -- -- -- -- -- -- -- -- -- -- Med Not Given for Pain - for MAR use only    04/02/25 01:37:59 101.9 °F (38.8 °C) 76 -- -- -- 91 % -- -- -- -- --    04/01/25 2228 99 °F (37.2 °C) 68 20 118/76 99 -- -- -- -- -- --    04/01/25 2149 -- -- -- -- -- -- 28 2 L/min Nasal cannula -- --    04/01/25 20:00:55 100.5 °F (38.1 °C) 81 20 111/71 84 92 % -- -- -- -- --    04/01/25 2000 -- -- -- -- -- -- -- -- -- -- No Pain    04/01/25 1930 -- 82 21 -- -- 94 % 28 2 L/min Nasal cannula -- --    04/01/25 1921 -- -- -- -- -- -- -- -- -- -- No Pain    04/01/25 1915 -- 85 25 -- -- 94 % 28 2 L/min Nasal cannula -- --    04/01/25 1831 102.2 °F (39 °C) 92 26 167/87 -- 92 % -- -- Nasal cannula Sitting No Pain             Pertinent Labs/Diagnostic Test Results:   Radiology:  XR chest 1 view portable   Final Interpretation by Mc Chinchilla MD (04/02 0528)      Right basilar airspace consolidation may represent pneumonia, aspiration and/or atelectasis. Patient being treated for pneumonia per clinical provider note.      Small right pleural effusion.      Workstation performed: [id number]           Cardiology:  ECG 12 lead   Final Result by Leelee Newberry MD (04/01 1926)   Normal sinus rhythm   Normal ECG   No previous ECGs available   Confirmed by Leelee Newberry ([id number]) on 4/1/2025 7:26:22 PM          Results from last 7 days   Lab Units 04/01/25  1839   SARS-COV-2  Negative     Results from last 7 days   Lab Units 04/03/25  0805 04/02/25 0454 04/01/25  1839   WBC Thousand/uL 11.41* 9.51 8.31   HEMOGLOBIN g/dL 12.4 11.6* 14.1   HEMATOCRIT % 36.8 34.2* 41.4   PLATELETS Thousands/uL 189 165 198   TOTAL NEUT ABS Thousands/µL 10.08* 8.07*  --    BANDS PCT %  --   --  3         Results from last 7 days   Lab Units 04/03/25  0805 04/02/25 0454 04/01/25  1839   SODIUM mmol/L 139 136 135   POTASSIUM mmol/L 3.9 3.5 4.4   CHLORIDE mmol/L 106 103 97   CO2 mmol/L 25 27 28   ANION GAP mmol/L 8 6 10   BUN mg/dL 29* 36* 40*   CREATININE mg/dL 1.03 1.37* 1.64*   EGFR ml/min/1.73sq m 72 51 41   CALCIUM mg/dL 9.0 8.6 9.7     Results from last 7 days   Lab Units 04/02/25  0454 04/01/25  1839   AST U/L 17 22   ALT U/L 12 15   ALK PHOS U/L 48 61   TOTAL PROTEIN g/dL 6.1* 8.0   ALBUMIN g/dL 3.1* 3.9   TOTAL BILIRUBIN mg/dL 0.88 1.57*   BILIRUBIN DIRECT mg/dL 0.28*  --          Results from last 7 days   Lab Units 04/03/25  0805 04/02/25 0454 04/01/25  1839   GLUCOSE RANDOM mg/dL 171* 121 136         Results from last 7 days   Lab Units 04/01/25  1839   PROTIME seconds 14.5   INR  1.06   PTT seconds 28         Results from last 7 days   Lab Units 04/02/25  0454 04/01/25  1839   PROCALCITONIN ng/ml 2.40* 2.18*     Results  from last 7 days   Lab Units 04/01/25  1839   LACTIC ACID mmol/L 1.4           Results from last 7 days   Lab Units 04/02/25  0137   CLARITY UA  Clear   COLOR UA  Yellow   SPEC GRAV UA  1.028   PH UA  6.0   GLUCOSE UA mg/dl Negative   KETONES UA mg/dl Negative   BLOOD UA  Small*   PROTEIN UA mg/dl 100 (2+)*   NITRITE UA  Negative   BILIRUBIN UA  Negative   UROBILINOGEN UA (BE) mg/dl 3.0*   LEUKOCYTES UA  Negative   WBC UA /hpf 2-4*   RBC UA /hpf 2-4*   BACTERIA UA /hpf None Seen   EPITHELIAL CELLS WET PREP /hpf None Seen     Results from last 7 days   Lab Units 04/02/25  0840 04/01/25  1839   STREP PNEUMONIAE ANTIGEN, URINE  Positive*  --    LEGIONELLA URINARY ANTIGEN  Negative  --    INFLUENZA A PCR   --  Negative   INFLUENZA B PCR   --  Negative   RSV PCR   --  Negative         Results from last 7 days   Lab Units 04/02/25  1036 04/01/25  1839 04/01/25  1830   BLOOD CULTURE   --  No Growth at 24 hrs. Streptococcus pneumoniae*   SPUTUM CULTURE  Culture results to follow.  --   --    GRAM STAIN RESULT  4+ Gram positive cocci in pairs*  2+ Polys*  Rare Gram negative rods*  1+ Epithelial cells per low power field*  --  Gram positive cocci in pairs and chains*         Past Medical History:   Diagnosis Date    Arthritis     Asthma     Chronic kidney disease     CKD (chronic kidney disease) stage 3, GFR 30-59 ml/min ([facility]) 09/24/2019    Depression     Dyshidrosis     Hypertension     Osteoarthritis     Trigger finger      Present on Admission:   MODE (acute kidney injury) ([facility])   Hyperlipidemia   HTN (hypertension)   Acute respiratory failure with hypoxia ([facility])   Pneumonia   Sepsis with acute renal failure ([facility])   Bacteremia due to Streptococcus pneumoniae      Admitting Diagnosis: Shortness of breath [R06.02]  Pneumonia [J18.9]  Hypoxia [R09.02]  Age/Sex: 74 y.o. male    Network Utilization Review Department  ATTENTION: Please call with any questions or concerns to [phone] and carefully listen to the prompts so  that you are directed to the right person. All voicemails are confidential.   For Discharge needs, contact Care Management DC Support Team at [phone] opt. 2  Send all requests for admission clinical reviews, approved or denied determinations and any other requests to dedicated fax number below belonging to the campus where the patient is receiving treatment. List of dedicated fax numbers for the Facilities:  FACILITY NAME UR FAX NUMBER   ADMISSION DENIALS (Administrative/Medical Necessity) [phone]   DISCHARGE SUPPORT TEAM (NETWORK) [phone]   PARENT CHILD HEALTH (Maternity/NICU/Pediatrics) [phone]   [facility] [phone]   [facility] [phone]   [facility] [phone]   [facility] [phone]   [facility] [phone]   [facility] [phone]   [facility] [phone]   [facility] [phone]   [facility] [phone]   [facility] [phone]   [facility] [phone]   [facility] [phone]

## 2025-04-03 NOTE — PROGRESS NOTES
Progress Note - Hospitalist   Name: Markel Alves 74 y.o. male I MRN: [id number]  Unit/Bed#: -01 I Date of Admission: 4/1/2025   Date of Service: 4/3/2025 I Hospital Day: 2    Assessment & Plan  Pneumonia    presented with shortness of breath, cough and fever of 102 degrees  Secondary to lower right lobe pneumonia  Strep pneumonia urinary antigen positive  1 of 2 admission blood cultures positive for strep pneumonia  Continue Rocephin, azithromycin and steroids, transition from Solu-Medrol to prednisone  Flu/covid/rsv negative  Wean o2 as able  Acute respiratory failure with hypoxia (HCC)   Presented with sob, acute hypoxic respiratory failure currently requiring 2 L nasal cannula  Likely 2/2 to PNA as seen on cxr      Hyperlipidemia  Continue statin    MODE (acute kidney injury) (HCC)  Notes of a creatinine of 1.6  Baseline approximately 1 suspect likely from volume depletion   Back to baseline with hydration, DC IVF  Follow-up BMP  HTN (hypertension)  Blood pressure controlled  Continue amlodipine, beta-blocker  Hold lisinopril in the setting of acute kidney injury, resume if hypertensive  Sepsis with acute renal failure (HCC)  POA as evidenced by fever, tachycardia heart rate 92, tachypnea respiratory rate 26 with source being right lower lobe pneumonia.  Procalcitonin rising, will trend.  Lactic acid normal.  Continue hydration and other management as above.  Bacteremia due to Streptococcus pneumoniae  Antibiotics as above  48 hours surveillance cultures to be drawn this evening  No new murmur or sequela of endocarditis, hold off on TTE for now    VTE Pharmacologic Prophylaxis: VTE Score: 5 on heparin    Mobility:   Basic Mobility Inpatient Raw Score: 18  JH-HLM Goal: 6: Walk 10 steps or more  JH-HLM Achieved: 7: Walk 25 feet or more  JH-HLM Goal achieved. Continue to encourage appropriate mobility.    Patient Centered Rounds: I performed bedside rounds with nursing staff today.   Discussions with  Specialists or Other Care Team Provider: N/A    Education and Discussions with Family / Patient: Updated  (wife) at bedside.    Current Length of Stay: 2 day(s)  Current Patient Status: Inpatient   Certification Statement: The patient will continue to require additional inpatient hospital stay due to treatment plan as outlined above  Discharge Plan: Anticipate discharge in 24-48 hrs to home.    Code Status: Level 1 - Full Code    Subjective   Feeling better, less coughing overnight, still weak and anorexic.    Objective :  Temp:  [97.6 °F (36.4 °C)-98.3 °F (36.8 °C)] 97.6 °F (36.4 °C)  HR:  [62-63] 63  BP: (126-143)/(74-82) 137/81  Resp:  [18-20] 18  SpO2:  [91 %-96 %] 94 %  O2 Device: None (Room air)  Nasal Cannula O2 Flow Rate (L/min):  [2 L/min] 2 L/min    Body mass index is 33.2 kg/m².     Input and Output Summary (last 24 hours):     Intake/Output Summary (Last 24 hours) at 4/3/2025 1131  Last data filed at 4/2/2025 2208  Gross per 24 hour   Intake 2840 ml   Output 200 ml   Net 2640 ml       Physical Exam  Constitutional:       Appearance: He is well-developed.   HENT:      Head: Normocephalic and atraumatic.      Nose: Nose normal.   Eyes:      General: No scleral icterus.     Conjunctiva/sclera: Conjunctivae normal.      Pupils: Pupils are equal, round, and reactive to light.   Neck:      Thyroid: No thyromegaly.      Vascular: No JVD.      Trachea: No tracheal deviation.   Cardiovascular:      Rate and Rhythm: Normal rate and regular rhythm.      Heart sounds: No murmur heard.     No friction rub. No gallop.   Pulmonary:      Effort: Pulmonary effort is normal. No respiratory distress.      Breath sounds: Rales present. No wheezing.      Comments: Wheezes have resolved, persistent right lower lobe crackles  Abdominal:      General: Bowel sounds are normal. There is no distension.      Palpations: Abdomen is soft. There is no mass.      Tenderness: There is no abdominal tenderness. There is no  guarding or rebound.   Musculoskeletal:         General: No tenderness.      Cervical back: Normal range of motion and neck supple.   Lymphadenopathy:      Cervical: No cervical adenopathy.   Skin:     General: Skin is warm and dry.      Findings: No erythema or rash.   Neurological:      Mental Status: He is alert and oriented to person, place, and time.      Cranial Nerves: No cranial nerve deficit.   Psychiatric:         Behavior: Behavior normal.         Thought Content: Thought content normal.         Judgment: Judgment normal.           Lines/Drains:              Lab Results: I have reviewed the following results:   Results from last 7 days   Lab Units 04/03/25  0805 04/02/25  0454 04/01/25  1839   WBC Thousand/uL 11.41*   < > 8.31   HEMOGLOBIN g/dL 12.4   < > 14.1   HEMATOCRIT % 36.8   < > 41.4   PLATELETS Thousands/uL 189   < > 198   BANDS PCT %  --   --  3   SEGS PCT % 89*   < >  --    LYMPHO PCT % 5*   < > 6*   MONO PCT % 4   < > 4   EOS PCT % 0   < > 0    < > = values in this interval not displayed.     Results from last 7 days   Lab Units 04/03/25  0805 04/02/25  0454   SODIUM mmol/L 139 136   POTASSIUM mmol/L 3.9 3.5   CHLORIDE mmol/L 106 103   CO2 mmol/L 25 27   BUN mg/dL 29* 36*   CREATININE mg/dL 1.03 1.37*   ANION GAP mmol/L 8 6   CALCIUM mg/dL 9.0 8.6   ALBUMIN g/dL  --  3.1*   TOTAL BILIRUBIN mg/dL  --  0.88   ALK PHOS U/L  --  48   ALT U/L  --  12   AST U/L  --  17   GLUCOSE RANDOM mg/dL 171* 121     Results from last 7 days   Lab Units 04/01/25  1839   INR  1.06             Results from last 7 days   Lab Units 04/02/25  0454 04/01/25  1839   LACTIC ACID mmol/L  --  1.4   PROCALCITONIN ng/ml 2.40* 2.18*       Recent Cultures (last 7 days):   Results from last 7 days   Lab Units 04/02/25  1036 04/02/25  0840 04/01/25  1839 04/01/25  1830   BLOOD CULTURE   --   --  No Growth at 24 hrs. Streptococcus pneumoniae*   GRAM STAIN RESULT  4+ Gram positive cocci in pairs*  2+ Polys*  Rare Gram negative  rods*  1+ Epithelial cells per low power field*  --   --  Gram positive cocci in pairs and chains*   LEGIONELLA URINARY ANTIGEN   --  Negative  --   --              Last 24 Hours Medication List:     Current Facility-Administered Medications:     acetaminophen (TYLENOL) tablet 650 mg, Q6H PRN    albuterol (PROVENTIL HFA,VENTOLIN HFA) inhaler 2 puff, Q6H PRN    amLODIPine (NORVASC) tablet 5 mg, Daily    aspirin chewable tablet 81 mg, Daily    atorvastatin (LIPITOR) tablet 40 mg, Daily    azithromycin (ZITHROMAX) 500 mg in sodium chloride 0.9% 250mL IVPB 500 mg, Q24H    benzonatate (TESSALON PERLES) capsule 200 mg, TID PRN    carvedilol (COREG) tablet 12.5 mg, BID With Meals    cefTRIAXone (ROCEPHIN) 2,000 mg in dextrose 5 % 50 mL IVPB, Q24H, Last Rate: 2,000 mg (04/02/25 8415)    fluticasone (FLONASE) 50 mcg/act nasal spray 1 spray, Daily    guaiFENesin (MUCINEX) 12 hr tablet 600 mg, Q12H CATHY    heparin (porcine) subcutaneous injection 5,000 Units, Q8H CATHY    LORazepam (ATIVAN) tablet 1 mg, HS PRN    methylPREDNISolone sodium succinate (Solu-MEDROL) injection 40 mg, Q12H CATHY    montelukast (SINGULAIR) tablet 10 mg, QPM    ondansetron (ZOFRAN) injection 4 mg, Q6H PRN    Administrative Statements   Today, Patient Was Seen By: Manjit Allison MD      **Please Note: This note may have been constructed using a voice recognition system.**

## 2025-04-04 ENCOUNTER — TRANSITIONAL CARE MANAGEMENT (OUTPATIENT)
Age: 75
End: 2025-04-04

## 2025-04-04 VITALS
BODY MASS INDEX: 33.27 KG/M2 | RESPIRATION RATE: 19 BRPM | OXYGEN SATURATION: 93 % | DIASTOLIC BLOOD PRESSURE: 85 MMHG | HEIGHT: 67 IN | TEMPERATURE: 98.3 F | WEIGHT: 212 LBS | SYSTOLIC BLOOD PRESSURE: 152 MMHG | HEART RATE: 59 BPM

## 2025-04-04 LAB
ANION GAP SERPL CALCULATED.3IONS-SCNC: 9 MMOL/L (ref 4–13)
BACTERIA BLD CULT: ABNORMAL
BUN SERPL-MCNC: 28 MG/DL (ref 5–25)
CALCIUM SERPL-MCNC: 8.5 MG/DL (ref 8.4–10.2)
CHLORIDE SERPL-SCNC: 105 MMOL/L (ref 96–108)
CO2 SERPL-SCNC: 25 MMOL/L (ref 21–32)
CREAT SERPL-MCNC: 0.94 MG/DL (ref 0.6–1.3)
ERYTHROCYTE [DISTWIDTH] IN BLOOD BY AUTOMATED COUNT: 13.1 % (ref 11.6–15.1)
GFR SERPL CREATININE-BSD FRML MDRD: 80 ML/MIN/1.73SQ M
GLUCOSE SERPL-MCNC: 176 MG/DL (ref 65–140)
GRAM STN SPEC: ABNORMAL
HCT VFR BLD AUTO: 35.1 % (ref 36.5–49.3)
HGB BLD-MCNC: 12 G/DL (ref 12–17)
MCH RBC QN AUTO: 32.3 PG (ref 26.8–34.3)
MCHC RBC AUTO-ENTMCNC: 34.2 G/DL (ref 31.4–37.4)
MCV RBC AUTO: 94 FL (ref 82–98)
NRBC BLD AUTO-RTO: 0 /100 WBCS
PLATELET # BLD AUTO: 229 THOUSANDS/UL (ref 149–390)
PMV BLD AUTO: 10.4 FL (ref 8.9–12.7)
POTASSIUM SERPL-SCNC: 3.9 MMOL/L (ref 3.5–5.3)
RBC # BLD AUTO: 3.72 MILLION/UL (ref 3.88–5.62)
S PNEUM DNA BLD POS QL NAA+NON-PROBE: DETECTED
SODIUM SERPL-SCNC: 139 MMOL/L (ref 135–147)
WBC # BLD AUTO: 12.33 THOUSAND/UL (ref 4.31–10.16)

## 2025-04-04 PROCEDURE — 94664 DEMO&/EVAL PT USE INHALER: CPT

## 2025-04-04 PROCEDURE — 85027 COMPLETE CBC AUTOMATED: CPT | Performed by: INTERNAL MEDICINE

## 2025-04-04 PROCEDURE — 99239 HOSP IP/OBS DSCHRG MGMT >30: CPT | Performed by: INTERNAL MEDICINE

## 2025-04-04 PROCEDURE — 80048 BASIC METABOLIC PNL TOTAL CA: CPT | Performed by: INTERNAL MEDICINE

## 2025-04-04 RX ORDER — PREDNISONE 20 MG/1
20 TABLET ORAL DAILY
Status: DISCONTINUED | OUTPATIENT
Start: 2025-04-05 | End: 2025-04-04 | Stop reason: HOSPADM

## 2025-04-04 RX ORDER — GUAIFENESIN 600 MG/1
600 TABLET, EXTENDED RELEASE ORAL EVERY 12 HOURS SCHEDULED
Start: 2025-04-04

## 2025-04-04 RX ORDER — PREDNISONE 10 MG/1
TABLET ORAL
Qty: 6 TABLET | Refills: 0 | Status: SHIPPED | OUTPATIENT
Start: 2025-04-05 | End: 2025-04-09

## 2025-04-04 RX ADMIN — METHYLPREDNISOLONE SODIUM SUCCINATE 40 MG: 40 INJECTION, POWDER, FOR SOLUTION INTRAMUSCULAR; INTRAVENOUS at 08:58

## 2025-04-04 RX ADMIN — ATORVASTATIN CALCIUM 40 MG: 40 TABLET, FILM COATED ORAL at 08:58

## 2025-04-04 RX ADMIN — ALBUTEROL SULFATE 2 PUFF: 90 AEROSOL, METERED RESPIRATORY (INHALATION) at 01:54

## 2025-04-04 RX ADMIN — HEPARIN SODIUM 5000 UNITS: 5000 INJECTION INTRAVENOUS; SUBCUTANEOUS at 05:05

## 2025-04-04 RX ADMIN — AMLODIPINE BESYLATE 5 MG: 5 TABLET ORAL at 08:58

## 2025-04-04 RX ADMIN — ASPIRIN 81 MG: 81 TABLET, CHEWABLE ORAL at 08:58

## 2025-04-04 RX ADMIN — CARVEDILOL 12.5 MG: 12.5 TABLET, FILM COATED ORAL at 09:19

## 2025-04-04 RX ADMIN — FLUTICASONE PROPIONATE 1 SPRAY: 50 SPRAY, METERED NASAL at 08:59

## 2025-04-04 RX ADMIN — GUAIFENESIN 600 MG: 600 TABLET ORAL at 08:58

## 2025-04-04 NOTE — PLAN OF CARE
Problem: DISCHARGE PLANNING  Goal: Discharge to home or other facility with appropriate resources  Description: INTERVENTIONS:- Identify barriers to discharge w/patient and caregiver- Arrange for needed discharge resources and transportation as appropriate- Identify discharge learning needs (meds, wound care, etc.)- Arrange for interpretive services to assist at discharge as needed- Refer to Case Management Department for coordinating discharge planning if the patient needs post-hospital services based on physician/advanced practitioner order or complex needs related to functional status, cognitive ability, or social support system  INTERVENTIONS:- Identify barriers to discharge w/patient and caregiver- Arrange for needed discharge resources and transportation as appropriate- Identify discharge learning needs (meds, wound care, etc.)- Arrange for interpretive services to assist at discharge as needed- Refer to Case Management Department for coordinating discharge planning if the patient needs post-hospital services based on physician/advanced practitioner order or complex needs related to functional status, cognitive ability, or social support system  Outcome: Progressing     Problem: PAIN - ADULT  Goal: Verbalizes/displays adequate comfort level or baseline comfort level  Description: Interventions:- Encourage patient to monitor pain and request assistance- Assess pain using appropriate pain scale- Administer analgesics based on type and severity of pain and evaluate response- Implement non-pharmacological measures as appropriate and evaluate response- Consider cultural and social influences on pain and pain management- Notify physician/advanced practitioner if interventions unsuccessful or patient reports new pain  Outcome: Progressing     Problem: INFECTION - ADULT  Goal: Absence or prevention of progression during hospitalization  Description: INTERVENTIONS:- Assess and monitor for signs and symptoms of  infection- Monitor lab/diagnostic results- Monitor all insertion sites, i.e. indwelling lines, tubes, and drains- Monitor endotracheal if appropriate and nasal secretions for changes in amount and color- [place] appropriate cooling/warming therapies per order- Administer medications as ordered- Instruct and encourage patient and family to use good hand hygiene technique- Identify and instruct in appropriate isolation precautions for identified infection/condition  Outcome: Progressing  Goal: Absence of fever/infection during neutropenic period  Description: INTERVENTIONS:- Monitor WBC  Outcome: Progressing     Problem: SAFETY ADULT  Goal: Patient will remain free of falls  Description: INTERVENTIONS:- Educate patient/family on patient safety including physical limitations- Instruct patient to call for assistance with activity - Consult OT/PT to assist with strengthening/mobility - Keep Call bell within reach- Keep bed low and locked with side rails adjusted as appropriate- Keep care items and personal belongings within reach- Initiate and maintain comfort rounds- Make Fall Risk Sign visible to staff- Offer Toileting every 2 Hours, in advance of need- Initiate/Maintain 2alarm- Obtain necessary fall risk management equipment: 2- Apply yellow socks and bracelet for high fall risk patients- Consider moving patient to room near nurses station  Outcome: Progressing  Goal: Maintain or return to baseline ADL function  Description: INTERVENTIONS:-  Assess patient's ability to carry out ADLs; assess patient's baseline for ADL function and identify physical deficits which impact ability to perform ADLs (bathing, care of mouth/teeth, toileting, grooming, dressing, etc.)- Assess/evaluate cause of self-care deficits - Assess range of motion- Assess patient's mobility; develop plan if impaired- Assess patient's need for assistive devices and provide as appropriate- Encourage maximum independence but intervene and supervise when  necessary- Involve family in performance of ADLs- Assess for home care needs following discharge - Consider OT consult to assist with ADL evaluation and planning for discharge- Provide patient education as appropriate  Outcome: Progressing  Goal: Maintains/Returns to pre admission functional level  Description: INTERVENTIONS:- Perform AM-PAC 6 Click Basic Mobility/ Daily Activity assessment daily.- Set and communicate daily mobility goal to care team and patient/family/caregiver. - Collaborate with rehabilitation services on mobility goals if consulted- Perform Range of Motion 2 times a day.- Reposition patient every 2 hours.- Dangle patient 2 times a day- Stand patient 2 times a day- Ambulate patient 2 times a day- Out of bed to chair 2 times a day - Out of bed for meals 2 times a day- Out of bed for toileting- Record patient progress and toleration of activity level   Outcome: Progressing     Problem: Knowledge Deficit  Goal: Patient/family/caregiver demonstrates understanding of disease process, treatment plan, medications, and discharge instructions  Description: Complete learning assessment and assess knowledge base.Interventions:- Provide teaching at level of understanding- Provide teaching via preferred learning methods  Outcome: Progressing     Problem: RESPIRATORY - ADULT  Goal: Achieves optimal ventilation and oxygenation  Description: INTERVENTIONS:- Assess for changes in respiratory status- Assess for changes in mentation and behavior- Position to facilitate oxygenation and minimize respiratory effort- Oxygen administered by appropriate delivery if ordered- Initiate smoking cessation education as indicated- Encourage broncho-pulmonary hygiene including cough, deep breathe, Incentive Spirometry- Assess the need for suctioning and aspirate as needed- Assess and instruct to report SOB or any respiratory difficulty- Respiratory Therapy support as indicated  Outcome: Progressing

## 2025-04-04 NOTE — PLAN OF CARE
Problem: DISCHARGE PLANNING  Goal: Discharge to home or other facility with appropriate resources  Description: INTERVENTIONS:- Identify barriers to discharge w/patient and caregiver- Arrange for needed discharge resources and transportation as appropriate- Identify discharge learning needs (meds, wound care, etc.)- Arrange for interpretive services to assist at discharge as needed- Refer to Case Management Department for coordinating discharge planning if the patient needs post-hospital services based on physician/advanced practitioner order or complex needs related to functional status, cognitive ability, or social support system  INTERVENTIONS:- Identify barriers to discharge w/patient and caregiver- Arrange for needed discharge resources and transportation as appropriate- Identify discharge learning needs (meds, wound care, etc.)- Arrange for interpretive services to assist at discharge as needed- Refer to Case Management Department for coordinating discharge planning if the patient needs post-hospital services based on physician/advanced practitioner order or complex needs related to functional status, cognitive ability, or social support system  Outcome: Progressing     Problem: PAIN - ADULT  Goal: Verbalizes/displays adequate comfort level or baseline comfort level  Description: Interventions:- Encourage patient to monitor pain and request assistance- Assess pain using appropriate pain scale- Administer analgesics based on type and severity of pain and evaluate response- Implement non-pharmacological measures as appropriate and evaluate response- Consider cultural and social influences on pain and pain management- Notify physician/advanced practitioner if interventions unsuccessful or patient reports new pain  Outcome: Progressing     Problem: INFECTION - ADULT  Goal: Absence or prevention of progression during hospitalization  Description: INTERVENTIONS:- Assess and monitor for signs and symptoms of  infection- Monitor lab/diagnostic results- Monitor all insertion sites, i.e. indwelling lines, tubes, and drains- Monitor endotracheal if appropriate and nasal secretions for changes in amount and color- [place] appropriate cooling/warming therapies per order- Administer medications as ordered- Instruct and encourage patient and family to use good hand hygiene technique- Identify and instruct in appropriate isolation precautions for identified infection/condition  Outcome: Progressing  Goal: Absence of fever/infection during neutropenic period  Description: INTERVENTIONS:- Monitor WBC  Outcome: Progressing     Problem: SAFETY ADULT  Goal: Patient will remain free of falls  Description: INTERVENTIONS:- Educate patient/family on patient safety including physical limitations- Instruct patient to call for assistance with activity - Consult OT/PT to assist with strengthening/mobility - Keep Call bell within reach- Keep bed low and locked with side rails adjusted as appropriate- Keep care items and personal belongings within reach- Initiate and maintain comfort rounds- Make Fall Risk Sign visible to staff- Offer Toileting evyellow socks and bracelet for high fall risk patients- Consider moving patient to room near nurses station  Outcome: Progressing  Goal: Maintain or return to baseline ADL function  Description: INTERVENTIONS:-  Assess patient's ability to carry out ADLs; assess patient's baseline for ADL function and identify physical deficits which impact ability to perform ADLs (bathing, care of mouth/teeth, toileting, grooming, dressing, etc.)- Assess/evaluate cause of self-care deficits - Assess range of motion- Assess patient's mobility; develop plan if impaired- Assess patient's need for assistive devices and provide as appropriate- Encourage maximum independence but intervene and supervise when necessary- Involve family in performance of ADLs- Assess for home care needs following discharge - Consider OT  consult to assist with ADL evaluation and planning for discharge- Provide patient education as appropriate  Outcome: Progressing  Goal: Maintains/Returns to pre admission functional level  Description: INTERVENTIONS:- Perform AM-PAC 6 Click Basic Mobility/ Daily Activity assessment daily.- Set and communicate daily mobility goal to care team and patient/family/caregiver. - Collaborate with rehabilitation services on mobility goals if consulted- Perform Range of Motion  times a day.- Reposition a day- Out of bed for toileting- Record patient progress and toleration of activity level   Outcome: Progressing     Problem: Knowledge Deficit  Goal: Patient/family/caregiver demonstrates understanding of disease process, treatment plan, medications, and discharge instructions  Description: Complete learning assessment and assess knowledge base.Interventions:- Provide teaching at level of understanding- Provide teaching via preferred learning methods  Outcome: Progressing

## 2025-04-04 NOTE — PLAN OF CARE
Problem: DISCHARGE PLANNING  Goal: Discharge to home or other facility with appropriate resources  Description: INTERVENTIONS:- Identify barriers to discharge w/patient and caregiver- Arrange for needed discharge resources and transportation as appropriate- Identify discharge learning needs (meds, wound care, etc.)- Arrange for interpretive services to assist at discharge as needed- Refer to Case Management Department for coordinating discharge planning if the patient needs post-hospital services based on physician/advanced practitioner order or complex needs related to functional status, cognitive ability, or social support system  INTERVENTIONS:- Identify barriers to discharge w/patient and caregiver- Arrange for needed discharge resources and transportation as appropriate- Identify discharge learning needs (meds, wound care, etc.)- Arrange for interpretive services to assist at discharge as needed- Refer to Case Management Department for coordinating discharge planning if the patient needs post-hospital services based on physician/advanced practitioner order or complex needs related to functional status, cognitive ability, or social support system  4/4/2025 1524 by Samantha Zapien RN  Outcome: Progressing  4/4/2025 1522 by Samantha Zapien RN  Outcome: Progressing     Problem: PAIN - ADULT  Goal: Verbalizes/displays adequate comfort level or baseline comfort level  Description: Interventions:- Encourage patient to monitor pain and request assistance- Assess pain using appropriate pain scale- Administer analgesics based on type and severity of pain and evaluate response- Implement non-pharmacological measures as appropriate and evaluate response- Consider cultural and social influences on pain and pain management- Notify physician/advanced practitioner if interventions unsuccessful or patient reports new pain  4/4/2025 1524 by Samantha Zapien RN  Outcome: Progressing  4/4/2025 1522 by Samantha Zapien RN  Outcome:  Progressing     Problem: INFECTION - ADULT  Goal: Absence or prevention of progression during hospitalization  Description: INTERVENTIONS:- Assess and monitor for signs and symptoms of infection- Monitor lab/diagnostic results- Monitor all insertion sites, i.e. indwelling lines, tubes, and drains- Monitor endotracheal if appropriate and nasal secretions for changes in amount and color- [place] appropriate cooling/warming therapies per order- Administer medications as ordered- Instruct and encourage patient and family to use good hand hygiene technique- Identify and instruct in appropriate isolation precautions for identified infection/condition  4/4/2025 1524 by Samantha Zapien RN  Outcome: Progressing  4/4/2025 1522 by Samantha Zapien RN  Outcome: Progressing  Goal: Absence of fever/infection during neutropenic period  Description: INTERVENTIONS:- Monitor WBC  4/4/2025 1524 by Samantha Zapien RN  Outcome: Progressing  4/4/2025 1522 by Samantha Zapien RN  Outcome: Progressing     Problem: SAFETY ADULT  Goal: Patient will remain free of falls  Description: INTERVENTIONS:- Educate patient/family on patient safety including physical limitations- Instruct patient to call for assistance with activity - Consult OT/PT to assist with strengthening/mobility - Keep Call bell within reach- Keep bed low and locked with side rails adjusted as appropriate- Keep care items and personal belongings within reach- Initiate and maintain comfort rounds- Make Fall Risk Sign visible to staff- Offer Toileting every 2 Hours, in advance of need- Initiate/Maintain 2alarm- Obtain necessary fall risk management equipment: 2- Apply yellow socks and bracelet for high fall risk patients- Consider moving patient to room near nurses station  4/4/2025 1524 by Samantha Zapien RN  Outcome: Progressing  4/4/2025 1522 by Samantha Zapien RN  Outcome: Progressing  Goal: Maintain or return to baseline ADL function  Description: INTERVENTIONS:-  Assess patient's ability to  carry out ADLs; assess patient's baseline for ADL function and identify physical deficits which impact ability to perform ADLs (bathing, care of mouth/teeth, toileting, grooming, dressing, etc.)- Assess/evaluate cause of self-care deficits - Assess range of motion- Assess patient's mobility; develop plan if impaired- Assess patient's need for assistive devices and provide as appropriate- Encourage maximum independence but intervene and supervise when necessary- Involve family in performance of ADLs- Assess for home care needs following discharge - Consider OT consult to assist with ADL evaluation and planning for discharge- Provide patient education as appropriate  4/4/2025 1524 by Samantha Zapien RN  Outcome: Progressing  4/4/2025 1522 by Samantha Zapien RN  Outcome: Progressing  Goal: Maintains/Returns to pre admission functional level  Description: INTERVENTIONS:- Perform AM-PAC 6 Click Basic Mobility/ Daily Activity assessment daily.- Set and communicate daily mobility goal to care team and patient/family/caregiver. - Collaborate with rehabilitation services on mobility goals if consulted- Perform Range of Motion 2 times a day.- Reposition patient every 2 hours.- Dangle patient 2 times a day- Stand patient 2 times a day- Ambulate patient 2 times a day- Out of bed to chair 2 times a day - Out of bed for meals 2 times a day- Out of bed for toileting- Record patient progress and toleration of activity level   4/4/2025 1524 by Samantha Zapien RN  Outcome: Progressing  4/4/2025 1522 by Samantha Zapien, RN  Outcome: Progressing     Problem: Knowledge Deficit  Goal: Patient/family/caregiver demonstrates understanding of disease process, treatment plan, medications, and discharge instructions  Description: Complete learning assessment and assess knowledge base.Interventions:- Provide teaching at level of understanding- Provide teaching via preferred learning methods  4/4/2025 1524 by Samantha Zapien RN  Outcome: Progressing  4/4/2025  1522 by Samantha Zapien, RN  Outcome: Progressing     Problem: RESPIRATORY - ADULT  Goal: Achieves optimal ventilation and oxygenation  Description: INTERVENTIONS:- Assess for changes in respiratory status- Assess for changes in mentation and behavior- Position to facilitate oxygenation and minimize respiratory effort- Oxygen administered by appropriate delivery if ordered- Initiate smoking cessation education as indicated- Encourage broncho-pulmonary hygiene including cough, deep breathe, Incentive Spirometry- Assess the need for suctioning and aspirate as needed- Assess and instruct to report SOB or any respiratory difficulty- Respiratory Therapy support as indicated  4/4/2025 1524 by Samantha Zapien RN  Outcome: Progressing  4/4/2025 1522 by Samantha Zapien, RN  Outcome: Progressing

## 2025-04-04 NOTE — RESPIRATORY THERAPY NOTE
RT Protocol Note  Markel Alves 74 y.o. male MRN: [id number]  Unit/Bed#: -01 Encounter: [id number]    Assessment    Principal Problem:    Pneumonia  Active Problems:    Hyperlipidemia    MODE (acute kidney injury) ([facility])    HTN (hypertension)    Acute respiratory failure with hypoxia ([facility])    Sepsis with acute renal failure ([facility])    Bacteremia due to Streptococcus pneumoniae      Home Pulmonary Medications:         Past Medical History:   Diagnosis Date    Arthritis     Asthma     Chronic kidney disease     CKD (chronic kidney disease) stage 3, GFR 30-59 ml/min ([facility]) 2019    Depression     Dyshidrosis     Hypertension     Osteoarthritis     Trigger finger      Social History     Socioeconomic History    Marital status: /Civil Union     Spouse name: None    Number of children: None    Years of education: None    Highest education level: None   Occupational History    Occupation: IT   Tobacco Use    Smoking status: Former     Current packs/day: 0.00     Types: Cigarettes     Quit date: 1973     Years since quittin.7    Smokeless tobacco: Never    Tobacco comments:     Occasional cigar smoker   Vaping Use    Vaping status: Never Used   Substance and Sexual Activity    Alcohol use: Yes     Alcohol/week: 6.0 standard drinks of alcohol     Types: 3 Glasses of wine, 2 Cans of beer, 1 Shots of liquor per week     Comment: occasional    Drug use: No    Sexual activity: Not Currently     Partners: Female   Other Topics Concern    None   Social History Narrative    Living independently with spouse    No advance directives     Social Drivers of Health     Financial Resource Strain: Low Risk  (2023)    Overall Financial Resource Strain (CARDIA)     Difficulty of Paying Living Expenses: Not hard at all   Food Insecurity: No Food Insecurity (2025)    Nursing - Inadequate Food Risk Classification     Worried About Running Out of Food in the Last Year: Never true     Ran Out of Food in the Last  "Year: Never true     Ran Out of Food in the Last Year: Never true   Transportation Needs: No Transportation Needs (2025)    Nursing - Transportation Risk Classification     Lack of Transportation: Not on file     Lack of Transportation: No   Physical Activity: Insufficiently Active (3/15/2021)    Exercise Vital Sign     Days of Exercise per Week: 3 days     Minutes of Exercise per Session: 30 min   Stress: No Stress Concern Present (3/15/2021)    [place] [employer] - Occupational Stress Questionnaire     Feeling of Stress : Not at all   Social Connections: Not on file   Intimate Partner Violence: Unknown (2025)    Nursing IPS     Feels Physically and Emotionally Safe: Not on file     Physically Hurt by Someone: Not on file     Humiliated or Emotionally Abused by Someone: Not on file     Physically Hurt by Someone: No     Hurt or Threatened by Someone: No   Housing Stability: Unknown (2025)    Nursing: Inadequate Housing Risk Classification     Has Housing: Not on file     Worried About Losing Housing: Not on file     Unable to Get Utilities: Not on file     Unable to Pay for Housing in the Last Year: No     Has Housin       Subjective         Objective    Physical Exam:   Assessment Type: Assess only  General Appearance: Awake, Alert  Respiratory Pattern: Normal  Chest Assessment: Chest expansion symmetrical  Bilateral Breath Sounds: Diminished    Vitals:  Blood pressure 155/80, pulse 56, temperature 98.3 °F (36.8 °C), temperature source Tympanic, resp. rate 19, height 5' 7\" (1.702 m), weight 96.2 kg (212 lb), SpO2 92%.          Imaging and other studies:           Plan    Respiratory Plan: Home Bronchodilator Patient pathway        Resp Comments: pt with hx of copd, will continue with prn inhaler   "

## 2025-04-04 NOTE — DISCHARGE SUMMARY
Discharge Summary - Markel Alves 74 y.o. male MRN: [id number]  Unit/Bed#: -01 Encounter: [id number]    Admission Date:    4/1/2025   Discharge Date:   04/04/25   Admitting Diagnosis:   Shortness of breath [R06.02]  Pneumonia [J18.9]  Hypoxia [R09.02]  Admitting Provider:   Jose Miguel Hagen MD  Discharge Provider:   Manjit Allison MD     Primary Care Physician at Discharge:   Manjit Allison MD,[phone]    HPI: From history and physical by Dr. Hagen  Chief Complaint: Shortness of breath     Markel Alves is a 74 y.o. male with past medical history significant hypertension, CKD, hyperlipidemia and is present with shortness of breath and cough.  Reports shortness of breath cough and fever that began last couple days.  Otherwise denies any acute complaints.  Chest pain, abdominal pain, nausea vomiting, diarrhea or any other complaints    Procedures Performed:   Orders Placed This Encounter   Procedures    ED ECG Documentation Only       Hospital Course:   Pneumonia    presented with shortness of breath, cough and fever of 102 degrees  Secondary to lower right lobe pneumonia  Strep pneumonia urinary antigen positive  1 of 2 admission blood cultures positive for strep pneumonia  Surveillance cultures drawn 4/3 after 48 hours of treatment negative to date  He completed 3 days Rocephin, azithromycin and IV Solu-Medrol  Transition to Augmentin to complete 7-day course  Taper prednisone over 4 days    Acute respiratory failure with hypoxia (HCC)   Presented with sob, acute hypoxic respiratory failure currently requiring 2 L nasal cannula  Likely 2/2 to PNA as seen on cxr        Hyperlipidemia  Continue statin    MODE (acute kidney injury) (HCC)  Notes of a creatinine of 1.6  Baseline approximately 1 suspect likely from volume depletion   Back to baseline with hydration  Follow-up BMP outpatient    HTN (hypertension)  Blood pressure controlled  Continue amlodipine, beta-blocker  Lisinopril held due to MODE, resume on  discharge    Sepsis with acute renal failure (HCC)  POA as evidenced by fever, tachycardia heart rate 92, tachypnea respiratory rate 26 with source being right lower lobe pneumonia.  Lactic acid normal.  Patient remained hemodynamically stable throughout hospitalization    Bacteremia due to Streptococcus pneumoniae  Antibiotics as above  48 hours surveillance cultures negative to date  No new murmur or sequela of endocarditis, hold off on TTE for now      Current Facility-Administered Medications:     acetaminophen (TYLENOL) tablet 650 mg, 650 mg, Oral, Q6H PRN, Jose Miguel Hagen MD, 650 mg at 04/02/25 2202    albuterol (PROVENTIL HFA,VENTOLIN HFA) inhaler 2 puff, 2 puff, Inhalation, Q6H PRN, Jose Miguel Hagen MD, 2 puff at 04/04/25 0154    amLODIPine (NORVASC) tablet 5 mg, 5 mg, Oral, Daily, Jose Miguel Hagen MD, 5 mg at 04/04/25 0858    aspirin chewable tablet 81 mg, 81 mg, Oral, Daily, Jose Miguel Hagen MD, 81 mg at 04/04/25 0858    atorvastatin (LIPITOR) tablet 40 mg, 40 mg, Oral, Daily, Jose Miguel Hagen MD, 40 mg at 04/04/25 0858    azithromycin (ZITHROMAX) 500 mg in sodium chloride 0.9% 250mL IVPB 500 mg, 500 mg, Intravenous, Q24H, Jose Miguel Hagen MD, 500 mg at 04/03/25 1846    benzonatate (TESSALON PERLES) capsule 200 mg, 200 mg, Oral, TID PRN, Manjit Allison MD, 200 mg at 04/02/25 2202    carvedilol (COREG) tablet 12.5 mg, 12.5 mg, Oral, BID With Meals, Jose Miguel Hagen MD, 12.5 mg at 04/04/25 0919    cefTRIAXone (ROCEPHIN) 2,000 mg in dextrose 5 % 50 mL IVPB, 2,000 mg, Intravenous, Q24H, Manjit Allison MD, Last Rate: 100 mL/hr at 04/03/25 1745, 2,000 mg at 04/03/25 1745    fluticasone (FLONASE) 50 mcg/act nasal spray 1 spray, 1 spray, Nasal, Daily, Jose Miguel Hagen MD, 1 spray at 04/04/25 0859    guaiFENesin (MUCINEX) 12 hr tablet 600 mg, 600 mg, Oral, Q12H CATHY, Manjit Allison MD, 600 mg at 04/04/25 0858    heparin (porcine) subcutaneous injection 5,000 Units, 5,000 Units, Subcutaneous, Q8H [facility], Jose Miguel Hagen MD, 5,000 Units at  04/04/25 0505    LORazepam (ATIVAN) tablet 1 mg, 1 mg, Oral, HS PRN, Jose Miguel Hagen MD    montelukast (SINGULAIR) tablet 10 mg, 10 mg, Oral, QPM, Jose Miguel Hagen MD, 10 mg at 04/03/25 1719    ondansetron (ZOFRAN) injection 4 mg, 4 mg, Intravenous, Q6H PRN, Jose Miguel Hagen MD    [START ON 4/5/2025] predniSONE tablet 20 mg, 20 mg, Oral, Daily, Manjit Allison MD      Consulting Providers   None    Significant Findings, Care, Treatment and Services Provided:   Chest x-ray:    IMPRESSION:     Right basilar airspace consolidation may represent pneumonia, aspiration and/or atelectasis. Patient being treated for pneumonia per clinical provider note.     Small right pleural effusion.    Complications:  None  Physical Exam:  General Appearance:    Alert, cooperative, no distress   Head:    Normocephalic, without obvious abnormality, atraumatic   Eyes:    PERRL, conjunctiva/corneas clear, EOM's intact       Nose:   Moist mucous membranes, no drainage or sinus tenderness   Throat:   No tenderness, no exudates   Neck:   Supple, symmetrical, trachea midline, no JVD   Lungs:   Diminished breath sounds with faint crackles right base, respirations unlabored   Heart:    Regular rate and rhythm, S1 and S2 normal, no murmur, rub   or gallop   Abdomen: Soft, non-tender, positive bowel sounds, no masses, no organomegaly   Extremities:  No pedal edema, calf tenderness. Distal pulses palpable.   Neurologic:     CNII-XII intact.    Labs:   Lab Results   Component Value Date    WBC 12.33 (H) 04/04/2025    WBC 5.06 09/25/2015    RBC 3.72 (L) 04/04/2025    RBC 4.31 09/25/2015    HGB 12.0 04/04/2025    HGB 12.5 02/05/2019    HCT 35.1 (L) 04/04/2025    HCT 36.5 (L) 02/05/2019    MCV 94 04/04/2025    MCV 94 09/25/2015    MCH 32.3 04/04/2025    MCH 32.9 09/25/2015    RDW 13.1 04/04/2025    RDW 12.5 09/25/2015     04/04/2025     09/25/2015     Lab Results   Component Value Date    CREATININE 0.94 04/04/2025    CREATININE 1.06 02/05/2019     BUN 28 (H) 04/04/2025    BUN 15 02/05/2019     09/25/2015    K 3.9 04/04/2025    K 4.4 02/05/2019     04/04/2025     02/05/2019    CO2 25 04/04/2025    CO2 21 (L) 02/05/2019    GLUCOSE 103 09/25/2015    PROT 7.2 09/25/2015    ALKPHOS 48 04/02/2025    ALKPHOS 71 08/08/2018    ALT 12 04/02/2025     (H) 08/08/2018    AST 17 04/02/2025    AST 55 (H) 08/08/2018    BILIDIR 0.28 (H) 04/02/2025    BILIDIR 0.16 09/25/2015         Discharge Diagnosis:   Principal Problem:    Pneumonia  Active Problems:    Hyperlipidemia    MODE (acute kidney injury) (HCC)    HTN (hypertension)    Acute respiratory failure with hypoxia (HCC)    Sepsis with acute renal failure (HCC)    Bacteremia due to Streptococcus pneumoniae  Resolved Problems:    * No resolved hospital problems. *      Condition at Discharge:   Good     Code Status: Level 1 - Full Code  Advance Directive and Living Will: <no information>  Power of :    POLST:      Discharge instructions/Information to patient and family:   See after visit summary for information provided to patient and family.      Provisions for Follow-Up Care:  See after visit summary for information related to follow-up care and any pertinent home health orders.      Disposition:   Home    Planned Readmission:   No    Discharge Statement   I spent 35 minutes discharging the patient. This time was spent on the day of discharge. I had direct contact with the patient on the day of discharge. Additional documentation is required if more than 30 minutes were spent on discharge. Greater than 50% of the total time was spent examining patient, answering all patient questions, arranging and discussing plan of care with patient as well as directly providing post-discharge instructions. Additional time then spent on discharge activities.    Discharge Medications:  See after visit summary for reconciled discharge medications provided to patient and family.      Manjit Allison  MD  4/4/2025,10:57 AM

## 2025-04-05 LAB
BACTERIA SPT RESP CULT: ABNORMAL
BACTERIA SPT RESP CULT: ABNORMAL
GRAM STN SPEC: ABNORMAL

## 2025-04-06 LAB
BACTERIA BLD CULT: NORMAL

## 2025-04-07 DIAGNOSIS — J18.9 PNEUMONIA: ICD-10-CM

## 2025-04-07 DIAGNOSIS — B95.3 BACTEREMIA DUE TO STREPTOCOCCUS PNEUMONIAE: ICD-10-CM

## 2025-04-07 DIAGNOSIS — G45.9 TIA (TRANSIENT ISCHEMIC ATTACK): ICD-10-CM

## 2025-04-07 DIAGNOSIS — R78.81 BACTEREMIA DUE TO STREPTOCOCCUS PNEUMONIAE: ICD-10-CM

## 2025-04-07 DIAGNOSIS — G45.3 AMAUROSIS FUGAX OF RIGHT EYE: ICD-10-CM

## 2025-04-07 NOTE — UTILIZATION REVIEW
NOTIFICATION OF ADMISSION DISCHARGE   This is a Notification of Discharge from [facility]. Please be advised that this patient has been discharge from our facility. Below you will find the admission and discharge date and time including the patient’s disposition.   UTILIZATION REVIEW CONTACT:  Utilization Review Assistants  Network Utilization Review Department  Phone: [phone] x carefully listen to the prompts. All voicemails are confidential.  Email: [email or web address]     ADMISSION INFORMATION  PRESENTATION DATE: 4/1/2025  6:26 PM  OBERVATION ADMISSION DATE: N/A  INPATIENT ADMISSION DATE: 4/1/25  7:08 PM   DISCHARGE DATE: 4/4/2025  2:30 PM   DISPOSITION:Home/Self Care    Network Utilization Review Department  ATTENTION: Please call with any questions or concerns to [phone] and carefully listen to the prompts so that you are directed to the right person. All voicemails are confidential.   For Discharge needs, contact Care Management DC Support Team at [phone] opt. [phone]  Send all requests for admission clinical reviews, approved or denied determinations and any other requests to dedicated fax number below belonging to the campus where the patient is receiving treatment. List of dedicated fax numbers for the Facilities:  FACILITY NAME UR FAX NUMBER   ADMISSION DENIALS (Administrative/Medical Necessity) [phone]   DISCHARGE SUPPORT TEAM ([facility]) [phone]   PARENT CHILD HEALTH (Maternity/NICU/Pediatrics) [phone]   [facility] [phone]   [facility] [phone]   [facility] [phone]   [facility] [phone]   [facility] [phone]   [facility] [phone]   [facility] [phone]   [facility] [phone]   [facility]  [facility] [phone]   [facility] [phone]   [facility] [phone]   [facility] [phone]

## 2025-04-07 NOTE — CASE MANAGEMENT
Case Management Discharge Planning Note    Patient name Markel Alves  Location /-01 MRN [id number]  : 1950 Date 2025       Current Admission Date: 2025  Current Admission Diagnosis:Pneumonia   Patient Active Problem List    Diagnosis Date Noted Date Diagnosed    Bacteremia due to Streptococcus pneumoniae 2025     Sepsis with acute renal failure (HCC) 2025     Acute respiratory failure with hypoxia (HCC) 2025     Pneumonia 2025     Dyspnea on exertion 2024     Palpitations 2024     Mild intermittent asthma without complication 2024     HTN (hypertension) 2024     Ascending aortic aneurysm (HCC) 2023     Smoking 2020     MODE (acute kidney injury) (HCC) 2019     PVC's (premature ventricular contractions) 2019     History of bilateral knee arthroplasty 2019     Elevated liver enzymes 2017     BPH without urinary obstruction 2016     Tubular adenoma of colon 2015     Asthma 10/09/2014     Hyperlipidemia 2014     Gout 2014     Impaired fasting glucose 2014     Hypertensive CKD (chronic kidney disease) 2014       LOS (days): 3  Geometric Mean LOS (GMLOS) (days): 4.9  Days to GMLOS:2.1     OBJECTIVE:  Risk of Unplanned Readmission Score: 12.17         Current admission status: Inpatient   Preferred Pharmacy:   [id number] - [place], [place] - [address]  [address] [address]  Phone: [phone] Fax: [phone]    [facility] [place], [place] - [address]  [address] [facility] [address]  Phone: [phone] Fax: [phone]    Primary Care Provider: Manjit Allison MD    Primary Insurance: [employer]  REP  Secondary Insurance:     DISCHARGE DETAILS:     CM contacted by [facility] coordinator Barbara SLAUGHTER asking if patient discharged.   [phone] EX:  [phone]

## 2025-04-08 ENCOUNTER — APPOINTMENT (OUTPATIENT)
Dept: LAB | Facility: CLINIC | Age: 75
End: 2025-04-08
Payer: COMMERCIAL

## 2025-04-08 DIAGNOSIS — M1A.9XX0 CHRONIC GOUT INVOLVING TOE OF RIGHT FOOT WITHOUT TOPHUS, UNSPECIFIED CAUSE: ICD-10-CM

## 2025-04-08 DIAGNOSIS — R73.01 IMPAIRED FASTING GLUCOSE: ICD-10-CM

## 2025-04-08 DIAGNOSIS — E78.2 MIXED HYPERLIPIDEMIA: ICD-10-CM

## 2025-04-08 LAB
ALBUMIN SERPL BCG-MCNC: 3.5 G/DL (ref 3.5–5)
ALP SERPL-CCNC: 72 U/L (ref 34–104)
ALT SERPL W P-5'-P-CCNC: 89 U/L (ref 7–52)
ANION GAP SERPL CALCULATED.3IONS-SCNC: 10 MMOL/L (ref 4–13)
ANISOCYTOSIS BLD QL SMEAR: PRESENT
AST SERPL W P-5'-P-CCNC: 44 U/L (ref 13–39)
BACTERIA BLD CULT: NORMAL
BACTERIA BLD CULT: NORMAL
BASOPHILS # BLD MANUAL: 0 THOUSAND/UL (ref 0–0.1)
BASOPHILS NFR MAR MANUAL: 0 % (ref 0–1)
BILIRUB DIRECT SERPL-MCNC: 0.25 MG/DL (ref 0–0.2)
BILIRUB SERPL-MCNC: 0.79 MG/DL (ref 0.2–1)
BUN SERPL-MCNC: 20 MG/DL (ref 5–25)
CALCIUM SERPL-MCNC: 8.8 MG/DL (ref 8.4–10.2)
CHLORIDE SERPL-SCNC: 102 MMOL/L (ref 96–108)
CHOLEST SERPL-MCNC: 84 MG/DL (ref ?–200)
CO2 SERPL-SCNC: 27 MMOL/L (ref 21–32)
CREAT SERPL-MCNC: 1.13 MG/DL (ref 0.6–1.3)
EOSINOPHIL # BLD MANUAL: 0.15 THOUSAND/UL (ref 0–0.4)
EOSINOPHIL NFR BLD MANUAL: 1 % (ref 0–6)
ERYTHROCYTE [DISTWIDTH] IN BLOOD BY AUTOMATED COUNT: 13.2 % (ref 11.6–15.1)
EST. AVERAGE GLUCOSE BLD GHB EST-MCNC: 140 MG/DL
GFR SERPL CREATININE-BSD FRML MDRD: 64 ML/MIN/1.73SQ M
GLUCOSE P FAST SERPL-MCNC: 130 MG/DL (ref 65–99)
HBA1C MFR BLD: 6.5 %
HCT VFR BLD AUTO: 40.6 % (ref 36.5–49.3)
HDLC SERPL-MCNC: 22 MG/DL
HGB BLD-MCNC: 13.6 G/DL (ref 12–17)
LDLC SERPL CALC-MCNC: 33 MG/DL (ref 0–100)
LYMPHOCYTES # BLD AUTO: 1.48 THOUSAND/UL (ref 0.6–4.47)
LYMPHOCYTES # BLD AUTO: 8 % (ref 14–44)
MACROCYTES BLD QL AUTO: PRESENT
MCH RBC QN AUTO: 32.6 PG (ref 26.8–34.3)
MCHC RBC AUTO-ENTMCNC: 33.5 G/DL (ref 31.4–37.4)
MCV RBC AUTO: 97 FL (ref 82–98)
MONOCYTES # BLD AUTO: 0.15 THOUSAND/UL (ref 0–1.22)
MONOCYTES NFR BLD: 1 % (ref 4–12)
NEUTROPHILS # BLD MANUAL: 13.04 THOUSAND/UL (ref 1.85–7.62)
NEUTS SEG NFR BLD AUTO: 88 % (ref 43–75)
NONHDLC SERPL-MCNC: 62 MG/DL
OVALOCYTES BLD QL SMEAR: PRESENT
PLATELET # BLD AUTO: 391 THOUSANDS/UL (ref 149–390)
PLATELET BLD QL SMEAR: ADEQUATE
PMV BLD AUTO: 9.8 FL (ref 8.9–12.7)
POIKILOCYTOSIS BLD QL SMEAR: PRESENT
POLYCHROMASIA BLD QL SMEAR: PRESENT
POTASSIUM SERPL-SCNC: 4.5 MMOL/L (ref 3.5–5.3)
PROT SERPL-MCNC: 6.5 G/DL (ref 6.4–8.4)
RBC # BLD AUTO: 4.17 MILLION/UL (ref 3.88–5.62)
RBC MORPH BLD: PRESENT
SODIUM SERPL-SCNC: 139 MMOL/L (ref 135–147)
TRIGL SERPL-MCNC: 147 MG/DL (ref ?–150)
URATE SERPL-MCNC: 5.9 MG/DL (ref 3.5–8.5)
VARIANT LYMPHS # BLD AUTO: 2 %
WBC # BLD AUTO: 14.82 THOUSAND/UL (ref 4.31–10.16)

## 2025-04-08 PROCEDURE — 85027 COMPLETE CBC AUTOMATED: CPT

## 2025-04-08 PROCEDURE — 80048 BASIC METABOLIC PNL TOTAL CA: CPT

## 2025-04-08 PROCEDURE — 84550 ASSAY OF BLOOD/URIC ACID: CPT

## 2025-04-08 PROCEDURE — 85007 BL SMEAR W/DIFF WBC COUNT: CPT

## 2025-04-08 PROCEDURE — 83036 HEMOGLOBIN GLYCOSYLATED A1C: CPT

## 2025-04-08 PROCEDURE — 80061 LIPID PANEL: CPT

## 2025-04-08 PROCEDURE — 80076 HEPATIC FUNCTION PANEL: CPT

## 2025-04-08 PROCEDURE — 36415 COLL VENOUS BLD VENIPUNCTURE: CPT

## 2025-04-08 RX ORDER — ASPIRIN 81 MG/1
81 TABLET, CHEWABLE ORAL DAILY
Qty: 30 TABLET | Refills: 5 | Status: SHIPPED | OUTPATIENT
Start: 2025-04-08

## 2025-04-09 ENCOUNTER — OFFICE VISIT (OUTPATIENT)
Age: 75
End: 2025-04-09
Payer: COMMERCIAL

## 2025-04-09 VITALS
WEIGHT: 202 LBS | SYSTOLIC BLOOD PRESSURE: 120 MMHG | HEIGHT: 67 IN | HEART RATE: 69 BPM | BODY MASS INDEX: 31.71 KG/M2 | OXYGEN SATURATION: 95 % | DIASTOLIC BLOOD PRESSURE: 76 MMHG

## 2025-04-09 DIAGNOSIS — B95.3 BACTEREMIA DUE TO STREPTOCOCCUS PNEUMONIAE: Primary | ICD-10-CM

## 2025-04-09 DIAGNOSIS — A40.3: ICD-10-CM

## 2025-04-09 DIAGNOSIS — E78.2 MIXED HYPERLIPIDEMIA: ICD-10-CM

## 2025-04-09 DIAGNOSIS — R78.81 BACTEREMIA DUE TO STREPTOCOCCUS PNEUMONIAE: Primary | ICD-10-CM

## 2025-04-09 DIAGNOSIS — R73.01 IMPAIRED FASTING GLUCOSE: ICD-10-CM

## 2025-04-09 DIAGNOSIS — N17.9 AKI (ACUTE KIDNEY INJURY) (HCC): ICD-10-CM

## 2025-04-09 DIAGNOSIS — R74.01 TRANSAMINITIS: ICD-10-CM

## 2025-04-09 DIAGNOSIS — R65.20: ICD-10-CM

## 2025-04-09 DIAGNOSIS — J13 PNEUMONIA OF RIGHT LOWER LOBE DUE TO STREPTOCOCCUS PNEUMONIAE (HCC): ICD-10-CM

## 2025-04-09 DIAGNOSIS — N17.9: ICD-10-CM

## 2025-04-09 DIAGNOSIS — D72.829 LEUKOCYTOSIS, UNSPECIFIED TYPE: ICD-10-CM

## 2025-04-09 PROCEDURE — 99496 TRANSJ CARE MGMT HIGH F2F 7D: CPT | Performed by: INTERNAL MEDICINE

## 2025-04-09 NOTE — ASSESSMENT & PLAN NOTE
A1c up to 6.5, likely contributed to by the stress of the acute illness plus the steroids, will repeat in 6 months

## 2025-04-09 NOTE — LETTER
April 9, 2025     Patient: Markel Alves  YOB: 1950  Date of Visit: 4/9/2025      To Whom it May Concern:    Markel Alves is under my professional care. Markel was seen in my office on 4/9/2025. Markel may return to work on 4/28/25 .    If you have any questions or concerns, please don't hesitate to call.         Sincerely,          Manjit Allison MD        CC: No Recipients

## 2025-04-09 NOTE — PROGRESS NOTES
Transition of Care Visit:  Name: Markel Alves      : 1950      MRN: [id number]  Encounter Provider: Manjit Allison MD  Encounter Date: 2025   Encounter department: [facility]ICINE [address]    Assessment & Plan  Bacteremia due to Streptococcus pneumoniae  Clinically improved, completed 7d abx       Sepsis due to Streptococcus pneumoniae with acute renal failure without septic shock, unspecified acute renal failure type (HCC)  resolved       MODE (acute kidney injury) (HCC)  resolved       Leukocytosis, unspecified type  Likely related to steroids though higher on prednisone than it was with Solu-Medrol in the hospital.  Will repeat CBC in about 3 weeks  Orders:    CBC and differential; Future    Transaminitis  New since hospitalization, perhaps related to medications versus acute illness.  Repeat labs in 3 weeks  Orders:    Basic metabolic panel; Future    Hepatic function panel; Future    Impaired fasting glucose  A1c up to 6.5, likely contributed to by the stress of the acute illness plus the steroids, will repeat in 6 months       Mixed hyperlipidemia  LDL remains at goal on atorvastatin       Pneumonia of right lower lobe due to Streptococcus pneumoniae (HCC)  F/u CXR 1 mo to assure resolution  Orders:    XR chest pa and lateral; Future         History of Present Illness     Transitional Care Management Review:   Markel Alves is a 74 y.o. male here for TCM follow up.     During the TCM phone call patient stated:  TCM Call (since 3/27/2025)       Date and time call was made  2025  3:42 PM    Hospital care reviewed  Records reviewed    Patient was hospitialized at  [facility]    Date of Admission  25    Date of discharge  25    Diagnosis  Pneumonia    Disposition  Home    Were the patients medications reviewed and updated  Yes    Current Symptoms  None          TCM Call (since 3/27/2025)       Post hospital issues  None    Scheduled for follow up?  Yes    Did  you obtain your prescribed medications  Yes    Do you need help managing your prescriptions or medications  No    Is transportation to your appointment needed  No    I have advised the patient to call PCP with any new or worsening symptoms  Rachel Mckenzie, Manager    Living Arrangements  Spouse or Significiant other    Support System  Spouse    The type of support provided  Emotional; Physical    Do you have social support  Yes, as much as I need    Are you recieving home care services  No          Transition of care as well as scheduled routine follow-up lab review  Hospitalized with streptococcal pneumonia April 1 through 4.  This was complicated by MODE which resolved with IV fluids.  Patient was treated with Rocephin and Zithromax in the hospital for 3 days and completed another 4 days of Augmentin.  First 2 days of Augmentin he mistakenly took double dose so course was continued to keep the 7-day duration.  He has been afebrile.  Feeling approximately 80% better.  Still with occasional cough.  Notes some stomach upset presumably related to antibiotic and steroid.  Both are completed today.      Review of Systems   Constitutional:  Negative for appetite change, chills, diaphoresis, fatigue, fever and unexpected weight change.   HENT:  Negative for congestion, hearing loss and rhinorrhea.    Eyes:  Negative for visual disturbance.   Respiratory:  Positive for cough. Negative for chest tightness, shortness of breath and wheezing.    Cardiovascular:  Negative for chest pain, palpitations and leg swelling.   Gastrointestinal:  Negative for abdominal pain and blood in stool.   Endocrine: Negative for cold intolerance, heat intolerance, polydipsia and polyuria.   Genitourinary:  Negative for difficulty urinating, dysuria, frequency and urgency.   Musculoskeletal:  Negative for arthralgias and myalgias.   Skin:  Negative for rash.   Neurological:  Negative for dizziness, weakness, light-headedness and headaches.  "  Hematological:  Does not bruise/bleed easily.   Psychiatric/Behavioral:  Negative for dysphoric mood and sleep disturbance.      Objective   /76 (BP Location: Left arm, Patient Position: Sitting, Cuff Size: Adult)   Pulse 69   Ht 5' 7\" (1.702 m)   Wt 91.6 kg (202 lb)   SpO2 95%   BMI 31.64 kg/m²     Physical Exam  Constitutional:       Appearance: He is well-developed.   HENT:      Head: Normocephalic and atraumatic.      Nose: Nose normal.   Eyes:      General: No scleral icterus.     Conjunctiva/sclera: Conjunctivae normal.      Pupils: Pupils are equal, round, and reactive to light.   Neck:      Thyroid: No thyromegaly.      Vascular: No JVD.      Trachea: No tracheal deviation.   Cardiovascular:      Rate and Rhythm: Normal rate and regular rhythm.      Heart sounds: No murmur heard.     No friction rub. No gallop.   Pulmonary:      Effort: Pulmonary effort is normal. No respiratory distress.      Breath sounds: Normal breath sounds. No wheezing or rales.   Musculoskeletal:         General: No deformity.      Cervical back: Normal range of motion and neck supple.   Lymphadenopathy:      Cervical: No cervical adenopathy.   Skin:     General: Skin is warm and dry.      Coloration: Skin is not pale.      Findings: No erythema or rash.   Neurological:      Mental Status: He is alert and oriented to person, place, and time.      Cranial Nerves: No cranial nerve deficit.   Psychiatric:         Behavior: Behavior normal.         Thought Content: Thought content normal.         Judgment: Judgment normal.       Medications have been reviewed by provider in current encounter      "

## 2025-04-12 ENCOUNTER — APPOINTMENT (EMERGENCY)
Dept: RADIOLOGY | Facility: HOSPITAL | Age: 75
End: 2025-04-12
Payer: COMMERCIAL

## 2025-04-12 ENCOUNTER — TELEMEDICINE (OUTPATIENT)
Dept: OTHER | Facility: HOSPITAL | Age: 75
End: 2025-04-12

## 2025-04-12 ENCOUNTER — APPOINTMENT (EMERGENCY)
Dept: CT IMAGING | Facility: HOSPITAL | Age: 75
End: 2025-04-12
Payer: COMMERCIAL

## 2025-04-12 ENCOUNTER — HOSPITAL ENCOUNTER (INPATIENT)
Facility: HOSPITAL | Age: 75
LOS: 5 days | Discharge: HOME | End: 2025-04-18
Attending: EMERGENCY MEDICINE | Admitting: STUDENT IN AN ORGANIZED HEALTH CARE EDUCATION/TRAINING PROGRAM
Payer: COMMERCIAL

## 2025-04-12 VITALS — TEMPERATURE: 100.6 F | HEART RATE: 62 BPM | OXYGEN SATURATION: 95 %

## 2025-04-12 DIAGNOSIS — R10.11 ABDOMINAL PAIN, ACUTE, RIGHT UPPER QUADRANT: Primary | ICD-10-CM

## 2025-04-12 DIAGNOSIS — R10.31 RLQ ABDOMINAL PAIN: Primary | ICD-10-CM

## 2025-04-12 DIAGNOSIS — J90 PLEURAL EFFUSION ON RIGHT: ICD-10-CM

## 2025-04-12 DIAGNOSIS — I71.20 THORACIC AORTIC ANEURYSM WITHOUT RUPTURE, UNSPECIFIED PART (HCC): ICD-10-CM

## 2025-04-12 DIAGNOSIS — I10 BENIGN ESSENTIAL HYPERTENSION: ICD-10-CM

## 2025-04-12 DIAGNOSIS — J18.9 PNEUMONIA INVOLVING RIGHT LUNG: ICD-10-CM

## 2025-04-12 DIAGNOSIS — N40.0 BPH WITHOUT URINARY OBSTRUCTION: ICD-10-CM

## 2025-04-12 PROBLEM — R07.9 CHEST PAIN: Status: ACTIVE | Noted: 2025-04-12

## 2025-04-12 LAB
2HR DELTA HS TROPONIN: 0 NG/L
ALBUMIN SERPL BCG-MCNC: 3.6 G/DL (ref 3.5–5)
ALP SERPL-CCNC: 64 U/L (ref 34–104)
ALT SERPL W P-5'-P-CCNC: 37 U/L (ref 7–52)
ANION GAP SERPL CALCULATED.3IONS-SCNC: 8 MMOL/L (ref 4–13)
APTT PPP: 30 SECONDS (ref 23–34)
AST SERPL W P-5'-P-CCNC: 16 U/L (ref 13–39)
BACTERIA UR QL AUTO: ABNORMAL /HPF
BASOPHILS # BLD AUTO: 0.05 THOUSANDS/ÂΜL (ref 0–0.1)
BASOPHILS NFR BLD AUTO: 0 % (ref 0–1)
BILIRUB DIRECT SERPL-MCNC: 0.15 MG/DL (ref 0–0.2)
BILIRUB SERPL-MCNC: 0.74 MG/DL (ref 0.2–1)
BILIRUB UR QL STRIP: NEGATIVE
BUN SERPL-MCNC: 20 MG/DL (ref 5–25)
CALCIUM SERPL-MCNC: 9.2 MG/DL (ref 8.4–10.2)
CARDIAC TROPONIN I PNL SERPL HS: 4 NG/L (ref ?–50)
CARDIAC TROPONIN I PNL SERPL HS: 4 NG/L (ref ?–50)
CHLORIDE SERPL-SCNC: 103 MMOL/L (ref 96–108)
CLARITY UR: CLEAR
CO2 SERPL-SCNC: 25 MMOL/L (ref 21–32)
COLOR UR: ABNORMAL
CREAT SERPL-MCNC: 1.04 MG/DL (ref 0.6–1.3)
EOSINOPHIL # BLD AUTO: 0.22 THOUSAND/ÂΜL (ref 0–0.61)
EOSINOPHIL NFR BLD AUTO: 2 % (ref 0–6)
ERYTHROCYTE [DISTWIDTH] IN BLOOD BY AUTOMATED COUNT: 12.9 % (ref 11.6–15.1)
GFR SERPL CREATININE-BSD FRML MDRD: 71 ML/MIN/1.73SQ M
GLUCOSE SERPL-MCNC: 137 MG/DL (ref 65–140)
GLUCOSE UR STRIP-MCNC: NEGATIVE MG/DL
HCT VFR BLD AUTO: 37 % (ref 36.5–49.3)
HGB BLD-MCNC: 12.4 G/DL (ref 12–17)
HGB UR QL STRIP.AUTO: NEGATIVE
IMM GRANULOCYTES # BLD AUTO: 0.11 THOUSAND/UL (ref 0–0.2)
IMM GRANULOCYTES NFR BLD AUTO: 1 % (ref 0–2)
INR PPP: 1.09 (ref 0.85–1.19)
KETONES UR STRIP-MCNC: NEGATIVE MG/DL
LACTATE SERPL-SCNC: 1.3 MMOL/L (ref 0.5–2)
LEUKOCYTE ESTERASE UR QL STRIP: NEGATIVE
LIPASE SERPL-CCNC: 25 U/L (ref 11–82)
LYMPHOCYTES # BLD AUTO: 1.21 THOUSANDS/ÂΜL (ref 0.6–4.47)
LYMPHOCYTES NFR BLD AUTO: 9 % (ref 14–44)
MCH RBC QN AUTO: 32.1 PG (ref 26.8–34.3)
MCHC RBC AUTO-ENTMCNC: 33.5 G/DL (ref 31.4–37.4)
MCV RBC AUTO: 96 FL (ref 82–98)
MONOCYTES # BLD AUTO: 1.05 THOUSAND/ÂΜL (ref 0.17–1.22)
MONOCYTES NFR BLD AUTO: 8 % (ref 4–12)
MUCOUS THREADS UR QL AUTO: ABNORMAL
NEUTROPHILS # BLD AUTO: 10.28 THOUSANDS/ÂΜL (ref 1.85–7.62)
NEUTS SEG NFR BLD AUTO: 80 % (ref 43–75)
NITRITE UR QL STRIP: NEGATIVE
NON-SQ EPI CELLS URNS QL MICRO: ABNORMAL /HPF
NRBC BLD AUTO-RTO: 0 /100 WBCS
PH UR STRIP.AUTO: 6 [PH]
PLATELET # BLD AUTO: 382 THOUSANDS/UL (ref 149–390)
PMV BLD AUTO: 9.3 FL (ref 8.9–12.7)
POTASSIUM SERPL-SCNC: 4.1 MMOL/L (ref 3.5–5.3)
PROCALCITONIN SERPL-MCNC: 0.09 NG/ML
PROT SERPL-MCNC: 6.7 G/DL (ref 6.4–8.4)
PROT UR STRIP-MCNC: ABNORMAL MG/DL
PROTHROMBIN TIME: 14.8 SECONDS (ref 12.3–15)
RBC # BLD AUTO: 3.86 MILLION/UL (ref 3.88–5.62)
RBC #/AREA URNS AUTO: ABNORMAL /HPF
SODIUM SERPL-SCNC: 136 MMOL/L (ref 135–147)
SP GR UR STRIP.AUTO: >=1.05 (ref 1–1.03)
UROBILINOGEN UR STRIP-ACNC: <2 MG/DL
WBC # BLD AUTO: 12.92 THOUSAND/UL (ref 4.31–10.16)
WBC #/AREA URNS AUTO: ABNORMAL /HPF

## 2025-04-12 PROCEDURE — 96374 THER/PROPH/DIAG INJ IV PUSH: CPT

## 2025-04-12 PROCEDURE — 81001 URINALYSIS AUTO W/SCOPE: CPT | Performed by: EMERGENCY MEDICINE

## 2025-04-12 PROCEDURE — 96361 HYDRATE IV INFUSION ADD-ON: CPT

## 2025-04-12 PROCEDURE — 83605 ASSAY OF LACTIC ACID: CPT | Performed by: EMERGENCY MEDICINE

## 2025-04-12 PROCEDURE — 96376 TX/PRO/DX INJ SAME DRUG ADON: CPT

## 2025-04-12 PROCEDURE — 36415 COLL VENOUS BLD VENIPUNCTURE: CPT | Performed by: EMERGENCY MEDICINE

## 2025-04-12 PROCEDURE — 93005 ELECTROCARDIOGRAM TRACING: CPT

## 2025-04-12 PROCEDURE — 99285 EMERGENCY DEPT VISIT HI MDM: CPT | Performed by: EMERGENCY MEDICINE

## 2025-04-12 PROCEDURE — 84484 ASSAY OF TROPONIN QUANT: CPT | Performed by: EMERGENCY MEDICINE

## 2025-04-12 PROCEDURE — 74177 CT ABD & PELVIS W/CONTRAST: CPT

## 2025-04-12 PROCEDURE — 85730 THROMBOPLASTIN TIME PARTIAL: CPT | Performed by: EMERGENCY MEDICINE

## 2025-04-12 PROCEDURE — 87086 URINE CULTURE/COLONY COUNT: CPT | Performed by: EMERGENCY MEDICINE

## 2025-04-12 PROCEDURE — 99223 1ST HOSP IP/OBS HIGH 75: CPT

## 2025-04-12 PROCEDURE — 96375 TX/PRO/DX INJ NEW DRUG ADDON: CPT

## 2025-04-12 PROCEDURE — 80076 HEPATIC FUNCTION PANEL: CPT | Performed by: EMERGENCY MEDICINE

## 2025-04-12 PROCEDURE — 84145 PROCALCITONIN (PCT): CPT | Performed by: EMERGENCY MEDICINE

## 2025-04-12 PROCEDURE — 71045 X-RAY EXAM CHEST 1 VIEW: CPT

## 2025-04-12 PROCEDURE — 80048 BASIC METABOLIC PNL TOTAL CA: CPT | Performed by: EMERGENCY MEDICINE

## 2025-04-12 PROCEDURE — 85610 PROTHROMBIN TIME: CPT | Performed by: EMERGENCY MEDICINE

## 2025-04-12 PROCEDURE — 99285 EMERGENCY DEPT VISIT HI MDM: CPT

## 2025-04-12 PROCEDURE — 83690 ASSAY OF LIPASE: CPT | Performed by: EMERGENCY MEDICINE

## 2025-04-12 PROCEDURE — 87040 BLOOD CULTURE FOR BACTERIA: CPT | Performed by: EMERGENCY MEDICINE

## 2025-04-12 PROCEDURE — 85025 COMPLETE CBC W/AUTO DIFF WBC: CPT | Performed by: EMERGENCY MEDICINE

## 2025-04-12 PROCEDURE — 71275 CT ANGIOGRAPHY CHEST: CPT

## 2025-04-12 RX ORDER — FENTANYL CITRATE 50 UG/ML
75 INJECTION, SOLUTION INTRAMUSCULAR; INTRAVENOUS ONCE
Refills: 0 | Status: COMPLETED | OUTPATIENT
Start: 2025-04-12 | End: 2025-04-12

## 2025-04-12 RX ORDER — MAGNESIUM HYDROXIDE/ALUMINUM HYDROXICE/SIMETHICONE 120; 1200; 1200 MG/30ML; MG/30ML; MG/30ML
30 SUSPENSION ORAL EVERY 6 HOURS PRN
Status: DISCONTINUED | OUTPATIENT
Start: 2025-04-12 | End: 2025-04-18 | Stop reason: HOSPADM

## 2025-04-12 RX ORDER — ALBUTEROL SULFATE 90 UG/1
2 INHALANT RESPIRATORY (INHALATION) EVERY 6 HOURS PRN
Status: DISCONTINUED | OUTPATIENT
Start: 2025-04-12 | End: 2025-04-18 | Stop reason: HOSPADM

## 2025-04-12 RX ORDER — CALCIUM CARBONATE 500 MG/1
1000 TABLET, CHEWABLE ORAL DAILY PRN
Status: DISCONTINUED | OUTPATIENT
Start: 2025-04-12 | End: 2025-04-18 | Stop reason: HOSPADM

## 2025-04-12 RX ORDER — CARVEDILOL 12.5 MG/1
12.5 TABLET ORAL 2 TIMES DAILY WITH MEALS
Status: DISCONTINUED | OUTPATIENT
Start: 2025-04-13 | End: 2025-04-18 | Stop reason: HOSPADM

## 2025-04-12 RX ORDER — GUAIFENESIN 600 MG/1
600 TABLET, EXTENDED RELEASE ORAL EVERY 12 HOURS SCHEDULED
Status: DISCONTINUED | OUTPATIENT
Start: 2025-04-12 | End: 2025-04-18 | Stop reason: HOSPADM

## 2025-04-12 RX ORDER — HEPARIN SODIUM 5000 [USP'U]/ML
5000 INJECTION, SOLUTION INTRAVENOUS; SUBCUTANEOUS EVERY 8 HOURS SCHEDULED
Status: DISCONTINUED | OUTPATIENT
Start: 2025-04-12 | End: 2025-04-18 | Stop reason: HOSPADM

## 2025-04-12 RX ORDER — SODIUM CHLORIDE, SODIUM GLUCONATE, SODIUM ACETATE, POTASSIUM CHLORIDE, MAGNESIUM CHLORIDE, SODIUM PHOSPHATE, DIBASIC, AND POTASSIUM PHOSPHATE .53; .5; .37; .037; .03; .012; .00082 G/100ML; G/100ML; G/100ML; G/100ML; G/100ML; G/100ML; G/100ML
75 INJECTION, SOLUTION INTRAVENOUS CONTINUOUS
Status: DISPENSED | OUTPATIENT
Start: 2025-04-12 | End: 2025-04-13

## 2025-04-12 RX ORDER — ATORVASTATIN CALCIUM 40 MG/1
40 TABLET, FILM COATED ORAL DAILY
Status: DISCONTINUED | OUTPATIENT
Start: 2025-04-13 | End: 2025-04-18 | Stop reason: HOSPADM

## 2025-04-12 RX ORDER — COLCHICINE 0.6 MG/1
0.6 TABLET ORAL DAILY
Status: DISCONTINUED | OUTPATIENT
Start: 2025-04-13 | End: 2025-04-18 | Stop reason: HOSPADM

## 2025-04-12 RX ORDER — ACETAMINOPHEN 325 MG/1
650 TABLET ORAL EVERY 6 HOURS PRN
Status: DISCONTINUED | OUTPATIENT
Start: 2025-04-12 | End: 2025-04-18 | Stop reason: HOSPADM

## 2025-04-12 RX ORDER — AMLODIPINE BESYLATE 5 MG/1
5 TABLET ORAL DAILY
Status: DISCONTINUED | OUTPATIENT
Start: 2025-04-13 | End: 2025-04-13

## 2025-04-12 RX ORDER — MORPHINE SULFATE 4 MG/ML
4 INJECTION, SOLUTION INTRAMUSCULAR; INTRAVENOUS ONCE
Status: COMPLETED | OUTPATIENT
Start: 2025-04-12 | End: 2025-04-12

## 2025-04-12 RX ORDER — ASPIRIN 81 MG/1
81 TABLET, CHEWABLE ORAL DAILY
Status: DISCONTINUED | OUTPATIENT
Start: 2025-04-13 | End: 2025-04-18 | Stop reason: HOSPADM

## 2025-04-12 RX ORDER — HYDROMORPHONE HCL/PF 1 MG/ML
0.5 SYRINGE (ML) INJECTION ONCE
Refills: 0 | Status: DISCONTINUED | OUTPATIENT
Start: 2025-04-12 | End: 2025-04-12

## 2025-04-12 RX ORDER — FLUTICASONE PROPIONATE 50 MCG
1 SPRAY, SUSPENSION (ML) NASAL DAILY
Status: DISCONTINUED | OUTPATIENT
Start: 2025-04-13 | End: 2025-04-18 | Stop reason: HOSPADM

## 2025-04-12 RX ORDER — LISINOPRIL 20 MG/1
20 TABLET ORAL 2 TIMES DAILY
Status: DISCONTINUED | OUTPATIENT
Start: 2025-04-12 | End: 2025-04-18 | Stop reason: HOSPADM

## 2025-04-12 RX ADMIN — IOHEXOL 100 ML: 350 INJECTION, SOLUTION INTRAVENOUS at 17:30

## 2025-04-12 RX ADMIN — MORPHINE SULFATE 4 MG: 4 INJECTION INTRAVENOUS at 17:11

## 2025-04-12 RX ADMIN — SODIUM CHLORIDE 500 ML: 0.9 INJECTION, SOLUTION INTRAVENOUS at 16:35

## 2025-04-12 RX ADMIN — FENTANYL CITRATE 75 MCG: 50 INJECTION INTRAMUSCULAR; INTRAVENOUS at 17:53

## 2025-04-12 RX ADMIN — SODIUM CHLORIDE, SODIUM GLUCONATE, SODIUM ACETATE, POTASSIUM CHLORIDE, MAGNESIUM CHLORIDE, SODIUM PHOSPHATE, DIBASIC, AND POTASSIUM PHOSPHATE 75 ML/HR: .53; .5; .37; .037; .03; .012; .00082 INJECTION, SOLUTION INTRAVENOUS at 22:33

## 2025-04-12 RX ADMIN — FENTANYL CITRATE 75 MCG: 50 INJECTION INTRAMUSCULAR; INTRAVENOUS at 20:44

## 2025-04-12 RX ADMIN — CEFTRIAXONE SODIUM 1000 MG: 10 INJECTION, POWDER, FOR SOLUTION INTRAVENOUS at 21:00

## 2025-04-12 RX ADMIN — LISINOPRIL 20 MG: 20 TABLET ORAL at 22:33

## 2025-04-12 NOTE — PROGRESS NOTES
Virtual Regular VisitName: Markel Alves      : 1950      MRN: [id number]  Encounter Provider: Yayo Delcid PA-C  Encounter Date: 2025   Encounter department: VIRTUAL CARE   :  Assessment & Plan  RLQ abdominal pain    Orders:    Transfer to other facility    Patient will proceed to [facility] ED for further evaluation.  If tests have been ordered our office will contact you with results if changes need to be made to the care plan discussed with you at the visit.  You can review your full results on [facility]  Follow up with PCP in 3-5 days.  Proceed to  ER if symptoms worsen.  If you need to contact care everywhere please call [phone]      History of Present Illness     This is a 74-year-old male with a past medical history of extending aortic aneurysm, high blood pressure, asthma and hyperlipidemia here complaining of fevers since today.  Patient states he was admitted to the hospital on  for pneumonia and was discharged on .  He finished his antibiotics for the pneumonia on .  He notes last night he started with right lower quadrant pain and today started with a fever.  Max temp of 100.6.  He notes he has been using his incentive spirometry.  He denies any nausea, vomiting, diarrhea, shortness of breath or chest pain.  Patient still has an appendix.      Review of Systems   Constitutional:  Positive for fever.   Respiratory:  Negative for shortness of breath.    Cardiovascular:  Negative for chest pain.   Gastrointestinal:  Positive for abdominal pain. Negative for diarrhea, nausea and vomiting.       Objective   Pulse 62   Temp (!) 100.6 °F (38.1 °C)   SpO2 95%     Physical Exam  Constitutional:       Appearance: He is ill-appearing.   HENT:      Nose: Nose normal.   Eyes:      Conjunctiva/sclera: Conjunctivae normal.   Pulmonary:      Effort: Pulmonary effort is normal.      Comments: Patient able to speak in multiple full sentences without needing to break or  pause.    Abdominal:      Tenderness: There is abdominal tenderness in the right lower quadrant.   Skin:     Comments: No rashes noted on head or neck.     Neurological:      General: No focal deficit present.      Mental Status: He is alert and oriented to person, place, and time.   Psychiatric:         Mood and Affect: Mood normal.         Behavior: Behavior normal.         Administrative Statements   Encounter provider Yayo Delcid PA-C    The Patient is located at Home and in the following state in which I hold an active license [place].    The patient was identified by name and date of birth. Markel Alves was informed that this is a telemedicine visit and that the visit is being conducted through the Epic Embedded platform. He agrees to proceed..  My office door was closed. No one else was in the room.  He acknowledged consent and understanding of privacy and security of the video platform. The patient has agreed to participate and understands they can discontinue the visit at any time.    I have spent a total time of 12 minutes in caring for this patient on the day of the visit/encounter including Risks and benefits of tx options, Instructions for management, Patient and family education, Documenting in the medical record, Obtaining or reviewing history  , and Communicating with other healthcare professionals , not including the time spent for establishing the audio/video connection.

## 2025-04-12 NOTE — ED PROVIDER NOTES
Time reflects when diagnosis was documented in both MDM as applicable and the Disposition within this note       Time User Action Codes Description Comment    4/12/2025  8:51 PM Delia Poon Add [R10.11] Abdominal pain, acute, right upper quadrant     4/12/2025  8:51 PM Delia Poon Add [J90] Pleural effusion on right     4/12/2025  8:51 PM Delia Poon Add [J18.9] Pneumonia involving right lung           ED Disposition       ED Disposition   Admit    Condition   Stable    Date/Time   Sat Apr 12, 2025  8:51 PM    Comment   Case was discussed with NURIA and the patient's admission status was agreed to be Admission Status: observation status to the service of Dr. Castellanos .               Assessment & Plan       Medical Decision Making  74-year-old male recently hospitalized for pneumonia and bacteremia status post IV and oral antibiotics and steroids, presents to the ED for acute right lower abdominal pain.  Patient has also been experience chest pain mostly with coughing but last night he had an episode that was not associated with cough.  Patient also reports today he had low-grade fever 100.3 °F for which he took Tylenol prior to coming to the ED.  Patient currently afebrile.  Patient does have tenderness in the right lower abdomen.  Differential diagnosis includes acute appendicitis, nonspecific enteritis or colitis, right-sided diverticulitis, UTI or pyelonephritis, renal colic secondary to passing stone, recurrent or worsening pneumonia, acute PE, less likely aortic dissection but still considered.  Will evaluate with cardiac, abdominal and infectious labs, CTA chest with CT abdomen and pelvis, UA.  Will provide IV fluids, morphine for pain control.    Amount and/or Complexity of Data Reviewed  Labs: ordered. Decision-making details documented in ED Course.  Radiology: ordered and independent interpretation performed. Decision-making details documented in ED Course.  ECG/medicine tests: ordered and  independent interpretation performed. Decision-making details documented in ED Course.    Risk  Prescription drug management.  Decision regarding hospitalization.        ED Course as of 04/12/25 2232   Sat Apr 12, 2025   1701 WBC(!): 12.92  4 days ago, WBC count was 14.82.   1737 hs TnI 0hr: 4   1749 Patient complaining of significant pain in his right mid abdomen.  Will give dose of fentanyl 75 mcg.   1917 Updated patient and wife updated about blood and CT findings. Pain overall improved after the fentanyl. Discussed that we are waiting on UA and then will discuss with [facility] for possible observation.    1956 Bacteria, UA: None Seen   1956 WBC, UA: 1-2       Medications   sodium chloride 0.9 % bolus 500 mL (0 mL Intravenous Stopped 4/12/25 1753)   morphine injection 4 mg (4 mg Intravenous Given 4/12/25 1711)   iohexol (OMNIPAQUE) 350 MG/ML injection (MULTI-DOSE) 100 mL (100 mL Intravenous Given 4/12/25 1730)   fentaNYL injection 75 mcg (75 mcg Intravenous Given 4/12/25 1753)   fentaNYL injection 75 mcg (75 mcg Intravenous Given 4/12/25 2044)   ceftriaxone (ROCEPHIN) 1 g/50 mL in dextrose IVPB (1,000 mg Intravenous New Bag 4/12/25 2100)       ED Risk Strat Scores   HEART Risk Score      Flowsheet Row Most Recent Value   Heart Score Risk Calculator    History 0 Filed at: 04/12/2025 1903   ECG 0 Filed at: 04/12/2025 1903   Age 2 Filed at: 04/12/2025 1903   Risk Factors 2 Filed at: 04/12/2025 1903   Troponin 0 Filed at: 04/12/2025 1903   HEART Score 4 Filed at: 04/12/2025 1903          HEART Risk Score      Flowsheet Row Most Recent Value   Heart Score Risk Calculator    History 0 Filed at: 04/12/2025 1903   ECG 0 Filed at: 04/12/2025 1903   Age 2 Filed at: 04/12/2025 1903   Risk Factors 2 Filed at: 04/12/2025 1903   Troponin 0 Filed at: 04/12/2025 1903   HEART Score 4 Filed at: 04/12/2025 1903                      No data recorded        SBIRT 22yo+      Flowsheet Row Most Recent Value   Initial Alcohol Screen: US  AUDIT-C     1. How often do you have a drink containing alcohol? 0 Filed at: 04/12/2025 1808   2. How many drinks containing alcohol do you have on a typical day you are drinking?  0 Filed at: 04/12/2025 1808   3b. FEMALE Any Age, or MALE 65+: How often do you have 4 or more drinks on one occassion? 0 Filed at: 04/12/2025 1808   Audit-C Score 0 Filed at: 04/12/2025 1808   JOSE: How many times in the past year have you...    Used an illegal drug or used a prescription medication for non-medical reasons? Never Filed at: 04/12/2025 1808          NATALIIA Risk Score      Flowsheet Row Most Recent Value   Age >= 65 1 Filed at: 04/12/2025 2210   Known CAD (stenosis >= 50%) 0 Filed at: 04/12/2025 2210   Recent (<=24 hrs) Service Angina 1 Filed at: 04/12/2025 2210   ST Deviation >= 0.5 mm 0 Filed at: 04/12/2025 2210   3+ CAD Risk Factors (FHx, HTN, HLP, DM, Smoker) 0 Filed at: 04/12/2025 2210   Aspirin Use Past 7 Days 1 Filed at: 04/12/2025 2210   Elevated Cardiac Markers 0 Filed at: 04/12/2025 2210   NATALIIA Risk Score (Calculated) 3 Filed at: 04/12/2025 2210                          History of Present Illness       Chief Complaint   Patient presents with    Abdominal Pain     Pt c/o RLQ pain since last night, radiates to right flank now, denies urinary symptoms, fever at home, last dose Tylenol @ 1415. Was recently discharged on the 4th for pneumonia, still having pain with coughing, finished antibiotics       Past Medical History:   Diagnosis Date    Arthritis     Asthma     Chronic kidney disease     CKD (chronic kidney disease) stage 3, GFR 30-59 ml/min ([facility]) 09/24/2019    Depression     Dyshidrosis     Hypertension     Osteoarthritis     Trigger finger       Past Surgical History:   Procedure Laterality Date    COLONOSCOPY  02/25/2010    COLONOSCOPY  09/2020    HEMORRHOID SURGERY      JOINT REPLACEMENT  9/7/18    REPLACEMENT TOTAL KNEE Left     TONSILLECTOMY      TRIGGER FINGER RELEASE      WISDOM TOOTH EXTRACTION         Family History   Problem Relation Age of Onset    Coronary artery disease Mother     Hyperlipidemia Mother     Hypertension Mother     Stroke Mother     Heart disease Mother     Stroke Father     Hypertension Father     Dementia Father     Asthma Maternal Aunt     Asthma Maternal Uncle     Arthritis Maternal Uncle       Social History     Tobacco Use    Smoking status: Former     Current packs/day: 0.00     Types: Cigarettes     Quit date: 1973     Years since quittin.7    Smokeless tobacco: Never    Tobacco comments:     Occasional cigar smoker   Vaping Use    Vaping status: Never Used   Substance Use Topics    Alcohol use: Yes     Alcohol/week: 6.0 standard drinks of alcohol     Types: 3 Glasses of wine, 2 Cans of beer, 1 Shots of liquor per week     Comment: occasional    Drug use: No      E-Cigarette/Vaping    E-Cigarette Use Never User       E-Cigarette/Vaping Substances    Nicotine No     THC No     CBD No     Flavoring No     Other No     Unknown No       I have reviewed and agree with the history as documented.     Patient is a 74-year-old male with past medical history significant for hypertension, hyperlipidemia, sleep apnea with CPAP dependence, chronic kidney disease, arthritis, depression, ascending aortic aneurysm, recent hospitalization for right lower lobe pneumonia and bacteremia secondary to streptococcus pneumoniae from  through 25 status post 3 days of IV antibiotics and 7 days of Augmentin as well as p.o. prednisone both of which patient finished, presents to the emergency department for chest pain as well as right lower abdominal pain.  Patient states that he was doing well at home since hospital discharge however he still has a mild productive cough of white phlegm.  Wife states that the cough seems to sound different than it was initially when he presented for the pneumonia.  He reports last night he woke up in the middle of the night and felt pain all over his body  including in his chest.  He states he has been having pleuritic chest pain mostly with coughing but last night he got the pain even when not coughing.  He described it as a sharp stabbing pain.  He states he took 2 Tylenol was able to go back to bed.  He states he still has central chest pain only with coughing today but denies the pain that he had last night.  He reports today he noticed pain in his right lower abdomen that seems to be more persistent, also sharp and stabbing in nature.  Denies radiation of the pain.  He also states that today he was feeling chills so he took his temperature and it was 100.3 °F.  He took 2 Tylenol for the low-grade fever approximately 2 hours ago.  He denies any significant headache, dizziness or near syncope, URI symptoms, palpitations, hemoptysis, dyspnea worse than baseline, abdominal distention, nausea, vomiting, diarrhea, constipation, dysuria, change in urinary frequency, hematuria, flank pain, skin rash or color change, leg pain or swelling, new focal neurologic deficits.      History provided by:  Patient and medical records   used: No    Abdominal Pain  Associated symptoms: chest pain, chills, cough and fever    Associated symptoms: no constipation, no diarrhea, no dysuria, no hematuria, no nausea, no shortness of breath, no sore throat and no vomiting        Review of Systems   Constitutional:  Positive for chills and fever.   HENT:  Negative for congestion, ear pain, rhinorrhea and sore throat.    Respiratory:  Positive for cough. Negative for chest tightness, shortness of breath and wheezing.    Cardiovascular:  Positive for chest pain. Negative for palpitations.   Gastrointestinal:  Positive for abdominal pain. Negative for abdominal distention, blood in stool, constipation, diarrhea, nausea and vomiting.   Genitourinary:  Negative for dysuria, flank pain, frequency and hematuria.   Musculoskeletal:  Negative for back pain, neck pain and neck  stiffness.   Skin:  Negative for color change, pallor, rash and wound.   Allergic/Immunologic: Negative for immunocompromised state.   Neurological:  Negative for dizziness, syncope, weakness, light-headedness, numbness and headaches.   Hematological:  Negative for adenopathy.   Psychiatric/Behavioral:  Negative for confusion and decreased concentration.    All other systems reviewed and are negative.          Objective       ED Triage Vitals   Temperature Pulse Blood Pressure Respirations SpO2 Patient Position - Orthostatic VS   04/12/25 1602 04/12/25 1602 04/12/25 1602 04/12/25 1602 04/12/25 1602 04/12/25 1602   98.1 °F (36.7 °C) 78 108/64 14 96 % Sitting      Temp Source Heart Rate Source BP Location FiO2 (%) Pain Score    04/12/25 1602 04/12/25 1602 04/12/25 1602 -- 04/12/25 1711    Oral Monitor Left arm  8      Vitals      Date and Time Temp Pulse SpO2 Resp BP Pain Score FACES Pain Rating User   04/12/25 2219 -- -- -- -- -- 7 -- TT   04/12/25 2219 98 °F (36.7 °C) 68 95 % -- 148/89 -- -- DII   04/12/25 2100 -- 69 95 % 20 110/68 -- --    04/12/25 2051 99.2 °F (37.3 °C) -- -- -- -- -- --    04/12/25 2044 -- -- -- -- -- 8 --    04/12/25 2030 -- 66 95 % 20 147/79 -- --    04/12/25 1830 -- 68 92 % 21 117/70 -- --    04/12/25 1753 -- -- -- -- -- 10 - Worst Possible Pain --    04/12/25 1711 -- -- -- -- -- 8 --    04/12/25 1602 98.1 °F (36.7 °C) 78 96 % 14 108/64 -- -- AG            Physical Exam  Vitals and nursing note reviewed.   Constitutional:       General: He is not in acute distress.     Appearance: Normal appearance. He is well-developed. He is not ill-appearing, toxic-appearing or diaphoretic.   HENT:      Head: Normocephalic and atraumatic.      Right Ear: External ear normal.      Left Ear: External ear normal.      Nose: Nose normal.      Mouth/Throat:      Mouth: Mucous membranes are moist.      Pharynx: Oropharynx is clear.   Eyes:      Extraocular Movements: Extraocular movements intact.       Conjunctiva/sclera: Conjunctivae normal.   Neck:      Vascular: No JVD.   Cardiovascular:      Rate and Rhythm: Normal rate and regular rhythm.      Pulses: Normal pulses.      Heart sounds: Normal heart sounds. No murmur heard.     No friction rub. No gallop.   Pulmonary:      Effort: Pulmonary effort is normal. No respiratory distress.      Breath sounds: No stridor. Rhonchi present. No wheezing or rales.      Comments: Inspiratory rhonchi heard in bilateral lung bases.  Good air movement.  No wheezing.  Abdominal:      General: There is no distension.      Palpations: Abdomen is soft.      Tenderness: There is abdominal tenderness. There is no guarding or rebound.      Comments: Mild tenderness in the right upper quadrant.  Moderate tenderness in the right lower quadrant.   Musculoskeletal:         General: No swelling or tenderness. Normal range of motion.      Cervical back: Normal range of motion and neck supple. No rigidity.   Skin:     General: Skin is warm and dry.      Coloration: Skin is not pale.      Findings: No erythema or rash.   Neurological:      General: No focal deficit present.      Mental Status: He is alert and oriented to person, place, and time.      Sensory: No sensory deficit.      Motor: No weakness.   Psychiatric:         Mood and Affect: Mood normal.         Behavior: Behavior normal.         Results Reviewed       Procedure Component Value Units Date/Time    Urine Microscopic [[id number]]  (Abnormal) Collected: 04/12/25 1903    Lab Status: Final result Specimen: Urine, Clean Catch Updated: 04/1952     RBC, UA 1-2 /hpf      WBC, UA 1-2 /hpf      Epithelial Cells None Seen /hpf      Bacteria, UA None Seen /hpf      MUCUS THREADS Occasional    Procalcitonin [[id number]]  (Normal) Collected: 04/12/25 1630    Lab Status: Final result Specimen: Blood from Arm, Right Updated: 04/12/25 1948     Procalcitonin 0.09 ng/ml     UA (URINE) with reflex to Scope [[id number]]  (Abnormal)  Collected: 04/12/25 1903    Lab Status: Final result Specimen: Urine, Clean Catch Updated: 04/12/25 1929     Color, UA Light Yellow     Clarity, UA Clear     Specific Gravity, UA >=1.050     pH, UA 6.0     Leukocytes, UA Negative     Nitrite, UA Negative     Protein, UA Trace mg/dl      Glucose, UA Negative mg/dl      Ketones, UA Negative mg/dl      Urobilinogen, UA <2.0 mg/dl      Bilirubin, UA Negative     Occult Blood, UA Negative    Urine culture [[id number]] Collected: 04/12/25 1903    Lab Status: In process Specimen: Urine, Clean Catch Updated: 04/12/25 1906    HS Troponin I 2hr [[id number]]  (Normal) Collected: 04/12/25 1831    Lab Status: Final result Specimen: Blood from Arm, Right Updated: 04/12/25 1901     hs TnI 2hr 4 ng/L      Delta 2hr hsTnI 0 ng/L     HS Troponin 0hr (reflex protocol) [[id number]]  (Normal) Collected: 04/12/25 1630    Lab Status: Final result Specimen: Blood from Arm, Right Updated: 04/12/25 1722     hs TnI 0hr 4 ng/L     Basic metabolic panel [[id number]] Collected: 04/12/25 1630    Lab Status: Final result Specimen: Blood from Arm, Right Updated: 04/12/25 1716     Sodium 136 mmol/L      Potassium 4.1 mmol/L      Chloride 103 mmol/L      CO2 25 mmol/L      ANION GAP 8 mmol/L      BUN 20 mg/dL      Creatinine 1.04 mg/dL      Glucose 137 mg/dL      Calcium 9.2 mg/dL      eGFR 71 ml/min/1.73sq m     Narrative:      [employer] Kid[employer] guidelines for Chronic Kidney Disease (CKD):     Stage 1 with normal or high GFR (GFR > 90 mL/min/1.73 square meters)    Stage 2 Mild CKD (GFR = 60-89 mL/min/1.73 square meters)    Stage 3A Moderate CKD (GFR = 45-59 mL/min/1.73 square meters)    Stage 3B Moderate CKD (GFR = 30-44 mL/min/1.73 square meters)    Stage 4 Severe CKD (GFR = 15-29 mL/min/1.73 square meters)    Stage 5 End Stage CKD (GFR <15 mL/min/1.73 square meters)  Note: GFR calculation is accurate only with a steady state creatinine    Hepatic function panel [[id number]]  (Normal)  Collected: 04/12/25 1630    Lab Status: Final result Specimen: Blood from Arm, Right Updated: 04/12/25 1716     Total Bilirubin 0.74 mg/dL      Bilirubin, Direct 0.15 mg/dL      Alkaline Phosphatase 64 U/L      AST 16 U/L      ALT 37 U/L      Total Protein 6.7 g/dL      Albumin 3.6 g/dL     Lipase [[id number]]  (Normal) Collected: 04/12/25 1630    Lab Status: Final result Specimen: Blood from Arm, Right Updated: 04/12/25 1716     Lipase 25 u/L     Lactic acid, plasma (w/reflex if result > 2.0) [[id number]]  (Normal) Collected: 04/12/25 1630    Lab Status: Final result Specimen: Blood from Arm, Right Updated: 04/12/25 1715     LACTIC ACID 1.3 mmol/L     Narrative:      Result may be elevated if tourniquet was used during collection.    Protime-INR [[id number]]  (Normal) Collected: 04/12/25 1630    Lab Status: Final result Specimen: Blood from Arm, Right Updated: 04/12/25 1711     Protime 14.8 seconds      INR 1.09    Narrative:      INR Therapeutic Range    Indication                                             INR Range      Atrial Fibrillation                                               2.0-3.0  Hypercoagulable State                                    2.0.2.3  Left Ventricular Asist Device                            2.0-3.0  Mechanical Heart Valve                                  -    Aortic(with afib, MI, embolism, HF, LA enlargement,    and/or coagulopathy)                                     2.0-3.0 (2.5-3.5)     Mitral                                                             2.5-3.5  Prosthetic/Bioprosthetic Heart Valve               2.0-3.0  Venous thromboembolism (VTE: VT, PE        2.0-3.0    APTT [[id number]]  (Normal) Collected: 04/12/25 1630    Lab Status: Final result Specimen: Blood from Arm, Right Updated: 04/12/25 1711     PTT 30 seconds     CBC and differential [[id number]]  (Abnormal) Collected: 04/12/25 1630    Lab Status: Final result Specimen: Blood from Arm, Right Updated: 04/12/25 1658     WBC  12.92 Thousand/uL      RBC 3.86 Million/uL      Hemoglobin 12.4 g/dL      Hematocrit 37.0 %      MCV 96 fL      MCH 32.1 pg      MCHC 33.5 g/dL      RDW 12.9 %      MPV 9.3 fL      Platelets 382 Thousands/uL      nRBC 0 /100 WBCs      Segmented % 80 %      Immature Grans % 1 %      Lymphocytes % 9 %      Monocytes % 8 %      Eosinophils Relative 2 %      Basophils Relative 0 %      Absolute Neutrophils 10.28 Thousands/µL      Absolute Immature Grans 0.11 Thousand/uL      Absolute Lymphocytes 1.21 Thousands/µL      Absolute Monocytes 1.05 Thousand/µL      Eosinophils Absolute 0.22 Thousand/µL      Basophils Absolute 0.05 Thousands/µL     Blood culture #1 [[id number]] Collected: 04/12/25 1629    Lab Status: In process Specimen: Blood from Arm, Left Updated: 04/12/25 1657    Blood culture #2 [[id number]] Collected: 04/12/25 1630    Lab Status: In process Specimen: Blood from Hand, Right Updated: 04/12/25 1656            CT pe study w abdomen pelvis w contrast   Final Interpretation by Feliicty Ochoa MD (04/12 1815)      CHEST:      1) No acute pulmonary embolism.      2) New moderate right pleural effusion and large right lower lobe airspace consolidation, which likely represents atelectasis. Superimposed pneumonia cannot be excluded. Mild groundglass opacities in the dependent portions of the right upper and middle    lobes, may also represent atelectasis versus infection.      3) Unchanged 4.4 x 4.2 cm fusiform ectasia of ascending thoracic aorta with no aortic dissection.      4) Dilatation of the main pulmonary artery up to 4 cm, which may be seen in the setting of pulmonary arterial hypertension.      ABDOMEN/PELVIS:      5) Mild circumferential wall thickening of the urinary bladder, which may be related to underdistention versus cystitis.      6) No other acute abdominal or pelvic pathology.      7) No bowel obstruction. Normal appendix. Evaluation for bowel inflammation elsewhere somewhat limited by  underdistention and lack of oral contrast, however no convincing inflammatory changes. Please note mild inflammation may not be apparent.      8) Additional findings as above.                  Workstation performed: [id number]         XR chest 1 view portable   ED Interpretation by Delia Poon DO (04/12 1738)   Slight improvement in right sided pneumonia.          ECG 12 Lead Documentation Only    Date/Time: 4/12/2025 4:39 PM    Performed by: Delia Poon DO  Authorized by: Delia Poon DO    ECG reviewed by me, the ED Provider: yes    Patient location:  ED  Previous ECG:     Previous ECG:  Compared to current    Comparison ECG info:  4-1-2025    Similarity:  No change  Rate:     ECG rate:  71    ECG rate assessment: normal    Rhythm:     Rhythm: sinus rhythm    Ectopy:     Ectopy: none    QRS:     QRS axis:  Normal    QRS intervals:  Normal  Conduction:     Conduction: normal    ST segments:     ST segments:  Normal  T waves:     T waves: normal    ECG 12 Lead Documentation Only    Date/Time: 4/12/2025 5:46 PM    Performed by: Delia Poon DO  Authorized by: Delia Poon DO    ECG reviewed by me, the ED Provider: yes    Patient location:  ED  Previous ECG:     Previous ECG:  Compared to current    Similarity:  No change  Rate:     ECG rate:  72    ECG rate assessment: normal    Rhythm:     Rhythm: sinus rhythm    Ectopy:     Ectopy: none    QRS:     QRS axis:  Normal    QRS intervals:  Normal  Conduction:     Conduction: normal    ST segments:     ST segments:  Normal  T waves:     T waves: normal        ED Medication and Procedure Management   Prior to Admission Medications   Prescriptions Last Dose Informant Patient Reported? Taking?   LORazepam (ATIVAN) 1 mg tablet  Self No No   Sig: Take 1 tablet (1 mg total) by mouth as needed for sleep   acetaminophen (TYLENOL) 325 mg tablet  Self Yes No   Sig: Take 650 mg by mouth every 6 (six) hours as needed for  mild pain   albuterol (Ventolin HFA) 90 mcg/act inhaler  Self No No   Sig: Inhale 2 puffs every 6 (six) hours as needed for wheezing   amLODIPine (NORVASC) 5 mg tablet   No No   Sig: take 1 tablet by mouth once daily   aspirin 81 mg chewable tablet   No No   Sig: chew and swallow 1 tablet by mouth once daily   atorvastatin (LIPITOR) 40 mg tablet   No No   Sig: take 1 tablet by mouth once daily   carvedilol (COREG) 12.5 mg tablet   No No   Sig: take 2 tablets by mouth twice a day with meals   colchicine (COLCRYS) 0.6 mg tablet  Self No No   Si po qd, up to tid for acute flare.   fluticasone (FLONASE) 50 mcg/act nasal spray  Self No No   Si spray into each nostril daily   guaiFENesin (MUCINEX) 600 mg 12 hr tablet   No No   Sig: Take 1 tablet (600 mg total) by mouth every 12 (twelve) hours   ipratropium-albuterol (DUO-NEB) 0.5-2.5 mg/3 mL nebulizer solution  Self No No   Sig: Take 3 mL by nebulization 3 (three) times a day   lisinopril (ZESTRIL) 20 mg tablet   No No   Sig: Take 1 tablet (20 mg total) by mouth 2 (two) times a day   montelukast (SINGULAIR) 10 mg tablet   No No   Sig: take 1 tablet by mouth every evening   triamcinolone (KENALOG) 0.1 % cream   No No   Sig: Apply topically 2 (two) times a day To the rash on the chest as needed.      Facility-Administered Medications: None     Current Discharge Medication List        CONTINUE these medications which have NOT CHANGED    Details   acetaminophen (TYLENOL) 325 mg tablet Take 650 mg by mouth every 6 (six) hours as needed for mild pain      albuterol (Ventolin HFA) 90 mcg/act inhaler Inhale 2 puffs every 6 (six) hours as needed for wheezing  Qty: 18 g, Refills: 2    Comments: Substitution to a formulary equivalent within the same pharmaceutical class is authorized.  Associated Diagnoses: Mild intermittent asthma without complication      amLODIPine (NORVASC) 5 mg tablet take 1 tablet by mouth once daily  Qty: 90 tablet, Refills: 3    Associated Diagnoses:  Benign essential hypertension      aspirin 81 mg chewable tablet chew and swallow 1 tablet by mouth once daily  Qty: 30 tablet, Refills: 5    Associated Diagnoses: TIA (transient ischemic attack); Amaurosis fugax of right eye      atorvastatin (LIPITOR) 40 mg tablet take 1 tablet by mouth once daily  Qty: 90 tablet, Refills: 3    Associated Diagnoses: Benign essential hypertension      carvedilol (COREG) 12.5 mg tablet take 2 tablets by mouth twice a day with meals  Qty: 360 tablet, Refills: 1    Associated Diagnoses: Thoracic aortic aneurysm without rupture, unspecified part (HCC)      colchicine (COLCRYS) 0.6 mg tablet 1 po qd, up to tid for acute flare.  Qty: 90 tablet, Refills: 5    Associated Diagnoses: Gout      fluticasone (FLONASE) 50 mcg/act nasal spray 1 spray into each nostril daily  Qty: 16 g, Refills: 3    Associated Diagnoses: Subacute cough      guaiFENesin (MUCINEX) 600 mg 12 hr tablet Take 1 tablet (600 mg total) by mouth every 12 (twelve) hours    Associated Diagnoses: Pneumonia; Bacteremia due to Streptococcus pneumoniae      ipratropium-albuterol (DUO-NEB) 0.5-2.5 mg/3 mL nebulizer solution Take 3 mL by nebulization 3 (three) times a day  Qty: 120 mL, Refills: 1    Associated Diagnoses: Upper respiratory tract infection, unspecified type      lisinopril (ZESTRIL) 20 mg tablet Take 1 tablet (20 mg total) by mouth 2 (two) times a day  Qty: 180 tablet, Refills: 3    Associated Diagnoses: Essential hypertension      LORazepam (ATIVAN) 1 mg tablet Take 1 tablet (1 mg total) by mouth as needed for sleep  Qty: 30 tablet, Refills: 0    Associated Diagnoses: Anxiety      montelukast (SINGULAIR) 10 mg tablet take 1 tablet by mouth every evening  Qty: 330 tablet, Refills: 0    Associated Diagnoses: Allergic rhinitis due to other allergic trigger, unspecified seasonality      triamcinolone (KENALOG) 0.1 % cream Apply topically 2 (two) times a day To the rash on the chest as needed.  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  ED SEPSIS DOCUMENTATION   Time reflects when diagnosis was documented in both MDM as applicable and the Disposition within this note       Time User Action Codes Description Comment    4/12/2025  8:51 PM Delia Poon [R10.11] Abdominal pain, acute, right upper quadrant     4/12/2025  8:51 PM Delia Poon [J90] Pleural effusion on right     4/12/2025  8:51 PM Delia Poon [J18.9] Pneumonia involving right lung                  Delia Poon DO  04/12/25 9610

## 2025-04-13 LAB
ALBUMIN SERPL BCG-MCNC: 3.4 G/DL (ref 3.5–5)
ALP SERPL-CCNC: 75 U/L (ref 34–104)
ALT SERPL W P-5'-P-CCNC: 30 U/L (ref 7–52)
ANION GAP SERPL CALCULATED.3IONS-SCNC: 7 MMOL/L (ref 4–13)
AST SERPL W P-5'-P-CCNC: 13 U/L (ref 13–39)
BASOPHILS # BLD AUTO: 0.06 THOUSANDS/ÂΜL (ref 0–0.1)
BASOPHILS NFR BLD AUTO: 1 % (ref 0–1)
BILIRUB SERPL-MCNC: 0.63 MG/DL (ref 0.2–1)
BUN SERPL-MCNC: 15 MG/DL (ref 5–25)
CALCIUM ALBUM COR SERPL-MCNC: 9.3 MG/DL (ref 8.3–10.1)
CALCIUM SERPL-MCNC: 8.8 MG/DL (ref 8.4–10.2)
CHLORIDE SERPL-SCNC: 104 MMOL/L (ref 96–108)
CO2 SERPL-SCNC: 25 MMOL/L (ref 21–32)
CREAT SERPL-MCNC: 1.02 MG/DL (ref 0.6–1.3)
CRP SERPL QL: 118.7 MG/L
EOSINOPHIL # BLD AUTO: 0.19 THOUSAND/ÂΜL (ref 0–0.61)
EOSINOPHIL NFR BLD AUTO: 2 % (ref 0–6)
ERYTHROCYTE [DISTWIDTH] IN BLOOD BY AUTOMATED COUNT: 13 % (ref 11.6–15.1)
ERYTHROCYTE [SEDIMENTATION RATE] IN BLOOD: 73 MM/HOUR (ref 0–19)
GFR SERPL CREATININE-BSD FRML MDRD: 73 ML/MIN/1.73SQ M
GLUCOSE P FAST SERPL-MCNC: 107 MG/DL (ref 65–99)
GLUCOSE SERPL-MCNC: 107 MG/DL (ref 65–140)
HCT VFR BLD AUTO: 35.4 % (ref 36.5–49.3)
HGB BLD-MCNC: 11.5 G/DL (ref 12–17)
IMM GRANULOCYTES # BLD AUTO: 0.1 THOUSAND/UL (ref 0–0.2)
IMM GRANULOCYTES NFR BLD AUTO: 1 % (ref 0–2)
LYMPHOCYTES # BLD AUTO: 1.45 THOUSANDS/ÂΜL (ref 0.6–4.47)
LYMPHOCYTES NFR BLD AUTO: 13 % (ref 14–44)
MAGNESIUM SERPL-MCNC: 2.1 MG/DL (ref 1.9–2.7)
MCH RBC QN AUTO: 31.8 PG (ref 26.8–34.3)
MCHC RBC AUTO-ENTMCNC: 32.5 G/DL (ref 31.4–37.4)
MCV RBC AUTO: 98 FL (ref 82–98)
MONOCYTES # BLD AUTO: 1.25 THOUSAND/ÂΜL (ref 0.17–1.22)
MONOCYTES NFR BLD AUTO: 11 % (ref 4–12)
NEUTROPHILS # BLD AUTO: 7.87 THOUSANDS/ÂΜL (ref 1.85–7.62)
NEUTS SEG NFR BLD AUTO: 72 % (ref 43–75)
NRBC BLD AUTO-RTO: 0 /100 WBCS
PHOSPHATE SERPL-MCNC: 3.8 MG/DL (ref 2.3–4.1)
PLATELET # BLD AUTO: 355 THOUSANDS/UL (ref 149–390)
PMV BLD AUTO: 9.5 FL (ref 8.9–12.7)
POTASSIUM SERPL-SCNC: 4.1 MMOL/L (ref 3.5–5.3)
PROT SERPL-MCNC: 6.3 G/DL (ref 6.4–8.4)
RBC # BLD AUTO: 3.62 MILLION/UL (ref 3.88–5.62)
SODIUM SERPL-SCNC: 136 MMOL/L (ref 135–147)
WBC # BLD AUTO: 10.92 THOUSAND/UL (ref 4.31–10.16)

## 2025-04-13 PROCEDURE — 85652 RBC SED RATE AUTOMATED: CPT

## 2025-04-13 PROCEDURE — 85025 COMPLETE CBC W/AUTO DIFF WBC: CPT

## 2025-04-13 PROCEDURE — 87449 NOS EACH ORGANISM AG IA: CPT

## 2025-04-13 PROCEDURE — 83735 ASSAY OF MAGNESIUM: CPT

## 2025-04-13 PROCEDURE — 99232 SBSQ HOSP IP/OBS MODERATE 35: CPT | Performed by: STUDENT IN AN ORGANIZED HEALTH CARE EDUCATION/TRAINING PROGRAM

## 2025-04-13 PROCEDURE — 86140 C-REACTIVE PROTEIN: CPT

## 2025-04-13 PROCEDURE — 84100 ASSAY OF PHOSPHORUS: CPT

## 2025-04-13 PROCEDURE — 80053 COMPREHEN METABOLIC PANEL: CPT

## 2025-04-13 RX ORDER — HYDROMORPHONE HCL/PF 1 MG/ML
0.5 SYRINGE (ML) INJECTION EVERY 4 HOURS PRN
Status: DISCONTINUED | OUTPATIENT
Start: 2025-04-13 | End: 2025-04-18 | Stop reason: HOSPADM

## 2025-04-13 RX ORDER — OXYCODONE HYDROCHLORIDE 5 MG/1
5 TABLET ORAL EVERY 4 HOURS PRN
Refills: 0 | Status: DISCONTINUED | OUTPATIENT
Start: 2025-04-13 | End: 2025-04-15

## 2025-04-13 RX ADMIN — LISINOPRIL 20 MG: 20 TABLET ORAL at 17:36

## 2025-04-13 RX ADMIN — OXYCODONE HYDROCHLORIDE 5 MG: 5 TABLET ORAL at 13:49

## 2025-04-13 RX ADMIN — OXYCODONE HYDROCHLORIDE 5 MG: 5 TABLET ORAL at 02:11

## 2025-04-13 RX ADMIN — OXYCODONE HYDROCHLORIDE 5 MG: 5 TABLET ORAL at 08:49

## 2025-04-13 RX ADMIN — CARVEDILOL 12.5 MG: 12.5 TABLET, FILM COATED ORAL at 17:36

## 2025-04-13 RX ADMIN — ATORVASTATIN CALCIUM 40 MG: 40 TABLET, FILM COATED ORAL at 08:37

## 2025-04-13 RX ADMIN — AMLODIPINE BESYLATE 5 MG: 5 TABLET ORAL at 08:37

## 2025-04-13 RX ADMIN — ASPIRIN 81 MG: 81 TABLET, CHEWABLE ORAL at 08:37

## 2025-04-13 RX ADMIN — CEFTRIAXONE SODIUM 1000 MG: 10 INJECTION, POWDER, FOR SOLUTION INTRAVENOUS at 21:29

## 2025-04-13 RX ADMIN — HYDROMORPHONE HYDROCHLORIDE 0.5 MG: 1 INJECTION, SOLUTION INTRAMUSCULAR; INTRAVENOUS; SUBCUTANEOUS at 17:36

## 2025-04-13 RX ADMIN — HYDROMORPHONE HYDROCHLORIDE 0.5 MG: 1 INJECTION, SOLUTION INTRAMUSCULAR; INTRAVENOUS; SUBCUTANEOUS at 04:57

## 2025-04-13 RX ADMIN — COLCHICINE 0.6 MG: 0.6 TABLET ORAL at 08:37

## 2025-04-13 RX ADMIN — LISINOPRIL 20 MG: 20 TABLET ORAL at 08:37

## 2025-04-13 RX ADMIN — HEPARIN SODIUM 5000 UNITS: 5000 INJECTION INTRAVENOUS; SUBCUTANEOUS at 16:25

## 2025-04-13 RX ADMIN — CARVEDILOL 12.5 MG: 12.5 TABLET, FILM COATED ORAL at 08:26

## 2025-04-13 RX ADMIN — HEPARIN SODIUM 5000 UNITS: 5000 INJECTION INTRAVENOUS; SUBCUTANEOUS at 06:11

## 2025-04-13 RX ADMIN — GUAIFENESIN 600 MG: 600 TABLET ORAL at 17:49

## 2025-04-13 NOTE — PROGRESS NOTES
Progress Note - Hospitalist   Name: Markel Alves 74 y.o. male I MRN: [id number]  Unit/Bed#: -01 I Date of Admission: 4/12/2025   Date of Service: 4/13/2025 I Hospital Day: 0    Assessment & Plan  Chest pain  Patient presented to ED with complaints of chest pain and right lower quadrant abdominal pain started last night, radiates to right flank.  Patient had elevated temperature at home and last dose of Tylenol was at 2:15 PM.  Patient was recently hospitalized for right lower lobe pneumonia and bacteremia requiring 3 days of IV antibiotics, and patient was sent home on Augmentin for 7 days and steroids of which he had completed.  Current vital signs: /89, HR 68, RR 20, temp 98, oxygen saturation 95% on room air.  ED provider gave fentanyl, morphine, Rocephin and bolus of IV fluids.  EKG-sinus rhythm.  Troponin 4-4, pending third troponin.  Delta 0.  CT PE abdomen pelvis-no PE, New moderate right pleural effusion and large right lower lobe airspace consolidation, which likely represents atelectasis. Superimposed pneumonia cannot be excluded. Mild groundglass opacities in the dependent portions of the right upper and middle lobes, may also represent atelectasis versus infection. Unchanged 4.4 x 4.2 cm fusiform ectasia of ascending thoracic aorta with no aortic dissection. Dilatation of the main pulmonary artery up to 4 cm, which may be seen in the setting of pulmonary arterial hypertension. Mild circumferential wall thickening of the urinary bladder, which may be related to underdistention versus cystitis.No other acute abdominal or pelvic pathology.  No bowel obstruction. Normal appendix. Evaluation for bowel inflammation elsewhere somewhat limited by underdistention and lack of oral contrast, however no convincing inflammatory changes. Please note mild inflammation may not be apparent.  NATALIIA-3.  WBC-12.82.  Blood cultures-pending.    Plan  Rocephin to be continued for PNA with concern for parapneumonic  effusion   Monitor for chest pain.  Telemetry ordered.  Monitor vital signs.  Continuous oxygen monitoring.  Mild IV fluid hydration for 10 hours.  Sed rate and CRP ordered.  Pulm consult  Will likely require IR consult, but pt and wife want to talk to PCP Dr. Allison first who will be returning tomorrow    Pleural effusion  Patient denies shortness of breath.  Current oxygen saturation in mid 90s on room air.  CT PE abdomen pelvis-no PE, New moderate right pleural effusion and large right lower lobe airspace consolidation, which likely represents atelectasis.  Respiratory protocol.  Continuous oxygen monitoring.  Pulm consultation   Suspect will need IR consult, however they would prefer to discuss with their PCP Dr. Allison before any procedures so this was deferred for today  Ctx   Asthma  Continue Proventil inhaler.  HTN (hypertension)  Patient's current /89, HR 68.  Blood pressure and heart rate stable.  Monitor BP and HR.  Continue amlodipine, Coreg and lisinopril.  Cardiac diet.  Hyperlipidemia  Continue atorvastatin.  BPH without urinary obstruction  Urinalysis negative.  Urine culture pending.  Urinary protocol.    VTE Pharmacologic Prophylaxis: VTE Score: 4 Moderate Risk (Score 3-4) - Pharmacological DVT Prophylaxis Ordered: heparin.    Mobility:   Basic Mobility Inpatient Raw Score: 24  JH-HLM Goal: 8: Walk 250 feet or more  JH-HLM Achieved: 8: Walk 250 feet ot more  JH-HLM Goal achieved. Continue to encourage appropriate mobility.    Patient Centered Rounds: I performed bedside rounds with nursing staff today.   Discussions with Specialists or Other Care Team Provider: none    Education and Discussions with Family / Patient: Updated  (wife) at bedside.    Current Length of Stay: 0 day(s)  Current Patient Status: Observation   Certification Statement: The patient will continue to require additional inpatient hospital stay due to pleural effusion, PNA  Discharge Plan: Anticipate discharge  in 48-72 hrs to home with home services.    Code Status: Level 1 - Full Code    Subjective   Reporting pain still present but controlled with pain medication. No respiratory distress or sob reported. No fevers. He does get a little tired when he gets the pain meds. Has not needed o2. Wife bedside.    Objective :  Temp:  [97.6 °F (36.4 °C)-100.6 °F (38.1 °C)] 99.2 °F (37.3 °C)  HR:  [62-78] 69  BP: (108-165)/(64-99) 109/66  Resp:  [14-21] 18  SpO2:  [90 %-96 %] 90 %  O2 Device: None (Room air)    Body mass index is 30.18 kg/m².     Input and Output Summary (last 24 hours):     Intake/Output Summary (Last 24 hours) at 4/13/2025 1354  Last data filed at 4/13/2025 0824  Gross per 24 hour   Intake 738.75 ml   Output --   Net 738.75 ml       Physical Exam  Nad  Nonlabored resp on RA, no wheezing  Rrr  Ntnd abd  No pitting edema  [id number]    Lines/Drains:        Telemetry:  Telemetry Orders (From admission, onward)               24 Hour Telemetry Monitoring  Continuous x 24 Hours (Telem)        Expiring   Question:  Reason for 24 Hour Telemetry  Answer:  PCI/EP study (including pacer and ICD implementation), Cardiac surgery, MI, abnormal cardiac cath, and chest pain- rule out MI                     Telemetry Reviewed: Normal Sinus Rhythm  Indication for Continued Telemetry Use: Arrthymias requiring medical therapy               Lab Results: I have reviewed the following results:   Results from last 7 days   Lab Units 04/13/25  0515   WBC Thousand/uL 10.92*   HEMOGLOBIN g/dL 11.5*   HEMATOCRIT % 35.4*   PLATELETS Thousands/uL 355   SEGS PCT % 72   LYMPHO PCT % 13*   MONO PCT % 11   EOS PCT % 2     Results from last 7 days   Lab Units 04/13/25  0515   SODIUM mmol/L 136   POTASSIUM mmol/L 4.1   CHLORIDE mmol/L 104   CO2 mmol/L 25   BUN mg/dL 15   CREATININE mg/dL 1.02   ANION GAP mmol/L 7   CALCIUM mg/dL 8.8   ALBUMIN g/dL 3.4*   TOTAL BILIRUBIN mg/dL 0.63   ALK PHOS U/L 75   ALT U/L 30   AST U/L 13   GLUCOSE RANDOM mg/dL 107      Results from last 7 days   Lab Units 04/12/25  1630   INR  1.09         Results from last 7 days   Lab Units 04/08/25  0904   HEMOGLOBIN A1C % 6.5*     Results from last 7 days   Lab Units 04/12/25  1630   LACTIC ACID mmol/L 1.3   PROCALCITONIN ng/ml 0.09       Recent Cultures (last 7 days):   Results from last 7 days   Lab Units 04/12/25  1630 04/12/25  1629   BLOOD CULTURE  Received in Microbiology Lab. Culture in Progress. Received in Microbiology Lab. Culture in Progress.       Imaging Results Review: I reviewed radiology reports from this admission including: CT chest.  Other Study Results Review: No additional pertinent studies reviewed.    Last 24 Hours Medication List:     Current Facility-Administered Medications:     acetaminophen (TYLENOL) tablet 650 mg, Q6H PRN    albuterol (PROVENTIL HFA,VENTOLIN HFA) inhaler 2 puff, Q6H PRN    aluminum-magnesium hydroxide-simethicone (MAALOX) oral suspension 30 mL, Q6H PRN    aspirin chewable tablet 81 mg, Daily    atorvastatin (LIPITOR) tablet 40 mg, Daily    calcium carbonate (TUMS) chewable tablet 1,000 mg, Daily PRN    carvedilol (COREG) tablet 12.5 mg, BID With Meals    cefTRIAXone (ROCEPHIN) 1,000 mg in dextrose 5 % 50 mL IVPB, Q24H    colchicine (COLCRYS) tablet 0.6 mg, Daily    fluticasone (FLONASE) 50 mcg/act nasal spray 1 spray, Daily    guaiFENesin (MUCINEX) 12 hr tablet 600 mg, Q12H CATHY    heparin (porcine) subcutaneous injection 5,000 Units, Q8H CATHY    HYDROmorphone (DILAUDID) injection 0.5 mg, Q4H PRN    lisinopril (ZESTRIL) tablet 20 mg, BID    oxyCODONE (ROXICODONE) split tablet 2.5 mg, Q4H PRN **OR** oxyCODONE (ROXICODONE) IR tablet 5 mg, Q4H PRN    Administrative Statements   Today, Patient Was Seen By: Farhan Heard MD      **Please Note: This note may have been constructed using a voice recognition system.**

## 2025-04-13 NOTE — ASSESSMENT & PLAN NOTE
Patient presented to ED with complaints of chest pain and right lower quadrant abdominal pain started last night, radiates to right flank.  Patient had elevated temperature at home and last dose of Tylenol was at 2:15 PM.  Patient was recently hospitalized for right lower lobe pneumonia and bacteremia requiring 3 days of IV antibiotics, and patient was sent home on Augmentin for 7 days and steroids of which he had completed.  Current vital signs: /89, HR 68, RR 20, temp 98, oxygen saturation 95% on room air.  ED provider gave fentanyl, morphine, Rocephin and bolus of IV fluids.  EKG-sinus rhythm.  Troponin 4-4, pending third troponin.  Delta 0.  CT PE abdomen pelvis-no PE, New moderate right pleural effusion and large right lower lobe airspace consolidation, which likely represents atelectasis. Superimposed pneumonia cannot be excluded. Mild groundglass opacities in the dependent portions of the right upper and middle lobes, may also represent atelectasis versus infection. Unchanged 4.4 x 4.2 cm fusiform ectasia of ascending thoracic aorta with no aortic dissection. Dilatation of the main pulmonary artery up to 4 cm, which may be seen in the setting of pulmonary arterial hypertension. Mild circumferential wall thickening of the urinary bladder, which may be related to underdistention versus cystitis.No other acute abdominal or pelvic pathology.  No bowel obstruction. Normal appendix. Evaluation for bowel inflammation elsewhere somewhat limited by underdistention and lack of oral contrast, however no convincing inflammatory changes. Please note mild inflammation may not be apparent.  NATALIIA-3.  WBC-12.82.  Blood cultures-pending.    Plan  Rocephin to be continued for PNA with concern for parapneumonic effusion   Monitor for chest pain.  Telemetry ordered.  Monitor vital signs.  Continuous oxygen monitoring.  Mild IV fluid hydration for 10 hours.  Sed rate and CRP ordered.  Pulm consult  Will likely require IR  consult, but pt and wife want to talk to PCP Dr. Estefany lester who will be returning tomorrow

## 2025-04-13 NOTE — ASSESSMENT & PLAN NOTE
Patient denies shortness of breath.  Current oxygen saturation in mid 90s on room air.  CT PE abdomen pelvis-no PE, New moderate right pleural effusion and large right lower lobe airspace consolidation, which likely represents atelectasis.  Respiratory protocol.  Continuous oxygen monitoring.  Pulm consultation   Suspect will need IR consult, however they would prefer to discuss with their PCP Dr. Allison before any procedures so this was deferred for today  Ctx

## 2025-04-13 NOTE — ASSESSMENT & PLAN NOTE
Patient's current /89, HR 68.  Blood pressure and heart rate stable.  Monitor BP and HR.  Continue Coreg and lisinopril. Hold amlodipine given borderline BP. Monitor BP  Cardiac diet.

## 2025-04-13 NOTE — ASSESSMENT & PLAN NOTE
Patient's current /89, HR 68.  Blood pressure and heart rate stable.  Monitor BP and HR.  Continue amlodipine, Coreg and lisinopril.  Cardiac diet.

## 2025-04-13 NOTE — ASSESSMENT & PLAN NOTE
Patient presented to ED with complaints of chest pain and right lower quadrant abdominal pain started last night, radiates to right flank.  Patient had elevated temperature at home and last dose of Tylenol was at 2:15 PM.  Patient was recently hospitalized for right lower lobe pneumonia and bacteremia requiring 3 days of IV antibiotics, and patient was sent home on Augmentin for 7 days and steroids of which she had completed.  Current vital signs: /89, HR 68, RR 20, temp 98, oxygen saturation 95% on room air.  ED provider gave fentanyl, morphine, Rocephin and bolus of IV fluids.  EKG-sinus rhythm.  Troponin 4-4, pending third troponin.  Delta 0.  CT PE abdomen pelvis-no PE, New moderate right pleural effusion and large right lower lobe airspace consolidation, which likely represents atelectasis. Superimposed pneumonia cannot be excluded. Mild groundglass opacities in the dependent portions of the right upper and middle lobes, may also represent atelectasis versus infection. Unchanged 4.4 x 4.2 cm fusiform ectasia of ascending thoracic aorta with no aortic dissection. Dilatation of the main pulmonary artery up to 4 cm, which may be seen in the setting of pulmonary arterial hypertension. Mild circumferential wall thickening of the urinary bladder, which may be related to underdistention versus cystitis.No other acute abdominal or pelvic pathology.  No bowel obstruction. Normal appendix. Evaluation for bowel inflammation elsewhere somewhat limited by underdistention and lack of oral contrast, however no convincing inflammatory changes. Please note mild inflammation may not be apparent.  NATALIIA-3.  WBC-12.82.  Blood cultures-pending.    Plan  Rocephin antibiotics given in ED, continue Rocephin for now and reevaluate if patient continues to need for treatment.  Monitor for chest pain.  Telemetry ordered.  Monitor vital signs.  Continuous oxygen monitoring.  Mild IV fluid hydration for 10 hours.  Sed rate and CRP  ordered.  May consider cardiology consult if chest pain worsens.  May consider IR consult if patients's respiratory status decompensates.   CBC and CMP in AM.

## 2025-04-13 NOTE — UTILIZATION REVIEW
Initial Clinical Review      OBS order 4/12 2052 converted to IP 4/13 1438 for CP workup     Admission: Date/Time/Statement:   Admission Orders (From admission, onward)       Ordered        04/13/25 1438  INPATIENT ADMISSION  Once            04/12/25 2052  Place in Observation  Once                           INPATIENT ADMISSION     Standing Status:   Standing     Number of Occurrences:   1     Level of Care:   Med Surg [16]     Estimated length of stay:   More than 2 Midnights     Certification:   I certify that inpatient services are medically necessary for this patient for a duration of greater than two midnights. See H&P and MD Progress Notes for additional information about the patient's course of treatment.     ED Arrival Information       Expected   4/12/2025     Arrival   4/12/2025 15:57    Acuity   Urgent              Means of arrival   Walk-In    Escorted by   Family Member    Service   Hospitalist    Admission type   Emergency              Arrival complaint   RLQ abdominal pain             Chief Complaint   Patient presents with    Abdominal Pain     Pt c/o RLQ pain since last night, radiates to right flank now, denies urinary symptoms, fever at home, last dose Tylenol @ 1415. Was recently discharged on the 4th for pneumonia, still having pain with coughing, finished antibiotics       Initial Presentation: 74 y.o. male to ED from home w/    PMH of arthritis, asthma, CKD, hypertension, BPH, pneumonia and depression who presents with complaints of chest pain and right lower quadrant abdominal pain started last night, radiates to right flank.  Patient had elevated temperature at home and last dose of Tylenol was at 2:15 PM.  Patient was recently hospitalized for right lower lobe pneumonia and bacteremia requiring 3 days of IV antibiotics, and patient was sent home on Augmentin for 7 days and steroids . CT PE abdomen pelvis-no PE, New moderate right pleural effusion and large right lower lobe airspace  consolidation, which likely represents atelectasis. Superimposed pneumonia cannot be excluded . Given rocephin . Fentanyl and IVF in ED . Admitted OBS status w/ CP . Plan for tele , IVF , sed rate , CRP , IR and cardiology consult , CBC , CMP in am . Pleural effusion cont O2 monitoring , consider IR consult . Asthma inhaler . HTN cont amlodipine , coreg and lisinopril . HLD statin . BPH ua cx pending .     Anticipated Length of Stay/Certification Statement:    Patient will be admitted on an observation basis with an anticipated length of stay of less than 2 midnights secondary to chest pain and pleural effusion.     4/13 IM Note  Will likely require IR consult . O2 sat 90 % on RA .   Cont IVF , IV abx . Pulm consulted . No wheezing .     Date: 4/14  Day 3: Has surpassed a 2nd midnight with active treatments and services. Still w/ severe pleuritic pain , narcotic analgesics helpful .Pulm consulted and agree w/ plan for thoracentesis . Check CXR . F/u pleural fluid studies . If this is more complex fluid with characteristics suggestive of empyema, may need chest tube and tPA/dornase. Comfortable on RA . Lungs diminished on R >L w/ dullness.     4/14 IR Thoracentesis   :600 mL of dark serosanguineous pleural fluid aspirated from right sided, ultrasound-guided thoracentesis.       ED Treatment-Medication Administration from 04/12/2025 1502 to 04/12/2025 2210         Date/Time Order Dose Route Action     04/12/2025 1635 sodium chloride 0.9 % bolus 500 mL 500 mL Intravenous New Bag     04/12/2025 1711 morphine injection 4 mg 4 mg Intravenous Given     04/12/2025 1730 iohexol (OMNIPAQUE) 350 MG/ML injection (MULTI-DOSE) 100 mL 100 mL Intravenous Given     04/12/2025 1753 fentaNYL injection 75 mcg 75 mcg Intravenous Given     04/12/2025 2044 fentaNYL injection 75 mcg 75 mcg Intravenous Given     04/12/2025 2100 ceftriaxone (ROCEPHIN) 1 g/50 mL in dextrose IVPB 1,000 mg Intravenous New Bag            Scheduled  Medications:  amLODIPine, 5 mg, Oral, Daily  aspirin, 81 mg, Oral, Daily  atorvastatin, 40 mg, Oral, Daily  carvedilol, 12.5 mg, Oral, BID With Meals  cefTRIAXone, 1,000 mg, Intravenous, Q24H  colchicine, 0.6 mg, Oral, Daily  fluticasone, 1 spray, Nasal, Daily  guaiFENesin, 600 mg, Oral, Q12H CATHY  heparin (porcine), 5,000 Units, Subcutaneous, Q8H CATHY  lisinopril, 20 mg, Oral, BID      Continuous IV Infusions:     PRN Meds:  acetaminophen, 650 mg, Oral, Q6H PRN  albuterol, 2 puff, Inhalation, Q6H PRN  aluminum-magnesium hydroxide-simethicone, 30 mL, Oral, Q6H PRN  calcium carbonate, 1,000 mg, Oral, Daily PRN  HYDROmorphone, 0.5 mg, Intravenous, Q4H PRN   4/13  x2  4/14 x1  oxyCODONE, 2.5 mg, Oral, Q4H PRN   Or  oxyCODONE, 5 mg, Oral, Q4H PRN      ED Triage Vitals   Temperature Pulse Respirations Blood Pressure SpO2 Pain Score   04/12/25 1602 04/12/25 1602 04/12/25 1602 04/12/25 1602 04/12/25 1602 04/12/25 1711   98.1 °F (36.7 °C) 78 14 108/64 96 % 8     Weight (last 2 days)       Date/Time Weight    04/12/25 22:19:29 92.7 (204.37)            Vital Signs (last 3 days)       Date/Time Temp Pulse Resp BP MAP (mmHg) SpO2 O2 Device Patient Position - Orthostatic VS Pain    04/14/25 0821 -- -- -- -- -- -- -- -- 6    04/14/25 07:54:02 98.5 °F (36.9 °C) 70 -- 146/81 103 92 % -- -- --    04/14/25 0722 -- 69 19 -- -- 93 % -- -- --    04/14/25 0416 -- -- -- -- -- -- -- -- 8    04/14/25 03:23:48 98.3 °F (36.8 °C) 74 -- 125/76 92 92 % -- -- --    04/13/25 22:38:30 98.4 °F (36.9 °C) 72 20 152/81 105 90 % -- -- --    04/13/25 19:49:46 98.4 °F (36.9 °C) 75 18 104/67 79 90 % -- -- --    04/13/25 1943 -- -- -- -- -- -- -- -- 6    04/13/25 1736 -- -- -- -- -- -- -- -- 8 04/13/25 17:31:57 -- 79 -- 142/77 99 92 % -- -- --    04/13/25 15:21:27 98.9 °F (37.2 °C) 72 -- 127/79 95 93 % -- -- --    04/13/25 1349 -- -- -- -- -- -- -- -- 8 04/13/25 11:10:44 99.2 °F (37.3 °C) 69 -- 109/66 80 90 % -- -- --    04/13/25 0849 -- -- 18 -- -- --  -- -- 8 04/13/25 0837 -- -- -- -- -- -- -- -- 8 04/13/25 08:25:47 99 °F (37.2 °C) 73 -- 118/76 90 93 % -- -- --    04/13/25 0741 -- 65 -- -- -- 92 % -- -- --    04/13/25 0734 -- 69 -- -- -- 92 % -- -- --    04/13/25 0700 -- 66 -- -- -- 90 % -- -- --    04/13/25 0653 -- 69 -- -- -- 92 % -- -- --    04/13/25 0645 -- 69 -- -- -- 92 % -- -- --    04/13/25 0457 -- -- -- -- -- -- -- -- 10 - Worst Possible Pain    04/13/25 02:12:48 98.3 °F (36.8 °C) 78 -- 165/99 121 92 % -- -- --    04/13/25 0211 -- -- -- -- -- -- -- -- 8 04/13/25 00:39:34 97.6 °F (36.4 °C) 73 -- -- -- 92 % -- -- --    04/12/25 2300 -- -- -- -- -- -- -- -- 7 04/12/25 2250 -- 73 16 -- -- 92 % -- -- --    04/12/25 22:19:29 98 °F (36.7 °C) 68 -- 148/89 109 95 % -- -- 7 04/12/25 2218 -- -- -- -- -- -- None (Room air) -- --    04/12/25 2100 -- 69 20 110/68 82 95 % None (Room air) Sitting --    04/12/25 2051 99.2 °F (37.3 °C) -- -- -- -- -- -- -- --    04/12/25 2044 -- -- -- -- -- -- -- -- 8 04/12/25 2030 -- 66 20 147/79 106 95 % None (Room air) Sitting --    04/12/25 1830 -- 68 21 117/70 86 92 % None (Room air) Sitting --    04/12/25 1753 -- -- -- -- -- -- -- -- 10 - Worst Possible Pain    04/12/25 1711 -- -- -- -- -- -- -- -- 8    04/12/25 1602 98.1 °F (36.7 °C) 78 14 108/64 78 96 % None (Room air) Sitting --              Pertinent Labs/Diagnostic Test Results:   Radiology:  CT pe study w abdomen pelvis w contrast   Final Interpretation by Felicity Ochoa MD (04/12 1815)      CHEST:      1) No acute pulmonary embolism.      2) New moderate right pleural effusion and large right lower lobe airspace consolidation, which likely represents atelectasis. Superimposed pneumonia cannot be excluded. Mild groundglass opacities in the dependent portions of the right upper and middle    lobes, may also represent atelectasis versus infection.      3) Unchanged 4.4 x 4.2 cm fusiform ectasia of ascending thoracic aorta with no aortic dissection.      4)  Dilatation of the main pulmonary artery up to 4 cm, which may be seen in the setting of pulmonary arterial hypertension.      ABDOMEN/PELVIS:      5) Mild circumferential wall thickening of the urinary bladder, which may be related to underdistention versus cystitis.      6) No other acute abdominal or pelvic pathology.      7) No bowel obstruction. Normal appendix. Evaluation for bowel inflammation elsewhere somewhat limited by underdistention and lack of oral contrast, however no convincing inflammatory changes. Please note mild inflammation may not be apparent.      8) Additional findings as above.                  Workstation performed: [id number]         XR chest 1 view portable   ED Interpretation by Delia Poon DO (04/12 1738)   Slight improvement in right sided pneumonia.      Final Interpretation by Esther Murrell MD (04/13 0856)      Moderate right pleural effusion and moderate right lower lobe atelectasis. See subsequent chest CT.            Workstation performed: [id number]         IR IN-Patient Thoracentesis    (Results Pending)     Cardiology:  ECG 12 lead    by Interface, Ris Results In (04/14 0825)      ECG 12 lead    by Interface, Ris Results In (04/14 0825)        GI:  No orders to display           Results from last 7 days   Lab Units 04/14/25  0516 04/13/25  0515 04/12/25  1630 04/08/25  0904   WBC Thousand/uL 10.04 10.92* 12.92* 14.82*   HEMOGLOBIN g/dL 10.9* 11.5* 12.4 13.6   HEMATOCRIT % 32.6* 35.4* 37.0 40.6   PLATELETS Thousands/uL 319 355 382 391*   TOTAL NEUT ABS Thousands/µL 6.82 7.87* 10.28*  --          Results from last 7 days   Lab Units 04/14/25  0516 04/13/25  0515 04/12/25  1630 04/08/25  0904   SODIUM mmol/L 135 136 136 139   POTASSIUM mmol/L 4.0 4.1 4.1 4.5   CHLORIDE mmol/L 103 104 103 102   CO2 mmol/L 27 25 25 27   ANION GAP mmol/L 5 7 8 10   BUN mg/dL 15 15 20 20   CREATININE mg/dL 1.03 1.02 1.04 1.13   EGFR ml/min/1.73sq m 72 73 71 64   CALCIUM mg/dL 8.6 8.8  9.2 8.8   MAGNESIUM mg/dL 2.2 2.1  --   --    PHOSPHORUS mg/dL  --  3.8  --   --      Results from last 7 days   Lab Units 04/14/25  0516 04/13/25  0515 04/12/25  1630 04/08/25  0904   AST U/L 12* 13 16 44*   ALT U/L 26 30 37 89*   ALK PHOS U/L 57 75 64 72   TOTAL PROTEIN g/dL 6.1* 6.3* 6.7 6.5   ALBUMIN g/dL 3.1* 3.4* 3.6 3.5   TOTAL BILIRUBIN mg/dL 0.72 0.63 0.74 0.79   BILIRUBIN DIRECT mg/dL  --   --  0.15 0.25*         Results from last 7 days   Lab Units 04/14/25  0516 04/13/25  0515 04/12/25  1630   GLUCOSE RANDOM mg/dL 108 107 137       Results from last 7 days   Lab Units 04/08/25  0904   HEMOGLOBIN A1C % 6.5*   EAG mg/dl 140       Results from last 7 days   Lab Units 04/12/25  1831 04/12/25  1630   HS TNI 0HR ng/L  --  4   HS TNI 2HR ng/L 4  --    HSTNI D2 ng/L 0  --        Results from last 7 days   Lab Units 04/14/25  0516 04/12/25  1630   PROTIME seconds 15.3* 14.8   INR  1.14 1.09   PTT seconds  --  30       Results from last 7 days   Lab Units 04/12/25  1630   PROCALCITONIN ng/ml 0.09     Results from last 7 days   Lab Units 04/12/25  1630   LACTIC ACID mmol/L 1.3       Results from last 7 days   Lab Units 04/12/25  1630   LIPASE u/L 25     Results from last 7 days   Lab Units 04/13/25  0515   CRP mg/L 118.7*   SED RATE mm/hour 73*         Results from last 7 days   Lab Units 04/12/25  1903   CLARITY UA  Clear   COLOR UA  Light Yellow   SPEC GRAV UA  >=1.050*   PH UA  6.0   GLUCOSE UA mg/dl Negative   KETONES UA mg/dl Negative   BLOOD UA  Negative   PROTEIN UA mg/dl Trace*   NITRITE UA  Negative   BILIRUBIN UA  Negative   UROBILINOGEN UA (BE) mg/dl <2.0   LEUKOCYTES UA  Negative   WBC UA /hpf 1-2   RBC UA /hpf 1-2   BACTERIA UA /hpf None Seen   EPITHELIAL CELLS WET PREP /hpf None Seen   MUCUS THREADS  Occasional*       Results from last 7 days   Lab Units 04/12/25  1903 04/12/25  1630 04/12/25  1629   BLOOD CULTURE   --  No Growth at 24 hrs. No Growth at 24 hrs.   URINE CULTURE  No Growth <1000 cfu/mL   --   --        Past Medical History:   Diagnosis Date    Arthritis     Asthma     Chronic kidney disease     CKD (chronic kidney disease) stage 3, GFR 30-59 ml/min ([facility]) 09/24/2019    Depression     Dyshidrosis     Hypertension     Osteoarthritis     Trigger finger      Present on Admission:   Asthma   BPH without urinary obstruction   Hyperlipidemia   HTN (hypertension)      Admitting Diagnosis: Pleural effusion on right [J90]  Abdominal pain, acute, right upper quadrant [R10.11]  Pneumonia involving right lung [J18.9]  Age/Sex: 74 y.o. male    Network Utilization Review Department  ATTENTION: Please call with any questions or concerns to [phone] and carefully listen to the prompts so that you are directed to the right person. All voicemails are confidential.   For Discharge needs, contact Care Management DC Support Team at [phone] opt. 2  Send all requests for admission clinical reviews, approved or denied determinations and any other requests to dedicated fax number below belonging to the campus where the patient is receiving treatment. List of dedicated fax numbers for the Facilities:  FACILITY NAME UR FAX NUMBER   ADMISSION DENIALS (Administrative/Medical Necessity) [phone]   DISCHARGE SUPPORT TEAM (NETWORK) [phone]   PARENT CHILD HEALTH (Maternity/NICU/Pediatrics) [phone]   [facility] [phone]   [facility] [phone]   [facility] [phone]   [facility] [phone]   [facility] [phone]   [facility] [phone]   [facility] [phone]   [facility] [phone]   [facility] [phone]   Critical access hospital [phone]   [facility] [phone]   [facility]  [facility] [phone]

## 2025-04-13 NOTE — ASSESSMENT & PLAN NOTE
Patient denies shortness of breath.  Current oxygen saturation in mid 90s on room air.  CT PE abdomen pelvis-no PE, New moderate right pleural effusion and large right lower lobe airspace consolidation, which likely represents atelectasis.  Respiratory protocol.  Continuous oxygen monitoring.  May consider IR consultation.

## 2025-04-13 NOTE — H&P
H&P - Hospitalist   Name: Markel Alves 74 y.o. male I MRN: [id number]  Unit/Bed#: ED 08 I Date of Admission: 4/12/2025   Date of Service: 4/12/2025 I Hospital Day: 0     Assessment & Plan  Chest pain  Patient presented to ED with complaints of chest pain and right lower quadrant abdominal pain started last night, radiates to right flank.  Patient had elevated temperature at home and last dose of Tylenol was at 2:15 PM.  Patient was recently hospitalized for right lower lobe pneumonia and bacteremia requiring 3 days of IV antibiotics, and patient was sent home on Augmentin for 7 days and steroids of which she had completed.  Current vital signs: /89, HR 68, RR 20, temp 98, oxygen saturation 95% on room air.  ED provider gave fentanyl, morphine, Rocephin and bolus of IV fluids.  EKG-sinus rhythm.  Troponin 4-4, pending third troponin.  Delta 0.  CT PE abdomen pelvis-no PE, New moderate right pleural effusion and large right lower lobe airspace consolidation, which likely represents atelectasis. Superimposed pneumonia cannot be excluded. Mild groundglass opacities in the dependent portions of the right upper and middle lobes, may also represent atelectasis versus infection. Unchanged 4.4 x 4.2 cm fusiform ectasia of ascending thoracic aorta with no aortic dissection. Dilatation of the main pulmonary artery up to 4 cm, which may be seen in the setting of pulmonary arterial hypertension. Mild circumferential wall thickening of the urinary bladder, which may be related to underdistention versus cystitis.No other acute abdominal or pelvic pathology.  No bowel obstruction. Normal appendix. Evaluation for bowel inflammation elsewhere somewhat limited by underdistention and lack of oral contrast, however no convincing inflammatory changes. Please note mild inflammation may not be apparent.  NATALIIA-3.  WBC-12.82.  Blood cultures-pending.    Plan  Rocephin antibiotics given in ED, continue Rocephin for now and reevaluate if  patient continues to need for treatment.  Monitor for chest pain.  Telemetry ordered.  Monitor vital signs.  Continuous oxygen monitoring.  Mild IV fluid hydration for 10 hours.  Sed rate and CRP ordered.  May consider cardiology consult if chest pain worsens.  May consider IR consult if patients's respiratory status decompensates.   CBC and CMP in AM.  Pleural effusion  Patient denies shortness of breath.  Current oxygen saturation in mid 90s on room air.  CT PE abdomen pelvis-no PE, New moderate right pleural effusion and large right lower lobe airspace consolidation, which likely represents atelectasis.  Respiratory protocol.  Continuous oxygen monitoring.  May consider IR consultation.  Asthma  Continue Proventil inhaler.  HTN (hypertension)  Patient's current /89, HR 68.  Blood pressure and heart rate stable.  Monitor BP and HR.  Continue amlodipine, Coreg and lisinopril.  Cardiac diet.  Hyperlipidemia  Continue atorvastatin.  BPH without urinary obstruction  Urinalysis negative.  Urine culture pending.  Urinary protocol.    VTE Pharmacologic Prophylaxis:   Moderate Risk (Score 3-4) - Pharmacological DVT Prophylaxis Ordered: heparin.  Code Status: Full Code  Discussion with family:  Patient.     Anticipated Length of Stay: Patient will be admitted on an observation basis with an anticipated length of stay of less than 2 midnights secondary to chest pain and pleural effusion.    History of Present Illness   Chief Complaint: Chest pain and right lower quadrant pain.    Markel Alves is a 74 y.o. male with a PMH of arthritis, asthma, CKD, hypertension, BPH, pneumonia and depression who presents with complaints of chest pain and right lower quadrant abdominal pain started last night, radiates to right flank.  Patient had elevated temperature at home and last dose of Tylenol was at 2:15 PM.  Patient was recently hospitalized for right lower lobe pneumonia and bacteremia requiring 3 days of IV antibiotics, and  patient was sent home on Augmentin for 7 days and steroids of which she had completed..    Review of Systems   Constitutional:  Negative for appetite change, chills and fever.   HENT:  Negative for ear pain and sore throat.    Eyes:  Negative for pain and visual disturbance.   Respiratory:  Negative for cough and shortness of breath.    Cardiovascular:  Negative for chest pain and palpitations.   Gastrointestinal:  Positive for abdominal pain. Negative for diarrhea, nausea and vomiting.   Genitourinary:  Negative for dysuria, flank pain, frequency and hematuria.   Musculoskeletal:  Negative for arthralgias and back pain.   Skin:  Negative for color change and rash.   Neurological:  Negative for seizures and syncope.   Psychiatric/Behavioral:  Negative for behavioral problems and confusion.    All other systems reviewed and are negative.      Historical Information   Past Medical History:   Diagnosis Date    Arthritis     Asthma     Chronic kidney disease     CKD (chronic kidney disease) stage 3, GFR 30-59 ml/min ([facility]) 2019    Depression     Dyshidrosis     Hypertension     Osteoarthritis     Trigger finger      Past Surgical History:   Procedure Laterality Date    COLONOSCOPY  2010    COLONOSCOPY  2020    HEMORRHOID SURGERY      JOINT REPLACEMENT  20    REPLACEMENT TOTAL KNEE Left     TONSILLECTOMY      TRIGGER FINGER RELEASE      WISDOM TOOTH EXTRACTION       Social History     Tobacco Use    Smoking status: Former     Current packs/day: 0.00     Types: Cigarettes     Quit date: 1973     Years since quittin.7    Smokeless tobacco: Never    Tobacco comments:     Occasional cigar smoker   Vaping Use    Vaping status: Never Used   Substance and Sexual Activity    Alcohol use: Yes     Alcohol/week: 6.0 standard drinks of alcohol     Types: 3 Glasses of wine, 2 Cans of beer, 1 Shots of liquor per week     Comment: occasional    Drug use: No    Sexual activity: Not Currently     Partners:  Female     E-Cigarette/Vaping    E-Cigarette Use Never User      E-Cigarette/Vaping Substances    Nicotine No     THC No     CBD No     Flavoring No     Other No     Unknown No      Family History   Problem Relation Age of Onset    Coronary artery disease Mother     Hyperlipidemia Mother     Hypertension Mother     Stroke Mother     Heart disease Mother     Stroke Father     Hypertension Father     Dementia Father     Asthma Maternal Aunt     Asthma Maternal Uncle     Arthritis Maternal Uncle      Social History:  Marital Status: /Civil Union   Occupation: N/A  Patient Pre-hospital Living Situation: Home  Patient Pre-hospital Level of Mobility: walks  Patient Pre-hospital Diet Restrictions: None    Meds/Allergies   I have reviewed home medications using recent Epic encounter.  Prior to Admission medications    Medication Sig Start Date End Date Taking? Authorizing Provider   acetaminophen (TYLENOL) 325 mg tablet Take 650 mg by mouth every 6 (six) hours as needed for mild pain    Historical Provider, MD   albuterol (Ventolin HFA) 90 mcg/act inhaler Inhale 2 puffs every 6 (six) hours as needed for wheezing 12/13/23   KAYLEY Valenzuela   amLODIPine (NORVASC) 5 mg tablet take 1 tablet by mouth once daily 4/15/24   Manjit Allison MD   aspirin 81 mg chewable tablet chew and swallow 1 tablet by mouth once daily 4/8/25   Manjit Allison MD   atorvastatin (LIPITOR) 40 mg tablet take 1 tablet by mouth once daily 4/15/24   Manjit Allison MD   carvedilol (COREG) 12.5 mg tablet take 2 tablets by mouth twice a day with meals 10/21/24   Manjit Allison MD   colchicine (COLCRYS) 0.6 mg tablet 1 po qd, up to tid for acute flare. 9/19/23   Manjit Allison MD   fluticasone (FLONASE) 50 mcg/act nasal spray 1 spray into each nostril daily 10/26/22   Manjit Allison MD   guaiFENesin (MUCINEX) 600 mg 12 hr tablet Take 1 tablet (600 mg total) by mouth every 12 (twelve) hours 4/4/25   Manjit Allison MD    ipratropium-albuterol (DUO-NEB) 0.5-2.5 mg/3 mL nebulizer solution Take 3 mL by nebulization 3 (three) times a day 11/17/23   KAYLEY Valenzuela   lisinopril (ZESTRIL) 20 mg tablet Take 1 tablet (20 mg total) by mouth 2 (two) times a day 12/2/24   Georgette Garibay PA-C   LORazepam (ATIVAN) 1 mg tablet Take 1 tablet (1 mg total) by mouth as needed for sleep 12/13/23   KAYLEY Valenzuela   montelukast (SINGULAIR) 10 mg tablet take 1 tablet by mouth every evening 3/6/25   Misha Multani DO   triamcinolone (KENALOG) 0.1 % cream Apply topically 2 (two) times a day To the rash on the chest as needed. 1/21/25   Torey Zapien DO     Allergies   Allergen Reactions    Cat Dander Anaphylaxis, Hives, Itching, Shortness Of Breath and Swelling    Horse Protein Anaphylaxis    Other Cough     mold    Pollen Extract Other (See Comments) and Shortness Of Breath    Nsaids        Objective :  Temp:  [98.1 °F (36.7 °C)-100.6 °F (38.1 °C)] 99.2 °F (37.3 °C)  HR:  [62-78] 69  BP: (108-147)/(64-79) 110/68  Resp:  [14-21] 20  SpO2:  [92 %-96 %] 95 %  O2 Device: None (Room air)    Physical Exam  Vitals and nursing note reviewed.   Constitutional:       General: He is not in acute distress.     Appearance: He is well-developed.   HENT:      Head: Normocephalic and atraumatic.      Nose: No congestion.      Mouth/Throat:      Mouth: Mucous membranes are moist.   Eyes:      Conjunctiva/sclera: Conjunctivae normal.   Cardiovascular:      Rate and Rhythm: Normal rate and regular rhythm.      Heart sounds: No murmur heard.  Pulmonary:      Effort: Pulmonary effort is normal. No respiratory distress.      Breath sounds: Normal breath sounds.      Comments: Room air  Abdominal:      General: Bowel sounds are normal.      Palpations: Abdomen is soft.      Tenderness: There is abdominal tenderness in the right lower quadrant.   Musculoskeletal:         General: No swelling.      Cervical back: Neck supple.   Skin:     General: Skin  is warm and dry.      Capillary Refill: Capillary refill takes less than 2 seconds.   Neurological:      Mental Status: He is alert and oriented to person, place, and time.      GCS: GCS eye subscore is 4. GCS verbal subscore is 5. GCS motor subscore is 6.      Motor: No weakness.   Psychiatric:         Mood and Affect: Mood normal.         Behavior: Behavior is cooperative.        Lines/Drains:      Lab Results: I have reviewed the following results:  Results from last 7 days   Lab Units 04/12/25  1630   WBC Thousand/uL 12.92*   HEMOGLOBIN g/dL 12.4   HEMATOCRIT % 37.0   PLATELETS Thousands/uL 382   SEGS PCT % 80*   LYMPHO PCT % 9*   MONO PCT % 8   EOS PCT % 2     Results from last 7 days   Lab Units 04/12/25  1630   SODIUM mmol/L 136   POTASSIUM mmol/L 4.1   CHLORIDE mmol/L 103   CO2 mmol/L 25   BUN mg/dL 20   CREATININE mg/dL 1.04   ANION GAP mmol/L 8   CALCIUM mg/dL 9.2   ALBUMIN g/dL 3.6   TOTAL BILIRUBIN mg/dL 0.74   ALK PHOS U/L 64   ALT U/L 37   AST U/L 16   GLUCOSE RANDOM mg/dL 137     Results from last 7 days   Lab Units 04/12/25  1630   INR  1.09         Lab Results   Component Value Date    HGBA1C 6.5 (H) 04/08/2025    HGBA1C 5.9 (H) 10/14/2024    HGBA1C 5.7 (H) 03/19/2024     Results from last 7 days   Lab Units 04/12/25  1630   LACTIC ACID mmol/L 1.3   PROCALCITONIN ng/ml 0.09       Imaging Results Review: I reviewed radiology reports from this admission including: CT abdomen/pelvis.  Other Study Results Review: EKG was reviewed.     Administrative Statements   I have spent a total time of 60 minutes in caring for this patient on the day of the visit/encounter including Diagnostic results, Patient and family education, Impressions, Documenting in the medical record, Reviewing/placing orders in the medical record (including tests, medications, and/or procedures), Obtaining or reviewing history  , and Communicating with other healthcare professionals .    ** Please Note: This note has been constructed  using a voice recognition system. **

## 2025-04-13 NOTE — ASSESSMENT & PLAN NOTE
Patient denies shortness of breath.  Current oxygen saturation in mid 90s on room air.  CT PE abdomen pelvis-no PE, New moderate right pleural effusion and large right lower lobe airspace consolidation, which likely represents atelectasis.  Respiratory protocol.  Continuous oxygen monitoring.  Pulm consultation

## 2025-04-13 NOTE — RESPIRATORY THERAPY NOTE
RT Protocol Note  Markel Alves 74 y.o. male MRN: [id number]  Unit/Bed#: -01 Encounter: [id number]    Assessment    Principal Problem:    Chest pain  Active Problems:    Asthma    BPH without urinary obstruction    Hyperlipidemia    HTN (hypertension)    Pleural effusion      Home Pulmonary Medications:    Home Devices/Therapy: BiPAP/CPAP    Past Medical History:   Diagnosis Date    Arthritis     Asthma     Chronic kidney disease     CKD (chronic kidney disease) stage 3, GFR 30-59 ml/min ([facility]) 2019    Depression     Dyshidrosis     Hypertension     Osteoarthritis     Trigger finger      Social History     Socioeconomic History    Marital status: /Civil Union     Spouse name: None    Number of children: None    Years of education: None    Highest education level: None   Occupational History    Occupation: IT   Tobacco Use    Smoking status: Former     Current packs/day: 0.00     Types: Cigarettes     Quit date: 1973     Years since quittin.7    Smokeless tobacco: Never    Tobacco comments:     Occasional cigar smoker   Vaping Use    Vaping status: Never Used   Substance and Sexual Activity    Alcohol use: Yes     Alcohol/week: 6.0 standard drinks of alcohol     Types: 3 Glasses of wine, 2 Cans of beer, 1 Shots of liquor per week     Comment: occasional    Drug use: No    Sexual activity: Not Currently     Partners: Female   Other Topics Concern    None   Social History Narrative    Living independently with spouse    No advance directives     Social Drivers of Health     Financial Resource Strain: Low Risk  (2023)    Overall Financial Resource Strain (CARDIA)     Difficulty of Paying Living Expenses: Not hard at all   Food Insecurity: No Food Insecurity (2025)    Nursing - Inadequate Food Risk Classification     Worried About Running Out of Food in the Last Year: Never true     Ran Out of Food in the Last Year: Never true     Ran Out of Food in the Last Year: Never true  "  Transportation Needs: No Transportation Needs (2025)    Nursing - Transportation Risk Classification     Lack of Transportation: Not on file     Lack of Transportation: No   Physical Activity: Insufficiently Active (3/15/2021)    Exercise Vital Sign     Days of Exercise per Week: 3 days     Minutes of Exercise per Session: 30 min   Stress: No Stress Concern Present (3/15/2021)    [employer] [place] [place] [employer] - Occupational Stress Questionnaire     Feeling of Stress : Not at all   Social Connections: Not on file   Intimate Partner Violence: Unknown (2025)    Nursing IPS     Feels Physically and Emotionally Safe: Not on file     Physically Hurt by Someone: Not on file     Humiliated or Emotionally Abused by Someone: Not on file     Physically Hurt by Someone: No     Hurt or Threatened by Someone: No   Housing Stability: Unknown (2025)    Nursing: Inadequate Housing Risk Classification     Has Housing: Not on file     Worried About Losing Housing: Not on file     Unable to Get Utilities: Not on file     Unable to Pay for Housing in the Last Year: No     Has Housin       Subjective         Objective    Physical Exam:   Assessment Type: Assess only  General Appearance: Alert, Awake  Respiratory Pattern: Normal  Chest Assessment: Chest expansion symmetrical  Bilateral Breath Sounds: Clear, Diminished    Vitals:  Blood pressure 148/89, pulse 73, temperature 98 °F (36.7 °C), resp. rate 16, height 5' 9\" (1.753 m), weight 92.7 kg (204 lb 5.9 oz), SpO2 92%.          Imaging and other studies:           Plan             Resp Comments: Patient stated that patient does brathing treatment as needed at home. Per patient he was ordered to do Duoneb last year when he had PNA but now he doesnt do it anymore as scheduled. Per patient he wear CPAP at home. I offer him to wear CPAP here but he refused. No CPAP in the room at this time. Will schedule as needed nebulizer and will continue to follow.       "

## 2025-04-14 ENCOUNTER — APPOINTMENT (INPATIENT)
Dept: NON INVASIVE DIAGNOSTICS | Facility: HOSPITAL | Age: 75
End: 2025-04-14
Attending: INTERNAL MEDICINE
Payer: COMMERCIAL

## 2025-04-14 LAB
ALBUMIN SERPL BCG-MCNC: 3.1 G/DL (ref 3.5–5)
ALP SERPL-CCNC: 57 U/L (ref 34–104)
ALT SERPL W P-5'-P-CCNC: 26 U/L (ref 7–52)
ANION GAP SERPL CALCULATED.3IONS-SCNC: 5 MMOL/L (ref 4–13)
APPEARANCE FLD: ABNORMAL
AST SERPL W P-5'-P-CCNC: 12 U/L (ref 13–39)
ATRIAL RATE: 71 BPM
ATRIAL RATE: 81 BPM
BACTERIA UR CULT: NORMAL
BASOPHILS # BLD AUTO: 0.08 THOUSANDS/ÂΜL (ref 0–0.1)
BASOPHILS NFR BLD AUTO: 1 % (ref 0–1)
BILIRUB SERPL-MCNC: 0.72 MG/DL (ref 0.2–1)
BUN SERPL-MCNC: 15 MG/DL (ref 5–25)
CALCIUM ALBUM COR SERPL-MCNC: 9.3 MG/DL (ref 8.3–10.1)
CALCIUM SERPL-MCNC: 8.6 MG/DL (ref 8.4–10.2)
CHLORIDE SERPL-SCNC: 103 MMOL/L (ref 96–108)
CO2 SERPL-SCNC: 27 MMOL/L (ref 21–32)
COLOR FLD: ABNORMAL
CREAT SERPL-MCNC: 1.03 MG/DL (ref 0.6–1.3)
EOSINOPHIL # BLD AUTO: 0.17 THOUSAND/ÂΜL (ref 0–0.61)
EOSINOPHIL NFR BLD AUTO: 2 % (ref 0–6)
EOSINOPHIL NFR FLD MANUAL: 4 %
ERYTHROCYTE [DISTWIDTH] IN BLOOD BY AUTOMATED COUNT: 12.9 % (ref 11.6–15.1)
GFR SERPL CREATININE-BSD FRML MDRD: 72 ML/MIN/1.73SQ M
GLUCOSE FLD-MCNC: 100 MG/DL
GLUCOSE SERPL-MCNC: 108 MG/DL (ref 65–140)
HCT VFR BLD AUTO: 32.6 % (ref 36.5–49.3)
HGB BLD-MCNC: 10.9 G/DL (ref 12–17)
HISTIOCYTES NFR FLD: 8 %
IMM GRANULOCYTES # BLD AUTO: 0.06 THOUSAND/UL (ref 0–0.2)
IMM GRANULOCYTES NFR BLD AUTO: 1 % (ref 0–2)
INR PPP: 1.14 (ref 0.85–1.19)
L PNEUMO1 AG UR QL IA.RAPID: NEGATIVE
LDH FLD L TO P-CCNC: 308 U/L
LYMPHOCYTES # BLD AUTO: 1.54 THOUSANDS/ÂΜL (ref 0.6–4.47)
LYMPHOCYTES NFR BLD AUTO: 15 % (ref 14–44)
LYMPHOCYTES NFR BLD AUTO: 8 %
MAGNESIUM SERPL-MCNC: 2.2 MG/DL (ref 1.9–2.7)
MCH RBC QN AUTO: 32.2 PG (ref 26.8–34.3)
MCHC RBC AUTO-ENTMCNC: 33.4 G/DL (ref 31.4–37.4)
MCV RBC AUTO: 96 FL (ref 82–98)
MONOCYTES # BLD AUTO: 1.37 THOUSAND/ÂΜL (ref 0.17–1.22)
MONOCYTES NFR BLD AUTO: 1 %
MONOCYTES NFR BLD AUTO: 14 % (ref 4–12)
NEUTROPHILS # BLD AUTO: 6.82 THOUSANDS/ÂΜL (ref 1.85–7.62)
NEUTS SEG NFR BLD AUTO: 67 % (ref 43–75)
NEUTS SEG NFR BLD AUTO: 79 %
NRBC BLD AUTO-RTO: 0 /100 WBCS
P AXIS: 22 DEGREES
P AXIS: 38 DEGREES
PH BODY FLUID: 7.6
PLATELET # BLD AUTO: 319 THOUSANDS/UL (ref 149–390)
PMV BLD AUTO: 9 FL (ref 8.9–12.7)
POTASSIUM SERPL-SCNC: 4 MMOL/L (ref 3.5–5.3)
PR INTERVAL: 156 MS
PR INTERVAL: 160 MS
PROT FLD-MCNC: 4.4 G/DL
PROT SERPL-MCNC: 6.1 G/DL (ref 6.4–8.4)
PROTHROMBIN TIME: 15.3 SECONDS (ref 12.3–15)
QRS AXIS: 35 DEGREES
QRS AXIS: 44 DEGREES
QRSD INTERVAL: 78 MS
QRSD INTERVAL: 80 MS
QT INTERVAL: 382 MS
QT INTERVAL: 398 MS
QTC INTERVAL: 432 MS
QTC INTERVAL: 443 MS
RBC # BLD AUTO: 3.38 MILLION/UL (ref 3.88–5.62)
S PNEUM AG UR QL: POSITIVE
SITE: ABNORMAL
SODIUM SERPL-SCNC: 135 MMOL/L (ref 135–147)
T WAVE AXIS: 10 DEGREES
T WAVE AXIS: 21 DEGREES
TOTAL CELLS COUNTED SPEC: 100
VENTRICULAR RATE: 71 BPM
VENTRICULAR RATE: 81 BPM
WBC # BLD AUTO: 10.04 THOUSAND/UL (ref 4.31–10.16)
WBC # FLD MANUAL: ABNORMAL /UL

## 2025-04-14 PROCEDURE — 32555 ASPIRATE PLEURA W/ IMAGING: CPT

## 2025-04-14 PROCEDURE — 84157 ASSAY OF PROTEIN OTHER: CPT | Performed by: INTERNAL MEDICINE

## 2025-04-14 PROCEDURE — 32555 ASPIRATE PLEURA W/ IMAGING: CPT | Performed by: NURSE PRACTITIONER

## 2025-04-14 PROCEDURE — 83615 LACTATE (LD) (LDH) ENZYME: CPT | Performed by: INTERNAL MEDICINE

## 2025-04-14 PROCEDURE — 83735 ASSAY OF MAGNESIUM: CPT | Performed by: STUDENT IN AN ORGANIZED HEALTH CARE EDUCATION/TRAINING PROGRAM

## 2025-04-14 PROCEDURE — 85610 PROTHROMBIN TIME: CPT | Performed by: STUDENT IN AN ORGANIZED HEALTH CARE EDUCATION/TRAINING PROGRAM

## 2025-04-14 PROCEDURE — 93010 ELECTROCARDIOGRAM REPORT: CPT | Performed by: INTERNAL MEDICINE

## 2025-04-14 PROCEDURE — 87070 CULTURE OTHR SPECIMN AEROBIC: CPT

## 2025-04-14 PROCEDURE — 99232 SBSQ HOSP IP/OBS MODERATE 35: CPT | Performed by: INTERNAL MEDICINE

## 2025-04-14 PROCEDURE — 88305 TISSUE EXAM BY PATHOLOGIST: CPT | Performed by: PATHOLOGY

## 2025-04-14 PROCEDURE — 87205 SMEAR GRAM STAIN: CPT

## 2025-04-14 PROCEDURE — 82945 GLUCOSE OTHER FLUID: CPT | Performed by: INTERNAL MEDICINE

## 2025-04-14 PROCEDURE — 94664 DEMO&/EVAL PT USE INHALER: CPT

## 2025-04-14 PROCEDURE — 87070 CULTURE OTHR SPECIMN AEROBIC: CPT | Performed by: INTERNAL MEDICINE

## 2025-04-14 PROCEDURE — 80053 COMPREHEN METABOLIC PANEL: CPT | Performed by: STUDENT IN AN ORGANIZED HEALTH CARE EDUCATION/TRAINING PROGRAM

## 2025-04-14 PROCEDURE — 0W993ZX DRAINAGE OF RIGHT PLEURAL CAVITY, PERCUTANEOUS APPROACH, DIAGNOSTIC: ICD-10-PCS | Performed by: RADIOLOGY

## 2025-04-14 PROCEDURE — 88112 CYTOPATH CELL ENHANCE TECH: CPT | Performed by: PATHOLOGY

## 2025-04-14 PROCEDURE — 89051 BODY FLUID CELL COUNT: CPT | Performed by: INTERNAL MEDICINE

## 2025-04-14 PROCEDURE — 99223 1ST HOSP IP/OBS HIGH 75: CPT | Performed by: INTERNAL MEDICINE

## 2025-04-14 PROCEDURE — 83986 ASSAY PH BODY FLUID NOS: CPT | Performed by: INTERNAL MEDICINE

## 2025-04-14 PROCEDURE — 87205 SMEAR GRAM STAIN: CPT | Performed by: INTERNAL MEDICINE

## 2025-04-14 PROCEDURE — 85025 COMPLETE CBC W/AUTO DIFF WBC: CPT | Performed by: STUDENT IN AN ORGANIZED HEALTH CARE EDUCATION/TRAINING PROGRAM

## 2025-04-14 RX ORDER — LIDOCAINE WITH 8.4% SOD BICARB 0.9%(10ML)
SYRINGE (ML) INJECTION AS NEEDED
Status: COMPLETED | OUTPATIENT
Start: 2025-04-14 | End: 2025-04-14

## 2025-04-14 RX ORDER — AMLODIPINE BESYLATE 5 MG/1
5 TABLET ORAL DAILY
Qty: 90 TABLET | Refills: 1 | Status: SHIPPED | OUTPATIENT
Start: 2025-04-14

## 2025-04-14 RX ORDER — COLCHICINE 0.6 MG/1
0.6 TABLET ORAL 2 TIMES DAILY
Status: CANCELLED | OUTPATIENT
Start: 2025-04-14

## 2025-04-14 RX ORDER — CARVEDILOL 12.5 MG/1
25 TABLET ORAL 2 TIMES DAILY WITH MEALS
Qty: 360 TABLET | Refills: 1 | Status: SHIPPED | OUTPATIENT
Start: 2025-04-14

## 2025-04-14 RX ORDER — ATORVASTATIN CALCIUM 40 MG/1
40 TABLET, FILM COATED ORAL DAILY
Qty: 90 TABLET | Refills: 1 | Status: SHIPPED | OUTPATIENT
Start: 2025-04-14

## 2025-04-14 RX ADMIN — COLCHICINE 0.6 MG: 0.6 TABLET ORAL at 08:19

## 2025-04-14 RX ADMIN — GUAIFENESIN 600 MG: 600 TABLET ORAL at 08:22

## 2025-04-14 RX ADMIN — CARVEDILOL 12.5 MG: 12.5 TABLET, FILM COATED ORAL at 08:22

## 2025-04-14 RX ADMIN — HEPARIN SODIUM 5000 UNITS: 5000 INJECTION INTRAVENOUS; SUBCUTANEOUS at 05:18

## 2025-04-14 RX ADMIN — HEPARIN SODIUM 5000 UNITS: 5000 INJECTION INTRAVENOUS; SUBCUTANEOUS at 12:37

## 2025-04-14 RX ADMIN — Medication 17 ML: at 13:59

## 2025-04-14 RX ADMIN — HEPARIN SODIUM 5000 UNITS: 5000 INJECTION INTRAVENOUS; SUBCUTANEOUS at 22:01

## 2025-04-14 RX ADMIN — LISINOPRIL 20 MG: 20 TABLET ORAL at 16:59

## 2025-04-14 RX ADMIN — HYDROMORPHONE HYDROCHLORIDE 0.5 MG: 1 INJECTION, SOLUTION INTRAMUSCULAR; INTRAVENOUS; SUBCUTANEOUS at 12:37

## 2025-04-14 RX ADMIN — OXYCODONE HYDROCHLORIDE 5 MG: 5 TABLET ORAL at 08:21

## 2025-04-14 RX ADMIN — ASPIRIN 81 MG: 81 TABLET, CHEWABLE ORAL at 08:22

## 2025-04-14 RX ADMIN — OXYCODONE HYDROCHLORIDE 5 MG: 5 TABLET ORAL at 04:16

## 2025-04-14 RX ADMIN — LISINOPRIL 20 MG: 20 TABLET ORAL at 08:22

## 2025-04-14 RX ADMIN — OXYCODONE HYDROCHLORIDE 5 MG: 5 TABLET ORAL at 16:59

## 2025-04-14 RX ADMIN — OXYCODONE HYDROCHLORIDE 5 MG: 5 TABLET ORAL at 22:10

## 2025-04-14 RX ADMIN — CEFTRIAXONE SODIUM 1000 MG: 10 INJECTION, POWDER, FOR SOLUTION INTRAVENOUS at 22:01

## 2025-04-14 RX ADMIN — HYDROMORPHONE HYDROCHLORIDE 0.5 MG: 1 INJECTION, SOLUTION INTRAMUSCULAR; INTRAVENOUS; SUBCUTANEOUS at 20:14

## 2025-04-14 RX ADMIN — GUAIFENESIN 600 MG: 600 TABLET ORAL at 22:01

## 2025-04-14 RX ADMIN — ATORVASTATIN CALCIUM 40 MG: 40 TABLET, FILM COATED ORAL at 08:22

## 2025-04-14 RX ADMIN — CARVEDILOL 12.5 MG: 12.5 TABLET, FILM COATED ORAL at 16:58

## 2025-04-14 NOTE — ASSESSMENT & PLAN NOTE
Patient presented to ED with complaints of severe pleuritic chest pain that started early a.m.4/13   Patient had elevated temperature at home and last dose of Tylenol was at 2:15 PM.  Patient was recently hospitalized for right lower lobe pneumonia and streptococcal pneumonia bacteremia requiring 3 days of IV antibiotics, and patient was sent home on Augmentin for 7 days and steroids of which he had completed.  Current vital signs: /89, HR 68, RR 20, temp 98, oxygen saturation 95% on room air.  ED provider gave fentanyl, morphine, Rocephin and bolus of IV fluids.  EKG-sinus rhythm.  Troponin negative  CT PE abdomen pelvis-no PE, New moderate right pleural effusion and large right lower lobe airspace consolidation, which likely represents atelectasis. Superimposed pneumonia cannot be excluded. Mild groundglass opacities in the dependent portions of the right upper and middle lobes, may also represent atelectasis versus infection. Unchanged 4.4 x 4.2 cm fusiform ectasia of ascending thoracic aorta with no aortic dissection. Dilatation of the main pulmonary artery up to 4 cm, which may be seen in the setting of pulmonary arterial hypertension.   WBC-12.82.  Blood cultures-pending.    Plan  IR consult for thoracentesis for parapneumonic effusion.  I am concerned this may be complicated given severity of pain and time course since initial infection  Rocephin to be continued for PNA with concern for parapneumonic effusion   Monitor hemodynamics  Sed rate and CRP markedly elevated  Pulm consult pending

## 2025-04-14 NOTE — ASSESSMENT & PLAN NOTE
Likely in the setting of recent pneumonia, parapneumonic versus empyema  Recently completed 7 day course of antibiotics with Augmentin  Procalcitonin elevated on recent admission, now normal  He has no leukocytosis  Strep pneumo antigen is still positive  Currently on ceftriaxone    Thoracentesis has been ordered, will follow-up pleural fluid studies  If empyema, will need chest tube

## 2025-04-14 NOTE — QUICK NOTE
Chart reviewed, CT reviewed.  Patient with parapneumonic effusion with history of strep pneumo bacteremia.  Importance of fluid sampling discussed with patient.  IR consult placed.  Patient verbalized understanding and willingness to proceed.

## 2025-04-14 NOTE — PROGRESS NOTES
Progress Note - Hospitalist   Name: Markel Alves 74 y.o. male I MRN: [id number]  Unit/Bed#: -01 I Date of Admission: 4/12/2025   Date of Service: 4/14/2025 I Hospital Day: 1    Assessment & Plan  Chest pain  Patient presented to ED with complaints of severe pleuritic chest pain that started early a.m.4/13   Patient had elevated temperature at home and last dose of Tylenol was at 2:15 PM.  Patient was recently hospitalized for right lower lobe pneumonia and streptococcal pneumonia bacteremia requiring 3 days of IV antibiotics, and patient was sent home on Augmentin for 7 days and steroids of which he had completed.  Current vital signs: /89, HR 68, RR 20, temp 98, oxygen saturation 95% on room air.  ED provider gave fentanyl, morphine, Rocephin and bolus of IV fluids.  EKG-sinus rhythm.  Troponin negative  CT PE abdomen pelvis-no PE, New moderate right pleural effusion and large right lower lobe airspace consolidation, which likely represents atelectasis. Superimposed pneumonia cannot be excluded. Mild groundglass opacities in the dependent portions of the right upper and middle lobes, may also represent atelectasis versus infection. Unchanged 4.4 x 4.2 cm fusiform ectasia of ascending thoracic aorta with no aortic dissection. Dilatation of the main pulmonary artery up to 4 cm, which may be seen in the setting of pulmonary arterial hypertension.   WBC-12.82.  Blood cultures-pending.    Plan  IR consult for thoracentesis for parapneumonic effusion.  I am concerned this may be complicated given severity of pain and time course since initial infection  Rocephin to be continued for PNA with concern for parapneumonic effusion   Monitor hemodynamics  Sed rate and CRP markedly elevated  Pulm consult pending  Pleural effusion  Patient denies shortness of breath.  Current oxygen saturation in mid 90s on room air.  CT PE abdomen pelvis-no PE, New moderate right pleural effusion and large right lower lobe airspace  consolidation, which likely represents atelectasis.  Respiratory protocol.  Continuous oxygen monitoring.  Pulm consultation   Asthma  Continue Proventil inhaler.  Asthma is mild at baseline   No signs of exacerbation   HTN (hypertension)  Patient's current /89, HR 68.  Blood pressure and heart rate stable.  Monitor BP and HR.  Continue Coreg and lisinopril. Hold amlodipine given borderline BP. Monitor BP  Cardiac diet.  Hyperlipidemia  Continue atorvastatin.  BPH without urinary obstruction  Urinalysis negative.  Urine culture pending.  Urinary protocol.  Gout  Patient feels gout pain coming on and right great toe  - Colchicine ATC, consider steroids    VTE Pharmacologic Prophylaxis: VTE Score: 4 heparin    Mobility:   Basic Mobility Inpatient Raw Score: 24  JH-HLM Goal: 8: Walk 250 feet or more  JH-HLM Achieved: 7: Walk 25 feet or more  JH-HLM Goal achieved. Continue to encourage appropriate mobility.    Patient Centered Rounds: I performed bedside rounds with nursing staff today.   Discussions with Specialists or Other Care Team Provider: IR    Education and Discussions with Family / Patient: Updated  (wife) at bedside.    Current Length of Stay: 1 day(s)  Current Patient Status: Inpatient   Certification Statement: The patient will continue to require additional inpatient hospital stay due to workup as outlined above  Discharge Plan:  Unclear at this time pending further workup    Code Status: Level 1 - Full Code    Subjective   Still with severe pleuritic pain though narcotic analgesia is helpful.  No fevers or chills.  No shortness of breath.  No left-sided chest pain.  Notes onset of right great toe pain consistent with gout flare    Objective :  Temp:  [98.3 °F (36.8 °C)-98.9 °F (37.2 °C)] 98.5 °F (36.9 °C)  HR:  [65-79] 65  BP: (104-152)/(67-81) 130/81  Resp:  [18-20] 19  SpO2:  [90 %-93 %] 93 %    Body mass index is 30.18 kg/m².     Input and Output Summary (last 24 hours):   No intake  or output data in the 24 hours ending 04/14/25 1225    Physical Exam  Constitutional:       Appearance: He is well-developed.   HENT:      Head: Normocephalic and atraumatic.      Nose: Nose normal.   Eyes:      General: No scleral icterus.     Conjunctiva/sclera: Conjunctivae normal.      Pupils: Pupils are equal, round, and reactive to light.   Neck:      Thyroid: No thyromegaly.      Vascular: No JVD.      Trachea: No tracheal deviation.   Cardiovascular:      Rate and Rhythm: Normal rate and regular rhythm.      Heart sounds: No murmur heard.     No friction rub. No gallop.   Pulmonary:      Effort: Pulmonary effort is normal. No respiratory distress.      Breath sounds: Rales present. No wheezing.      Comments: Faint basilar crackles with diminished breath sounds right base  Abdominal:      General: Bowel sounds are normal. There is no distension.      Palpations: Abdomen is soft. There is no mass.      Tenderness: There is no abdominal tenderness. There is no guarding or rebound.   Musculoskeletal:         General: No tenderness.      Cervical back: Normal range of motion and neck supple.      Comments: Mildly erythematous but exquisitely tender right first MTP   Lymphadenopathy:      Cervical: No cervical adenopathy.   Skin:     General: Skin is warm and dry.      Findings: No erythema or rash.   Neurological:      Mental Status: He is alert and oriented to person, place, and time.      Cranial Nerves: No cranial nerve deficit.   Psychiatric:         Behavior: Behavior normal.         Thought Content: Thought content normal.         Judgment: Judgment normal.           Lines/Drains:        Telemetry:  Telemetry Orders (From admission, onward)               24 Hour Telemetry Monitoring  Continuous x 24 Hours (Telem)        Expiring   Question:  Reason for 24 Hour Telemetry  Answer:  PCI/EP study (including pacer and ICD implementation), Cardiac surgery, MI, abnormal cardiac cath, and chest pain- rule out MI                      Telemetry Reviewed: Normal Sinus Rhythm  Indication for Continued Telemetry Use: No indication for continued use. Will discontinue.                Lab Results: I have reviewed the following results:   Results from last 7 days   Lab Units 04/14/25  0516   WBC Thousand/uL 10.04   HEMOGLOBIN g/dL 10.9*   HEMATOCRIT % 32.6*   PLATELETS Thousands/uL 319   SEGS PCT % 67   LYMPHO PCT % 15   MONO PCT % 14*   EOS PCT % 2     Results from last 7 days   Lab Units 04/14/25  0516   SODIUM mmol/L 135   POTASSIUM mmol/L 4.0   CHLORIDE mmol/L 103   CO2 mmol/L 27   BUN mg/dL 15   CREATININE mg/dL 1.03   ANION GAP mmol/L 5   CALCIUM mg/dL 8.6   ALBUMIN g/dL 3.1*   TOTAL BILIRUBIN mg/dL 0.72   ALK PHOS U/L 57   ALT U/L 26   AST U/L 12*   GLUCOSE RANDOM mg/dL 108     Results from last 7 days   Lab Units 04/14/25  0516   INR  1.14         Results from last 7 days   Lab Units 04/08/25  0904   HEMOGLOBIN A1C % 6.5*     Results from last 7 days   Lab Units 04/12/25  1630   LACTIC ACID mmol/L 1.3   PROCALCITONIN ng/ml 0.09       Recent Cultures (last 7 days):   Results from last 7 days   Lab Units 04/13/25  1324 04/12/25  1903 04/12/25  1630 04/12/25  1629   BLOOD CULTURE   --   --  No Growth at 24 hrs. No Growth at 24 hrs.   URINE CULTURE   --  No Growth <1000 cfu/mL  --   --    LEGIONELLA URINARY ANTIGEN  Negative  --   --   --              Last 24 Hours Medication List:     Current Facility-Administered Medications:     acetaminophen (TYLENOL) tablet 650 mg, Q6H PRN    albuterol (PROVENTIL HFA,VENTOLIN HFA) inhaler 2 puff, Q6H PRN    aluminum-magnesium hydroxide-simethicone (MAALOX) oral suspension 30 mL, Q6H PRN    aspirin chewable tablet 81 mg, Daily    atorvastatin (LIPITOR) tablet 40 mg, Daily    calcium carbonate (TUMS) chewable tablet 1,000 mg, Daily PRN    carvedilol (COREG) tablet 12.5 mg, BID With Meals    cefTRIAXone (ROCEPHIN) 1,000 mg in dextrose 5 % 50 mL IVPB, Q24H, Last Rate: 1,000 mg (04/13/25 0368)     colchicine (COLCRYS) tablet 0.6 mg, Daily    fluticasone (FLONASE) 50 mcg/act nasal spray 1 spray, Daily    guaiFENesin (MUCINEX) 12 hr tablet 600 mg, Q12H CATHY    heparin (porcine) subcutaneous injection 5,000 Units, Q8H CATHY    HYDROmorphone (DILAUDID) injection 0.5 mg, Q4H PRN    lisinopril (ZESTRIL) tablet 20 mg, BID    oxyCODONE (ROXICODONE) split tablet 2.5 mg, Q4H PRN **OR** oxyCODONE (ROXICODONE) IR tablet 5 mg, Q4H PRN    Administrative Statements   Today, Patient Was Seen By: Manjit Allison MD      **Please Note: This note may have been constructed using a voice recognition system.**

## 2025-04-14 NOTE — PLAN OF CARE
Problem: INFECTION - ADULT  Goal: Absence or prevention of progression during hospitalization  Description: INTERVENTIONS:- Assess and monitor for signs and symptoms of infection- Monitor lab/diagnostic results- Monitor all insertion sites, i.e. indwelling lines, tubes, and drains- Monitor endotracheal if appropriate and nasal secretions for changes in amount and color- [place] appropriate cooling/warming therapies per order- Administer medications as ordered- Instruct and encourage patient and family to use good hand hygiene technique- Identify and instruct in appropriate isolation precautions for identified infection/condition  Outcome: Progressing

## 2025-04-14 NOTE — CASE MANAGEMENT
Case Management Discharge Planning Note    Patient name Markel Alves  Location /-01 MRN [id number]  : 1950 Date 2025       Current Admission Date: 2025  Current Admission Diagnosis:Chest pain   Patient Active Problem List    Diagnosis Date Noted Date Diagnosed    Pleural effusion 2025     Chest pain 2025     Bacteremia due to Streptococcus pneumoniae 2025     Sepsis with acute renal failure (HCC) 2025     Acute respiratory failure with hypoxia (HCC) 2025     Pneumonia 2025     Dyspnea on exertion 2024     Palpitations 2024     Mild intermittent asthma without complication 2024     HTN (hypertension) 2024     Ascending aortic aneurysm (HCC) 2023     Smoking 2020     MODE (acute kidney injury) (HCC) 2019     PVC's (premature ventricular contractions) 2019     History of bilateral knee arthroplasty 2019     Elevated liver enzymes 2017     BPH without urinary obstruction 2016     Tubular adenoma of colon 2015     Asthma 10/09/2014     Hyperlipidemia 2014     Gout 2014     Impaired fasting glucose 2014     Hypertensive CKD (chronic kidney disease) 2014       LOS (days): 1  Geometric Mean LOS (GMLOS) (days):   Days to GMLOS:     OBJECTIVE:  Risk of Unplanned Readmission Score: 14.71         Current admission status: Inpatient   Preferred Pharmacy:   [id number] - [place], [place] - [address]  [address] [facility] [address]  Phone: [phone] Fax: [phone]    [facility] - [place], [place] - [address]  [address] [facility] [address]  Phone: [phone] Fax: [phone]    Primary Care Provider: Manjit Allison MD    Primary Insurance: [employer]  Secondary Insurance:     DISCHARGE DETAILS:    Discharge planning discussed with:: Patient at bedside  Freedom of Choice: Yes  Comments - Freedom of Choice:  CM discussed freedom of choice as it pertains to discharge planning. Patient is alert oriented and competent to make decisions. Introduced self and role, explained role of CM in discharge planning. CM discussed needs at discharge. Patient declined HHC, DME and rehab. Patient declined having any needs. Patient is independent and works fulltime. Pt is aware and encouraged to seek CM for any questions or concerns. CM continues to follow.  CM contacted family/caregiver?: Yes  Were Treatment Team discharge recommendations reviewed with patient/caregiver?: Yes  Did patient/caregiver verbalize understanding of patient care needs?: Yes  Were patient/caregiver advised of the risks associated with not following Treatment Team discharge recommendations?: Yes    Contacts  Patient Contacts: wife  Relationship to Patient:: Family  Contact Method: In Person  Reason/Outcome: Continuity of Care    Requested Home Health Care         Is the patient interested in HHC at discharge?: No    DME Referral Provided  Referral made for DME?: No    Other Referral/Resources/Interventions Provided:  Interventions: None Indicated, Declines resources  Referral Comments: declined needs         Treatment Team Recommendation: Home  Discharge Destination Plan:: Home  Transport at Discharge : Family

## 2025-04-14 NOTE — BRIEF OP NOTE (RAD/CATH)
IR THORACENTESIS Procedure Note    PATIENT NAME: Markel Alves  : 1950  MRN: [id number]    Pre-op Diagnosis:   1. Abdominal pain, acute, right upper quadrant    2. Pleural effusion on right    3. Pneumonia involving right lung      Post-op Diagnosis:   1. Abdominal pain, acute, right upper quadrant    2. Pleural effusion on right    3. Pneumonia involving right lung        Provider:   KAYLEY Nguyen    Assistants:   None    Estimated Blood Loss: None     Findings: 600 mL of dark serosanguineous pleural fluid aspirated from right sided, ultrasound-guided thoracentesis.    Specimens: Specimens obtained/sent as ordered    Complications: None immediate    Anesthesia: Local    KAYLEY Nguyen     Date: 2025  Time: 2:23 PM

## 2025-04-14 NOTE — RESPIRATORY THERAPY NOTE
04/14/25 0722   Respiratory Protocol   Protocol Initiated? Yes   Protocol Selection Respiratory   Language Barrier? No   Medical & Social History Reviewed? Yes   Diagnostic Studies Reviewed? Yes   Physical Assessment Performed? Yes   Home Devices/Therapy BiPAP/CPAP   Respiratory Plan No distress/Pulmonary history   Respiratory Assessment   Assessment Type Assess only   General Appearance Awake   Respiratory Pattern Normal   Chest Assessment Chest expansion symmetrical   Bilateral Breath Sounds Clear   Resp Comments will cont w PRN txs as per home regimen, no o2 req at this time bbs clear   O2 Device ra   Additional Assessments   Pulse 69   Respirations 19   SpO2 93 %

## 2025-04-14 NOTE — UTILIZATION REVIEW
NOTIFICATION OF INPATIENT ADMISSION   AUTHORIZATION REQUEST   SERVICING FACILITY:   [place] [address]  Tax ID: [id number]  NPI: [id number] ATTENDING PROVIDER:  Attending Name and NPI#: Manjit Allison Md [[id number]]  Address: [address] [place] [address]  Phone: [phone]     ADMISSION INFORMATION:  Place of Service: Inpatient [facility]  Place of Service Code: 21  Inpatient Admission Date/Time: 4/13/25  2:38 PM  Discharge Date/Time: No discharge date for patient encounter.  Admitting Diagnosis Code/Description:  Pleural effusion on right [J90]  Abdominal pain, acute, right upper quadrant [R10.11]  Pneumonia involving right lung [J18.9]     UTILIZATION REVIEW CONTACT:  Yudy Williamson Utilization   Network Utilization Review Department  Phone: [phone]  Fax [phone]  Email: [email or web address]  Contact for approvals/pending authorizations, clinical reviews, and discharge.     PHYSICIAN ADVISORY SERVICES:  Medical Necessity Denial & Njlb-md-Qlbc Review  Phone: [phone]  Fax: [phone]  Email: [email or web address]     DISCHARGE SUPPORT TEAM:  For Patients Discharge Needs & Updates  Phone: [phone] opt. 2 Fax: [phone]  Email: [email or web address]

## 2025-04-15 ENCOUNTER — APPOINTMENT (INPATIENT)
Dept: RADIOLOGY | Facility: HOSPITAL | Age: 75
End: 2025-04-15
Payer: COMMERCIAL

## 2025-04-15 PROCEDURE — 99232 SBSQ HOSP IP/OBS MODERATE 35: CPT | Performed by: INTERNAL MEDICINE

## 2025-04-15 PROCEDURE — 71046 X-RAY EXAM CHEST 2 VIEWS: CPT

## 2025-04-15 RX ORDER — PREDNISONE 20 MG/1
40 TABLET ORAL DAILY
Status: DISCONTINUED | OUTPATIENT
Start: 2025-04-15 | End: 2025-04-17

## 2025-04-15 RX ORDER — OXYCODONE HYDROCHLORIDE 10 MG/1
10 TABLET ORAL EVERY 4 HOURS PRN
Status: DISCONTINUED | OUTPATIENT
Start: 2025-04-15 | End: 2025-04-18 | Stop reason: HOSPADM

## 2025-04-15 RX ORDER — OXYCODONE HYDROCHLORIDE 5 MG/1
5 TABLET ORAL EVERY 4 HOURS PRN
Status: DISCONTINUED | OUTPATIENT
Start: 2025-04-15 | End: 2025-04-18 | Stop reason: HOSPADM

## 2025-04-15 RX ADMIN — OXYCODONE HYDROCHLORIDE 10 MG: 10 TABLET ORAL at 19:27

## 2025-04-15 RX ADMIN — HYDROMORPHONE HYDROCHLORIDE 0.5 MG: 1 INJECTION, SOLUTION INTRAMUSCULAR; INTRAVENOUS; SUBCUTANEOUS at 08:35

## 2025-04-15 RX ADMIN — HEPARIN SODIUM 5000 UNITS: 5000 INJECTION INTRAVENOUS; SUBCUTANEOUS at 12:39

## 2025-04-15 RX ADMIN — OXYCODONE HYDROCHLORIDE 10 MG: 10 TABLET ORAL at 12:45

## 2025-04-15 RX ADMIN — CARVEDILOL 12.5 MG: 12.5 TABLET, FILM COATED ORAL at 16:55

## 2025-04-15 RX ADMIN — HEPARIN SODIUM 5000 UNITS: 5000 INJECTION INTRAVENOUS; SUBCUTANEOUS at 05:53

## 2025-04-15 RX ADMIN — LISINOPRIL 20 MG: 20 TABLET ORAL at 16:55

## 2025-04-15 RX ADMIN — CARVEDILOL 12.5 MG: 12.5 TABLET, FILM COATED ORAL at 08:09

## 2025-04-15 RX ADMIN — OXYCODONE HYDROCHLORIDE 10 MG: 10 TABLET ORAL at 23:55

## 2025-04-15 RX ADMIN — GUAIFENESIN 600 MG: 600 TABLET ORAL at 08:09

## 2025-04-15 RX ADMIN — OXYCODONE HYDROCHLORIDE 5 MG: 5 TABLET ORAL at 03:25

## 2025-04-15 RX ADMIN — COLCHICINE 0.6 MG: 0.6 TABLET ORAL at 08:09

## 2025-04-15 RX ADMIN — ASPIRIN 81 MG: 81 TABLET, CHEWABLE ORAL at 08:09

## 2025-04-15 RX ADMIN — PREDNISONE 40 MG: 20 TABLET ORAL at 12:39

## 2025-04-15 RX ADMIN — CEFTRIAXONE SODIUM 1000 MG: 10 INJECTION, POWDER, FOR SOLUTION INTRAVENOUS at 21:57

## 2025-04-15 RX ADMIN — HEPARIN SODIUM 5000 UNITS: 5000 INJECTION INTRAVENOUS; SUBCUTANEOUS at 21:57

## 2025-04-15 RX ADMIN — ATORVASTATIN CALCIUM 40 MG: 40 TABLET, FILM COATED ORAL at 08:09

## 2025-04-15 RX ADMIN — GUAIFENESIN 600 MG: 600 TABLET ORAL at 21:57

## 2025-04-15 RX ADMIN — LISINOPRIL 20 MG: 20 TABLET ORAL at 08:09

## 2025-04-15 NOTE — ASSESSMENT & PLAN NOTE
Likely in the setting of recent pneumonia, parapneumonic versus empyema  Recently completed 7 day course of antibiotics with Augmentin  Procalcitonin elevated on recent admission, now normal  He has no leukocytosis  Strep pneumo antigen is still positive  Currently on ceftriaxone    Thoracentesis done yesterday, fluid is neutrophilic predominant, exudative  Complicated parapneumonic effusion  Follow up CXR ordered for this morning-if still effusion remaining, may need to consider chest tube placement

## 2025-04-15 NOTE — PLAN OF CARE
Problem: INFECTION - ADULT  Goal: Absence or prevention of progression during hospitalization  Description: INTERVENTIONS:- Assess and monitor for signs and symptoms of infection- Monitor lab/diagnostic results- Monitor all insertion sites, i.e. indwelling lines, tubes, and drains- Monitor endotracheal if appropriate and nasal secretions for changes in amount and color- [place] appropriate cooling/warming therapies per order- Administer medications as ordered- Instruct and encourage patient and family to use good hand hygiene technique- Identify and instruct in appropriate isolation precautions for identified infection/condition  Outcome: Progressing

## 2025-04-15 NOTE — ASSESSMENT & PLAN NOTE
Patient denies shortness of breath.    Current oxygen saturation in low 90s on room air.  CT PE abdomen pelvis-no PE, New moderate right pleural effusion and large right lower lobe airspace consolidation, which likely represents atelectasis.  Thoracentesis revealed exudative fluid.  Possible complicated parapneumonic effusion vs empyema  Respiratory protocol.  Continuous oxygen monitoring.  Repeat CXR pending.  Pulm recommendations pending results.  Continue ceftriaxone.

## 2025-04-15 NOTE — PLAN OF CARE
Problem: Potential for Falls  Goal: Patient will remain free of falls  Description: INTERVENTIONS:- Educate patient/family on patient safety including physical limitations- Instruct patient to call for assistance with activity - Consult OT/PT to assist with strengthening/mobility - Keep Call bell within reach- Keep bed low and locked with side rails adjusted as appropriate- Keep care items and personal belongings within reach- Initiate and maintain comfort rounds- Apply yellow socks and bracelet for high fall risk patients- Consider moving patient to room near nurses station  INTERVENTIONS:- Educate patient/family on patient safety including physical limitations- Instruct patient to call for assistance with activity - Consult OT/PT to assist with strengthening/mobility - Keep Call bell within reach- Keep bed low and locked with side rails adjusted as appropriate- Keep care items and personal belongings within reach- Initiate and maintain comfort rounds- Apply yellow socks and bracelet for high fall risk patients- Consider moving patient to room near nurses station  Outcome: Progressing

## 2025-04-15 NOTE — PROGRESS NOTES
Progress Note - Pulmonology   Name: Markel Alves 74 y.o. male I MRN: [id number]  Unit/Bed#: -01 I Date of Admission: 4/12/2025   Date of Service: 4/15/2025 I Hospital Day: 2    Assessment & Plan  Pleural effusion  Patient denies shortness of breath.    Current oxygen saturation in low 90s on room air.  CT PE abdomen pelvis-no PE, New moderate right pleural effusion and large right lower lobe airspace consolidation, which likely represents atelectasis.  Thoracentesis revealed exudative fluid.  Possible complicated parapneumonic effusion vs empyema  Respiratory protocol.  Continuous oxygen monitoring.  Repeat CXR pending.  Pulm recommendations pending results.  Continue ceftriaxone.  Chest pain  Patient was recently hospitalized for right lower lobe pneumonia and streptococcal pneumonia bacteremia requiring 3 days of IV antibiotics, and patient was sent home on Augmentin for 7 days and steroids of which he had completed. CT revealed right sided pleural effusion as above.   Currently patient is unable to adequately cough due to pleuritic chest pain.    Plan  Increased oxy from 2.5-5 to 5-10 for moderate to severe pain respectively.   Asthma  Continue Proventil inhaler.  Asthma is mild at baseline   No signs of exacerbation   HTN (hypertension)  Blood Pressure: 125/78    Continue lisinopril and carvedilol  Medications held: None  Monitor blood pressure  Hyperlipidemia  Continue atorvastatin.  BPH without urinary obstruction  Urinalysis negative.  Urine culture negative  Urinary protocol.  Gout  Pain in right great toe limiting mobility.  Consider steroid if symptoms persist.    24 Hour Events : Improvement is SOB  Subjective :   Patient seen and examined at bedside today.  There were no overnight events.  Reports feeling better since day of admission.  Still has pleuritic chest pain that prevents him from coughing.  No fevers overnight but did report subjective chills.  We discussed exudative vs transudative fluid  and possible implications.  Otherwise denies any new symptoms.    Objective :  Temp:  [98.2 °F (36.8 °C)-99 °F (37.2 °C)] 98.2 °F (36.8 °C)  HR:  [65-84] 67  BP: (112-132)/(67-81) 125/78  Resp:  [18-19] 19  SpO2:  [90 %-94 %] 92 %    Physical Exam  Constitutional:       Appearance: Normal appearance.   HENT:      Head: Normocephalic and atraumatic.   Eyes:      Extraocular Movements: Extraocular movements intact.      Conjunctiva/sclera: Conjunctivae normal.      Pupils: Pupils are equal, round, and reactive to light.   Cardiovascular:      Rate and Rhythm: Normal rate and regular rhythm.      Pulses: Normal pulses.      Heart sounds: Normal heart sounds. No murmur heard.     No gallop.   Pulmonary:      Effort: No respiratory distress.      Breath sounds: No wheezing.      Comments: Diminished breath sounds at right base.  Abdominal:      General: Bowel sounds are normal. There is no distension.      Palpations: Abdomen is soft.      Tenderness: There is no abdominal tenderness.   Skin:     Capillary Refill: Capillary refill takes less than 2 seconds.   Neurological:      General: No focal deficit present.      Mental Status: He is alert and oriented to person, place, and time. Mental status is at baseline.   Psychiatric:         Mood and Affect: Mood normal.         Behavior: Behavior normal.         Lab Results: I have reviewed the following results:   No new results in last 24 hours.  ABG: No new results in last 24 hours.

## 2025-04-15 NOTE — PROGRESS NOTES
Progress Note - Pulmonology   Name: Markel Alves 74 y.o. male I MRN: [id number]  Unit/Bed#: -01 I Date of Admission: 4/12/2025   Date of Service: 4/15/2025 I Hospital Day: 2    Assessment & Plan  Pleural effusion  Likely in the setting of recent pneumonia, parapneumonic versus empyema  Recently completed 7 day course of antibiotics with Augmentin  Procalcitonin elevated on recent admission, now normal  He has no leukocytosis  Strep pneumo antigen is still positive  Currently on ceftriaxone    Thoracentesis done yesterday, fluid is neutrophilic predominant, exudative  Complicated parapneumonic effusion  Follow up CXR ordered for this morning-if still effusion remaining, may need to consider chest tube placement  Chest pain  Likely secondary to pleural effusion  Asthma  Asthma well-controlled at baseline  Gout      24 Hour Events : Thoracentesis done yesterday  Subjective : Patient ambulating around the room.  Still having some pleuritic pain although improved somewhat after thoracentesis.    Objective :  Temp:  [98.2 °F (36.8 °C)-99 °F (37.2 °C)] 98.2 °F (36.8 °C)  HR:  [65-84] 65  BP: (112-146)/(67-81) 130/75  Resp:  [18-19] 19  SpO2:  [90 %-94 %] 92 %  O2 Device: None (Room air)    Physical Exam  Vitals reviewed.   Constitutional:       General: He is not in acute distress.     Appearance: Normal appearance. He is well-developed. He is not ill-appearing.   HENT:      Head: Normocephalic and atraumatic.      Mouth/Throat:      Pharynx: Oropharynx is clear.   Eyes:      Pupils: Pupils are equal, round, and reactive to light.   Cardiovascular:      Rate and Rhythm: Normal rate and regular rhythm.   Pulmonary:      Effort: Pulmonary effort is normal. No respiratory distress.      Breath sounds: Examination of the right-lower field reveals decreased breath sounds. Decreased breath sounds present. No wheezing, rhonchi or rales.   Abdominal:      General: Abdomen is flat. There is no distension.   Musculoskeletal:          General: Normal range of motion.      Cervical back: Normal range of motion.      Right lower leg: No edema.      Left lower leg: No edema.   Skin:     General: Skin is warm and dry.      Findings: No rash.   Neurological:      Mental Status: He is alert and oriented to person, place, and time.   Psychiatric:         Mood and Affect: Mood normal.         Behavior: Behavior normal.           Lab Results: I have reviewed the following results:   No new results in last 24 hours.  ABG: No new results in last 24 hours.    Imaging Results Review: I reviewed radiology reports from this admission including: chest xray and CT chest.  Other Study Results Review: Other studies reviewed include: pleural fluid studies  PFT Results Reviewed: none available

## 2025-04-15 NOTE — ASSESSMENT & PLAN NOTE
Blood Pressure: 125/78    Continue lisinopril and carvedilol  Medications held: None  Monitor blood pressure

## 2025-04-15 NOTE — ASSESSMENT & PLAN NOTE
Patient was recently hospitalized for right lower lobe pneumonia and streptococcal pneumonia bacteremia requiring 3 days of IV antibiotics, and patient was sent home on Augmentin for 7 days and steroids of which he had completed. CT revealed right sided pleural effusion as above.   Currently patient is unable to adequately cough due to pleuritic chest pain.    Plan  Increased oxy from 2.5-5 to 5-10 for moderate to severe pain respectively.

## 2025-04-16 ENCOUNTER — APPOINTMENT (INPATIENT)
Dept: RADIOLOGY | Facility: HOSPITAL | Age: 75
End: 2025-04-16
Payer: COMMERCIAL

## 2025-04-16 LAB
ANION GAP SERPL CALCULATED.3IONS-SCNC: 7 MMOL/L (ref 4–13)
BACTERIA SPT RESP CULT: NORMAL
BASOPHILS # BLD AUTO: 0.02 THOUSANDS/ÂΜL (ref 0–0.1)
BASOPHILS NFR BLD AUTO: 0 % (ref 0–1)
BUN SERPL-MCNC: 19 MG/DL (ref 5–25)
CALCIUM SERPL-MCNC: 9.2 MG/DL (ref 8.4–10.2)
CHLORIDE SERPL-SCNC: 98 MMOL/L (ref 96–108)
CO2 SERPL-SCNC: 29 MMOL/L (ref 21–32)
CREAT SERPL-MCNC: 1.03 MG/DL (ref 0.6–1.3)
CRP SERPL QL: 142.4 MG/L
EOSINOPHIL # BLD AUTO: 0.01 THOUSAND/ÂΜL (ref 0–0.61)
EOSINOPHIL NFR BLD AUTO: 0 % (ref 0–6)
ERYTHROCYTE [DISTWIDTH] IN BLOOD BY AUTOMATED COUNT: 12.1 % (ref 11.6–15.1)
ERYTHROCYTE [SEDIMENTATION RATE] IN BLOOD: 39 MM/HOUR (ref 0–19)
GFR SERPL CREATININE-BSD FRML MDRD: 72 ML/MIN/1.73SQ M
GLUCOSE SERPL-MCNC: 152 MG/DL (ref 65–140)
GRAM STN SPEC: NORMAL
HCT VFR BLD AUTO: 37 % (ref 36.5–49.3)
HGB BLD-MCNC: 12 G/DL (ref 12–17)
IMM GRANULOCYTES # BLD AUTO: 0.05 THOUSAND/UL (ref 0–0.2)
IMM GRANULOCYTES NFR BLD AUTO: 0 % (ref 0–2)
LYMPHOCYTES # BLD AUTO: 0.84 THOUSANDS/ÂΜL (ref 0.6–4.47)
LYMPHOCYTES NFR BLD AUTO: 8 % (ref 14–44)
MCH RBC QN AUTO: 31.7 PG (ref 26.8–34.3)
MCHC RBC AUTO-ENTMCNC: 32.4 G/DL (ref 31.4–37.4)
MCV RBC AUTO: 98 FL (ref 82–98)
MONOCYTES # BLD AUTO: 0.47 THOUSAND/ÂΜL (ref 0.17–1.22)
MONOCYTES NFR BLD AUTO: 4 % (ref 4–12)
NEUTROPHILS # BLD AUTO: 9.84 THOUSANDS/ÂΜL (ref 1.85–7.62)
NEUTS SEG NFR BLD AUTO: 88 % (ref 43–75)
NRBC BLD AUTO-RTO: 0 /100 WBCS
PLATELET # BLD AUTO: 356 THOUSANDS/UL (ref 149–390)
PMV BLD AUTO: 9 FL (ref 8.9–12.7)
POTASSIUM SERPL-SCNC: 4.2 MMOL/L (ref 3.5–5.3)
RBC # BLD AUTO: 3.78 MILLION/UL (ref 3.88–5.62)
SODIUM SERPL-SCNC: 134 MMOL/L (ref 135–147)
WBC # BLD AUTO: 11.23 THOUSAND/UL (ref 4.31–10.16)

## 2025-04-16 PROCEDURE — 80048 BASIC METABOLIC PNL TOTAL CA: CPT | Performed by: INTERNAL MEDICINE

## 2025-04-16 PROCEDURE — 99232 SBSQ HOSP IP/OBS MODERATE 35: CPT | Performed by: INTERNAL MEDICINE

## 2025-04-16 PROCEDURE — 86140 C-REACTIVE PROTEIN: CPT | Performed by: INTERNAL MEDICINE

## 2025-04-16 PROCEDURE — 85025 COMPLETE CBC W/AUTO DIFF WBC: CPT | Performed by: INTERNAL MEDICINE

## 2025-04-16 PROCEDURE — 85652 RBC SED RATE AUTOMATED: CPT | Performed by: INTERNAL MEDICINE

## 2025-04-16 PROCEDURE — 88112 CYTOPATH CELL ENHANCE TECH: CPT | Performed by: PATHOLOGY

## 2025-04-16 PROCEDURE — 71046 X-RAY EXAM CHEST 2 VIEWS: CPT

## 2025-04-16 PROCEDURE — 88305 TISSUE EXAM BY PATHOLOGIST: CPT | Performed by: PATHOLOGY

## 2025-04-16 RX ORDER — POLYETHYLENE GLYCOL 3350 17 G/17G
17 POWDER, FOR SOLUTION ORAL DAILY
Status: DISCONTINUED | OUTPATIENT
Start: 2025-04-16 | End: 2025-04-18 | Stop reason: HOSPADM

## 2025-04-16 RX ORDER — TAMSULOSIN HYDROCHLORIDE 0.4 MG/1
0.4 CAPSULE ORAL DAILY
Status: DISCONTINUED | OUTPATIENT
Start: 2025-04-16 | End: 2025-04-18 | Stop reason: HOSPADM

## 2025-04-16 RX ADMIN — LISINOPRIL 20 MG: 20 TABLET ORAL at 08:39

## 2025-04-16 RX ADMIN — PREDNISONE 40 MG: 20 TABLET ORAL at 08:39

## 2025-04-16 RX ADMIN — ALBUTEROL SULFATE 2 PUFF: 90 AEROSOL, METERED RESPIRATORY (INHALATION) at 08:15

## 2025-04-16 RX ADMIN — CARVEDILOL 12.5 MG: 12.5 TABLET, FILM COATED ORAL at 08:56

## 2025-04-16 RX ADMIN — GUAIFENESIN 600 MG: 600 TABLET ORAL at 08:39

## 2025-04-16 RX ADMIN — CEFTRIAXONE SODIUM 1000 MG: 10 INJECTION, POWDER, FOR SOLUTION INTRAVENOUS at 20:52

## 2025-04-16 RX ADMIN — HEPARIN SODIUM 5000 UNITS: 5000 INJECTION INTRAVENOUS; SUBCUTANEOUS at 20:52

## 2025-04-16 RX ADMIN — CARVEDILOL 12.5 MG: 12.5 TABLET, FILM COATED ORAL at 17:00

## 2025-04-16 RX ADMIN — TAMSULOSIN HYDROCHLORIDE 0.4 MG: 0.4 CAPSULE ORAL at 08:39

## 2025-04-16 RX ADMIN — COLCHICINE 0.6 MG: 0.6 TABLET ORAL at 08:37

## 2025-04-16 RX ADMIN — HEPARIN SODIUM 5000 UNITS: 5000 INJECTION INTRAVENOUS; SUBCUTANEOUS at 15:00

## 2025-04-16 RX ADMIN — FLUTICASONE PROPIONATE 1 SPRAY: 50 SPRAY, METERED NASAL at 08:42

## 2025-04-16 RX ADMIN — HEPARIN SODIUM 5000 UNITS: 5000 INJECTION INTRAVENOUS; SUBCUTANEOUS at 05:04

## 2025-04-16 RX ADMIN — POLYETHYLENE GLYCOL 3350 17 G: 17 POWDER, FOR SOLUTION ORAL at 15:00

## 2025-04-16 RX ADMIN — GUAIFENESIN 600 MG: 600 TABLET ORAL at 20:52

## 2025-04-16 RX ADMIN — ASPIRIN 81 MG: 81 TABLET, CHEWABLE ORAL at 08:36

## 2025-04-16 RX ADMIN — OXYCODONE HYDROCHLORIDE 10 MG: 10 TABLET ORAL at 08:12

## 2025-04-16 RX ADMIN — ALBUTEROL SULFATE 2 PUFF: 90 AEROSOL, METERED RESPIRATORY (INHALATION) at 18:36

## 2025-04-16 RX ADMIN — ACETAMINOPHEN 650 MG: 325 TABLET, FILM COATED ORAL at 08:13

## 2025-04-16 RX ADMIN — ATORVASTATIN CALCIUM 40 MG: 40 TABLET, FILM COATED ORAL at 08:37

## 2025-04-16 RX ADMIN — OXYCODONE HYDROCHLORIDE 5 MG: 5 TABLET ORAL at 18:35

## 2025-04-16 RX ADMIN — LISINOPRIL 20 MG: 20 TABLET ORAL at 17:00

## 2025-04-16 NOTE — PROGRESS NOTES
Progress Note - Pulmonology   Name: Markel Alves 74 y.o. male I MRN: [id number]  Unit/Bed#: -01 I Date of Admission: 4/12/2025   Date of Service: 4/16/2025 I Hospital Day: 3    Assessment & Plan  Pleural effusion  Likely in the setting of recent pneumonia, parapneumonic versus empyema  Recently completed 7 day course of antibiotics with Augmentin  Procalcitonin elevated on recent admission, now normal  He has no leukocytosis  Strep pneumo antigen is still positive  Currently on ceftriaxone    Thoracentesis shows neutrophilic predominant, exudative effusion  Follow up CXR today shows very small effusion, not enough to drain at this point, would repeat again tomorrow and can discharge home if stable/improved  Chest pain  Likely secondary to pleural effusion  Asthma  Asthma well-controlled at baseline  Gout      24 Hour Events : NO events  Subjective : Patient resting in the chair. He is feeling better with less pain this morning.     Objective :  Temp:  [97.6 °F (36.4 °C)-98.4 °F (36.9 °C)] 98.4 °F (36.9 °C)  HR:  [62-74] 74  BP: (108-159)/(67-83) 159/83  Resp:  [18] 18  SpO2:  [88 %-97 %] 95 %  O2 Device: Nasal cannula  Nasal Cannula O2 Flow Rate (L/min):  [4 L/min] 4 L/min    Physical Exam  Vitals reviewed.   Constitutional:       General: He is not in acute distress.     Appearance: Normal appearance. He is well-developed. He is not ill-appearing.   HENT:      Head: Normocephalic and atraumatic.      Mouth/Throat:      Pharynx: Oropharynx is clear.   Eyes:      Pupils: Pupils are equal, round, and reactive to light.   Cardiovascular:      Rate and Rhythm: Normal rate and regular rhythm.   Pulmonary:      Effort: Pulmonary effort is normal. No respiratory distress.      Breath sounds: Examination of the right-lower field reveals decreased breath sounds. Decreased breath sounds present. No wheezing, rhonchi or rales.   Abdominal:      General: Abdomen is flat. There is no distension.   Musculoskeletal:          General: Normal range of motion.      Cervical back: Normal range of motion.      Right lower leg: No edema.      Left lower leg: No edema.   Skin:     General: Skin is warm and dry.      Findings: No rash.   Neurological:      Mental Status: He is alert and oriented to person, place, and time.   Psychiatric:         Mood and Affect: Mood normal.         Behavior: Behavior normal.           Lab Results: I have reviewed the following results:   .     04/16/25  0526   WBC 11.23*   HGB 12.0   HCT 37.0      SODIUM 134*   K 4.2   CL 98   CO2 29   BUN 19   CREATININE 1.03   GLUC 152*     ABG: No new results in last 24 hours.    Imaging Results Review: I personally reviewed the following image studies in PACS and associated radiology reports: chest xray. My interpretation of the radiology images/reports is: small right sided effusion.  Other Study Results Review: Other studies reviewed include: pleural fluid studies  PFT Results Reviewed: none available

## 2025-04-16 NOTE — PLAN OF CARE
Problem: PAIN - ADULT  Goal: Verbalizes/displays adequate comfort level or baseline comfort level  Description: Interventions:- Encourage patient to monitor pain and request assistance- Assess pain using appropriate pain scale- Administer analgesics based on type and severity of pain and evaluate response- Implement non-pharmacological measures as appropriate and evaluate response- Consider cultural and social influences on pain and pain management- Notify physician/advanced practitioner if interventions unsuccessful or patient reports new pain  Outcome: Progressing     Problem: INFECTION - ADULT  Goal: Absence or prevention of progression during hospitalization  Description: INTERVENTIONS:- Assess and monitor for signs and symptoms of infection- Monitor lab/diagnostic results- Monitor all insertion sites, i.e. indwelling lines, tubes, and drains- Monitor endotracheal if appropriate and nasal secretions for changes in amount and color- [place] appropriate cooling/warming therapies per order- Administer medications as ordered- Instruct and encourage patient and family to use good hand hygiene technique- Identify and instruct in appropriate isolation precautions for identified infection/condition  Outcome: Progressing  Goal: Absence of fever/infection during neutropenic period  Description: INTERVENTIONS:- Monitor WBC  Outcome: Progressing     Problem: SAFETY ADULT  Goal: Patient will remain free of falls  Description: INTERVENTIONS:- Educate patient/family on patient safety including physical limitations- Instruct patient to call for assistance with activity - Consult OT/PT to assist with strengthening/mobility - Keep Call bell within reach- Keep bed low and locked with side rails adjusted as appropriate- Keep care items and personal belongings within reach- Initiate and maintain comfort rounds- Make Fall Risk Sign visible to staff  Goal: Maintain or return to baseline ADL function  Description:  INTERVENTIONS:-  Assess patient's ability to carry out ADLs; assess patient's baseline for ADL function and identify physical deficits which impact ability to perform ADLs (bathing, care of mouth/teeth, toileting, grooming, dressing, etc.)- Assess/evaluate cause of self-care deficits - Assess range of motion- Assess patient's mobility; develop plan if impaired- Assess patient's need for assistive devices and provide as appropriate- Encourage maximum independence but intervene and supervise when necessary- Involve family in performance of ADLs- Assess for home care needs following discharge - Consider OT consult to assist with ADL evaluation and planning for discharge- Provide patient education as appropriate  Outcome: Progressing  Goal: Maintains/Returns to pre admission functional level  Description: INTERVENTIONS:- Perform AM-PAC 6 Click Basic Mobility/ Daily Activity assessment daily.- Set and communicate daily mobility goal to care team and patient/family/caregiver. -     Problem: DISCHARGE PLANNING  Goal: Discharge to home or other facility with appropriate resources  Description: INTERVENTIONS:- Identify barriers to discharge w/patient and caregiver- Arrange for needed discharge resources and transportation as appropriate- Identify discharge learning needs (meds, wound care, etc.)- Arrange for interpretive services to assist at discharge as needed- Refer to Case Management Department for coordinating discharge planning if the patient needs post-hospital services based on physician/advanced practitioner order or complex needs related to functional status, cognitive ability, or social support system  Outcome: Progressing     Problem: Knowledge Deficit  Goal: Patient/family/caregiver demonstrates understanding of disease process, treatment plan, medications, and discharge instructions  Description: Complete learning assessment and assess knowledge base.Interventions:- Provide teaching at level of understanding-  Provide teaching via preferred learning methods  Outcome: Progressing     Problem: RESPIRATORY - ADULT  Goal: Achieves optimal ventilation and oxygenation  Description: INTERVENTIONS:- Assess for changes in respiratory status- Assess for changes in mentation and behavior- Position to facilitate oxygenation and minimize respiratory effort- Oxygen administered by appropriate delivery if ordered- Initiate smoking cessation education as indicated- Encourage broncho-pulmonary hygiene including cough, deep breathe, Incentive Spirometry- Assess the need for suctioning and aspirate as needed- Assess and instruct to report SOB or any respiratory difficulty- Respiratory Therapy support as indicated  Outcome: Progressing

## 2025-04-16 NOTE — PLAN OF CARE
Problem: Potential for Falls  Goal: Patient will remain free of falls  Description: INTERVENTIONS:- Educate patient/family on patient safety including physical limitations- Instruct patient to call for assistance with activity - Consult OT/PT to assist with strengthening/mobility - Keep Call bell within reach- Keep bed low and locked with side rails adjusted as appropriate- Keep care items and personal belongings within reach- Initiate and maintain socks and bracelet for high fall risk patients- Consider moving patient to room near nurses station  Outcome: Progressing     Problem: PAIN - ADULT  Goal: Verbalizes/displays adequate comfort level or baseline comfort level  Description: Interventions:- Encourage patient to monitor pain and request assistance- Assess pain using appropriate pain scale- Administer analgesics based on type and severity of pain and evaluate response- Implement non-pharmacological measures as appropriate and evaluate response- Consider cultural and social influences on pain and pain management- Notify physician/advanced practitioner if interventions unsuccessful or patient reports new pain  Outcome: Progressing     Problem: INFECTION - ADULT  Goal: Absence or prevention of progression during hospitalization  Description: INTERVENTIONS:- Assess and monitor for signs and symptoms of infection- Monitor lab/diagnostic results- Monitor all insertion sites, i.e. indwelling lines, tubes, and drains- Monitor endotracheal if appropriate and nasal secretions for changes in amount and color- [place] appropriate cooling/warming therapies per order- Administer medications as ordered- Instruct and encourage patient and family to use good hand hygiene technique- Identify and instruct in appropriate isolation precautions for identified infection/condition  Outcome: Progressing  Goal: Absence of fever/infection during neutropenic period  Description: INTERVENTIONS:- Monitor WBC  Outcome: Progressing      Problem: SAFETY ADULT  Goal: Patient will remain free of falls  Description: INTERVENTIONS:- Educate patient/family on patient safety including physical limitations- Instruct patient to call for assistance with activity - Consult OT/PT to assist with strengthening/mobility - Keep Call bell within reach- Keep bed low and locked with side rails adjusted as appropriate- Keep care items and personal belongings within reach- Initiate and maintain comfort rounds- Make Fall Risk Sign visible to staff- Offer Toileting socks and bracelet for high fall risk patients- Consider moving patient to room near nurses station  Outcome: Progressing  Goal: Maintain or return to baseline ADL function  Description: INTERVENTIONS:-  Assess patient's ability to carry out ADLs; assess patient's baseline for ADL function and identify physical deficits which impact ability to perform ADLs (bathing, care of mouth/teeth, toileting, grooming, dressing, etc.)- Assess/evaluate cause of self-care deficits - Assess range of motion- Assess patient's mobility; develop plan if impaired- Assess patient's need for assistive devices and provide as appropriate- Encourage maximum independence but intervene and supervise when necessary- Involve family in performance of ADLs- Assess for home care needs following discharge - Consider OT consult to assist with ADL evaluation and planning for discharge- Provide patient education as appropriate  Outcome: Progressing  Goal: Maintains/Returns to pre admission functional level  Progressing  Outcome: Progressing     Problem: DISCHARGE PLANNING  Goal: Discharge to home or other facility with appropriate resources  Description: INTERVENTIONS:- Identify barriers to discharge w/patient and caregiver- Arrange for needed discharge resources and transportation as appropriate- Identify discharge learning needs (meds, wound care, etc.)- Arrange for interpretive services to assist at discharge as needed- Refer to Case  Management Department for coordinating discharge planning if the patient needs post-hospital services based on physician/advanced practitioner order or complex needs related to functional status, cognitive ability, or social support system  Outcome: Progressing     Problem: Knowledge Deficit  Goal: Patient/family/caregiver demonstrates understanding of disease process, treatment plan, medications, and discharge instructions  Description: Complete learning assessment and assess knowledge base.Interventions:- Provide teaching at level of understanding- Provide teaching via preferred learning methods  Outcome: Progressing     Problem: RESPIRATORY - ADULT  Goal: Achieves optimal ventilation and oxygenation  Description: INTERVENTIONS:- Assess for changes in respiratory status- Assess for changes in mentation and behavior- Position to facilitate oxygenation and minimize respiratory effort- Oxygen administered by appropriate delivery if ordered- Initiate smoking cessation education as indicated- Encourage broncho-pulmonary hygiene including cough, deep breathe, Incentive Spirometry- Assess the need for suctioning and aspirate as needed- Assess and instruct to report SOB or any respiratory difficulty- Respiratory Therapy support as indicated  Outcome: Progressing

## 2025-04-16 NOTE — CASE MANAGEMENT
Case Management Discharge Planning Note    Patient name Markel Alves  Location /-01 MRN [id number]  : 1950 Date 2025       Current Admission Date: 2025  Current Admission Diagnosis:Pleural effusion   Patient Active Problem List    Diagnosis Date Noted Date Diagnosed    Pleural effusion 2025     Chest pain 2025     Bacteremia due to Streptococcus pneumoniae 2025     Sepsis with acute renal failure (HCC) 2025     Acute respiratory failure with hypoxia (HCC) 2025     Pneumonia 2025     Dyspnea on exertion 2024     Palpitations 2024     Mild intermittent asthma without complication 2024     HTN (hypertension) 2024     Ascending aortic aneurysm (HCC) 2023     Smoking 2020     MODE (acute kidney injury) (HCC) 2019     PVC's (premature ventricular contractions) 2019     History of bilateral knee arthroplasty 2019     Elevated liver enzymes 2017     BPH without urinary obstruction 2016     Tubular adenoma of colon 2015     Asthma 10/09/2014     Hyperlipidemia 2014     Gout 2014     Impaired fasting glucose 2014     Hypertensive CKD (chronic kidney disease) 2014       LOS (days): 3  Geometric Mean LOS (GMLOS) (days): 4.4  Days to GMLOS:1.4     OBJECTIVE:  Risk of Unplanned Readmission Score: 12.99         Current admission status: Inpatient   Preferred Pharmacy:   [id number] - [place], [place] - [address]  [address] [facility] [address]  Phone: [phone] Fax: [phone]    [facility] - [place], [place] - [address]  [address] [facility] [address]  Phone: [phone] Fax: [phone]    Primary Care Provider: Manjit Allison MD    Primary Insurance: [employer] REP  Secondary Insurance:     DISCHARGE DETAILS:     IMM Given (Date):: 25  IMM Given to:: Patient (CM reviewed IMM with pt at bedside. Patient  reported understanding their rights and declined any questions or concerns. Patient was given a copy and signed copy placed in medical records.)

## 2025-04-16 NOTE — ASSESSMENT & PLAN NOTE
Likely in the setting of recent pneumonia, parapneumonic versus empyema  Recently completed 7 day course of antibiotics with Augmentin  Procalcitonin elevated on recent admission, now normal  He has no leukocytosis  Strep pneumo antigen is still positive  Currently on ceftriaxone    Thoracentesis shows neutrophilic predominant, exudative effusion  Follow up CXR today shows very small effusion, not enough to drain at this point, would repeat again tomorrow and can discharge home if stable/improved

## 2025-04-16 NOTE — ASSESSMENT & PLAN NOTE
Patient endorses shortness of breath.    Current oxygen saturation in low 90s on room air.  Dip into 80's w/ conversation  CT PE abdomen pelvis-no PE, New moderate right pleural effusion and large right lower lobe airspace consolidation, which likely represents atelectasis.  Thoracentesis revealed exudative fluid.  Possible complicated parapneumonic effusion vs empyema  Respiratory protocol.  Continuous oxygen monitoring.  Nursing noted drops into low 80's overnight 4/15-4/16 requiring increase to 4L  Repeat CXR pending radiology interpretation, looks stable to slightly improved to my review  Pleuritic pain has improved, perhaps d/t steroids.  Pulm recommendations pending results.  Continue ceftriaxone and prednisone

## 2025-04-16 NOTE — ASSESSMENT & PLAN NOTE
Blood Pressure: 159/83    Continue lisinopril and carvedilol  Medications held: None  Monitor blood pressure

## 2025-04-16 NOTE — ASSESSMENT & PLAN NOTE
Patient was recently hospitalized for right lower lobe pneumonia and streptococcal pneumonia bacteremia requiring 3 days of IV antibiotics, and patient was sent home on Augmentin for 7 days and steroids of which he had completed. CT revealed right sided pleural effusion as above.   Pt is now able to cough w/ less discomfort  Still requiring intermittent oxycodone    Plan  Wean oxy as able

## 2025-04-16 NOTE — PROGRESS NOTES
Progress Note - Hospitalist   Name: Markel Alves 74 y.o. male I MRN: [id number]  Unit/Bed#: -01 I Date of Admission: 4/12/2025   Date of Service: 4/16/2025 I Hospital Day: 3    Assessment & Plan  Pleural effusion  Patient endorses shortness of breath.    Current oxygen saturation in low 90s on room air.  Dip into 80's w/ conversation  CT PE abdomen pelvis-no PE, New moderate right pleural effusion and large right lower lobe airspace consolidation, which likely represents atelectasis.  Thoracentesis revealed exudative fluid.  Possible complicated parapneumonic effusion vs empyema  Respiratory protocol.  Continuous oxygen monitoring.  Nursing noted drops into low 80's overnight 4/15-4/16 requiring increase to 4L  Repeat CXR pending radiology interpretation, looks stable to slightly improved to my review  Pleuritic pain has improved, perhaps d/t steroids.  Pulm recommendations pending results.  Continue ceftriaxone and prednisone  Chest pain  Patient was recently hospitalized for right lower lobe pneumonia and streptococcal pneumonia bacteremia requiring 3 days of IV antibiotics, and patient was sent home on Augmentin for 7 days and steroids of which he had completed. CT revealed right sided pleural effusion as above.   Pt is now able to cough w/ less discomfort  Still requiring intermittent oxycodone    Plan  Wean oxy as able  Asthma  Continue Proventil inhaler.  Asthma is mild at baseline   No signs of exacerbation   HTN (hypertension)  Blood Pressure: 159/83    Continue lisinopril and carvedilol  Medications held: None  Monitor blood pressure  Hyperlipidemia  Continue atorvastatin.  BPH without urinary obstruction  Urinalysis negative.  Urine culture negative  Urinary protocol.  Flomax added d/t pvr 200  Gout  Pain in right great toe limiting mobility.  Markedly improved w/ prednisone    VTE Pharmacologic Prophylaxis: VTE Score: 4 on heparin    Mobility:   Basic Mobility Inpatient Raw Score: 24  [facility] Goal:  8: Walk 250 feet or more  JH-HLM Achieved: 8: Walk 250 feet ot more  JH-HLM Goal achieved. Continue to encourage appropriate mobility.    Patient Centered Rounds: I performed bedside rounds with nursing staff today.   Discussions with Specialists or Other Care Team Provider: pulmonary    Education and Discussions with Family / Patient: Updated  (wife) at bedside.    Current Length of Stay: 3 day(s)  Current Patient Status: Inpatient   Certification Statement: The patient will continue to require additional inpatient hospital stay due to plan as above  Discharge Plan:  unclear at this time    Code Status: Level 1 - Full Code    Subjective   Significant improvement in chest pain overnight.  He was able to ambulate in the hallway off of oxygen and made several laps.    Objective :  Temp:  [97.6 °F (36.4 °C)-98.4 °F (36.9 °C)] 98.4 °F (36.9 °C)  HR:  [62-74] 74  BP: (108-159)/(67-83) 159/83  Resp:  [18] 18  SpO2:  [88 %-97 %] 95 %  O2 Device: Nasal cannula  Nasal Cannula O2 Flow Rate (L/min):  [4 L/min] 4 L/min    Body mass index is 30.18 kg/m².     Input and Output Summary (last 24 hours):     Intake/Output Summary (Last 24 hours) at 4/16/2025 1153  Last data filed at 4/16/2025 0900  Gross per 24 hour   Intake 600 ml   Output 300 ml   Net 300 ml       Physical Exam  Constitutional:       Appearance: He is well-developed.   HENT:      Head: Normocephalic and atraumatic.      Nose: Nose normal.   Eyes:      General: No scleral icterus.     Conjunctiva/sclera: Conjunctivae normal.      Pupils: Pupils are equal, round, and reactive to light.   Neck:      Thyroid: No thyromegaly.      Vascular: No JVD.      Trachea: No tracheal deviation.   Cardiovascular:      Rate and Rhythm: Normal rate and regular rhythm.      Heart sounds: No murmur heard.     No friction rub. No gallop.   Pulmonary:      Effort: Pulmonary effort is normal. No respiratory distress.      Breath sounds: No wheezing or rales.      Comments:  Diminished breath sounds right base  Abdominal:      General: Bowel sounds are normal. There is no distension.      Palpations: Abdomen is soft. There is no mass.      Tenderness: There is no abdominal tenderness. There is no guarding or rebound.   Musculoskeletal:         General: No tenderness.      Cervical back: Normal range of motion and neck supple.   Lymphadenopathy:      Cervical: No cervical adenopathy.   Skin:     General: Skin is warm and dry.      Findings: No erythema or rash.   Neurological:      Mental Status: He is alert and oriented to person, place, and time.      Cranial Nerves: No cranial nerve deficit.   Psychiatric:         Behavior: Behavior normal.         Thought Content: Thought content normal.         Judgment: Judgment normal.           Lines/Drains:              Lab Results: I have reviewed the following results:   Results from last 7 days   Lab Units 04/16/25  0526   WBC Thousand/uL 11.23*   HEMOGLOBIN g/dL 12.0   HEMATOCRIT % 37.0   PLATELETS Thousands/uL 356   SEGS PCT % 88*   LYMPHO PCT % 8*   MONO PCT % 4   EOS PCT % 0     Results from last 7 days   Lab Units 04/16/25  0526 04/14/25  0516   SODIUM mmol/L 134* 135   POTASSIUM mmol/L 4.2 4.0   CHLORIDE mmol/L 98 103   CO2 mmol/L 29 27   BUN mg/dL 19 15   CREATININE mg/dL 1.03 1.03   ANION GAP mmol/L 7 5   CALCIUM mg/dL 9.2 8.6   ALBUMIN g/dL  --  3.1*   TOTAL BILIRUBIN mg/dL  --  0.72   ALK PHOS U/L  --  57   ALT U/L  --  26   AST U/L  --  12*   GLUCOSE RANDOM mg/dL 152* 108     Results from last 7 days   Lab Units 04/14/25  0516   INR  1.14             Results from last 7 days   Lab Units 04/12/25  1630   LACTIC ACID mmol/L 1.3   PROCALCITONIN ng/ml 0.09       Recent Cultures (last 7 days):   Results from last 7 days   Lab Units 04/14/25  1409 04/14/25  0525 04/13/25  1324 04/12/25  1903 04/12/25  1630 04/12/25  1629   BLOOD CULTURE   --   --   --   --  No Growth at 72 hrs. No Growth at 72 hrs.   SPUTUM CULTURE   --  4+ Growth of  --    --   --   --    GRAM STAIN RESULT  1+ Polys  No Polys or Bacteria seen 2+ Polys  No organisms seen  1+ Epithelial Cells  --   --   --   --    URINE CULTURE   --   --   --  No Growth <1000 cfu/mL  --   --    BODY FLUID CULTURE, STERILE  No growth  --   --   --   --   --    LEGIONELLA URINARY ANTIGEN   --   --  Negative  --   --   --              Last 24 Hours Medication List:     Current Facility-Administered Medications:     acetaminophen (TYLENOL) tablet 650 mg, Q6H PRN    albuterol (PROVENTIL HFA,VENTOLIN HFA) inhaler 2 puff, Q6H PRN    aluminum-magnesium hydroxide-simethicone (MAALOX) oral suspension 30 mL, Q6H PRN    aspirin chewable tablet 81 mg, Daily    atorvastatin (LIPITOR) tablet 40 mg, Daily    calcium carbonate (TUMS) chewable tablet 1,000 mg, Daily PRN    carvedilol (COREG) tablet 12.5 mg, BID With Meals    cefTRIAXone (ROCEPHIN) 1,000 mg in dextrose 5 % 50 mL IVPB, Q24H, Last Rate: 1,000 mg (04/15/25 2157)    colchicine (COLCRYS) tablet 0.6 mg, Daily    fluticasone (FLONASE) 50 mcg/act nasal spray 1 spray, Daily    guaiFENesin (MUCINEX) 12 hr tablet 600 mg, Q12H CATHY    heparin (porcine) subcutaneous injection 5,000 Units, Q8H CATHY    HYDROmorphone (DILAUDID) injection 0.5 mg, Q4H PRN    lisinopril (ZESTRIL) tablet 20 mg, BID    magnesium hydroxide (MILK OF MAGNESIA) oral suspension 30 mL, TID PRN    oxyCODONE (ROXICODONE) IR tablet 5 mg, Q4H PRN **OR** oxyCODONE (ROXICODONE) immediate release tablet 10 mg, Q4H PRN    polyethylene glycol (MIRALAX) packet 17 g, Daily    predniSONE tablet 40 mg, Daily    tamsulosin (FLOMAX) capsule 0.4 mg, Daily    Administrative Statements   Today, Patient Was Seen By: Manjit Allison MD      **Please Note: This note may have been constructed using a voice recognition system.**

## 2025-04-17 PROBLEM — G47.33 OSA (OBSTRUCTIVE SLEEP APNEA): Status: ACTIVE | Noted: 2025-04-17

## 2025-04-17 PROBLEM — J96.11 CHRONIC RESPIRATORY FAILURE WITH HYPOXIA (HCC): Status: ACTIVE | Noted: 2025-04-17

## 2025-04-17 LAB
BACTERIA SPEC BFLD CULT: NO GROWTH
GRAM STN SPEC: NORMAL
GRAM STN SPEC: NORMAL

## 2025-04-17 PROCEDURE — 99232 SBSQ HOSP IP/OBS MODERATE 35: CPT | Performed by: INTERNAL MEDICINE

## 2025-04-17 RX ADMIN — OXYCODONE HYDROCHLORIDE 10 MG: 10 TABLET ORAL at 04:02

## 2025-04-17 RX ADMIN — TAMSULOSIN HYDROCHLORIDE 0.4 MG: 0.4 CAPSULE ORAL at 08:47

## 2025-04-17 RX ADMIN — HEPARIN SODIUM 5000 UNITS: 5000 INJECTION INTRAVENOUS; SUBCUTANEOUS at 05:09

## 2025-04-17 RX ADMIN — CEFTRIAXONE SODIUM 1000 MG: 10 INJECTION, POWDER, FOR SOLUTION INTRAVENOUS at 19:31

## 2025-04-17 RX ADMIN — GUAIFENESIN 600 MG: 600 TABLET ORAL at 08:47

## 2025-04-17 RX ADMIN — OXYCODONE HYDROCHLORIDE 10 MG: 10 TABLET ORAL at 18:08

## 2025-04-17 RX ADMIN — ALBUTEROL SULFATE 2 PUFF: 90 AEROSOL, METERED RESPIRATORY (INHALATION) at 08:47

## 2025-04-17 RX ADMIN — OXYCODONE HYDROCHLORIDE 10 MG: 10 TABLET ORAL at 08:48

## 2025-04-17 RX ADMIN — CARVEDILOL 12.5 MG: 12.5 TABLET, FILM COATED ORAL at 17:11

## 2025-04-17 RX ADMIN — LISINOPRIL 20 MG: 20 TABLET ORAL at 17:11

## 2025-04-17 RX ADMIN — PREDNISONE 40 MG: 20 TABLET ORAL at 08:47

## 2025-04-17 RX ADMIN — GUAIFENESIN 600 MG: 600 TABLET ORAL at 21:06

## 2025-04-17 RX ADMIN — POLYETHYLENE GLYCOL 3350 17 G: 17 POWDER, FOR SOLUTION ORAL at 08:45

## 2025-04-17 RX ADMIN — ASPIRIN 81 MG: 81 TABLET, CHEWABLE ORAL at 08:48

## 2025-04-17 RX ADMIN — ATORVASTATIN CALCIUM 40 MG: 40 TABLET, FILM COATED ORAL at 08:48

## 2025-04-17 RX ADMIN — CARVEDILOL 12.5 MG: 12.5 TABLET, FILM COATED ORAL at 08:47

## 2025-04-17 RX ADMIN — COLCHICINE 0.6 MG: 0.6 TABLET ORAL at 08:48

## 2025-04-17 RX ADMIN — LISINOPRIL 20 MG: 20 TABLET ORAL at 08:48

## 2025-04-17 RX ADMIN — HYDROMORPHONE HYDROCHLORIDE 0.5 MG: 1 INJECTION, SOLUTION INTRAMUSCULAR; INTRAVENOUS; SUBCUTANEOUS at 21:06

## 2025-04-17 NOTE — ASSESSMENT & PLAN NOTE
- Stable right-sided parapneumonic effusion  -4/14/2025-thoracentesis- 600 exudative neutrophilic predominant  -Negative for malignancy  -Day # 6/7- ceftriaxone  - Repeat imaging in 4 to 6 weeks with PCP

## 2025-04-17 NOTE — ASSESSMENT & PLAN NOTE
Patient endorses shortness of breath.    Current oxygen saturation in low 90s on room air.  Dip into 80's w/ conversation  CT PE abdomen pelvis-no PE, New moderate right pleural effusion and large right lower lobe airspace consolidation, which likely represents atelectasis.  Thoracentesis revealed exudative fluid.  Pleuritic pain has improved, perhaps d/t steroids.  Now able to cough with decent force.  Continue prednisone.  Will discontinue CTX as no clear source of infection.  Pending final recommendations from pulm.  Will obtain CXR in am to check for re-accumulation.

## 2025-04-17 NOTE — PROGRESS NOTES
Progress Note - Hospitalist   Name: Markel Alves 74 y.o. male I MRN: [id number]  Unit/Bed#: -01 I Date of Admission: 4/12/2025   Date of Service: 4/17/2025 I Hospital Day: 4    Assessment & Plan  Pleural effusion  Patient endorses shortness of breath.    Current oxygen saturation in low 90s on room air.  Dip into 80's w/ conversation  CT PE abdomen pelvis-no PE, New moderate right pleural effusion and large right lower lobe airspace consolidation, which likely represents atelectasis.  Thoracentesis revealed exudative fluid.  Pleuritic pain has improved, perhaps d/t steroids.  Now able to cough with decent force.  Continue prednisone.  Will discontinue CTX as no clear source of infection.  Pending final recommendations from pulm.  Will obtain CXR in am to check for re-accumulation.  Chest pain  Patient was recently hospitalized for right lower lobe pneumonia and streptococcal pneumonia bacteremia requiring 3 days of IV antibiotics, and patient was sent home on Augmentin for 7 days and steroids of which he had completed. CT revealed right sided pleural effusion as above.   Pt is now able to cough w/ less discomfort  Still requiring intermittent oxycodone but less than the prior 24-48 hours.    Plan  Wean oxy as able  Asthma  Continue Proventil inhaler.  Asthma is mild at baseline   No signs of exacerbation   HTN (hypertension)  Blood Pressure: 118/68    Continue lisinopril and carvedilol  Medications held: None  Monitor blood pressure  Hyperlipidemia  Continue atorvastatin.  BPH without urinary obstruction  Urinalysis negative.  Urine culture negative  Urinary protocol.  Flomax added d/t pvr 200  Gout  Pain in right great toe limiting mobility.  Markedly improved w/ prednisone  Chronic respiratory failure with hypoxia (HCC)    NATASHA (obstructive sleep apnea)      VTE Pharmacologic Prophylaxis: VTE Score: 4 on heparin    Mobility:   Basic Mobility Inpatient Raw Score: 24  [facility] Goal: 8: Walk 250 feet or  more  JH-HLM Achieved: 7: Walk 25 feet or more  JH-HLM Goal achieved. Continue to encourage appropriate mobility.    Patient Centered Rounds: I performed bedside rounds with nursing staff today.   Discussions with Specialists or Other Care Team Provider: pulmonary    Education and Discussions with Family / Patient: Updated  (wife) at bedside.    Current Length of Stay: 4 day(s)  Current Patient Status: Inpatient   Certification Statement: The patient will continue to require additional inpatient hospital stay due to plan as above  Discharge Plan:  unclear at this time    Code Status: Level 1 - Full Code    Subjective   Significant improvement in chest pain overnight.  He was able to ambulate in the hallway off of oxygen and made several laps without desaturations. Cough is no longer being blunted due to pain.     Objective :  Temp:  [97.7 °F (36.5 °C)] 97.7 °F (36.5 °C)  HR:  [54-74] 58  BP: (118-152)/(68-80) 118/68  Resp:  [17-18] 17  SpO2:  [92 %-94 %] 92 %  O2 Device: Nasal cannula  Nasal Cannula O2 Flow Rate (L/min):  [2 L/min] 2 L/min    Body mass index is 30.18 kg/m².     Input and Output Summary (last 24 hours):     Intake/Output Summary (Last 24 hours) at 4/17/2025 1624  Last data filed at 4/17/2025 1300  Gross per 24 hour   Intake 720 ml   Output --   Net 720 ml       Physical Exam  Constitutional:       Appearance: He is well-developed.   HENT:      Head: Normocephalic and atraumatic.      Nose: Nose normal.   Eyes:      General: No scleral icterus.     Conjunctiva/sclera: Conjunctivae normal.      Pupils: Pupils are equal, round, and reactive to light.   Neck:      Thyroid: No thyromegaly.      Vascular: No JVD.      Trachea: No tracheal deviation.   Cardiovascular:      Rate and Rhythm: Normal rate and regular rhythm.      Heart sounds: No murmur heard.     No friction rub. No gallop.   Pulmonary:      Effort: Pulmonary effort is normal. No respiratory distress.      Breath sounds: No  wheezing or rales.      Comments: Diminished breath sounds right base  Abdominal:      General: Bowel sounds are normal. There is no distension.      Palpations: Abdomen is soft. There is no mass.      Tenderness: There is no abdominal tenderness. There is no guarding or rebound.   Musculoskeletal:         General: No tenderness.      Cervical back: Normal range of motion and neck supple.   Lymphadenopathy:      Cervical: No cervical adenopathy.   Skin:     General: Skin is warm and dry.      Findings: No erythema or rash.   Neurological:      Mental Status: He is alert and oriented to person, place, and time.      Cranial Nerves: No cranial nerve deficit.   Psychiatric:         Behavior: Behavior normal.         Thought Content: Thought content normal.         Judgment: Judgment normal.           Lines/Drains:              Lab Results: I have reviewed the following results:   Results from last 7 days   Lab Units 04/16/25  0526   WBC Thousand/uL 11.23*   HEMOGLOBIN g/dL 12.0   HEMATOCRIT % 37.0   PLATELETS Thousands/uL 356   SEGS PCT % 88*   LYMPHO PCT % 8*   MONO PCT % 4   EOS PCT % 0     Results from last 7 days   Lab Units 04/16/25  0526 04/14/25  0516   SODIUM mmol/L 134* 135   POTASSIUM mmol/L 4.2 4.0   CHLORIDE mmol/L 98 103   CO2 mmol/L 29 27   BUN mg/dL 19 15   CREATININE mg/dL 1.03 1.03   ANION GAP mmol/L 7 5   CALCIUM mg/dL 9.2 8.6   ALBUMIN g/dL  --  3.1*   TOTAL BILIRUBIN mg/dL  --  0.72   ALK PHOS U/L  --  57   ALT U/L  --  26   AST U/L  --  12*   GLUCOSE RANDOM mg/dL 152* 108     Results from last 7 days   Lab Units 04/14/25  0516   INR  1.14             Results from last 7 days   Lab Units 04/12/25  1630   LACTIC ACID mmol/L 1.3   PROCALCITONIN ng/ml 0.09       Recent Cultures (last 7 days):   Results from last 7 days   Lab Units 04/14/25  1409 04/14/25  0525 04/13/25  1324 04/12/25  1903 04/12/25  1630 04/12/25  1629   BLOOD CULTURE   --   --   --   --  No Growth After 4 Days. No Growth After 4 Days.    SPUTUM CULTURE   --  4+ Growth of  --   --   --   --    GRAM STAIN RESULT  1+ Polys  No Polys or Bacteria seen 2+ Polys  No organisms seen  1+ Epithelial Cells  --   --   --   --    URINE CULTURE   --   --   --  No Growth <1000 cfu/mL  --   --    BODY FLUID CULTURE, STERILE  No growth  --   --   --   --   --    LEGIONELLA URINARY ANTIGEN   --   --  Negative  --   --   --              Last 24 Hours Medication List:     Current Facility-Administered Medications:     acetaminophen (TYLENOL) tablet 650 mg, Q6H PRN    albuterol (PROVENTIL HFA,VENTOLIN HFA) inhaler 2 puff, Q6H PRN    aluminum-magnesium hydroxide-simethicone (MAALOX) oral suspension 30 mL, Q6H PRN    aspirin chewable tablet 81 mg, Daily    atorvastatin (LIPITOR) tablet 40 mg, Daily    calcium carbonate (TUMS) chewable tablet 1,000 mg, Daily PRN    carvedilol (COREG) tablet 12.5 mg, BID With Meals    cefTRIAXone (ROCEPHIN) 1,000 mg in dextrose 5 % 50 mL IVPB, Q24H, Last Rate: 1,000 mg (04/16/25 2052)    colchicine (COLCRYS) tablet 0.6 mg, Daily    fluticasone (FLONASE) 50 mcg/act nasal spray 1 spray, Daily    guaiFENesin (MUCINEX) 12 hr tablet 600 mg, Q12H CATHY    heparin (porcine) subcutaneous injection 5,000 Units, Q8H CATHY    HYDROmorphone (DILAUDID) injection 0.5 mg, Q4H PRN    lisinopril (ZESTRIL) tablet 20 mg, BID    magnesium hydroxide (MILK OF MAGNESIA) oral suspension 30 mL, TID PRN    oxyCODONE (ROXICODONE) IR tablet 5 mg, Q4H PRN **OR** oxyCODONE (ROXICODONE) immediate release tablet 10 mg, Q4H PRN    polyethylene glycol (MIRALAX) packet 17 g, Daily    predniSONE tablet 40 mg, Daily    tamsulosin (FLOMAX) capsule 0.4 mg, Daily    Administrative Statements   Today, Patient Was Seen By: Hawk Toledo MD      **Please Note: This note may have been constructed using a voice recognition system.**

## 2025-04-17 NOTE — ASSESSMENT & PLAN NOTE
Patient was recently hospitalized for right lower lobe pneumonia and streptococcal pneumonia bacteremia requiring 3 days of IV antibiotics, and patient was sent home on Augmentin for 7 days and steroids of which he had completed. CT revealed right sided pleural effusion as above.   Pt is now able to cough w/ less discomfort  Still requiring intermittent oxycodone but less than the prior 24-48 hours.    Plan  Wean oxy as able

## 2025-04-17 NOTE — ASSESSMENT & PLAN NOTE
- Patient was recently started on CPAP in November  - Overall had some difficulty adjusting to it but now that he has smaller nasal mask he is looking forward to restarting his therapy at home        - Patient has primary pulmonologist at [facility] no indication for follow-up on RN  - Pulmonary will sign off

## 2025-04-17 NOTE — ASSESSMENT & PLAN NOTE
-No formal PFTs  -Follows with [facility]  -Continue home regimen: Proventil 2 puffs every 6 as needed

## 2025-04-17 NOTE — PROGRESS NOTES
"Progress Note - Pulmonology   Name: Markel Alves 74 y.o. male I MRN: [id number]  Unit/Bed#: -01 I Date of Admission: 4/12/2025   Date of Service: 4/17/2025 I Hospital Day: 4     Assessment & Plan  Pleural effusion  - Stable right-sided parapneumonic effusion  -4/14/2025-thoracentesis- 600 exudative neutrophilic predominant  -Negative for malignancy  -Day # 6/7- ceftriaxone  - Repeat imaging in 4 to 6 weeks with PCP  Asthma  -No formal PFTs  -Follows with [facility]  -Continue home regimen: Proventil 2 puffs every 6 as needed    Chronic respiratory failure with hypoxia (HCC)  - Patient currently on 2 L 94%, does not wear home O2  - Continue sats greater than 88%  - Pulmonary toileting: Deep breathing cough out of bed as tolerated incentive spirometry  - Will need ambulatory pulse ox    Gout  -Day # 2-prednisone 40 mg  - Managed per primary team  NATASHA (obstructive sleep apnea)  - Patient was recently started on CPAP in November  - Overall had some difficulty adjusting to it but now that he has smaller nasal mask he is looking forward to restarting his therapy at home        - Patient has primary pulmonologist at [facility] no indication for follow-up on RN  - Pulmonary will sign off    24 Hour Events : na  Subjective : \"I am ok\"    Objective :  Temp:  [97.7 °F (36.5 °C)] 97.7 °F (36.5 °C)  HR:  [54-74] 64  BP: (114-152)/(68-80) 152/80  Resp:  [17-19] 17  SpO2:  [92 %-94 %] 94 %  O2 Device: Nasal cannula  Nasal Cannula O2 Flow Rate (L/min):  [2 L/min] 2 L/min    Physical Exam  Constitutional:       General: He is not in acute distress.     Appearance: Normal appearance. He is normal weight.   HENT:      Head: Normocephalic and atraumatic.      Nose: Nose normal. No congestion or rhinorrhea.      Mouth/Throat:      Mouth: Mucous membranes are dry.      Pharynx: Oropharynx is clear. No oropharyngeal exudate or posterior oropharyngeal erythema.   Cardiovascular:      Rate and Rhythm: Normal rate and " regular rhythm.      Pulses: Normal pulses.      Heart sounds: Normal heart sounds. No murmur heard.     No friction rub. No gallop.   Pulmonary:      Effort: Pulmonary effort is normal. No respiratory distress.      Breath sounds: No stridor. No wheezing, rhonchi or rales.      Comments: Some scattered wheezing  Chest:      Chest wall: No tenderness.   Abdominal:      General: Abdomen is flat. Bowel sounds are normal. There is no distension.      Palpations: Abdomen is soft. There is no mass.   Musculoskeletal:         General: No swelling or tenderness. Normal range of motion.      Cervical back: Normal range of motion. No rigidity or tenderness.   Skin:     General: Skin is warm and dry.      Coloration: Skin is not jaundiced or pale.   Neurological:      General: No focal deficit present.      Mental Status: He is alert and oriented to person, place, and time. Mental status is at baseline.   Psychiatric:         Mood and Affect: Mood normal.         Behavior: Behavior normal.             Lab Results: I have reviewed the following results:   No new results in last 24 hours.  ABG: No new results in last 24 hours.    Imaging Results Review: I personally reviewed the following image studies/reports in PACS and discussed pertinent findings with Radiology: chest xray. My interpretation of the radiology images/reports is:  .  Other Study Results Review: EKG was reviewed.   PFT Results Reviewed: reviewed

## 2025-04-17 NOTE — ASSESSMENT & PLAN NOTE
- Patient currently on 2 L 94%, does not wear home O2  - Continue sats greater than 88%  - Pulmonary toileting: Deep breathing cough out of bed as tolerated incentive spirometry  - Will need ambulatory pulse ox

## 2025-04-17 NOTE — ASSESSMENT & PLAN NOTE
Blood Pressure: 118/68    Continue lisinopril and carvedilol  Medications held: None  Monitor blood pressure

## 2025-04-18 ENCOUNTER — TRANSITIONAL CARE MANAGEMENT (OUTPATIENT)
Age: 75
End: 2025-04-18

## 2025-04-18 ENCOUNTER — APPOINTMENT (INPATIENT)
Dept: RADIOLOGY | Facility: HOSPITAL | Age: 75
End: 2025-04-18
Payer: COMMERCIAL

## 2025-04-18 VITALS
TEMPERATURE: 97.7 F | HEIGHT: 69 IN | OXYGEN SATURATION: 96 % | DIASTOLIC BLOOD PRESSURE: 82 MMHG | HEART RATE: 67 BPM | BODY MASS INDEX: 30.27 KG/M2 | WEIGHT: 204.37 LBS | RESPIRATION RATE: 17 BRPM | SYSTOLIC BLOOD PRESSURE: 155 MMHG

## 2025-04-18 VITALS
RESPIRATION RATE: 17 BRPM | HEART RATE: 76 BPM | TEMPERATURE: 97.6 F | DIASTOLIC BLOOD PRESSURE: 88 MMHG | SYSTOLIC BLOOD PRESSURE: 152 MMHG | WEIGHT: 204.37 LBS | BODY MASS INDEX: 30.27 KG/M2 | HEIGHT: 69 IN | OXYGEN SATURATION: 99 %

## 2025-04-18 LAB
BACTERIA BLD CULT: NORMAL
BACTERIA BLD CULT: NORMAL

## 2025-04-18 PROCEDURE — 71046 X-RAY EXAM CHEST 2 VIEWS: CPT

## 2025-04-18 PROCEDURE — 99239 HOSP IP/OBS DSCHRG MGMT >30: CPT | Performed by: INTERNAL MEDICINE

## 2025-04-18 PROCEDURE — 99232 SBSQ HOSP IP/OBS MODERATE 35: CPT | Performed by: INTERNAL MEDICINE

## 2025-04-18 RX ORDER — TAMSULOSIN HYDROCHLORIDE 0.4 MG/1
0.4 CAPSULE ORAL DAILY
Qty: 30 CAPSULE | Refills: 3 | Status: SHIPPED | OUTPATIENT
Start: 2025-04-19 | End: 2025-05-11

## 2025-04-18 RX ORDER — OXYCODONE HYDROCHLORIDE 5 MG/1
5 TABLET ORAL EVERY 4 HOURS PRN
Qty: 6 TABLET | Refills: 0 | Status: SHIPPED | OUTPATIENT
Start: 2025-04-18 | End: 2025-04-28

## 2025-04-18 RX ORDER — PREDNISONE 10 MG/1
TABLET ORAL
Qty: 6 TABLET | Refills: 0 | Status: SHIPPED | OUTPATIENT
Start: 2025-04-19 | End: 2025-04-23

## 2025-04-18 RX ORDER — POLYETHYLENE GLYCOL 3350 17 G/17G
17 POWDER, FOR SOLUTION ORAL DAILY
Start: 2025-04-19

## 2025-04-18 RX ADMIN — COLCHICINE 0.6 MG: 0.6 TABLET ORAL at 08:23

## 2025-04-18 RX ADMIN — CARVEDILOL 12.5 MG: 12.5 TABLET, FILM COATED ORAL at 07:18

## 2025-04-18 RX ADMIN — ATORVASTATIN CALCIUM 40 MG: 40 TABLET, FILM COATED ORAL at 08:23

## 2025-04-18 RX ADMIN — TAMSULOSIN HYDROCHLORIDE 0.4 MG: 0.4 CAPSULE ORAL at 08:23

## 2025-04-18 RX ADMIN — OXYCODONE HYDROCHLORIDE 5 MG: 5 TABLET ORAL at 07:18

## 2025-04-18 RX ADMIN — LISINOPRIL 20 MG: 20 TABLET ORAL at 08:23

## 2025-04-18 RX ADMIN — ASPIRIN 81 MG: 81 TABLET, CHEWABLE ORAL at 08:23

## 2025-04-18 RX ADMIN — PREDNISONE 30 MG: 20 TABLET ORAL at 08:23

## 2025-04-18 RX ADMIN — GUAIFENESIN 600 MG: 600 TABLET ORAL at 08:23

## 2025-04-18 NOTE — DISCHARGE SUMMARY
Discharge Summary - Markel Alves 74 y.o. male MRN: [id number]  Unit/Bed#: -01 Encounter: [id number]    Admission Date:    4/12/2025   Discharge Date:   04/18/25   Admitting Diagnosis:   Pleural effusion on right [J90]  Abdominal pain, acute, right upper quadrant [R10.11]  Pneumonia involving right lung [J18.9]  Admitting Provider:   Farhan Heard MD  Discharge Provider:   Manjit Allison MD     Primary Care Physician at Discharge:   Manjit Allison MD,[phone]    HPI: From history and physical by KAYLEY Parry  Chief Complaint: Chest pain and right lower quadrant pain.     Markel Alves is a 74 y.o. male with a PMH of arthritis, asthma, CKD, hypertension, BPH, pneumonia and depression who presents with complaints of chest pain and right lower quadrant abdominal pain started last night, radiates to right flank.  Patient had elevated temperature at home and last dose of Tylenol was at 2:15 PM.  Patient was recently hospitalized for right lower lobe pneumonia and bacteremia requiring 3 days of IV antibiotics, and patient was sent home on Augmentin for 7 days and steroids of which she had completed..       Procedures Performed:   Orders Placed This Encounter   Procedures    ED ECG Documentation Only    ED ECG Documentation Only       Hospital Course:   Pleural effusion  Patient endorses shortness of breath.    Current oxygen saturation in low 90s on room air.  Dip into 80's w/ conversation  CT PE abdomen pelvis-no PE, New moderate right pleural effusion and large right lower lobe airspace consolidation, which likely represents atelectasis.  Thoracentesis revealed exudative fluid.  Possible complicated parapneumonic effusion vs empyema  Respiratory protocol.  Continuous oxygen monitoring.  Nursing noted drops into low 80's overnight 4/15-4/16 requiring increase to 4L  Repeat CXR 4/18 pending radiology interpretation, looks improved to my review, aeration has improved considerably  Pleuritic pain has  improved, on prednisone with decreasing narcotic requirement  He completed 6 days of Rocephin, pleural fluid was sterile and he remained afebrile  No further antibiotics on discharge  Taper prednisone and oxycodone on discharge  Chest pain  Patient was recently hospitalized for right lower lobe pneumonia and streptococcal pneumonia bacteremia requiring 3 days of IV antibiotics, and patient was sent home on Augmentin for 7 days and steroids of which he had completed. CT revealed right sided pleural effusion as above.   Pt is now able to cough w/ minimal discomfort  Still requiring intermittent oxycodone     Plan  Wean oxy as able  Asthma  Continue Proventil inhaler.  Asthma is mild at baseline   No signs of exacerbation   HTN (hypertension)  Blood Pressure: 159/83     Continue lisinopril, amlodipine and carvedilol  Hyperlipidemia  Continue atorvastatin.  BPH without urinary obstruction  Urinalysis negative.  Urine culture negative  Urinary protocol.  Flomax added d/t pvr 200  Patient notes improvement in urinary stream but unsure if he wants to continue Flomax indefinitely.  Prescription provided on discharge  Gout  Pain in right great toe limiting mobility.  Markedly improved w/ prednisone, taper as above      Current Facility-Administered Medications:     acetaminophen (TYLENOL) tablet 650 mg, 650 mg, Oral, Q6H PRN, KAYLEY An, 650 mg at 04/16/25 0813    albuterol (PROVENTIL HFA,VENTOLIN HFA) inhaler 2 puff, 2 puff, Inhalation, Q6H PRN, KAYLEY An, 2 puff at 04/17/25 0847    aluminum-magnesium hydroxide-simethicone (MAALOX) oral suspension 30 mL, 30 mL, Oral, Q6H PRN, KAYLEY An    aspirin chewable tablet 81 mg, 81 mg, Oral, Daily, KAYLEY An, 81 mg at 04/18/25 0823    atorvastatin (LIPITOR) tablet 40 mg, 40 mg, Oral, Daily, KAYLEY An, 40 mg at 04/18/25 0823    calcium carbonate (TUMS) chewable tablet 1,000 mg,  1,000 mg, Oral, Daily PRN, KAYLEY An    carvedilol (COREG) tablet 12.5 mg, 12.5 mg, Oral, BID With Meals, KAYLEY An, 12.5 mg at 04/18/25 0718    colchicine (COLCRYS) tablet 0.6 mg, 0.6 mg, Oral, Daily, KAYLEY An, 0.6 mg at 04/18/25 0823    fluticasone (FLONASE) 50 mcg/act nasal spray 1 spray, 1 spray, Nasal, Daily, KAYLEY An, 1 spray at 04/16/25 0842    guaiFENesin (MUCINEX) 12 hr tablet 600 mg, 600 mg, Oral, Q12H CATHY, KAYLEY An, 600 mg at 04/18/25 0823    heparin (porcine) subcutaneous injection 5,000 Units, 5,000 Units, Subcutaneous, Q8H CATHY, KAYLEY An, 5,000 Units at 04/17/25 0509    HYDROmorphone (DILAUDID) injection 0.5 mg, 0.5 mg, Intravenous, Q4H PRN, KAYLEY An, 0.5 mg at 04/17/25 2106    lisinopril (ZESTRIL) tablet 20 mg, 20 mg, Oral, BID, KAYLEY An, 20 mg at 04/18/25 0823    magnesium hydroxide (MILK OF MAGNESIA) oral suspension 30 mL, 30 mL, Oral, TID PRN, Manjit Allison MD    oxyCODONE (ROXICODONE) IR tablet 5 mg, 5 mg, Oral, Q4H PRN, 5 mg at 04/18/25 0718 **OR** oxyCODONE (ROXICODONE) immediate release tablet 10 mg, 10 mg, Oral, Q4H PRN, Hawk Toledo MD, 10 mg at 04/17/25 1808    polyethylene glycol (MIRALAX) packet 17 g, 17 g, Oral, Daily, Manjit Allison MD, 17 g at 04/17/25 0845    predniSONE tablet 30 mg, 30 mg, Oral, Daily, Hawk Toledo MD, 30 mg at 04/18/25 0823    tamsulosin (FLOMAX) capsule 0.4 mg, 0.4 mg, Oral, Daily, Manjit Allison MD, 0.4 mg at 04/18/25 0823      Consulting Providers   Pulmonary    Significant Findings, Care, Treatment and Services Provided:   CT PE study with abdomen pelvis:  IMPRESSION:     CHEST:     1) No acute pulmonary embolism.     2) New moderate right pleural effusion and large right lower lobe airspace consolidation, which likely represents atelectasis. Superimposed pneumonia cannot be  excluded. Mild groundglass opacities in the dependent portions of the right upper and middle   lobes, may also represent atelectasis versus infection.     3) Unchanged 4.4 x 4.2 cm fusiform ectasia of ascending thoracic aorta with no aortic dissection.     4) Dilatation of the main pulmonary artery up to 4 cm, which may be seen in the setting of pulmonary arterial hypertension.     ABDOMEN/PELVIS:     5) Mild circumferential wall thickening of the urinary bladder, which may be related to underdistention versus cystitis.     6) No other acute abdominal or pelvic pathology.     7) No bowel obstruction. Normal appendix. Evaluation for bowel inflammation elsewhere somewhat limited by underdistention and lack of oral contrast, however no convincing inflammatory changes. Please note mild inflammation may not be apparent.     8) Additional findings as above.      Complications:  None  Physical Exam:  General Appearance:    Alert, cooperative, no distress   Head:    Normocephalic, without obvious abnormality, atraumatic   Eyes:    PERRL, conjunctiva/corneas clear, EOM's intact       Nose:   Moist mucous membranes, no drainage or sinus tenderness   Throat:   No tenderness, no exudates   Neck:   Supple, symmetrical, trachea midline, no JVD   Lungs:     Clear to auscultation bilaterally, persistent diminished breath sounds right base, respirations unlabored   Heart:    Regular rate and rhythm, S1 and S2 normal, no murmur, rub   or gallop   Abdomen: Soft, non-tender, positive bowel sounds, no masses, no organomegaly   Extremities:  No pedal edema, calf tenderness. Distal pulses palpable.   Neurologic:     CNII-XII intact.    Labs:   Lab Results   Component Value Date    WBC 11.23 (H) 04/16/2025    WBC 5.06 09/25/2015    RBC 3.78 (L) 04/16/2025    RBC 4.31 09/25/2015    HGB 12.0 04/16/2025    HGB 12.5 02/05/2019    HCT 37.0 04/16/2025    HCT 36.5 (L) 02/05/2019    MCV 98 04/16/2025    MCV 94 09/25/2015    MCH 31.7 04/16/2025     MCH 32.9 09/25/2015    RDW 12.1 04/16/2025    RDW 12.5 09/25/2015     04/16/2025     09/25/2015     Lab Results   Component Value Date    CREATININE 1.03 04/16/2025    CREATININE 1.06 02/05/2019    BUN 19 04/16/2025    BUN 15 02/05/2019     09/25/2015    K 4.2 04/16/2025    K 4.4 02/05/2019    CL 98 04/16/2025     02/05/2019    CO2 29 04/16/2025    CO2 21 (L) 02/05/2019    GLUCOSE 103 09/25/2015    PROT 7.2 09/25/2015    ALKPHOS 57 04/14/2025    ALKPHOS 71 08/08/2018    ALT 26 04/14/2025     (H) 08/08/2018    AST 12 (L) 04/14/2025    AST 55 (H) 08/08/2018    BILIDIR 0.15 04/12/2025    BILIDIR 0.16 09/25/2015         Discharge Diagnosis:   Principal Problem:    Pleural effusion  Active Problems:    Asthma    BPH without urinary obstruction    Hyperlipidemia    Gout    HTN (hypertension)    Chest pain    Chronic respiratory failure with hypoxia (HCC)    NATASHA (obstructive sleep apnea)  Resolved Problems:    * No resolved hospital problems. *      Condition at Discharge:   Good     Code Status: Level 1 - Full Code  Advance Directive and Living Will: <no information>  Power of :    POLST:      Discharge instructions/Information to patient and family:   See after visit summary for information provided to patient and family.      Provisions for Follow-Up Care:  See after visit summary for information related to follow-up care and any pertinent home health orders.      Disposition:   Home    Planned Readmission:   No    Discharge Statement   I spent 35 minutes discharging the patient. This time was spent on the day of discharge. I had direct contact with the patient on the day of discharge. Additional documentation is required if more than 30 minutes were spent on discharge. Greater than 50% of the total time was spent examining patient, answering all patient questions, arranging and discussing plan of care with patient as well as directly providing post-discharge instructions.  Additional time then spent on discharge activities.    Discharge Medications:  See after visit summary for reconciled discharge medications provided to patient and family.      Manjit Allison MD  4/18/2025,10:33 AM

## 2025-04-18 NOTE — ASSESSMENT & PLAN NOTE
- Patient currently on room air 94%, does not wear home O2  - Continue sats greater than 88%  - Pulmonary toileting: Deep breathing cough out of bed as tolerated incentive spirometry  - Will need ambulatory pulse ox

## 2025-04-18 NOTE — ASSESSMENT & PLAN NOTE
- Stable right-sided parapneumonic effusion  -4/14/2025-thoracentesis- 600 exudative neutrophilic predominant  -Negative for malignancy  -Day # 7/7- ceftriaxone  - Repeat imaging this a.m. shows significant improvement on right sided effusion  - Repeat imaging in 4 to 6 weeks with PCP

## 2025-04-18 NOTE — ASSESSMENT & PLAN NOTE
-No formal PFTs  -Follows with [facility]  -Continue home regimen: Proventil 2 puffs every 6 as needed

## 2025-04-18 NOTE — PROGRESS NOTES
"Progress Note - Pulmonology   Name: Markel Alves 74 y.o. male I MRN: [id number]  Unit/Bed#: -01 I Date of Admission: 4/12/2025   Date of Service: 4/18/2025 I Hospital Day: 5     Assessment & Plan  Pleural effusion  - Stable right-sided parapneumonic effusion  -4/14/2025-thoracentesis- 600 exudative neutrophilic predominant  -Negative for malignancy  -Day # 7/7- ceftriaxone  - Repeat imaging this a.m. shows significant improvement on right sided effusion  - Repeat imaging in 4 to 6 weeks with PCP  Asthma  -No formal PFTs  -Follows with [facility]  -Continue home regimen: Proventil 2 puffs every 6 as needed    Chronic respiratory failure with hypoxia (HCC)  - Patient currently on room air 94%, does not wear home O2  - Continue sats greater than 88%  - Pulmonary toileting: Deep breathing cough out of bed as tolerated incentive spirometry  - Will need ambulatory pulse ox    Gout  -Day # 3-prednisone 40 mg  - Managed per primary team  NATASHA (obstructive sleep apnea)  - Patient was recently started on CPAP in November  - Overall had some difficulty adjusting to it but now that he has smaller nasal mask he is looking forward to restarting his therapy at home        - Outpatient follow-up per discharge instructions  - Pulmonary will sign off    24 Hour Events : na  Subjective : \"I feel a lot better\"    Objective :  Temp:  [97.7 °F (36.5 °C)] 97.7 °F (36.5 °C)  HR:  [55-67] 67  BP: (118-155)/(68-82) 155/82  Resp:  [17] 17  SpO2:  [91 %-96 %] 96 %  O2 Device: None (Room air)    Physical Exam  Constitutional:       General: He is not in acute distress.     Appearance: Normal appearance. He is normal weight.   HENT:      Head: Normocephalic and atraumatic.      Nose: Nose normal. No congestion or rhinorrhea.      Mouth/Throat:      Mouth: Mucous membranes are dry.      Pharynx: Oropharynx is clear. No oropharyngeal exudate or posterior oropharyngeal erythema.   Cardiovascular:      Rate and Rhythm: Normal " rate and regular rhythm.      Pulses: Normal pulses.      Heart sounds: Normal heart sounds. No murmur heard.     No friction rub. No gallop.   Pulmonary:      Effort: Pulmonary effort is normal. No respiratory distress.      Breath sounds: No stridor. No wheezing, rhonchi or rales.      Comments: Right Diminished aeration  Chest:      Chest wall: No tenderness.   Abdominal:      General: Abdomen is flat. Bowel sounds are normal. There is no distension.      Palpations: Abdomen is soft. There is no mass.   Musculoskeletal:         General: No swelling or tenderness. Normal range of motion.      Cervical back: Normal range of motion. No rigidity or tenderness.   Skin:     General: Skin is warm and dry.      Coloration: Skin is not jaundiced or pale.   Neurological:      General: No focal deficit present.      Mental Status: He is alert and oriented to person, place, and time. Mental status is at baseline.   Psychiatric:         Mood and Affect: Mood normal.         Behavior: Behavior normal.           Lab Results: I have reviewed the following results:   No new results in last 24 hours.  ABG: No new results in last 24 hours.    Imaging Results Review: I personally reviewed the following image studies/reports in PACS and discussed pertinent findings with Radiology: chest xray. My interpretation of the radiology images/reports is:  .  Other Study Results Review: EKG was reviewed.   PFT Results Reviewed: reviewed

## 2025-04-18 NOTE — PLAN OF CARE
Problem: PAIN - ADULT  Goal: Verbalizes/displays adequate comfort level or baseline comfort level  Description: Interventions:- Encourage patient to monitor pain and request assistance- Assess pain using appropriate pain scale- Administer analgesics based on type and severity of pain and evaluate response- Implement non-pharmacological measures as appropriate and evaluate response- Consider cultural and social influences on pain and pain management- Notify physician/advanced practitioner if interventions unsuccessful or patient reports new pain  Outcome: Progressing     Problem: INFECTION - ADULT  Goal: Absence or prevention of progression during hospitalization  Description: INTERVENTIONS:- Assess and monitor for signs and symptoms of infection- Monitor lab/diagnostic results- Monitor all insertion sites, i.e. indwelling lines, tubes, and drains- Monitor endotracheal if appropriate and nasal secretions for changes in amount and color- [place] appropriate cooling/warming therapies per order- Administer medications as ordered- Instruct and encourage patient and family to use good hand hygiene technique- Identify and instruct in appropriate isolation precautions for identified infection/condition  Outcome: Progressing     Goal: Maintain or return to baseline ADL function  Description: INTERVENTIONS:-  Assess patient's ability to carry out ADLs; assess patient's baseline for ADL function and identify physical deficits which impact ability to perform ADLs (bathing, care of mouth/teeth, toileting, grooming, dressing, etc.)- Assess/evaluate cause of self-care deficits - Assess range of motion- Assess patient's mobility; develop plan if impaired- Assess patient's need for assistive devices and provide as appropriate- Encourage maximum independence but intervene and supervise when necessary- Involve family in performance of ADLs- Assess for home care needs following discharge - Consider OT consult to assist with ADL  evaluation and planning for discharge- Provide patient education as appropriate  Outcome: Progressing  Goal: Maintains/Returns to pre admission functional level  Description: INTERVENTIONS:- Perform AM-PAC 6 Click Basic Mobility/ Daily Activity assessment daily.- Set and communicate daily mobility goal to care team and patient/family/caregiver. - Collaborate with rehabilitation services on mobility goals if consulted- Perform Range of Motion 4 times a day.- Reposition patient every 2 hours.- Dangle patient 3 times a day- Stand patient 3 times a day- Ambulate patient 3 times a day- Out of bed to chair 3 times a day - Out of bed for meals 3 times a day- Out of bed for toileting- Record patient progress and toleration of activity level   Outcome: Progressing     Problem: DISCHARGE PLANNING  Goal: Discharge to home or other facility with appropriate resources  Description: INTERVENTIONS:- Identify barriers to discharge w/patient and caregiver- Arrange for needed discharge resources and transportation as appropriate- Identify discharge learning needs (meds, wound care, etc.)- Arrange for interpretive services to assist at discharge as needed- Refer to Case Management Department for coordinating discharge planning if the patient needs post-hospital services based on physician/advanced practitioner order or complex needs related to functional status, cognitive ability, or social support system  Outcome: Progressing     Problem: Knowledge Deficit  Goal: Patient/family/caregiver demonstrates understanding of disease process, treatment plan, medications, and discharge instructions  Description: Complete learning assessment and assess knowledge base.Interventions:- Provide teaching at level of understanding- Provide teaching via preferred learning methods  Outcome: Progressing     Problem: RESPIRATORY - ADULT  Goal: Achieves optimal ventilation and oxygenation  Description: INTERVENTIONS:- Assess for changes in respiratory  status- Assess for changes in mentation and behavior- Position to facilitate oxygenation and minimize respiratory effort- Oxygen administered by appropriate delivery if ordered- Initiate smoking cessation education as indicated- Encourage broncho-pulmonary hygiene including cough, deep breathe, Incentive Spirometry- Assess the need for suctioning and aspirate as needed- Assess and instruct to report SOB or any respiratory difficulty- Respiratory Therapy support as indicated  Outcome: Progressing

## 2025-04-18 NOTE — PLAN OF CARE
Problem: Potential for Falls  Goal: Patient will remain free of falls  Description: INTERVENTIONS:- Educate patient/family on patient safety including physical limitations- Instruct patient to call for assistance with activity - Consult OT/PT to assist with strengthening/mobility - Keep Call bell within reach- Keep bed low and locked with side rails adjusted as appropriate- Keep care items and personal belongings within reach- Initiate and maintain comfort rounds- Make Fall Risk Sign visible to staff- Offer Toileting every 2 Hours, in advance of need- Initiate/Maintain bed alarm- Obtain necessary fall risk management equipment: - Apply yellow socks and bracelet for high fall risk patients- Consider moving patient to room near nurses station  INTERVENTIONS:- Educate patient/family on patient safety including physical limitations- Instruct patient to call for assistance with activity - Consult OT/PT to assist with strengthening/mobility - Keep Call bell within reach- Keep bed low and locked with side rails adjusted as appropriate- Keep care items and personal belongings within reach- Initiate and maintain comfort rounds- Make Fall Risk Sign visible to staff- Offer Toileting every 2 Hours, in advance of need- Initiate/Maintain bed alarm- Obtain necessary fall risk management equipment: - Apply yellow socks and bracelet for high fall risk patients- Consider moving patient to room near nurses station  Outcome: Progressing     Problem: PAIN - ADULT  Goal: Verbalizes/displays adequate comfort level or baseline comfort level  Description: Interventions:- Encourage patient to monitor pain and request assistance- Assess pain using appropriate pain scale- Administer analgesics based on type and severity of pain and evaluate response- Implement non-pharmacological measures as appropriate and evaluate response- Consider cultural and social influences on pain and pain management- Notify physician/advanced practitioner if  interventions unsuccessful or patient reports new pain  Outcome: Progressing     Problem: INFECTION - ADULT  Goal: Absence or prevention of progression during hospitalization  Description: INTERVENTIONS:- Assess and monitor for signs and symptoms of infection- Monitor lab/diagnostic results- Monitor all insertion sites, i.e. indwelling lines, tubes, and drains- Monitor endotracheal if appropriate and nasal secretions for changes in amount and color- [place] appropriate cooling/warming therapies per order- Administer medications as ordered- Instruct and encourage patient and family to use good hand hygiene technique- Identify and instruct in appropriate isolation precautions for identified infection/condition  Outcome: Progressing     Problem: SAFETY ADULT  Goal: Patient will remain free of falls  Description: INTERVENTIONS:- Educate patient/family on patient safety including physical limitations- Instruct patient to call for assistance with activity - Consult OT/PT to assist with strengthening/mobility - Keep Call bell within reach- Keep bed low and locked with side rails adjusted as appropriate- Keep care items and personal belongings within reach- Initiate and maintain comfort rounds- Make Fall Risk Sign visible to staff- Offer Toileting every 2 Hours, in advance of need- Initiate/Maintain bed alarm- Obtain necessary fall risk management equipment: - Apply yellow socks and bracelet for high fall risk patients- Consider moving patient to room near nurses station  INTERVENTIONS:- Educate patient/family on patient safety including physical limitations- Instruct patient to call for assistance with activity - Consult OT/PT to assist with strengthening/mobility - Keep Call bell within reach- Keep bed low and locked with side rails adjusted as appropriate- Keep care items and personal belongings within reach- Initiate and maintain comfort rounds- Make Fall Risk Sign visible to staff- Offer Toileting every 2 Hours, in  advance of need- Initiate/Maintain bed alarm- Obtain necessary fall risk management equipment: - Apply yellow socks and bracelet for high fall risk patients- Consider moving patient to room near nurses station  Outcome: Progressing  Goal: Maintain or return to baseline ADL function  Description: INTERVENTIONS:-  Assess patient's ability to carry out ADLs; assess patient's baseline for ADL function and identify physical deficits which impact ability to perform ADLs (bathing, care of mouth/teeth, toileting, grooming, dressing, etc.)- Assess/evaluate cause of self-care deficits - Assess range of motion- Assess patient's mobility; develop plan if impaired- Assess patient's need for assistive devices and provide as appropriate- Encourage maximum independence but intervene and supervise when necessary- Involve family in performance of ADLs- Assess for home care needs following discharge - Consider OT consult to assist with ADL evaluation and planning for discharge- Provide patient education as appropriate  Outcome: Progressing  Goal: Maintains/Returns to pre admission functional level  Description: INTERVENTIONS:- Perform AM-PAC 6 Click Basic Mobility/ Daily Activity assessment daily.- Set and communicate daily mobility goal to care team and patient/family/caregiver. - Collaborate with rehabilitation services on mobility goals if consulted- Perform Range of Motion 3 times a day.- Reposition patient every 2 hours.- Dangle patient 3 times a day- Stand patient 3 times a day- Ambulate patient 3 times a day- Out of bed to chair 3 times a day - Out of bed for meals 3 times a day- Out of bed for toileting- Record patient progress and toleration of activity level   Outcome: Progressing     Problem: DISCHARGE PLANNING  Goal: Discharge to home or other facility with appropriate resources  Description: INTERVENTIONS:- Identify barriers to discharge w/patient and caregiver- Arrange for needed discharge resources and transportation as  appropriate- Identify discharge learning needs (meds, wound care, etc.)- Arrange for interpretive services to assist at discharge as needed- Refer to Case Management Department for coordinating discharge planning if the patient needs post-hospital services based on physician/advanced practitioner order or complex needs related to functional status, cognitive ability, or social support system  Outcome: Progressing     Problem: Knowledge Deficit  Goal: Patient/family/caregiver demonstrates understanding of disease process, treatment plan, medications, and discharge instructions  Description: Complete learning assessment and assess knowledge base.Interventions:- Provide teaching at level of understanding- Provide teaching via preferred learning methods  Outcome: Progressing     Problem: RESPIRATORY - ADULT  Goal: Achieves optimal ventilation and oxygenation  Description: INTERVENTIONS:- Assess for changes in respiratory status- Assess for changes in mentation and behavior- Position to facilitate oxygenation and minimize respiratory effort- Oxygen administered by appropriate delivery if ordered- Initiate smoking cessation education as indicated- Encourage broncho-pulmonary hygiene including cough, deep breathe, Incentive Spirometry- Assess the need for suctioning and aspirate as needed- Assess and instruct to report SOB or any respiratory difficulty- Respiratory Therapy support as indicated  Outcome: Progressing

## 2025-04-18 NOTE — ASSESSMENT & PLAN NOTE
- Patient was recently started on CPAP in November  - Overall had some difficulty adjusting to it but now that he has smaller nasal mask he is looking forward to restarting his therapy at home        - Outpatient follow-up per discharge instructions  - Pulmonary will sign off

## 2025-04-23 ENCOUNTER — OFFICE VISIT (OUTPATIENT)
Age: 75
End: 2025-04-23
Payer: COMMERCIAL

## 2025-04-23 VITALS
BODY MASS INDEX: 29.47 KG/M2 | HEART RATE: 64 BPM | RESPIRATION RATE: 18 BRPM | OXYGEN SATURATION: 98 % | DIASTOLIC BLOOD PRESSURE: 62 MMHG | HEIGHT: 69 IN | WEIGHT: 199 LBS | SYSTOLIC BLOOD PRESSURE: 108 MMHG

## 2025-04-23 DIAGNOSIS — R06.09 DYSPNEA ON EXERTION: ICD-10-CM

## 2025-04-23 DIAGNOSIS — R73.01 IMPAIRED FASTING GLUCOSE: ICD-10-CM

## 2025-04-23 DIAGNOSIS — N17.9 AKI (ACUTE KIDNEY INJURY) (HCC): ICD-10-CM

## 2025-04-23 DIAGNOSIS — N17.9: ICD-10-CM

## 2025-04-23 DIAGNOSIS — F41.9 ANXIETY: ICD-10-CM

## 2025-04-23 DIAGNOSIS — J13 PNEUMONIA OF RIGHT LOWER LOBE DUE TO STREPTOCOCCUS PNEUMONIAE (HCC): ICD-10-CM

## 2025-04-23 DIAGNOSIS — J90 PLEURAL EFFUSION: ICD-10-CM

## 2025-04-23 DIAGNOSIS — J96.01 ACUTE RESPIRATORY FAILURE WITH HYPOXIA (HCC): Primary | ICD-10-CM

## 2025-04-23 DIAGNOSIS — R65.20: ICD-10-CM

## 2025-04-23 DIAGNOSIS — J45.20 MILD INTERMITTENT ASTHMA WITHOUT COMPLICATION: ICD-10-CM

## 2025-04-23 DIAGNOSIS — E78.2 MIXED HYPERLIPIDEMIA: ICD-10-CM

## 2025-04-23 DIAGNOSIS — A40.3: ICD-10-CM

## 2025-04-23 PROBLEM — J96.11 CHRONIC RESPIRATORY FAILURE WITH HYPOXIA (HCC): Status: RESOLVED | Noted: 2025-04-17 | Resolved: 2025-04-23

## 2025-04-23 PROCEDURE — 99215 OFFICE O/P EST HI 40 MIN: CPT | Performed by: INTERNAL MEDICINE

## 2025-04-23 RX ORDER — LORAZEPAM 0.5 MG/1
0.5 TABLET ORAL
Qty: 30 TABLET | Refills: 0 | Status: SHIPPED | OUTPATIENT
Start: 2025-04-23

## 2025-04-23 NOTE — PROGRESS NOTES
Transition of Care Visit:  Name: Markel Alves      : 1950      MRN: [id number]  Encounter Provider: Manjit Allison MD  Encounter Date: 2025   Encounter department: [facility] INTERNAL MEDICINE [address]    Assessment & Plan  Acute respiratory failure with hypoxia (HCC)  Resolved       Mild intermittent asthma without complication  Stable, completed prednisone taper       Pleural effusion  Improving, scheduled for CT chest in May therefore will hold off on further chest x-ray and just reevaluate with scheduled CT scan       Pneumonia of right lower lobe due to Streptococcus pneumoniae (HCC)  Resolved as above       Sepsis due to Streptococcus pneumoniae with acute renal failure without septic shock, unspecified acute renal failure type (HCC)  Resolved       MODE (acute kidney injury) (HCC)  Resolved       Dyspnea on exertion  Improving       Impaired fasting glucose    Orders:    Hemoglobin A1C; Future    Mixed hyperlipidemia    Orders:    Basic metabolic panel; Future    CBC and differential; Future    Lipid panel; Future    Hepatic function panel; Future    TSH, 3rd generation with Free T4 reflex; Future    Anxiety  Sleep difficulties noted since hospitalization, likely contributed to by oxycodone and prednisone  Orders:    LORazepam (ATIVAN) 0.5 mg tablet; Take 1 tablet (0.5 mg total) by mouth daily at bedtime as needed for sleep         History of Present Illness     Transitional Care Management Review:   Markel Alves is a 74 y.o. male here for TCM follow up.     During the TCM phone call patient stated:  TCM Call (since 2025)       Hospital care reviewed  Records reviewed    Patient was hospitialized at  [facility]    Date of Admission  25    Date of discharge  25    Diagnosis  Pleural effusion    Disposition  Home    Were the patients medications reviewed and updated  Yes    Current Symptoms  None          TCM Call (since 2025)       Post hospital issues  None     Scheduled for follow up?  Yes    Did you obtain your prescribed medications  Yes    Do you need help managing your prescriptions or medications  No    Is transportation to your appointment needed  No    I have advised the patient to call PCP with any new or worsening symptoms  Rachel Mckenzie, Manager    Living Arrangements  Spouse or Significiant other    Support System  Spouse    The type of support provided  Emotional    Do you have social support  Yes, as much as I need    Are you recieving home care services  No          Transition of care.  Hospitalized April 12 through 18 with pleurisy related to parapneumonic effusion related to recent pneumococcal pneumonia.  He received 7 days of Rocephin though pleural fluid was exudative but sterile.    Since hospital discharge she is feeling about 50% better.  He is able to take a full breath.  He has not needed any pain medication.  He is now up to walking approximately 30 minutes/day with his dogs.  His energy level is still not near baseline but seems to be getting better every day.      Review of Systems   Constitutional:  Negative for appetite change, chills, diaphoresis, fatigue, fever and unexpected weight change.   HENT:  Negative for congestion, hearing loss and rhinorrhea.    Eyes:  Negative for visual disturbance.   Respiratory:  Positive for cough. Negative for chest tightness, shortness of breath and wheezing.    Cardiovascular:  Negative for chest pain, palpitations and leg swelling.   Gastrointestinal:  Negative for abdominal pain and blood in stool.   Endocrine: Negative for cold intolerance, heat intolerance, polydipsia and polyuria.   Genitourinary:  Negative for difficulty urinating, dysuria, frequency and urgency.   Musculoskeletal:  Negative for arthralgias and myalgias.   Skin:  Negative for rash.   Neurological:  Negative for dizziness, weakness, light-headedness and headaches.   Hematological:  Does not bruise/bleed easily.  "  Psychiatric/Behavioral:  Negative for dysphoric mood and sleep disturbance.      Objective   /62 (BP Location: Left arm, Patient Position: Sitting, Cuff Size: Standard)   Pulse 64   Resp 18   Ht 5' 9\" (1.753 m)   Wt 90.3 kg (199 lb)   SpO2 98%   BMI 29.39 kg/m²     Physical Exam  Constitutional:       Appearance: He is well-developed.   HENT:      Head: Normocephalic and atraumatic.      Nose: Nose normal.   Eyes:      General: No scleral icterus.     Conjunctiva/sclera: Conjunctivae normal.      Pupils: Pupils are equal, round, and reactive to light.   Neck:      Thyroid: No thyromegaly.      Vascular: No JVD.      Trachea: No tracheal deviation.   Cardiovascular:      Rate and Rhythm: Normal rate and regular rhythm.      Heart sounds: No murmur heard.     No friction rub. No gallop.   Pulmonary:      Effort: Pulmonary effort is normal. No respiratory distress.      Breath sounds: No wheezing or rales.      Comments: Slight diminished breath sounds at right base  Abdominal:      General: Bowel sounds are normal. There is no distension.      Palpations: Abdomen is soft. There is no mass.      Tenderness: There is no abdominal tenderness. There is no guarding or rebound.   Musculoskeletal:         General: No tenderness.      Cervical back: Normal range of motion and neck supple.   Lymphadenopathy:      Cervical: No cervical adenopathy.   Skin:     General: Skin is warm and dry.      Findings: No erythema or rash.   Neurological:      Mental Status: He is alert and oriented to person, place, and time.      Cranial Nerves: No cranial nerve deficit.   Psychiatric:         Behavior: Behavior normal.         Thought Content: Thought content normal.         Judgment: Judgment normal.       Medications have been reviewed by provider in current encounter      "

## 2025-04-23 NOTE — ASSESSMENT & PLAN NOTE
Orders:    Basic metabolic panel; Future    CBC and differential; Future    Lipid panel; Future    Hepatic function panel; Future    TSH, 3rd generation with Free T4 reflex; Future

## 2025-04-23 NOTE — ASSESSMENT & PLAN NOTE
Improving, scheduled for CT chest in May therefore will hold off on further chest x-ray and just reevaluate with scheduled CT scan

## 2025-05-02 ENCOUNTER — OFFICE VISIT (OUTPATIENT)
Age: 75
End: 2025-05-02
Payer: COMMERCIAL

## 2025-05-02 VITALS
WEIGHT: 196 LBS | OXYGEN SATURATION: 95 % | SYSTOLIC BLOOD PRESSURE: 110 MMHG | RESPIRATION RATE: 16 BRPM | HEART RATE: 69 BPM | HEIGHT: 69 IN | BODY MASS INDEX: 29.03 KG/M2 | DIASTOLIC BLOOD PRESSURE: 70 MMHG | TEMPERATURE: 98 F

## 2025-05-02 DIAGNOSIS — B37.0 ORAL THRUSH: ICD-10-CM

## 2025-05-02 DIAGNOSIS — G47.33 OSA (OBSTRUCTIVE SLEEP APNEA): ICD-10-CM

## 2025-05-02 DIAGNOSIS — J90 PLEURAL EFFUSION: Primary | ICD-10-CM

## 2025-05-02 PROBLEM — J18.9 PNEUMONIA: Status: RESOLVED | Noted: 2025-04-01 | Resolved: 2025-05-02

## 2025-05-02 PROBLEM — A41.9 SEPSIS WITH ACUTE RENAL FAILURE (HCC): Status: RESOLVED | Noted: 2025-04-02 | Resolved: 2025-05-02

## 2025-05-02 PROBLEM — R65.20 SEPSIS WITH ACUTE RENAL FAILURE (HCC): Status: RESOLVED | Noted: 2025-04-02 | Resolved: 2025-05-02

## 2025-05-02 PROBLEM — N17.9 SEPSIS WITH ACUTE RENAL FAILURE (HCC): Status: RESOLVED | Noted: 2025-04-02 | Resolved: 2025-05-02

## 2025-05-02 PROCEDURE — 99214 OFFICE O/P EST MOD 30 MIN: CPT | Performed by: PHYSICIAN ASSISTANT

## 2025-05-02 RX ORDER — NYSTATIN 100000 [USP'U]/ML
500000 SUSPENSION ORAL 4 TIMES DAILY
Qty: 200 ML | Refills: 0 | Status: SHIPPED | OUTPATIENT
Start: 2025-05-02 | End: 2025-05-12

## 2025-05-03 NOTE — PROGRESS NOTES
Follow-up  Visit - Pulmonary Medicine   Name: Markel Alves      : 1950      MRN: [id number]  Encounter Provider: Refugio Meyer PA-C  Encounter Date: 2025   Encounter department: [facility] PULMONARY [facility]  :  Assessment & Plan  Pleural effusion  Recent hospitalization with pleural effusion, likely parapneumonic related to recent pneumonia.  He did undergo thoracentesis which was exudative, had small residual pleural effusion  He is already scheduled for a CT scan in May.  Will hold off on any further imaging, he is just about back to his baseline activity level, occasionally has slight discomfort on the right side.       Oral thrush  Oral thrush likely related to recent antibiotic use, was also on steroids for gout  Orders:    nystatin (MYCOSTATIN) 500,000 units/5 mL suspension; Apply 5 mL (500,000 Units total) to the mouth or throat 4 (four) times a day for 10 days    NATASHA (obstructive sleep apnea)  CPAP managed by pulmonologist at [facility], he is having trouble with the mask.  Did recommend he try different masks before giving up on the CPAP.  He would possibly be a candidate for Inspire although he is not interested in implantable device at this time.         No follow-ups on file.    History of Present Illness   Markel Alves is a 74 y.o. male with remote smoking history with past medical history of asthma, CKD, hypertension who presents for hospital follow-up.  He recently hospitalized with severe right-sided pleuritic chest pain found to have pleural effusion that was drained.  He is feeling much improved, occasionally will have some discomfort on the right side but otherwise is basically back to his regular activity.    Review of Systems   Constitutional: Negative.    HENT: Negative.     Respiratory: Negative.     Cardiovascular: Negative.    Gastrointestinal: Negative.    Genitourinary: Negative.    Musculoskeletal: Negative.    Skin: Negative.    Allergic/Immunologic:  Negative.    Neurological: Negative.    Psychiatric/Behavioral: Negative.         Aside from what is mentioned in the HPI, ROS is otherwise negative    Current Outpatient Medications on File Prior to Visit   Medication Sig Dispense Refill    acetaminophen (TYLENOL) 325 mg tablet Take 650 mg by mouth every 6 (six) hours as needed for mild pain      albuterol (Ventolin HFA) 90 mcg/act inhaler Inhale 2 puffs every 6 (six) hours as needed for wheezing 18 g 2    amLODIPine (NORVASC) 5 mg tablet take 1 tablet by mouth once daily 90 tablet 1    aspirin 81 mg chewable tablet chew and swallow 1 tablet by mouth once daily 30 tablet 5    atorvastatin (LIPITOR) 40 mg tablet take 1 tablet by mouth once daily 90 tablet 1    carvedilol (COREG) 12.5 mg tablet take 2 tablets by mouth twice a day with food 360 tablet 1    colchicine (COLCRYS) 0.6 mg tablet 1 po qd, up to tid for acute flare. 90 tablet 5    fluticasone (FLONASE) 50 mcg/act nasal spray 1 spray into each nostril daily 16 g 3    guaiFENesin (MUCINEX) 600 mg 12 hr tablet Take 1 tablet (600 mg total) by mouth every 12 (twelve) hours      ipratropium-albuterol (DUO-NEB) 0.5-2.5 mg/3 mL nebulizer solution Take 3 mL by nebulization 3 (three) times a day 120 mL 1    lisinopril (ZESTRIL) 20 mg tablet Take 1 tablet (20 mg total) by mouth 2 (two) times a day 180 tablet 3    LORazepam (ATIVAN) 0.5 mg tablet Take 1 tablet (0.5 mg total) by mouth daily at bedtime as needed for sleep 30 tablet 0    montelukast (SINGULAIR) 10 mg tablet take 1 tablet by mouth every evening 330 tablet 0    polyethylene glycol (MIRALAX) 17 g packet Take 17 g by mouth daily      tamsulosin (FLOMAX) 0.4 mg Take 1 capsule (0.4 mg total) by mouth in the morning 30 capsule 3    triamcinolone (KENALOG) 0.1 % cream Apply topically 2 (two) times a day To the rash on the chest as needed. 453 g 1     No current facility-administered medications on file prior to visit.         Medical History Reviewed by provider  "this encounter:     .    Objective   /70 (BP Location: Right arm, Patient Position: Sitting, Cuff Size: Large)   Pulse 69   Temp 98 °F (36.7 °C) (Oral)   Resp 16   Ht 5' 9\" (1.753 m)   Wt 88.9 kg (196 lb)   SpO2 95%   BMI 28.94 kg/m²     Physical Exam  Vitals reviewed.   Constitutional:       General: He is not in acute distress.     Appearance: Normal appearance. He is well-developed. He is not ill-appearing.   HENT:      Head: Normocephalic and atraumatic.      Mouth/Throat:      Pharynx: Oropharynx is clear.   Eyes:      Pupils: Pupils are equal, round, and reactive to light.   Cardiovascular:      Rate and Rhythm: Normal rate and regular rhythm.   Pulmonary:      Effort: Pulmonary effort is normal. No respiratory distress.      Breath sounds: Examination of the right-lower field reveals decreased breath sounds. Decreased breath sounds present. No wheezing, rhonchi or rales.   Abdominal:      General: Abdomen is flat. There is no distension.   Musculoskeletal:         General: Normal range of motion.      Cervical back: Normal range of motion.      Right lower leg: No edema.      Left lower leg: No edema.   Skin:     General: Skin is warm and dry.      Findings: No rash.   Neurological:      Mental Status: He is alert and oriented to person, place, and time.   Psychiatric:         Mood and Affect: Mood normal.         Behavior: Behavior normal.           Diagnostic Data:  Labs: I personally reviewed the most recent laboratory data pertinent to today's visit.  Reviewed pleural fluid labs    Radiology results:  Radiology Results Review: I have reviewed radiology reports from hospitalization including: chest xray and CT chest.      PFT/spirometry results:  No results found for: \"FEV1\", \"FVC\", \"LVG3KNX\", \"TLC\", \"DLCO\"       Oximetry testing:  He did not require O2 upon discharge    Other studies:      Refugio Meyer PA-C      "

## 2025-05-03 NOTE — ASSESSMENT & PLAN NOTE
Recent hospitalization with pleural effusion, likely parapneumonic related to recent pneumonia.  He did undergo thoracentesis which was exudative, had small residual pleural effusion  He is already scheduled for a CT scan in May.  Will hold off on any further imaging, he is just about back to his baseline activity level, occasionally has slight discomfort on the right side.

## 2025-05-03 NOTE — ASSESSMENT & PLAN NOTE
CPAP managed by pulmonologist at [facility], he is having trouble with the mask.  Did recommend he try different masks before giving up on the CPAP.  He would possibly be a candidate for Inspire although he is not interested in implantable device at this time.

## 2025-05-11 ENCOUNTER — PATIENT MESSAGE (OUTPATIENT)
Age: 75
End: 2025-05-11

## 2025-05-11 DIAGNOSIS — N40.0 BPH WITHOUT URINARY OBSTRUCTION: ICD-10-CM

## 2025-05-11 RX ORDER — TAMSULOSIN HYDROCHLORIDE 0.4 MG/1
0.4 CAPSULE ORAL EVERY MORNING
Qty: 90 CAPSULE | Refills: 0 | Status: SHIPPED | OUTPATIENT
Start: 2025-05-11

## 2025-05-12 ENCOUNTER — TELEPHONE (OUTPATIENT)
Age: 75
End: 2025-05-12

## 2025-05-19 ENCOUNTER — HOSPITAL ENCOUNTER (OUTPATIENT)
Dept: CT IMAGING | Facility: HOSPITAL | Age: 75
Discharge: HOME | End: 2025-05-19
Payer: COMMERCIAL

## 2025-05-19 DIAGNOSIS — I71.20 THORACIC AORTIC ANEURYSM (TAA), UNSPECIFIED PART, UNSPECIFIED WHETHER RUPTURED (HCC): ICD-10-CM

## 2025-05-19 PROCEDURE — 71250 CT THORAX DX C-: CPT

## 2025-05-27 DIAGNOSIS — I71.21 ANEURYSM OF ASCENDING AORTA WITHOUT RUPTURE (HCC): Primary | ICD-10-CM

## 2025-05-30 ENCOUNTER — RESULTS FOLLOW-UP (OUTPATIENT)
Dept: NON INVASIVE DIAGNOSTICS | Facility: HOSPITAL | Age: 75
End: 2025-05-30

## 2025-06-26 ENCOUNTER — TELEPHONE (OUTPATIENT)
Age: 75
End: 2025-06-26

## 2025-08-07 ENCOUNTER — TELEPHONE (OUTPATIENT)
Age: 75
End: 2025-08-07